# Patient Record
Sex: FEMALE | Race: WHITE | NOT HISPANIC OR LATINO | Employment: OTHER | URBAN - METROPOLITAN AREA
[De-identification: names, ages, dates, MRNs, and addresses within clinical notes are randomized per-mention and may not be internally consistent; named-entity substitution may affect disease eponyms.]

---

## 2017-01-16 ENCOUNTER — HOSPITAL ENCOUNTER (OUTPATIENT)
Dept: PAIN MEDICINE | Facility: HOSPITAL | Age: 82
Discharge: HOME | End: 2017-01-16
Attending: ANESTHESIOLOGY | Admitting: ANESTHESIOLOGY

## 2017-01-16 LAB
AMPHETAMINES UR QL SCN: NEGATIVE
BZE UR QL SCN: NEGATIVE
CREAT 24H UR-MCNC: ABNORMAL MG/DL
METHADONE UR QL SCN: NEGATIVE
OPIATE CONFIRMATION URINE: ABNORMAL
THC SERPLBLD CFM-MCNC: NEGATIVE NG/ML

## 2017-11-17 ENCOUNTER — HOSPITAL ENCOUNTER (OUTPATIENT)
Dept: PAIN MEDICINE | Facility: HOSPITAL | Age: 82
Discharge: HOME | End: 2017-11-17
Attending: ANESTHESIOLOGY | Admitting: ANESTHESIOLOGY

## 2017-11-22 ENCOUNTER — HOSPITAL ENCOUNTER (OUTPATIENT)
Dept: LAB | Facility: HOSPITAL | Age: 82
Discharge: HOME | End: 2017-11-22
Attending: NURSE PRACTITIONER | Admitting: NURSE PRACTITIONER

## 2017-11-22 LAB
ABS VARIANT LYMPHS: 0.18 10*3/UL
ALBUMIN SERPL-MCNC: 3.4 G/DL (ref 3.5–4.8)
ALBUMIN/GLOB SERPL: 0.8 {RATIO} (ref 1–1.7)
ALP SERPL-CCNC: 76 IU/L (ref 32–91)
ALT SERPL-CCNC: 10 IU/L (ref 14–54)
ANION GAP SERPL CALC-SCNC: 14.3 MMOL/L (ref 10–20)
AST SERPL-CCNC: 19 IU/L (ref 15–41)
BILIRUB SERPL-MCNC: 1.3 MG/DL (ref 0.3–1.2)
BUN SERPL-MCNC: 30 MG/DL (ref 8–20)
BUN/CREAT SERPL: 17.6 (ref 5.4–26.2)
CALCIUM SERPL-MCNC: 9 MG/DL (ref 8.9–10.3)
CHLORIDE SERPL-SCNC: 95 MMOL/L (ref 101–111)
CONV ABS BANDS: 1.7 10*3/UL
CONV ANISOCYTES: SLIGHT
CONV CO2: 27 MMOL/L (ref 22–32)
CONV DOHLE BODY IN BLOOD BY LIGHT MICROSCOPY: (no result)
CONV TOTAL PROTEIN: 7.5 G/DL (ref 6.1–7.9)
CONV TOXIC GRANULES IN BLOOD BY LIGHT MICROSCOPY: SLIGHT
CONV VARIANT LYMPHOCYTES RELATIVE PERCENT BY MANUAL COUNT: 1 % (ref 0–1)
CREAT UR-MCNC: 1.7 MG/DL (ref 0.4–1)
DIFFERENTIAL METHOD BLD: (no result)
ERYTHROCYTE [DISTWIDTH] IN BLOOD BY AUTOMATED COUNT: 14.9 % (ref 11.5–14.5)
GLOBULIN UR ELPH-MCNC: 4.1 G/DL (ref 2.5–3.8)
GLUCOSE SERPL-MCNC: 132 MG/DL (ref 65–99)
HCT VFR BLD AUTO: 34.7 % (ref 35–49)
HGB BLD-MCNC: 11.3 G/DL (ref 12–15)
LYMPHOCYTES # BLD AUTO: 0.6 10*3/UL (ref 0.8–4.8)
LYMPHOCYTES NFR BLD AUTO: 3 % (ref 18–42)
MCH RBC QN AUTO: 28.2 PG (ref 26–32)
MCHC RBC AUTO-ENTMCNC: 32.7 G/DL (ref 32–36)
MCV RBC AUTO: 86.4 FL (ref 80–94)
MONOCYTES # BLD AUTO: 0.4 10*3/UL (ref 0.1–1.3)
MONOCYTES NFR BLD AUTO: 2 % (ref 2–11)
NEUTROPHILS # BLD AUTO: 15.5 10*3/UL (ref 2.3–8.6)
NEUTROPHILS NFR BLD AUTO: 85 % (ref 50–75)
NEUTS BAND NFR BLD MANUAL: 9 % (ref 0–5)
PLATELET # BLD AUTO: 296 10*3/UL (ref 150–450)
PMV BLD AUTO: 7.2 FL (ref 7.4–10.4)
POTASSIUM SERPL-SCNC: 4.3 MMOL/L (ref 3.6–5.1)
RBC # BLD AUTO: 4.02 10*6/UL (ref 4–5.4)
SODIUM SERPL-SCNC: 132 MMOL/L (ref 136–144)
WBC # BLD AUTO: 18.4 10*3/UL (ref 4.5–11.5)

## 2017-12-08 ENCOUNTER — HOSPITAL ENCOUNTER (OUTPATIENT)
Dept: HOME HEALTH SERVICES | Facility: HOME HEALTHCARE | Age: 82
Setting detail: SPECIMEN
Discharge: HOME | End: 2017-12-08
Attending: FAMILY MEDICINE | Admitting: FAMILY MEDICINE

## 2017-12-08 LAB
ANION GAP SERPL CALC-SCNC: 11.4 MMOL/L (ref 10–20)
BUN SERPL-MCNC: 29 MG/DL (ref 8–20)
BUN/CREAT SERPL: 29 (ref 5.4–26.2)
CALCIUM SERPL-MCNC: 8.8 MG/DL (ref 8.9–10.3)
CHLORIDE SERPL-SCNC: 94 MMOL/L (ref 101–111)
CONV CO2: 28 MMOL/L (ref 22–32)
CREAT UR-MCNC: 1 MG/DL (ref 0.4–1)
GLUCOSE SERPL-MCNC: 126 MG/DL (ref 65–99)
POTASSIUM SERPL-SCNC: 4.4 MMOL/L (ref 3.6–5.1)
SODIUM SERPL-SCNC: 129 MMOL/L (ref 136–144)

## 2019-01-28 ENCOUNTER — HOSPITAL ENCOUNTER (OUTPATIENT)
Dept: CARDIOLOGY | Facility: HOSPITAL | Age: 84
Discharge: HOME | End: 2019-01-28
Attending: INTERNAL MEDICINE | Admitting: INTERNAL MEDICINE

## 2019-06-27 DIAGNOSIS — I48.20 CHRONIC ATRIAL FIBRILLATION (HCC): Primary | ICD-10-CM

## 2019-06-27 DIAGNOSIS — Z79.01 LONG TERM (CURRENT) USE OF ANTICOAGULANTS: ICD-10-CM

## 2019-06-27 RX ORDER — WARFARIN SODIUM 2.5 MG/1
TABLET ORAL
Qty: 90 TABLET | Refills: 0 | Status: SHIPPED | OUTPATIENT
Start: 2019-06-27 | End: 2019-10-09 | Stop reason: SDUPTHER

## 2019-07-23 ENCOUNTER — ANTICOAGULATION VISIT (OUTPATIENT)
Dept: CARDIOLOGY | Facility: CLINIC | Age: 84
End: 2019-07-23

## 2019-07-23 VITALS — DIASTOLIC BLOOD PRESSURE: 58 MMHG | SYSTOLIC BLOOD PRESSURE: 164 MMHG | WEIGHT: 103 LBS | HEART RATE: 74 BPM

## 2019-07-23 DIAGNOSIS — Z79.01 LONG TERM (CURRENT) USE OF ANTICOAGULANTS: ICD-10-CM

## 2019-07-23 DIAGNOSIS — I48.20 CHRONIC ATRIAL FIBRILLATION (HCC): ICD-10-CM

## 2019-07-23 PROBLEM — I48.91 ATRIAL FIBRILLATION: Status: ACTIVE | Noted: 2019-07-23

## 2019-07-23 LAB — INR PPP: 2.3 (ref 2–3)

## 2019-07-23 PROCEDURE — 85610 PROTHROMBIN TIME: CPT | Performed by: INTERNAL MEDICINE

## 2019-07-23 PROCEDURE — 36416 COLLJ CAPILLARY BLOOD SPEC: CPT | Performed by: INTERNAL MEDICINE

## 2019-08-05 ENCOUNTER — CLINICAL SUPPORT NO REQUIREMENTS (OUTPATIENT)
Dept: CARDIOLOGY | Facility: CLINIC | Age: 84
End: 2019-08-05

## 2019-08-05 DIAGNOSIS — I48.20 CHRONIC ATRIAL FIBRILLATION (HCC): ICD-10-CM

## 2019-08-05 DIAGNOSIS — Z95.0 PACEMAKER: Primary | ICD-10-CM

## 2019-08-05 DIAGNOSIS — I49.5 TACHY-BRADY SYNDROME (HCC): ICD-10-CM

## 2019-09-03 ENCOUNTER — ANTICOAGULATION VISIT (OUTPATIENT)
Dept: CARDIOLOGY | Facility: CLINIC | Age: 84
End: 2019-09-03

## 2019-09-03 VITALS — HEART RATE: 70 BPM | WEIGHT: 101 LBS | DIASTOLIC BLOOD PRESSURE: 71 MMHG | SYSTOLIC BLOOD PRESSURE: 163 MMHG

## 2019-09-03 DIAGNOSIS — Z79.01 LONG TERM (CURRENT) USE OF ANTICOAGULANTS: ICD-10-CM

## 2019-09-03 DIAGNOSIS — I48.20 CHRONIC ATRIAL FIBRILLATION (HCC): ICD-10-CM

## 2019-09-03 LAB — INR PPP: 2 (ref 0.9–1.1)

## 2019-09-03 PROCEDURE — 36416 COLLJ CAPILLARY BLOOD SPEC: CPT | Performed by: INTERNAL MEDICINE

## 2019-09-03 PROCEDURE — 85610 PROTHROMBIN TIME: CPT | Performed by: INTERNAL MEDICINE

## 2019-09-24 ENCOUNTER — CLINICAL SUPPORT NO REQUIREMENTS (OUTPATIENT)
Dept: CARDIOLOGY | Facility: CLINIC | Age: 84
End: 2019-09-24

## 2019-09-24 DIAGNOSIS — I49.5 SICK SINUS SYNDROME (HCC): Primary | ICD-10-CM

## 2019-09-24 DIAGNOSIS — Z95.0 PACEMAKER: ICD-10-CM

## 2019-09-24 PROCEDURE — 93296 REM INTERROG EVL PM/IDS: CPT | Performed by: INTERNAL MEDICINE

## 2019-09-24 PROCEDURE — 93294 REM INTERROG EVL PM/LDLS PM: CPT | Performed by: INTERNAL MEDICINE

## 2019-10-08 ENCOUNTER — ANTICOAGULATION VISIT (OUTPATIENT)
Dept: CARDIOLOGY | Facility: CLINIC | Age: 84
End: 2019-10-08

## 2019-10-08 VITALS — WEIGHT: 104 LBS | HEART RATE: 68 BPM | DIASTOLIC BLOOD PRESSURE: 70 MMHG | SYSTOLIC BLOOD PRESSURE: 166 MMHG

## 2019-10-08 DIAGNOSIS — I48.21 PERMANENT ATRIAL FIBRILLATION (HCC): ICD-10-CM

## 2019-10-08 DIAGNOSIS — Z79.01 LONG TERM (CURRENT) USE OF ANTICOAGULANTS: ICD-10-CM

## 2019-10-08 LAB — INR PPP: 1.7 (ref 0.9–1.1)

## 2019-10-08 PROCEDURE — 85610 PROTHROMBIN TIME: CPT | Performed by: INTERNAL MEDICINE

## 2019-10-08 PROCEDURE — 36416 COLLJ CAPILLARY BLOOD SPEC: CPT | Performed by: INTERNAL MEDICINE

## 2019-10-09 DIAGNOSIS — Z79.01 LONG TERM (CURRENT) USE OF ANTICOAGULANTS: ICD-10-CM

## 2019-10-09 DIAGNOSIS — I48.20 CHRONIC ATRIAL FIBRILLATION (HCC): ICD-10-CM

## 2019-10-09 RX ORDER — WARFARIN SODIUM 2.5 MG/1
TABLET ORAL
Qty: 90 TABLET | Refills: 0 | Status: SHIPPED | OUTPATIENT
Start: 2019-10-09 | End: 2019-12-30

## 2019-11-20 ENCOUNTER — CLINICAL SUPPORT NO REQUIREMENTS (OUTPATIENT)
Dept: CARDIOLOGY | Facility: CLINIC | Age: 84
End: 2019-11-20

## 2019-11-20 DIAGNOSIS — Z95.0 PACEMAKER: ICD-10-CM

## 2019-11-20 DIAGNOSIS — I49.5 TACHY-BRADY SYNDROME (HCC): Primary | ICD-10-CM

## 2019-11-26 ENCOUNTER — ANTICOAGULATION VISIT (OUTPATIENT)
Dept: CARDIOLOGY | Facility: CLINIC | Age: 84
End: 2019-11-26

## 2019-11-26 VITALS — SYSTOLIC BLOOD PRESSURE: 145 MMHG | HEART RATE: 76 BPM | WEIGHT: 103 LBS | DIASTOLIC BLOOD PRESSURE: 59 MMHG

## 2019-11-26 DIAGNOSIS — I48.21 PERMANENT ATRIAL FIBRILLATION (HCC): ICD-10-CM

## 2019-11-26 DIAGNOSIS — Z79.01 LONG TERM (CURRENT) USE OF ANTICOAGULANTS: ICD-10-CM

## 2019-11-26 LAB — INR PPP: 1.7 (ref 0.9–1.1)

## 2019-11-26 PROCEDURE — 85610 PROTHROMBIN TIME: CPT | Performed by: INTERNAL MEDICINE

## 2019-11-26 PROCEDURE — 36416 COLLJ CAPILLARY BLOOD SPEC: CPT | Performed by: INTERNAL MEDICINE

## 2019-12-27 RX ORDER — ACETAMINOPHEN 500 MG
TABLET ORAL
COMMUNITY
Start: 2016-01-26 | End: 2020-04-11

## 2019-12-27 RX ORDER — DIGOXIN 125 MCG
125 TABLET ORAL EVERY 24 HOURS
COMMUNITY
Start: 2014-08-15 | End: 2020-10-07

## 2019-12-27 RX ORDER — ASPIRIN 81 MG/1
81 TABLET ORAL DAILY
COMMUNITY
Start: 2014-08-15 | End: 2020-10-07

## 2019-12-27 RX ORDER — TRAMADOL HYDROCHLORIDE 50 MG/1
50 TABLET ORAL
COMMUNITY
Start: 2014-08-15 | End: 2020-10-07

## 2019-12-27 RX ORDER — LISINOPRIL 5 MG/1
5 TABLET ORAL EVERY 24 HOURS
COMMUNITY
Start: 2016-01-26 | End: 2020-10-07

## 2019-12-27 RX ORDER — AMLODIPINE BESYLATE 5 MG/1
TABLET ORAL EVERY 24 HOURS
COMMUNITY
Start: 2019-01-23 | End: 2020-01-20

## 2019-12-30 DIAGNOSIS — Z79.01 LONG TERM (CURRENT) USE OF ANTICOAGULANTS: ICD-10-CM

## 2019-12-30 DIAGNOSIS — I48.20 CHRONIC ATRIAL FIBRILLATION (HCC): ICD-10-CM

## 2019-12-30 RX ORDER — WARFARIN SODIUM 2.5 MG/1
TABLET ORAL
Qty: 100 TABLET | Refills: 0 | Status: SHIPPED | OUTPATIENT
Start: 2019-12-30 | End: 2020-01-28

## 2020-01-06 DIAGNOSIS — I48.20 CHRONIC ATRIAL FIBRILLATION (HCC): ICD-10-CM

## 2020-01-06 DIAGNOSIS — Z79.01 LONG TERM (CURRENT) USE OF ANTICOAGULANTS: ICD-10-CM

## 2020-01-07 ENCOUNTER — ANTICOAGULATION VISIT (OUTPATIENT)
Dept: CARDIOLOGY | Facility: CLINIC | Age: 85
End: 2020-01-07

## 2020-01-07 ENCOUNTER — OFFICE VISIT (OUTPATIENT)
Dept: CARDIOLOGY | Facility: CLINIC | Age: 85
End: 2020-01-07

## 2020-01-07 ENCOUNTER — CLINICAL SUPPORT NO REQUIREMENTS (OUTPATIENT)
Dept: CARDIOLOGY | Facility: CLINIC | Age: 85
End: 2020-01-07

## 2020-01-07 VITALS — WEIGHT: 105 LBS | HEART RATE: 79 BPM | SYSTOLIC BLOOD PRESSURE: 139 MMHG | DIASTOLIC BLOOD PRESSURE: 61 MMHG

## 2020-01-07 VITALS
BODY MASS INDEX: 20.87 KG/M2 | HEART RATE: 79 BPM | HEIGHT: 59 IN | DIASTOLIC BLOOD PRESSURE: 61 MMHG | SYSTOLIC BLOOD PRESSURE: 139 MMHG | WEIGHT: 103.5 LBS

## 2020-01-07 DIAGNOSIS — Z79.01 LONG TERM (CURRENT) USE OF ANTICOAGULANTS: ICD-10-CM

## 2020-01-07 DIAGNOSIS — Z95.0 PACEMAKER: ICD-10-CM

## 2020-01-07 DIAGNOSIS — I48.0 PAROXYSMAL ATRIAL FIBRILLATION (HCC): Primary | ICD-10-CM

## 2020-01-07 DIAGNOSIS — I48.21 PERMANENT ATRIAL FIBRILLATION (HCC): ICD-10-CM

## 2020-01-07 DIAGNOSIS — I49.5 TACHY-BRADY SYNDROME (HCC): Primary | ICD-10-CM

## 2020-01-07 DIAGNOSIS — I49.5 TACHY-BRADY SYNDROME (HCC): ICD-10-CM

## 2020-01-07 LAB — INR PPP: 2 (ref 0.9–1.1)

## 2020-01-07 PROCEDURE — 93000 ELECTROCARDIOGRAM COMPLETE: CPT | Performed by: INTERNAL MEDICINE

## 2020-01-07 PROCEDURE — 99214 OFFICE O/P EST MOD 30 MIN: CPT | Performed by: INTERNAL MEDICINE

## 2020-01-07 PROCEDURE — 93280 PM DEVICE PROGR EVAL DUAL: CPT | Performed by: INTERNAL MEDICINE

## 2020-01-07 PROCEDURE — 85610 PROTHROMBIN TIME: CPT | Performed by: INTERNAL MEDICINE

## 2020-01-07 PROCEDURE — 36416 COLLJ CAPILLARY BLOOD SPEC: CPT | Performed by: INTERNAL MEDICINE

## 2020-01-07 RX ORDER — WARFARIN SODIUM 2.5 MG/1
TABLET ORAL
Qty: 90 TABLET | Refills: 0 | OUTPATIENT
Start: 2020-01-07

## 2020-01-07 NOTE — TELEPHONE ENCOUNTER
Called spoke with patient she did not remember requesting the med in December advised we sent refill 12/30/19 and rec'd pharmacy confirmation. Per patient okay to cancel new request.

## 2020-01-07 NOTE — PROGRESS NOTES
Encounter Date:01/07/2020  Last seen 6/11/2019      Patient ID: Alexandrea Gaxiola is a 87 y.o. female.    Chief Complaint:  Status post pacemaker  Paroxysmal atrial fibrillation  Anticoagulation management  Hypertension      History of Present Illness  Since I have last seen, the patient has been without any chest discomfort ,shortness of breath, palpitations, dizziness or syncope.  Denies having any headache ,abdominal pain ,nausea, vomiting , diarrhea constipation, loss of weight or loss of appetite.  Denies having any excessive bruising ,hematuria or blood in the stool.    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  Pacemaker was reprogrammed to DDDR due to long AV delays.      -history of intermittent atrial fibrillation.  Patient is in Sinus rhythm.  Patient is on Coumadin.  INR is   1.6     -moderate mitral regurgitation.  Echocardiogram 01/28/2019 revealed moderate mitral regurgitation left atrial enlargement normal left ventricle function.      -  Status post  subendocardial myocardial infarction -improved.     Cardiac catheterization 12/29/2015 revealed mild anterior wall hypokinesis with ejection fraction of 50%, 40% mid LAD 70-80% ostial diagonal branch disease (small caliber vessel) and 80% circumflex distal to a large marginal branch.      -status post left carotid endarterectomy cervical spine surgery left hip replacement and recent scalp surgery for carcinoma and grafting.     -hypertension-not well controlled.      -allergy to codeine, morphine and Demerol.     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     ===============    Plan  ================  Patient is not having any angina pectoris or congestive heart failure.  EKG showed dual-chamber pacemaker rhythm at a rate of  60/min.  Anticoagulation status was reviewed.  INR is 2.0 INR   1.6  Continue Coumadin.    Continue atenolol to 25 mg tablet 2 tablets in the morning and 1 tablet in the evening.  Continue amlodipine to 5 mg a day  Interrogation of the pacemaker revealed excellent pacing parameters.  Battery status is 7 months   Medications were reviewed and updated.  Follow-up in the office in 6 months with pacemaker interrogation.  Have discussed with patient regarding possible need for pulse generator replacement in the near future.  ////////////////////////////             Diagnosis Plan   1. Paroxysmal atrial fibrillation (CMS/HCC)     2. Pacemaker     3. Tachy-ramiro syndrome (CMS/HCC)     4. Long term (current) use of anticoagulants [Z79.01]     LAB RESULTS (LAST 7 DAYS)    CBC        BMP        CMP         BNP        TROPONIN        CoAg  Results from last 7 days   Lab Units 01/07/20  1054   INR  2.00*       Creatinine Clearance  CrCl cannot be calculated (Patient's most recent lab result is older than the maximum 30 days allowed.).    ABG        Radiology  No radiology results for the last day                The following portions of the patient's history were reviewed and updated as appropriate: allergies, current medications, past family history, past medical history, past social history, past surgical history and problem list.    Review of Systems   Constitution: Negative for malaise/fatigue.   Cardiovascular: Positive for leg swelling (some swelling in feet). Negative for chest pain, palpitations and syncope.   Respiratory: Negative for shortness of breath.    Skin: Negative for rash.   Gastrointestinal: Negative for nausea and vomiting.   Neurological: Negative for dizziness, light-headedness and numbness.         Current Outpatient Medications:   •  amLODIPine (NORVASC) 5 MG tablet, Daily., Disp: , Rfl:   •  aspirin (ASPIR-LOW) 81 MG EC tablet, ASPIR-LOW 81 MG TBEC, Disp: , Rfl:   •  Calcium Carbonate-Vit D-Min  "(CALCIUM 1200) 7746-4600 MG-UNIT chewable tablet, CALCIUM 1200 6078-8361 MG-UNIT CHEW, Disp: , Rfl:   •  digoxin (LANOXIN) 125 MCG tablet, Daily., Disp: , Rfl:   •  lisinopril (PRINIVIL,ZESTRIL) 5 MG tablet, Daily., Disp: , Rfl:   •  metoprolol tartrate (LOPRESSOR) 25 MG tablet, Every 12 (Twelve) Hours., Disp: , Rfl:   •  Multiple Vitamins-Minerals (MULTI VITAMIN/MINERALS) tablet, MULTI VITAMIN/MINERALS TABS, Disp: , Rfl:   •  traMADol (ULTRAM) 50 MG tablet, TRAMADOL HCL 50 MG TABS, Disp: , Rfl:   •  warfarin (COUMADIN) 2.5 MG tablet, Take 2 tablets by mouth Wednesday and 1 tablet by mouth all other days or as directed., Disp: 100 tablet, Rfl: 0    Allergies   Allergen Reactions   • Codeine Other (See Comments)   • Hydrocodone-Acetaminophen Other (See Comments)   • Meperidine Other (See Comments)   • Morphine Other (See Comments)       History reviewed. No pertinent family history.    History reviewed. No pertinent surgical history.    History reviewed. No pertinent past medical history.    History reviewed. No pertinent family history.    Social History     Socioeconomic History   • Marital status:      Spouse name: Not on file   • Number of children: Not on file   • Years of education: Not on file   • Highest education level: Not on file   Tobacco Use   • Smoking status: Never Smoker   • Smokeless tobacco: Never Used   Substance and Sexual Activity   • Alcohol use: Never     Frequency: Never           ECG 12 Lead  Date/Time: 1/7/2020 11:32 AM  Performed by: Mignon Luke MD  Authorized by: Mignon Luke MD   Comparison: compared with previous ECG   Similar to previous ECG  Comments: Dual-chamber pacemaker rhythm 62/min nonspecific ST-T wave changes left anterior vascular block no ectopy compared to 6/3/2019 no significant changes seen              Objective:       Physical Exam    /61   Pulse 79   Ht 149.9 cm (59\")   Wt 46.9 kg (103 lb 8 oz)   BMI 20.90 kg/m²   The patient is alert, " oriented and in no distress.    Vital signs as noted above.    Head and neck revealed no carotid bruits or jugular venous distension.  No thyromegaly or lymphadenopathy is present.    Lungs clear.  No wheezing.  Breath sounds are normal bilaterally.    Heart normal first and second heart sounds.  No murmur..  No pericardial rub is present.  No gallop is present.    Abdomen soft and nontender.  No organomegaly is present.    Extremities revealed good peripheral pulses without any pedal edema.    Skin warm and dry.  Pacemaker site looks normal.    Musculoskeletal system is grossly normal.    CNS grossly normal.

## 2020-01-20 RX ORDER — AMLODIPINE BESYLATE 5 MG/1
TABLET ORAL
Qty: 90 TABLET | Refills: 1 | Status: SHIPPED | OUTPATIENT
Start: 2020-01-20 | End: 2020-04-11

## 2020-01-28 DIAGNOSIS — Z79.01 LONG TERM (CURRENT) USE OF ANTICOAGULANTS: ICD-10-CM

## 2020-01-28 DIAGNOSIS — I48.20 CHRONIC ATRIAL FIBRILLATION (HCC): ICD-10-CM

## 2020-01-28 RX ORDER — WARFARIN SODIUM 2.5 MG/1
TABLET ORAL
Qty: 100 TABLET | Refills: 0 | Status: SHIPPED | OUTPATIENT
Start: 2020-01-28 | End: 2020-04-11

## 2020-02-18 ENCOUNTER — ANTICOAGULATION VISIT (OUTPATIENT)
Dept: CARDIOLOGY | Facility: CLINIC | Age: 85
End: 2020-02-18

## 2020-02-18 VITALS
HEART RATE: 74 BPM | BODY MASS INDEX: 21.01 KG/M2 | DIASTOLIC BLOOD PRESSURE: 62 MMHG | SYSTOLIC BLOOD PRESSURE: 145 MMHG | WEIGHT: 104 LBS

## 2020-02-18 DIAGNOSIS — I48.0 PAROXYSMAL ATRIAL FIBRILLATION (HCC): ICD-10-CM

## 2020-02-18 DIAGNOSIS — Z79.01 LONG TERM (CURRENT) USE OF ANTICOAGULANTS: ICD-10-CM

## 2020-02-18 LAB — INR PPP: 1.2 (ref 0.9–1.1)

## 2020-02-18 PROCEDURE — 85610 PROTHROMBIN TIME: CPT | Performed by: INTERNAL MEDICINE

## 2020-02-18 PROCEDURE — 36416 COLLJ CAPILLARY BLOOD SPEC: CPT | Performed by: INTERNAL MEDICINE

## 2020-03-10 PROCEDURE — 88108 CYTOPATH CONCENTRATE TECH: CPT | Performed by: RADIOLOGY

## 2020-03-11 ENCOUNTER — LAB REQUISITION (OUTPATIENT)
Dept: LAB | Facility: HOSPITAL | Age: 85
End: 2020-03-11

## 2020-03-11 DIAGNOSIS — Z00.00 ENCOUNTER FOR GENERAL ADULT MEDICAL EXAMINATION WITHOUT ABNORMAL FINDINGS: ICD-10-CM

## 2020-03-12 LAB
LAB AP CASE REPORT: NORMAL
PATH REPORT.FINAL DX SPEC: NORMAL
PATH REPORT.GROSS SPEC: NORMAL

## 2020-03-17 ENCOUNTER — TELEPHONE (OUTPATIENT)
Dept: CARDIOLOGY | Facility: CLINIC | Age: 85
End: 2020-03-17

## 2020-03-17 NOTE — TELEPHONE ENCOUNTER
Spoke to daughter Qi and advised her that it was not necessary to bring Alexandrea out for INR if she was not having any problems. Did encourage her to speak to PCP about possible liver function studies d/t yellowing of eyes. cj

## 2020-03-17 NOTE — TELEPHONE ENCOUNTER
Qi-daughter [phone], called stating that she does not want to get her mother out right now, she has been weak and she has noticed some yellowing of her eyes. They did contact her PCP and was advised to stay home. Alexandrea was scheduled for an INR check today. Qi was made aware that I would forward a message to the nurses to advise on how long she can go without having this checked.

## 2020-04-10 ENCOUNTER — APPOINTMENT (OUTPATIENT)
Dept: CT IMAGING | Facility: HOSPITAL | Age: 85
End: 2020-04-10

## 2020-04-10 ENCOUNTER — HOSPITAL ENCOUNTER (OUTPATIENT)
Facility: HOSPITAL | Age: 85
Setting detail: OBSERVATION
Discharge: HOME | End: 2020-04-14
Attending: EMERGENCY MEDICINE | Admitting: FAMILY MEDICINE

## 2020-04-10 ENCOUNTER — ANTICOAGULATION VISIT (OUTPATIENT)
Dept: CARDIOLOGY | Facility: CLINIC | Age: 85
End: 2020-04-10

## 2020-04-10 ENCOUNTER — APPOINTMENT (OUTPATIENT)
Dept: GENERAL RADIOLOGY | Facility: HOSPITAL | Age: 85
End: 2020-04-10

## 2020-04-10 VITALS
HEART RATE: 72 BPM | DIASTOLIC BLOOD PRESSURE: 60 MMHG | BODY MASS INDEX: 21.41 KG/M2 | WEIGHT: 106 LBS | SYSTOLIC BLOOD PRESSURE: 148 MMHG

## 2020-04-10 DIAGNOSIS — I50.9 ACUTE CONGESTIVE HEART FAILURE, UNSPECIFIED HEART FAILURE TYPE (HCC): ICD-10-CM

## 2020-04-10 DIAGNOSIS — J18.9 PNEUMONIA DUE TO INFECTIOUS ORGANISM, UNSPECIFIED LATERALITY, UNSPECIFIED PART OF LUNG: Primary | ICD-10-CM

## 2020-04-10 DIAGNOSIS — I48.0 PAROXYSMAL ATRIAL FIBRILLATION (HCC): ICD-10-CM

## 2020-04-10 DIAGNOSIS — Z79.01 LONG TERM (CURRENT) USE OF ANTICOAGULANTS: ICD-10-CM

## 2020-04-10 DIAGNOSIS — J96.01 ACUTE RESPIRATORY FAILURE WITH HYPOXEMIA (HCC): ICD-10-CM

## 2020-04-10 LAB
ANISOCYTOSIS BLD QL: ABNORMAL
D-LACTATE SERPL-SCNC: 1 MMOL/L (ref 0.5–2)
DEPRECATED RDW RBC AUTO: 47.7 FL (ref 37–54)
DIGOXIN SERPL-MCNC: 1 NG/ML (ref 0.6–1.2)
EOSINOPHIL # BLD MANUAL: 0.18 10*3/MM3 (ref 0–0.4)
EOSINOPHIL NFR BLD MANUAL: 1 % (ref 0.3–6.2)
ERYTHROCYTE [DISTWIDTH] IN BLOOD BY AUTOMATED COUNT: 15.5 % (ref 12.3–15.4)
HCT VFR BLD AUTO: 36.6 % (ref 34–46.6)
HGB BLD-MCNC: 12.5 G/DL (ref 12–15.9)
INR PPP: 1.02 (ref 2–3)
INR PPP: 1.3 (ref 0.9–1.1)
LYMPHOCYTES # BLD MANUAL: 0.92 10*3/MM3 (ref 0.7–3.1)
LYMPHOCYTES NFR BLD MANUAL: 3 % (ref 5–12)
LYMPHOCYTES NFR BLD MANUAL: 5 % (ref 19.6–45.3)
MCH RBC QN AUTO: 29.6 PG (ref 26.6–33)
MCHC RBC AUTO-ENTMCNC: 34.2 G/DL (ref 31.5–35.7)
MCV RBC AUTO: 86.5 FL (ref 79–97)
METAMYELOCYTES NFR BLD MANUAL: 1 % (ref 0–0)
MONOCYTES # BLD AUTO: 0.55 10*3/MM3 (ref 0.1–0.9)
MYELOCYTES NFR BLD MANUAL: 2 % (ref 0–0)
NEUTROPHILS # BLD AUTO: 16.19 10*3/MM3 (ref 1.7–7)
NEUTROPHILS NFR BLD MANUAL: 83 % (ref 42.7–76)
NEUTS BAND NFR BLD MANUAL: 5 % (ref 0–5)
NT-PROBNP SERPL-MCNC: 4650 PG/ML (ref 5–1800)
PLAT MORPH BLD: NORMAL
PLATELET # BLD AUTO: 354 10*3/MM3 (ref 140–450)
PMV BLD AUTO: 7 FL (ref 6–12)
POLYCHROMASIA BLD QL SMEAR: ABNORMAL
PROCALCITONIN SERPL-MCNC: 0.11 NG/ML (ref 0.1–0.25)
PROTHROMBIN TIME: 10.7 SECONDS (ref 19.4–28.5)
RBC # BLD AUTO: 4.23 10*6/MM3 (ref 3.77–5.28)
SCAN SLIDE: NORMAL
WBC MORPH BLD: NORMAL
WBC NRBC COR # BLD: 18.4 10*3/MM3 (ref 3.4–10.8)

## 2020-04-10 PROCEDURE — 0099U HC BIOFIRE FILMARRAY RESP PANEL 1: CPT | Performed by: EMERGENCY MEDICINE

## 2020-04-10 PROCEDURE — 71045 X-RAY EXAM CHEST 1 VIEW: CPT

## 2020-04-10 PROCEDURE — 85025 COMPLETE CBC W/AUTO DIFF WBC: CPT | Performed by: EMERGENCY MEDICINE

## 2020-04-10 PROCEDURE — 80162 ASSAY OF DIGOXIN TOTAL: CPT | Performed by: EMERGENCY MEDICINE

## 2020-04-10 PROCEDURE — 93005 ELECTROCARDIOGRAM TRACING: CPT | Performed by: EMERGENCY MEDICINE

## 2020-04-10 PROCEDURE — 84484 ASSAY OF TROPONIN QUANT: CPT | Performed by: EMERGENCY MEDICINE

## 2020-04-10 PROCEDURE — 85610 PROTHROMBIN TIME: CPT | Performed by: INTERNAL MEDICINE

## 2020-04-10 PROCEDURE — 36416 COLLJ CAPILLARY BLOOD SPEC: CPT | Performed by: INTERNAL MEDICINE

## 2020-04-10 PROCEDURE — 71250 CT THORAX DX C-: CPT

## 2020-04-10 PROCEDURE — 83880 ASSAY OF NATRIURETIC PEPTIDE: CPT | Performed by: EMERGENCY MEDICINE

## 2020-04-10 PROCEDURE — 80053 COMPREHEN METABOLIC PANEL: CPT | Performed by: EMERGENCY MEDICINE

## 2020-04-10 PROCEDURE — 99284 EMERGENCY DEPT VISIT MOD MDM: CPT

## 2020-04-10 PROCEDURE — 85007 BL SMEAR W/DIFF WBC COUNT: CPT | Performed by: EMERGENCY MEDICINE

## 2020-04-10 PROCEDURE — 84145 PROCALCITONIN (PCT): CPT | Performed by: EMERGENCY MEDICINE

## 2020-04-10 PROCEDURE — 85610 PROTHROMBIN TIME: CPT | Performed by: EMERGENCY MEDICINE

## 2020-04-10 PROCEDURE — 83605 ASSAY OF LACTIC ACID: CPT

## 2020-04-10 PROCEDURE — 87635 SARS-COV-2 COVID-19 AMP PRB: CPT | Performed by: EMERGENCY MEDICINE

## 2020-04-10 PROCEDURE — 87040 BLOOD CULTURE FOR BACTERIA: CPT | Performed by: EMERGENCY MEDICINE

## 2020-04-10 RX ORDER — ACETAMINOPHEN 325 MG/1
TABLET ORAL
Status: DISPENSED
Start: 2020-04-10 | End: 2020-04-11

## 2020-04-10 RX ORDER — SODIUM CHLORIDE 0.9 % (FLUSH) 0.9 %
10 SYRINGE (ML) INJECTION AS NEEDED
Status: DISCONTINUED | OUTPATIENT
Start: 2020-04-10 | End: 2020-04-14 | Stop reason: HOSPADM

## 2020-04-10 RX ORDER — ACETAMINOPHEN 325 MG/1
650 TABLET ORAL ONCE
Status: COMPLETED | OUTPATIENT
Start: 2020-04-10 | End: 2020-04-10

## 2020-04-10 RX ADMIN — ACETAMINOPHEN 650 MG: 325 TABLET, FILM COATED ORAL at 23:14

## 2020-04-11 PROBLEM — J18.9 PNEUMONIA DUE TO INFECTIOUS ORGANISM: Status: ACTIVE | Noted: 2020-04-11

## 2020-04-11 PROBLEM — I50.31 ACUTE DIASTOLIC CONGESTIVE HEART FAILURE (HCC): Status: ACTIVE | Noted: 2020-04-11

## 2020-04-11 LAB
ALBUMIN SERPL-MCNC: 4 G/DL (ref 3.5–5.2)
ALBUMIN/GLOB SERPL: 1.1 G/DL
ALP SERPL-CCNC: 84 U/L (ref 39–117)
ALT SERPL W P-5'-P-CCNC: 8 U/L (ref 1–33)
ANION GAP SERPL CALCULATED.3IONS-SCNC: 13 MMOL/L (ref 5–15)
AST SERPL-CCNC: 22 U/L (ref 1–32)
B PERT DNA SPEC QL NAA+PROBE: NOT DETECTED
BILIRUB SERPL-MCNC: 0.3 MG/DL (ref 0.2–1.2)
BUN BLD-MCNC: 24 MG/DL (ref 8–23)
BUN/CREAT SERPL: 23.8 (ref 7–25)
C PNEUM DNA NPH QL NAA+NON-PROBE: NOT DETECTED
CALCIUM SPEC-SCNC: 9.3 MG/DL (ref 8.6–10.5)
CHLORIDE SERPL-SCNC: 97 MMOL/L (ref 98–107)
CO2 SERPL-SCNC: 24 MMOL/L (ref 22–29)
CREAT BLD-MCNC: 1.01 MG/DL (ref 0.57–1)
FLUAV H1 2009 PAND RNA NPH QL NAA+PROBE: NOT DETECTED
FLUAV H1 HA GENE NPH QL NAA+PROBE: NOT DETECTED
FLUAV H3 RNA NPH QL NAA+PROBE: NOT DETECTED
FLUAV SUBTYP SPEC NAA+PROBE: NOT DETECTED
FLUBV RNA ISLT QL NAA+PROBE: NOT DETECTED
GFR SERPL CREATININE-BSD FRML MDRD: 52 ML/MIN/1.73
GLOBULIN UR ELPH-MCNC: 3.7 GM/DL
GLUCOSE BLD-MCNC: 144 MG/DL (ref 65–99)
HADV DNA SPEC NAA+PROBE: NOT DETECTED
HCOV 229E RNA SPEC QL NAA+PROBE: NOT DETECTED
HCOV HKU1 RNA SPEC QL NAA+PROBE: NOT DETECTED
HCOV NL63 RNA SPEC QL NAA+PROBE: NOT DETECTED
HCOV OC43 RNA SPEC QL NAA+PROBE: NOT DETECTED
HMPV RNA NPH QL NAA+NON-PROBE: NOT DETECTED
HOLD SPECIMEN: NORMAL
HOLD SPECIMEN: NORMAL
HPIV1 RNA SPEC QL NAA+PROBE: NOT DETECTED
HPIV2 RNA SPEC QL NAA+PROBE: NOT DETECTED
HPIV3 RNA NPH QL NAA+PROBE: NOT DETECTED
HPIV4 P GENE NPH QL NAA+PROBE: NOT DETECTED
M PNEUMO IGG SER IA-ACNC: NOT DETECTED
POTASSIUM BLD-SCNC: 5.1 MMOL/L (ref 3.5–5.2)
PROT SERPL-MCNC: 7.7 G/DL (ref 6–8.5)
RHINOVIRUS RNA SPEC NAA+PROBE: NOT DETECTED
RSV RNA NPH QL NAA+NON-PROBE: NOT DETECTED
SARS-COV-2 RNA RESP QL NAA+PROBE: NOT DETECTED
SODIUM BLD-SCNC: 134 MMOL/L (ref 136–145)
TROPONIN T SERPL-MCNC: <0.01 NG/ML (ref 0–0.03)
WHOLE BLOOD HOLD SPECIMEN: NORMAL
WHOLE BLOOD HOLD SPECIMEN: NORMAL

## 2020-04-11 PROCEDURE — 96375 TX/PRO/DX INJ NEW DRUG ADDON: CPT

## 2020-04-11 PROCEDURE — 25010000002 AZITHROMYCIN PER 500 MG: Performed by: EMERGENCY MEDICINE

## 2020-04-11 PROCEDURE — 96372 THER/PROPH/DIAG INJ SC/IM: CPT

## 2020-04-11 PROCEDURE — 25010000002 FUROSEMIDE PER 20 MG: Performed by: NURSE PRACTITIONER

## 2020-04-11 PROCEDURE — 25010000002 ENOXAPARIN PER 10 MG: Performed by: NURSE PRACTITIONER

## 2020-04-11 PROCEDURE — 96365 THER/PROPH/DIAG IV INF INIT: CPT

## 2020-04-11 PROCEDURE — 25010000002 FUROSEMIDE PER 20 MG: Performed by: EMERGENCY MEDICINE

## 2020-04-11 PROCEDURE — G0378 HOSPITAL OBSERVATION PER HR: HCPCS

## 2020-04-11 PROCEDURE — 99213 OFFICE O/P EST LOW 20 MIN: CPT | Performed by: INTERNAL MEDICINE

## 2020-04-11 PROCEDURE — 25010000002 CEFTRIAXONE IN SWFI 2 GRAMS/20ML IV PUSH SYRINGE (SIMPLE): Performed by: EMERGENCY MEDICINE

## 2020-04-11 PROCEDURE — 25010000002 CEFTRIAXONE PER 250 MG: Performed by: NURSE PRACTITIONER

## 2020-04-11 RX ORDER — FUROSEMIDE 10 MG/ML
40 INJECTION INTRAMUSCULAR; INTRAVENOUS ONCE
Status: COMPLETED | OUTPATIENT
Start: 2020-04-11 | End: 2020-04-11

## 2020-04-11 RX ORDER — WARFARIN SODIUM 2.5 MG/1
2.5 TABLET ORAL
COMMUNITY
End: 2020-08-31

## 2020-04-11 RX ORDER — SODIUM CHLORIDE 0.9 % (FLUSH) 0.9 %
10 SYRINGE (ML) INJECTION EVERY 12 HOURS SCHEDULED
Status: DISCONTINUED | OUTPATIENT
Start: 2020-04-11 | End: 2020-04-11

## 2020-04-11 RX ORDER — SODIUM CHLORIDE 0.9 % (FLUSH) 0.9 %
10 SYRINGE (ML) INJECTION AS NEEDED
Status: DISCONTINUED | OUTPATIENT
Start: 2020-04-11 | End: 2020-04-11

## 2020-04-11 RX ORDER — WARFARIN SODIUM 2.5 MG/1
5 TABLET ORAL
COMMUNITY
End: 2020-08-31 | Stop reason: DRUGHIGH

## 2020-04-11 RX ORDER — SODIUM CHLORIDE 0.9 % (FLUSH) 0.9 %
10 SYRINGE (ML) INJECTION EVERY 12 HOURS SCHEDULED
Status: DISCONTINUED | OUTPATIENT
Start: 2020-04-11 | End: 2020-04-14 | Stop reason: HOSPADM

## 2020-04-11 RX ORDER — AMLODIPINE BESYLATE 5 MG/1
5 TABLET ORAL DAILY
COMMUNITY
End: 2020-07-20

## 2020-04-11 RX ORDER — AZITHROMYCIN 250 MG/1
250 TABLET, FILM COATED ORAL
Status: DISCONTINUED | OUTPATIENT
Start: 2020-04-12 | End: 2020-04-14 | Stop reason: HOSPADM

## 2020-04-11 RX ORDER — DIGOXIN 125 MCG
125 TABLET ORAL DAILY
Status: DISCONTINUED | OUTPATIENT
Start: 2020-04-11 | End: 2020-04-14 | Stop reason: HOSPADM

## 2020-04-11 RX ORDER — WARFARIN SODIUM 2.5 MG/1
2.5 TABLET ORAL
Status: DISCONTINUED | OUTPATIENT
Start: 2020-04-11 | End: 2020-04-12

## 2020-04-11 RX ORDER — WARFARIN SODIUM 5 MG/1
5 TABLET ORAL
Status: DISCONTINUED | OUTPATIENT
Start: 2020-04-15 | End: 2020-04-12

## 2020-04-11 RX ORDER — ASPIRIN 81 MG/1
81 TABLET ORAL DAILY
Status: DISCONTINUED | OUTPATIENT
Start: 2020-04-11 | End: 2020-04-14 | Stop reason: HOSPADM

## 2020-04-11 RX ORDER — ACETAMINOPHEN 325 MG/1
650 TABLET ORAL EVERY 6 HOURS PRN
Status: DISCONTINUED | OUTPATIENT
Start: 2020-04-11 | End: 2020-04-14 | Stop reason: HOSPADM

## 2020-04-11 RX ORDER — NITROGLYCERIN 0.4 MG/1
0.4 TABLET SUBLINGUAL
Status: DISCONTINUED | OUTPATIENT
Start: 2020-04-11 | End: 2020-04-14 | Stop reason: HOSPADM

## 2020-04-11 RX ORDER — ONDANSETRON 2 MG/ML
4 INJECTION INTRAMUSCULAR; INTRAVENOUS EVERY 6 HOURS PRN
Status: DISCONTINUED | OUTPATIENT
Start: 2020-04-11 | End: 2020-04-14 | Stop reason: HOSPADM

## 2020-04-11 RX ADMIN — WARFARIN 2.5 MG: 2.5 TABLET ORAL at 18:39

## 2020-04-11 RX ADMIN — AZITHROMYCIN MONOHYDRATE 500 MG: 500 INJECTION, POWDER, LYOPHILIZED, FOR SOLUTION INTRAVENOUS at 00:23

## 2020-04-11 RX ADMIN — Medication 10 ML: at 21:55

## 2020-04-11 RX ADMIN — CEFTRIAXONE 2 G: 2 INJECTION, POWDER, FOR SOLUTION INTRAMUSCULAR; INTRAVENOUS at 00:24

## 2020-04-11 RX ADMIN — ENOXAPARIN SODIUM 30 MG: 30 INJECTION SUBCUTANEOUS at 15:13

## 2020-04-11 RX ADMIN — ASPIRIN 81 MG: 81 TABLET, COATED ORAL at 13:43

## 2020-04-11 RX ADMIN — METOPROLOL TARTRATE 25 MG: 25 TABLET, FILM COATED ORAL at 13:43

## 2020-04-11 RX ADMIN — FUROSEMIDE 40 MG: 40 INJECTION, SOLUTION INTRAMUSCULAR; INTRAVENOUS at 01:43

## 2020-04-11 RX ADMIN — METOPROLOL TARTRATE 25 MG: 25 TABLET, FILM COATED ORAL at 21:55

## 2020-04-11 RX ADMIN — CEFTRIAXONE SODIUM 1 G: 1 INJECTION, POWDER, FOR SOLUTION INTRAMUSCULAR; INTRAVENOUS at 21:55

## 2020-04-11 RX ADMIN — FUROSEMIDE 40 MG: 40 INJECTION, SOLUTION INTRAMUSCULAR; INTRAVENOUS at 13:43

## 2020-04-11 RX ADMIN — DIGOXIN 125 MCG: 0.12 TABLET ORAL at 13:43

## 2020-04-11 NOTE — CONSULTS
Referring Provider: Dr. Guzman  Reason for Consultation: CHF    Patient Care Team:  Yayo Guzman MD as PCP - General  Flora Guzman MD as PCP - Family Medicine    Chief complaint shortness of breath        History of present illness:  Alexandrea Gaxiola is a 87 y.o. female who presents with shortness of breath and cough.   87-year-old white female patient admitted to hospital with increasing cough for 1 week shortness of breath no significant lower extremity edema PND orthopnea no chest pain or syncopal episodes  Patient was evaluated in the emergency room BNP was elevated so she was admitted to the hospital for CHF and also to rule out acute viral infection  Currently she is in isolation  She denies of any active symptoms at rest  She is does have cough  Previously she underwent permanent pacemaker placement and also on anticoagulation for paroxysmal atrial fibrillation  Symptoms started last 1 week    Review of Systems   Constitution: Positive for malaise/fatigue. Negative for fever.   HENT: Negative for congestion and hearing loss.    Eyes: Negative for double vision and visual disturbance.   Cardiovascular: Positive for dyspnea on exertion. Negative for chest pain, claudication, leg swelling and syncope.   Respiratory: Positive for cough and shortness of breath.    Endocrine: Negative for cold intolerance.   Skin: Negative for color change and rash.   Musculoskeletal: Negative for arthritis and joint pain.   Gastrointestinal: Negative for abdominal pain and heartburn.   Genitourinary: Negative for hematuria.   Neurological: Negative for excessive daytime sleepiness and dizziness.   Psychiatric/Behavioral: Negative for depression. The patient is not nervous/anxious.    All other systems reviewed and are negative.      History   past medical history history of permanent pacemaker placement paroxysmal atrial fibrillation mitral insufficiency    History of coronary artery disease    Family history of  "hypertension    Social History     Tobacco Use   • Smoking status: Never Smoker   • Smokeless tobacco: Never Used   Substance Use Topics   • Alcohol use: Never     Frequency: Never   • Drug use: Not on file        Medications Prior to Admission   Medication Sig Dispense Refill Last Dose   • amLODIPine (NORVASC) 5 MG tablet Take 5 mg by mouth Daily.      • aspirin (ASPIR-LOW) 81 MG EC tablet Take 81 mg by mouth Daily.      • digoxin (LANOXIN) 125 MCG tablet Take 125 mcg by mouth Daily.      • lisinopril (PRINIVIL,ZESTRIL) 5 MG tablet Take 5 mg by mouth Daily.      • metoprolol tartrate (LOPRESSOR) 25 MG tablet Take 25 mg by mouth 2 (Two) Times a Day.      • Multiple Vitamins-Minerals (MULTI VITAMIN/MINERALS) tablet Take 1 tablet by mouth Daily.      • traMADol (ULTRAM) 50 MG tablet Take 50 mg by mouth Every 3 (Three) Hours As Needed for Moderate Pain .      • warfarin (COUMADIN) 2.5 MG tablet Take 2.5 mg by mouth. Sunday, Monday, Tuesday, Thursday, Friday, Saturday      • warfarin (COUMADIN) 2.5 MG tablet Take 5 mg by mouth. Wednesday            Codeine; Hydrocodone-acetaminophen; Meperidine; and Morphine    Scheduled Meds:   Continuous Infusions:   PRN Meds:.•  sodium chloride        VITAL SIGNS  Vitals:    04/11/20 0142 04/11/20 0310 04/11/20 0506 04/11/20 0836   BP:  147/56 146/61 136/73   BP Location:  Right arm Right arm    Patient Position:  Lying Lying    Pulse: 67 69 69 71   Resp:  14 14 15   Temp:  98.2 °F (36.8 °C) 97.8 °F (36.6 °C) 98.1 °F (36.7 °C)   TempSrc:  Oral Oral    SpO2: 93% 93% 94% 91%   Weight:  48.3 kg (106 lb 7.7 oz)     Height:  147.3 cm (58\")         Flowsheet Rows      First Filed Value   Admission Height  149.9 cm (59\") Documented at 04/10/2020 2239   Admission Weight  46.7 kg (103 lb) Documented at 04/10/2020 2239           TELEMETRY: Pacer rhythm    Physical Exam:  Physical Exam   Constitutional: She is oriented to person, place, and time. She appears well-developed and well-nourished. " She is cooperative.   HENT:   Head: Normocephalic and atraumatic.   Mouth/Throat: Uvula is midline and oropharynx is clear and moist. No oral lesions.   Eyes: Conjunctivae are normal. No scleral icterus.   Neck: Trachea normal. Neck supple. No JVD present. Carotid bruit is not present. No thyromegaly present.   Cardiovascular: Normal rate, regular rhythm, S1 normal, S2 normal, intact distal pulses and normal pulses. PMI is not displaced. Exam reveals no gallop and no friction rub.   Murmur heard.  Pulmonary/Chest: Effort normal. She has wheezes. She has rales.   Abdominal: Soft. Bowel sounds are normal.   Genitourinary:   Genitourinary Comments: No Freed   Musculoskeletal: Normal range of motion.   Neurological: She is alert and oriented to person, place, and time. She has normal strength.   No focal deficits   Skin: Skin is warm. No cyanosis.   Psychiatric: She has a normal mood and affect.                LAB RESULTS (LAST 7 DAYS)    CBC  Results from last 7 days   Lab Units 04/10/20  2312   WBC 10*3/mm3 18.40*   RBC 10*6/mm3 4.23   HEMOGLOBIN g/dL 12.5   HEMATOCRIT % 36.6   MCV fL 86.5   PLATELETS 10*3/mm3 354       BMP  Results from last 7 days   Lab Units 04/10/20  2312   SODIUM mmol/L 134*   POTASSIUM mmol/L 5.1   CHLORIDE mmol/L 97*   CO2 mmol/L 24.0   BUN mg/dL 24*   CREATININE mg/dL 1.01*   GLUCOSE mg/dL 144*       CMP   Results from last 7 days   Lab Units 04/10/20  2312   SODIUM mmol/L 134*   POTASSIUM mmol/L 5.1   CHLORIDE mmol/L 97*   CO2 mmol/L 24.0   BUN mg/dL 24*   CREATININE mg/dL 1.01*   GLUCOSE mg/dL 144*   ALBUMIN g/dL 4.00   BILIRUBIN mg/dL 0.3   ALK PHOS U/L 84   AST (SGOT) U/L 22   ALT (SGPT) U/L 8         BNP        TROPONIN  Results from last 7 days   Lab Units 04/10/20  2312   TROPONIN T ng/mL <0.010       CoAg  Results from last 7 days   Lab Units 04/10/20  2334 04/10/20  1203   INR  1.02* 1.30*       Creatinine Clearance  Estimated Creatinine Clearance: 29.9 mL/min (A) (by C-G formula  based on SCr of 1.01 mg/dL (H)).    ABG        Radiology  Ct Chest Without Contrast    Result Date: 4/10/2020  1. Moderate right and small left pleural effusions. Interlobular septal thickening in the lung bases. Findings are compatible with interstitial pulmonary edema. 2. Patchy groundglass airspace disease in the perihilar right upper lobe. This may represent an infectious or inflammatory process, versus an alveolar component of pulmonary edema. 3. Atherosclerosis, including the coronary arteries. 4. Passive atelectasis in the lung bases. 5. Additional chronic findings as above. Electronically signed by:  Sarthak Bowman M.D.  4/10/2020 10:52 PM    Xr Chest 1 View    Result Date: 4/11/2020  New bilateral atelectasis and/or infiltrate is seen. The findings may represent moderate-to-severe pulmonary edema with vascular congestion. Superimposed infectious multifocal pneumonia cannot be excluded, especially in the left lung base.  Small-to-moderate bilateral pleural effusions are present, which are thought to be new, as well.  There is mild to moderate cardiomegaly, as before.  Electronically Signed By-Dr. Gamaliel Danielson MD On:4/11/2020 2:35 AM This report was finalized on [id number] by Dr. Gamaliel Danielson MD.          EKG          I personally viewed and interpreted the patient's EKG/Telemetry data:    ECHOCARDIOGRAM:         STRESS MYOVIEW:    CARDIAC CATHETERIZATION:    OTHER:         Assessment/Plan       Atrial fibrillation (CMS/HCC) [I48.91]    Long term (current) use of anticoagulants [Z79.01]    Pacemaker    Tachy-ramiro syndrome (CMS/HCC)    Pneumonia due to infectious organism      Antibiotics per primary team  Continue anticoagulation monitor INR closely  Pacemaker functioning well  Cardiac wise appears to be stable  Cautious diuresis  When stable repeat the echocardiogram    Shankar Carlson MD  04/11/20  12:02

## 2020-04-11 NOTE — NURSING NOTE
A call was placed to Dr. Guzman and Dr. Schaffer was on call and answered. She was made aware of the pt being on the unit. An order was placed for a regular diet. Pt resting, will continue to monitor.

## 2020-04-11 NOTE — NURSING NOTE
Spoke with pts son and updated him on pt condition and plan of care. Informed him to call nursing station [facility] any concerns or questions

## 2020-04-11 NOTE — H&P
Patient Care Team:  Yayo Guzman MD as PCP - General  Flora Guzman MD as PCP - Family Medicine    Chief complaint NICKI    Subjective     Patient is a 87 y.o. female presents with SOA Onset of symptoms was 1 week ago        Patient lives with her daughter and son in law- Patient reports she has been isolated to the house yet her family works and has not been in isolation. No family members have been ill.    Patient reports she has had a dry cough for 1 week and last night she felt SOA and came to the ED.    According to primary care records, she took Augmentin for a questionable  Illness on 3/16/20    Pt reports to me today that she has felt fatigued for weeks if not months.     Review of Systems   Constitutional: Positive for activity change, fatigue and fever. Negative for appetite change.   HENT: Negative for congestion, sore throat and trouble swallowing.    Respiratory: Positive for cough and shortness of breath.    Cardiovascular: Negative for chest pain, palpitations and leg swelling.   Gastrointestinal: Negative for abdominal pain, constipation, diarrhea, nausea and vomiting.   Genitourinary: Negative for dysuria.   Musculoskeletal: Negative for arthralgias, back pain and joint swelling.   Neurological: Negative for dizziness, speech difficulty and headaches.   Psychiatric/Behavioral: Negative for agitation and confusion.          History   a fib/ Htn/ tachy ramiro syndrome/ paacemaker  No past medical history on file.  No past surgical history on file.  No family history on file.  Social History     Tobacco Use   • Smoking status: Never Smoker   • Smokeless tobacco: Never Used   Substance Use Topics   • Alcohol use: Never     Frequency: Never   • Drug use: Not on file     Medications Prior to Admission   Medication Sig Dispense Refill Last Dose   • amLODIPine (NORVASC) 5 MG tablet Take 5 mg by mouth Daily.      • digoxin (LANOXIN) 125 MCG tablet Take 125 mcg by mouth Daily.      • lisinopril  (PRINIVIL,ZESTRIL) 5 MG tablet Take 5 mg by mouth Daily.      • metoprolol tartrate (LOPRESSOR) 25 MG tablet Take 25 mg by mouth 2 (Two) Times a Day.      • Multiple Vitamins-Minerals (MULTI VITAMIN/MINERALS) tablet Take 1 tablet by mouth Daily.      • traMADol (ULTRAM) 50 MG tablet Take 50 mg by mouth Every 3 (Three) Hours As Needed for Moderate Pain .      • warfarin (COUMADIN) 2.5 MG tablet Take 2.5 mg by mouth. Sunday, Monday, Tuesday, Thursday, Friday, Saturday      • warfarin (COUMADIN) 2.5 MG tablet Take 5 mg by mouth. Wednesday      • aspirin (ASPIR-LOW) 81 MG EC tablet Take 81 mg by mouth Daily.        Allergies:  Codeine; Hydrocodone-acetaminophen; Meperidine; and Morphine    Objective     Vital Signs  Temp:  [97.8 °F (36.6 °C)-101 °F (38.3 °C)] 98.6 °F (37 °C)  Heart Rate:  [64-81] 65  Resp:  [14-36] 14  BP: (119-167)/(56-79) 119/64     Physical Exam:      General Appearance:    Alert, cooperative, in no acute distress   Head:    Normocephalic, without obvious abnormality, atraumatic   Eyes:            Lids and lashes normal, conjunctivae and sclerae normal, no   icterus, no pallor, corneas clear, PERRLA   Ears:    Ears appear intact with no abnormalities noted   Throat:   No oral lesions, no thrush, oral mucosa moist   Neck:   No adenopathy, supple, trachea midline, no thyromegaly, no   carotid bruit, no JVD   Lungs:    patient is with a nonproductive frequent cough,respirations regular, even and unlabored. Bilateral lung bases are with crackles     Heart:    Regular rhythm and normal rate, normal S1 and S2, 2/6 murmur heard at the LSB ,  no gallop, no rub, no click   Chest Wall:    No abnormalities observed   Abdomen:     Normal bowel sounds, no masses, no organomegaly, soft        non-tender, non-distended, no guarding, no rebound                tenderness   Extremities:   Moves all extremities well, no edema, no cyanosis, no             redness   Pulses:   Pulses palpable and equal bilaterally   Skin:    No bleeding, bruising or rash   Lymph nodes:   No palpable adenopathy   Neurologic:   Cranial nerves 2 - 12 grossly intact, sensation intact, DTR       present and equal bilaterally       Results Review:     Imaging Results (Last 24 Hours)     Procedure Component Value Units Date/Time    XR Chest 1 View [[id number]] Collected:  04/11/20 0222     Updated:  04/11/20 0237    Narrative:       DATE OF EXAM:  4/10/2020 11:24 PM     PROCEDURE:  XR CHEST 1 VW-     INDICATIONS:  soa; shortness of breath; cough     COMPARISON:  06/03/2019.     TECHNIQUE:   Single radiographic AP view of the chest was obtained.     FINDINGS:  A single AP upright portable chest radiograph reveals bilateral  atelectasis and/or infiltrate, especially in the mid to basilar lung  zones. The findings may represent pulmonary edema. Infectious multifocal  pneumonia cannot be excluded. Bilateral small to moderate pleural  effusions are suspected. There is mild to moderate cardiomegaly, as  before. Atherosclerotic changes are seen. There is a left-sided cardiac  implantable electronic device (CIED) in place, as before. There are  postoperative changes of the thoracolumbar spine, seen previously. No  pneumothorax is appreciated.  Chronic post-traumatic deformity is  possible involving the proximal right humerus. There may be a chronic  bone infarct versus a chondroid matrix tumor within the proximal right  humerus, seen on prior studies and not significantly changed. Loose  bodies involving the left glenohumeral joint space cannot be excluded.  Scoliosis is suspected.          Impression:       New bilateral atelectasis and/or infiltrate is seen. The findings may  represent moderate-to-severe pulmonary edema with vascular congestion.  Superimposed infectious multifocal pneumonia cannot be excluded,  especially in the left lung base.      Small-to-moderate bilateral pleural effusions are present, which are  thought to be new, as well.      There is mild  to moderate cardiomegaly, as before.     Electronically Signed By-Dr. Gamaliel Danielson MD On:4/11/2020 2:35 AM  This report was finalized on [id number] by Dr. Gamaliel Danielson MD.    CT Chest Without Contrast [[id number]] Collected:  04/10/20 2248     Updated:  04/11/20 0053    Narrative:       EXAM: CT SCAN OF THE CHEST WITHOUT CONTRAST    INDICATION: Shortness breath, fever    TECHNIQUE: CT scan through the chest was performed without the administration of intravenous contrast. CT dose lowering techniques were used, to include: automated exposure control, adjustment for patient size, and / or use of iterative reconstruction.     COMPARISON: None    FINDINGS:  Vasculature: There is no thoracic aortic aneurysm.  There are atherosclerotic calcifications of the thoracic aorta, great vessels and coronary arteries.    Mediastinum / Pleura: A moderate right and small left pleural effusion are present. There is no mediastinal, hilar or axillary lymphadenopathy.     Airways / Lungs: The central airways are patent.  There is no pneumothorax. There is patchy groundglass airspace disease in the perihilar right upper lobe. There is interlobular septal thickening in the lung bases. Passive atelectatic changes are also   present in the lung bases. A coarsely calcified nodule in the left apex is compatible with a granuloma.    Bones: There is scoliotic curvature of the visualized spine. Partially imaged is lower thoracic and lumbar fusion hardware.    Upper Abdomen: Limited images below the diaphragm demonstrate no acute abnormality.      Impression:         1. Moderate right and small left pleural effusions. Interlobular septal thickening in the lung bases. Findings are compatible with interstitial pulmonary edema.  2. Patchy groundglass airspace disease in the perihilar right upper lobe. This may represent an infectious or inflammatory process, versus an alveolar component of pulmonary edema.  3. Atherosclerosis, including the  coronary arteries.  4. Passive atelectasis in the lung bases.  5. Additional chronic findings as above.    Electronically signed by:  Sarthak Bowman M.D.    4/10/2020 10:52 PM           Lab Results (last 24 hours)     Procedure Component Value Units Date/Time    [facility] Draw [[id number]] Collected:  04/10/20 2312    Specimen:  Blood Updated:  04/11/20 0046    Narrative:       The following orders were created for panel order [facility] Draw.  Procedure                               Abnormality         Status                     ---------                               -----------         ------                     Light Blue Top[[id number]]                                   Final result               Green Top (Gel)[[id number]]                                  Final result               Lavender Top[[id number]]                                     Final result               Gold Top - SST[[id number]]                                   Final result                 Please view results for these tests on the individual orders.    Light Blue Top [[id number]] Collected:  04/10/20 2334    Specimen:  Blood Updated:  04/11/20 0046     Extra Tube hold for add-on     Comment: Auto resulted       Respiratory Panel, PCR - Swab, Nasopharynx [[id number]]  (Normal) Collected:  04/10/20 2312    Specimen:  Swab from Nasopharynx Updated:  04/11/20 0041     ADENOVIRUS, PCR Not Detected     Coronavirus 229E Not Detected     Coronavirus HKU1 Not Detected     Coronavirus NL63 Not Detected     Coronavirus OC43 Not Detected     Human Metapneumovirus Not Detected     Human Rhinovirus/Enterovirus Not Detected     Influenza B PCR Not Detected     Parainfluenza Virus 1 Not Detected     Parainfluenza Virus 2 Not Detected     Parainfluenza Virus 3 Not Detected     Parainfluenza Virus 4 Not Detected     Bordetella pertussis pcr Not Detected     Influenza A H1 2009 PCR Not Detected     Chlamydophila pneumoniae PCR Not Detected     Mycoplasma pneumo by PCR Not  Detected     Influenza A PCR Not Detected     Influenza A H3 Not Detected     Influenza A H1 Not Detected     RSV, PCR Not Detected    Narrative:       The coronavirus on the RVP is NOT COVID-19 and is NOT indicative of infection with COVID-19.     SARS-CoV-2, PCR (IN-HOUSE), NP Swab in [employer] - Swab, Nasopharynx [[id number]] Collected:  04/10/20 2312    Specimen:  Swab from Nasopharynx Updated:  04/11/20 0038    Lavender Top [[id number]] Collected:  04/10/20 2312    Specimen:  Blood Updated:  04/11/20 0016     Extra Tube hold for add-on     Comment: Auto resulted       Gold Top - SST [[id number]] Collected:  04/10/20 2312    Specimen:  Blood Updated:  04/11/20 0016     Extra Tube Hold for add-ons.     Comment: Auto resulted.       Green Top (Gel) [[id number]] Collected:  04/10/20 2312    Specimen:  Blood Updated:  04/11/20 0010     Extra Tube --    Troponin [[id number]]  (Normal) Collected:  04/10/20 2312    Specimen:  Blood Updated:  04/11/20 0010     Troponin T <0.010 ng/mL      Comment: Specimen hemolyzed.  Results may be affected.       Narrative:       Troponin T Reference Range:  <= 0.03 ng/mL-   Negative for AMI  >0.03 ng/mL-     Abnormal for myocardial necrosis.  Clinicians would have to utilize clinical acumen, EKG, Troponin and serial changes to determine if it is an Acute Myocardial Infarction or myocardial injury due to an underlying chronic condition.       Results may be falsely decreased if patient taking Biotin.      Comprehensive Metabolic Panel [[id number]]  (Abnormal) Collected:  04/10/20 2312    Specimen:  Blood Updated:  04/11/20 0010     Glucose 144 mg/dL      BUN 24 mg/dL      Creatinine 1.01 mg/dL      Sodium 134 mmol/L      Potassium 5.1 mmol/L      Comment: Specimen hemolyzed.  Results may be affected. ckd with estuardo and said ok to release        Chloride 97 mmol/L      CO2 24.0 mmol/L      Calcium 9.3 mg/dL      Total Protein 7.7 g/dL      Albumin 4.00 g/dL      ALT (SGPT) 8 U/L       Comment: Specimen hemolyzed.  Results may be affected.        AST (SGOT) 22 U/L      Comment: Specimen hemolyzed.  Results may be affected.        Alkaline Phosphatase 84 U/L      Total Bilirubin 0.3 mg/dL      eGFR Non African Amer 52 mL/min/1.73      Globulin 3.7 gm/dL      A/G Ratio 1.1 g/dL      BUN/Creatinine Ratio 23.8     Anion Gap 13.0 mmol/L     Narrative:       GFR Normal >60  Chronic Kidney Disease <60  Kidney Failure <15      CBC & Differential [[id number]] Collected:  04/10/20 2312    Specimen:  Blood Updated:  04/10/20 2359    Narrative:       The following orders were created for panel order CBC & Differential.  Procedure                               Abnormality         Status                     ---------                               -----------         ------                     CBC Auto Differential[[id number]]        Abnormal            Final result                 Please view results for these tests on the individual orders.    CBC Auto Differential [[id number]]  (Abnormal) Collected:  04/10/20 2312    Specimen:  Blood Updated:  04/10/20 2359     WBC 18.40 10*3/mm3      RBC 4.23 10*6/mm3      Hemoglobin 12.5 g/dL      Hematocrit 36.6 %      MCV 86.5 fL      MCH 29.6 pg      MCHC 34.2 g/dL      RDW 15.5 %      RDW-SD 47.7 fl      MPV 7.0 fL      Platelets 354 10*3/mm3     Scan Slide [[id number]] Collected:  04/10/20 2312    Specimen:  Blood Updated:  04/10/20 2359     Scan Slide --     Comment: See Manual Differential Results       Manual Differential [[id number]]  (Abnormal) Collected:  04/10/20 2312    Specimen:  Blood Updated:  04/10/20 2359     Neutrophil % 83.0 %      Lymphocyte % 5.0 %      Monocyte % 3.0 %      Eosinophil % 1.0 %      Bands %  5.0 %      Metamyelocyte % 1.0 %      Myelocyte % 2.0 %      Neutrophils Absolute 16.19 10*3/mm3      Lymphocytes Absolute 0.92 10*3/mm3      Monocytes Absolute 0.55 10*3/mm3      Eosinophils Absolute 0.18 10*3/mm3      Anisocytosis Slight/1+      "Polychromasia Slight/1+     WBC Morphology Normal     Platelet Morphology Normal    Procalcitonin [[id number]]  (Normal) Collected:  04/10/20 2312    Specimen:  Blood Updated:  04/10/20 2355     Procalcitonin 0.11 ng/mL     Narrative:       As a Marker for Sepsis (Non-Neonates):   1. <0.5 ng/mL represents a low risk of severe sepsis and/or septic shock.  1. >2 ng/mL represents a high risk of severe sepsis and/or septic shock.    As a Marker for Lower Respiratory Tract Infections that require antibiotic therapy:  PCT on Admission     Antibiotic Therapy             6-12 Hrs later  > 0.5                Strongly Recommended            >0.25 - <0.5         Recommended  0.1 - 0.25           Discouraged                   Remeasure/reassess PCT  <0.1                 Strongly Discouraged          Remeasure/reassess PCT      As 28 day mortality risk marker: \"Change in Procalcitonin Result\" (> 80 % or <=80 %) if Day 0 (or Day 1) and Day 4 values are available. Refer to [email or web address]   Change in PCT <=80 %   A decrease of PCT levels below or equal to 80 % defines a positive change in PCT test result representing a higher risk for 28-day all-cause mortality of patients diagnosed with severe sepsis or septic shock.  Change in PCT > 80 %   A decrease of PCT levels of more than 80 % defines a negative change in PCT result representing a lower risk for 28-day all-cause mortality of patients diagnosed with severe sepsis or septic shock.                Results may be falsely decreased if patient taking Biotin.     Digoxin Level [[id number]]  (Normal) Collected:  04/10/20 2312    Specimen:  Blood Updated:  04/10/20 2352     Digoxin 1.00 ng/mL     BNP [[id number]]  (Abnormal) Collected:  04/10/20 2312    Specimen:  Blood Updated:  04/10/20 2349     proBNP 4,650.0 pg/mL     Narrative:       Among patients with dyspnea, NT-proBNP is highly sensitive for the detection of acute congestive heart failure. In addition " NT-proBNP of <300 pg/ml effectively rules out acute congestive heart failure with 99% negative predictive value.    Results may be falsely decreased if patient taking Biotin.      Protime-INR [[id number]]  (Abnormal) Collected:  04/10/20 2334    Specimen:  Blood Updated:  04/10/20 2344     Protime 10.7 Seconds      INR 1.02    Blood Culture - Blood, Arm, Right [[id number]] Collected:  04/10/20 2325    Specimen:  Blood from Arm, Right Updated:  04/10/20 2328    POC Lactate [[id number]]  (Normal) Collected:  04/10/20 2325    Specimen:  Blood Updated:  04/10/20 2326     Lactate 1.0 mmol/L      Comment: Serial Number: [id number]:  [id number]       Blood Culture - Blood, Arm, Left [[id number]] Collected:  04/10/20 2320    Specimen:  Blood from Arm, Left Updated:  04/10/20 2325           I reviewed the patient's new clinical results.    Assessment/Plan       Atrial fibrillation (CMS/HCC) [I48.91]    Long term (current) use of anticoagulants [Z79.01]    Pacemaker    Tachy-ramiro syndrome (CMS/HCC)    Pneumonia due to infectious organism    Acute diastolic congestive heart failure (CMS/HCC)    1. Pneumonia- on 1 gm rocephin IV q d- had 500 mg zmax IV will change to po. COVID test pending- respiratory panel negative. Maintain O2- ABG ordered. CT chest reviewed - pleural effusion desiree, groundglass airspace disease  RUL   2. Acute diastolic CHF- probnp elevated- lasix IV today reeval in AM and monitor lytes  3. Afib- on dig and metoprolol- continue- Anticoagulated w coumadin- continue and monitor inr- PT IS NOT THERAPEUTIC AT THIS TIME ADDING IN LOVENOX UNTIL INR IS THERAPEUTIC- continue 81 asa  4. HTN - renal function preserved - last Cr in office in July 2019- 0.7 will monitor holding home norvasc and lisinopril will monitor   5. Long term use of anticoagulants- monitor INR  6. 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 Pacemaker status- monitor         I discussed the patients findings and my recommendations  with patient.     Nela Diaz, APRN  04/11/20  12:57

## 2020-04-11 NOTE — PLAN OF CARE
Problem: Patient Care Overview  Goal: Plan of Care Review  Outcome: Ongoing (interventions implemented as appropriate)  Flowsheets (Taken 4/11/2020 0403)  Progress: no change  Outcome Summary: Pt came to the floor on 2 liters of oxygen via nasal cannula sating at 93%. Pt is stable and is resting comfortably.Will continue to monitor.

## 2020-04-11 NOTE — PLAN OF CARE
Pt doing well this shift with minimal complaints. Covid test has come back negative. Pt states that she feels much better. Does continue to require 4 L of 02 that is not worn at home by patient to maintain sats above 90%. Weakness noted, requiring assist xs 1 to bedside commode

## 2020-04-11 NOTE — ED PROVIDER NOTES
Subjective   87-year-old female complains of shortness of air, moderate, worse with exertion with associated dry cough for 1 week.  Symptoms were worse today with malaise and anorexia.  No chest pain, no abdominal pain vomiting or diarrhea.  No known sick contacts.  No recent illnesses otherwise, no calf pain or swelling or history of DVT or PE.          Review of Systems   Constitutional: Positive for fatigue.   Respiratory: Positive for cough and shortness of breath.    Gastrointestinal:        As per HPI, otherwise negative   All other systems reviewed and are negative.      No past medical history on file.    Allergies   Allergen Reactions   • Codeine Other (See Comments)   • Hydrocodone-Acetaminophen Other (See Comments)   • Meperidine Other (See Comments)   • Morphine Other (See Comments)       No past surgical history on file.    No family history on file.    Social History     Socioeconomic History   • Marital status:      Spouse name: Not on file   • Number of children: Not on file   • Years of education: Not on file   • Highest education level: Not on file   Tobacco Use   • Smoking status: Never Smoker   • Smokeless tobacco: Never Used   Substance and Sexual Activity   • Alcohol use: Never     Frequency: Never           Objective   Physical Exam   Constitutional: She is oriented to person, place, and time. She appears well-developed and well-nourished.   HENT:   Head: Normocephalic and atraumatic.   Mouth/Throat: Oropharynx is clear and moist.   Eyes: Pupils are equal, round, and reactive to light. Conjunctivae are normal.   Neck: Normal range of motion. Neck supple.   Cardiovascular: Normal rate, regular rhythm, normal heart sounds and intact distal pulses.   Pulmonary/Chest:   No wheezes, good air movement, no distress, mild rhonchi bilaterally   Abdominal: Soft. Bowel sounds are normal. She exhibits no distension. There is no tenderness.   Musculoskeletal: Normal range of motion. She exhibits no  edema or tenderness.   Neurological: She is alert and oriented to person, place, and time.   Skin: Capillary refill takes less than 2 seconds.   Psychiatric: She has a normal mood and affect. Her behavior is normal.       Procedures           ED Course  ED Course as of Apr 11 0119   Fri Apr 10, 2020   2311 EKG interpretation: Paced rhythm, rate 83    [JR]      ED Course User Index  [JR] Francis Reyes MD                                           MDM  Number of Diagnoses or Management Options  Acute congestive heart failure, unspecified heart failure type (CMS/HCC):   Acute respiratory failure with hypoxemia (CMS/HCC):   Pneumonia due to infectious organism, unspecified laterality, unspecified part of lung:   Diagnosis management comments: Results for orders placed or performed during the hospital encounter of 04/10/20  -Respiratory Panel, PCR - Swab, Nasopharynx       Result                      Value             Ref Range           ADENOVIRUS, PCR             Not Detected      Not Detected        Coronavirus 229E            Not Detected      Not Detected        Coronavirus HKU1            Not Detected      Not Detected        Coronavirus NL63            Not Detected      Not Detected        Coronavirus OC43            Not Detected      Not Detected        Human Metapneumovirus       Not Detected      Not Detected        Human Rhinovirus/Enter*     Not Detected      Not Detected        Influenza B PCR             Not Detected      Not Detected        Parainfluenza Virus 1       Not Detected      Not Detected        Parainfluenza Virus 2       Not Detected      Not Detected        Parainfluenza Virus 3       Not Detected      Not Detected        Parainfluenza Virus 4       Not Detected      Not Detected        Bordetella pertussis p*     Not Detected      Not Detected        Influenza A H1 2009 PCR     Not Detected      Not Detected        Chlamydophila pneumoni*     Not Detected      Not Detected        Mycoplasma  pneumo by P*     Not Detected      Not Detected        Influenza A PCR             Not Detected      Not Detected        Influenza A H3              Not Detected      Not Detected        Influenza A H1              Not Detected      Not Detected        RSV, PCR                    Not Detected      Not Detected   -Comprehensive Metabolic Panel       Result                      Value             Ref Range           Glucose                     144 (H)           65 - 99 mg/dL       BUN                         24 (H)            8 - 23 mg/dL        Creatinine                  1.01 (H)          0.57 - 1.00 *       Sodium                      134 (L)           136 - 145 mm*       Potassium                   5.1               3.5 - 5.2 mm*       Chloride                    97 (L)            98 - 107 mmo*       CO2                         24.0              22.0 - 29.0 *       Calcium                     9.3               8.6 - 10.5 m*       Total Protein               7.7               6.0 - 8.5 g/*       Albumin                     4.00              3.50 - 5.20 *       ALT (SGPT)                  8                 1 - 33 U/L          AST (SGOT)                  22                1 - 32 U/L          Alkaline Phosphatase        84                39 - 117 U/L        Total Bilirubin             0.3               0.2 - 1.2 mg*       eGFR Non  Amer       52 (L)            >60 mL/min/1*       Globulin                    3.7               gm/dL               A/G Ratio                   1.1               g/dL                BUN/Creatinine Ratio        23.8              7.0 - 25.0          Anion Gap                   13.0              5.0 - 15.0 m*  -Troponin       Result                      Value             Ref Range           Troponin T                  <0.010            0.000 - 0.03*  -BNP       Result                      Value             Ref Range           proBNP                      4,650.0 (H)       5.0-1,800.0  *  -Protime-INR       Result                      Value             Ref Range           Protime                     10.7 (L)          19.4 - 28.5 *       INR                         1.02 (L)          2.00 - 3.00    -Digoxin Level       Result                      Value             Ref Range           Digoxin                     1.00              0.60 - 1.20 *  -Procalcitonin       Result                      Value             Ref Range           Procalcitonin               0.11              0.10 - 0.25 *  -CBC Auto Differential       Result                      Value             Ref Range           WBC                         18.40 (H)         3.40 - 10.80*       RBC                         4.23              3.77 - 5.28 *       Hemoglobin                  12.5              12.0 - 15.9 *       Hematocrit                  36.6              34.0 - 46.6 %       MCV                         86.5              79.0 - 97.0 *       MCH                         29.6              26.6 - 33.0 *       MCHC                        34.2              31.5 - 35.7 *       RDW                         15.5 (H)          12.3 - 15.4 %       RDW-SD                      47.7              37.0 - 54.0 *       MPV                         7.0               6.0 - 12.0 fL       Platelets                   354               140 - 450 10*  -Scan Slide       Result                      Value             Ref Range           Scan Slide                                                   -Manual Differential       Result                      Value             Ref Range           Neutrophil %                83.0 (H)          42.7 - 76.0 %       Lymphocyte %                5.0 (L)           19.6 - 45.3 %       Monocyte %                  3.0 (L)           5.0 - 12.0 %        Eosinophil %                1.0               0.3 - 6.2 %         Bands %                     5.0               0.0 - 5.0 %         Metamyelocyte %             1.0 (H)           0.0 -  0.0 %         Myelocyte %                 2.0 (H)           0.0 - 0.0 %         Neutrophils Absolute        16.19 (H)         1.70 - 7.00 *       Lymphocytes Absolute        0.92              0.70 - 3.10 *       Monocytes Absolute          0.55              0.10 - 0.90 *       Eosinophils Absolute        0.18              0.00 - 0.40 *       Anisocytosis                Slight/1+         None Seen           Polychromasia               Slight/1+         None Seen           WBC Morphology              Normal            Normal              Platelet Morphology         Normal            Normal         -POC Lactate       Result                      Value             Ref Range           Lactate                     1.0               0.5 - 2.0 mm*  -Light Blue Top       Result                      Value             Ref Range           Extra Tube                                                    hold for add-on  -Green Top (Gel)       Result                      Value             Ref Range           Extra Tube                                                   -Lavender Top       Result                      Value             Ref Range           Extra Tube                                                    hold for add-on  -Gold Top - SST       Result                      Value             Ref Range           Extra Tube                                                    Hold for add-ons.  Ct Chest Without Contrast    Result Date: 4/10/2020  1. Moderate right and small left pleural effusions. Interlobular septal thickening in the lung bases. Findings are compatible with interstitial pulmonary edema. 2. Patchy groundglass airspace disease in the perihilar right upper lobe. This may represent an infectious or inflammatory process, versus an alveolar component of pulmonary edema. 3. Atherosclerosis, including the coronary arteries. 4. Passive atelectasis in the lung bases. 5. Additional chronic findings as above. Electronically  signed by:  Sarthak Bowman M.D.  4/10/2020 10:52 PM    Patient well, feels better supplemental oxygen, vital signs stable heart failure with fever and cough and likely underlying pneumonia.  Patient without any chest pain.  Case discussed with Dr. Schaffer on-call for Dr. Guzman accepts for admission.  Patient will be ruled out for coronavirus as it still is in the differential.  Patient elects to be a full code at this time.       Amount and/or Complexity of Data Reviewed  Clinical lab tests: reviewed  Tests in the radiology section of CPT®: reviewed  Decide to obtain previous medical records or to obtain history from someone other than the patient: yes        Final diagnoses:   Pneumonia due to infectious organism, unspecified laterality, unspecified part of lung   Acute respiratory failure with hypoxemia (CMS/HCC)   Acute congestive heart failure, unspecified heart failure type (CMS/HCC)            Francis Reyes MD  04/11/20 0122

## 2020-04-12 LAB
ANION GAP SERPL CALCULATED.3IONS-SCNC: 10 MMOL/L (ref 5–15)
BASOPHILS # BLD AUTO: 0 10*3/MM3 (ref 0–0.2)
BASOPHILS NFR BLD AUTO: 0.3 % (ref 0–1.5)
BUN BLD-MCNC: 25 MG/DL (ref 8–23)
BUN/CREAT SERPL: 25 (ref 7–25)
CALCIUM SPEC-SCNC: 9.1 MG/DL (ref 8.6–10.5)
CHLORIDE SERPL-SCNC: 96 MMOL/L (ref 98–107)
CO2 SERPL-SCNC: 30 MMOL/L (ref 22–29)
CREAT BLD-MCNC: 1 MG/DL (ref 0.57–1)
DEPRECATED RDW RBC AUTO: 47.7 FL (ref 37–54)
EOSINOPHIL # BLD AUTO: 0.3 10*3/MM3 (ref 0–0.4)
EOSINOPHIL NFR BLD AUTO: 2.9 % (ref 0.3–6.2)
ERYTHROCYTE [DISTWIDTH] IN BLOOD BY AUTOMATED COUNT: 15.5 % (ref 12.3–15.4)
GFR SERPL CREATININE-BSD FRML MDRD: 52 ML/MIN/1.73
GLUCOSE BLD-MCNC: 102 MG/DL (ref 65–99)
HCT VFR BLD AUTO: 31.5 % (ref 34–46.6)
HGB BLD-MCNC: 10.5 G/DL (ref 12–15.9)
INR PPP: 1.02 (ref 2–3)
LYMPHOCYTES # BLD AUTO: 0.6 10*3/MM3 (ref 0.7–3.1)
LYMPHOCYTES NFR BLD AUTO: 6.3 % (ref 19.6–45.3)
MCH RBC QN AUTO: 29 PG (ref 26.6–33)
MCHC RBC AUTO-ENTMCNC: 33.3 G/DL (ref 31.5–35.7)
MCV RBC AUTO: 87.1 FL (ref 79–97)
MONOCYTES # BLD AUTO: 0.7 10*3/MM3 (ref 0.1–0.9)
MONOCYTES NFR BLD AUTO: 6.9 % (ref 5–12)
NEUTROPHILS # BLD AUTO: 7.9 10*3/MM3 (ref 1.7–7)
NEUTROPHILS NFR BLD AUTO: 83.6 % (ref 42.7–76)
NRBC BLD AUTO-RTO: 0.1 /100 WBC (ref 0–0.2)
PLATELET # BLD AUTO: 219 10*3/MM3 (ref 140–450)
PMV BLD AUTO: 6.7 FL (ref 6–12)
POTASSIUM BLD-SCNC: 3.9 MMOL/L (ref 3.5–5.2)
PROTHROMBIN TIME: 10.6 SECONDS (ref 19.4–28.5)
RBC # BLD AUTO: 3.61 10*6/MM3 (ref 3.77–5.28)
SODIUM BLD-SCNC: 136 MMOL/L (ref 136–145)
WBC NRBC COR # BLD: 9.4 10*3/MM3 (ref 3.4–10.8)

## 2020-04-12 PROCEDURE — 25010000002 ENOXAPARIN PER 10 MG: Performed by: NURSE PRACTITIONER

## 2020-04-12 PROCEDURE — 99214 OFFICE O/P EST MOD 30 MIN: CPT | Performed by: INTERNAL MEDICINE

## 2020-04-12 PROCEDURE — 85610 PROTHROMBIN TIME: CPT | Performed by: NURSE PRACTITIONER

## 2020-04-12 PROCEDURE — 96372 THER/PROPH/DIAG INJ SC/IM: CPT

## 2020-04-12 PROCEDURE — 80048 BASIC METABOLIC PNL TOTAL CA: CPT | Performed by: NURSE PRACTITIONER

## 2020-04-12 PROCEDURE — 25010000002 CEFTRIAXONE PER 250 MG: Performed by: NURSE PRACTITIONER

## 2020-04-12 PROCEDURE — 96367 TX/PROPH/DG ADDL SEQ IV INF: CPT

## 2020-04-12 PROCEDURE — G0378 HOSPITAL OBSERVATION PER HR: HCPCS

## 2020-04-12 PROCEDURE — 85025 COMPLETE CBC W/AUTO DIFF WBC: CPT | Performed by: NURSE PRACTITIONER

## 2020-04-12 RX ORDER — WARFARIN SODIUM 5 MG/1
5 TABLET ORAL
Status: DISCONTINUED | OUTPATIENT
Start: 2020-04-12 | End: 2020-04-14 | Stop reason: HOSPADM

## 2020-04-12 RX ORDER — LISINOPRIL 5 MG/1
5 TABLET ORAL
Status: DISCONTINUED | OUTPATIENT
Start: 2020-04-12 | End: 2020-04-14 | Stop reason: HOSPADM

## 2020-04-12 RX ADMIN — WARFARIN 5 MG: 5 TABLET ORAL at 17:41

## 2020-04-12 RX ADMIN — ENOXAPARIN SODIUM 30 MG: 30 INJECTION SUBCUTANEOUS at 17:41

## 2020-04-12 RX ADMIN — Medication 10 ML: at 20:52

## 2020-04-12 RX ADMIN — ASPIRIN 81 MG: 81 TABLET, COATED ORAL at 08:59

## 2020-04-12 RX ADMIN — METOPROLOL TARTRATE 25 MG: 25 TABLET, FILM COATED ORAL at 08:59

## 2020-04-12 RX ADMIN — AZITHROMYCIN MONOHYDRATE 250 MG: 250 TABLET ORAL at 08:59

## 2020-04-12 RX ADMIN — Medication 10 ML: at 08:59

## 2020-04-12 RX ADMIN — CEFTRIAXONE SODIUM 1 G: 1 INJECTION, POWDER, FOR SOLUTION INTRAMUSCULAR; INTRAVENOUS at 21:14

## 2020-04-12 RX ADMIN — METOPROLOL TARTRATE 25 MG: 25 TABLET, FILM COATED ORAL at 20:52

## 2020-04-12 RX ADMIN — DIGOXIN 125 MCG: 0.12 TABLET ORAL at 09:00

## 2020-04-12 RX ADMIN — LISINOPRIL 5 MG: 5 TABLET ORAL at 17:41

## 2020-04-12 NOTE — PROGRESS NOTES
"    Reason for follow-up: Atrial fibrillation  Shortness of breath probably pneumonia  Diastolic congestive heart failure  Hypertension  Status post permanent pacemaker placement     Patient Care Team:  Yayo Guzman MD as PCP - General  Flora Guzman MD as PCP - Family Medicine    Subjective .   Feels better     Review of Systems   Constitution: Positive for malaise/fatigue.       Codeine; Hydrocodone-acetaminophen; Meperidine; and Morphine    Scheduled Meds:  aspirin 81 mg Oral Daily   azithromycin 250 mg Oral Q24H   cefTRIAXone 1 g Intravenous Q24H   digoxin 125 mcg Oral Daily   enoxaparin 30 mg Subcutaneous Q24H   lisinopril 5 mg Oral Q24H   metoprolol tartrate 25 mg Oral BID   sodium chloride 10 mL Intravenous Q12H   warfarin 5 mg Oral Daily     Continuous Infusions:   PRN Meds:.•  acetaminophen  •  nitroglycerin  •  ondansetron  •  sodium chloride    Objective   Looks comfortable lying in the bed    VITAL SIGNS  Vitals:    04/11/20 2018 04/12/20 0359 04/12/20 0755 04/12/20 1238   BP: 129/55 131/54 134/53 162/66   BP Location: Right arm Right arm Right arm Right arm   Patient Position: Lying Lying Lying Lying   Pulse: 62 62 75 60   Resp: 16 16  17   Temp: 97.4 °F (36.3 °C) 97.4 °F (36.3 °C)  98.1 °F (36.7 °C)   TempSrc: Oral Oral  Oral   SpO2: 93% 94%  97%   Weight:  46.2 kg (101 lb 13.6 oz)     Height:           Flowsheet Rows      First Filed Value   Admission Height  149.9 cm (59\") Documented at 04/10/2020 2239   Admission Weight  46.7 kg (103 lb) Documented at 04/10/2020 2239           TELEMETRY: Pacer rhythm    Physical Exam:  Physical Exam   Constitutional: She is oriented to person, place, and time. She appears well-developed and well-nourished. She is cooperative.   HENT:   Head: Normocephalic and atraumatic.   Mouth/Throat: Uvula is midline and oropharynx is clear and moist. No oral lesions.   Eyes: Conjunctivae are normal. No scleral icterus.   Neck: Trachea normal. Neck supple. No JVD present. " Carotid bruit is not present. No thyromegaly present.   Cardiovascular: Normal rate, regular rhythm, S1 normal, S2 normal, normal heart sounds, intact distal pulses and normal pulses. PMI is not displaced. Exam reveals no gallop and no friction rub.   No murmur heard.  Pulmonary/Chest: Effort normal and breath sounds normal.   Abdominal: Soft. Bowel sounds are normal.   Musculoskeletal: Normal range of motion.   Neurological: She is alert and oriented to person, place, and time. She has normal strength.   No focal deficits   Skin: Skin is warm. No cyanosis.   Psychiatric: She has a normal mood and affect.                LAB RESULTS (LAST 7 DAYS)    CBC  Results from last 7 days   Lab Units 04/12/20  0234 04/10/20  2312   WBC 10*3/mm3 9.40 18.40*   RBC 10*6/mm3 3.61* 4.23   HEMOGLOBIN g/dL 10.5* 12.5   HEMATOCRIT % 31.5* 36.6   MCV fL 87.1 86.5   PLATELETS 10*3/mm3 219 354       BMP  Results from last 7 days   Lab Units 04/12/20  0234 04/10/20  2312   SODIUM mmol/L 136 134*   POTASSIUM mmol/L 3.9 5.1   CHLORIDE mmol/L 96* 97*   CO2 mmol/L 30.0* 24.0   BUN mg/dL 25* 24*   CREATININE mg/dL 1.00 1.01*   GLUCOSE mg/dL 102* 144*       CMP Results from last 7 days   Lab Units 04/12/20  0234 04/10/20  2312   SODIUM mmol/L 136 134*   POTASSIUM mmol/L 3.9 5.1   CHLORIDE mmol/L 96* 97*   CO2 mmol/L 30.0* 24.0   BUN mg/dL 25* 24*   CREATININE mg/dL 1.00 1.01*   GLUCOSE mg/dL 102* 144*   ALBUMIN g/dL  --  4.00   BILIRUBIN mg/dL  --  0.3   ALK PHOS U/L  --  84   AST (SGOT) U/L  --  22   ALT (SGPT) U/L  --  8         BNP        TROPONIN  Results from last 7 days   Lab Units 04/10/20  2312   TROPONIN T ng/mL <0.010       CoAg  Results from last 7 days   Lab Units 04/12/20  0234 04/10/20  2334 04/10/20  1203   INR  1.02* 1.02* 1.30*       Creatinine Clearance  Estimated Creatinine Clearance: 28.9 mL/min (by C-G formula based on SCr of 1 mg/dL).    ABG        Radiology  Ct Chest Without Contrast    Result Date: 4/10/2020  1. Moderate  right and small left pleural effusions. Interlobular septal thickening in the lung bases. Findings are compatible with interstitial pulmonary edema. 2. Patchy groundglass airspace disease in the perihilar right upper lobe. This may represent an infectious or inflammatory process, versus an alveolar component of pulmonary edema. 3. Atherosclerosis, including the coronary arteries. 4. Passive atelectasis in the lung bases. 5. Additional chronic findings as above. Electronically signed by:  Sarthak Bowman M.D.  4/10/2020 10:52 PM    Xr Chest 1 View    Result Date: 4/11/2020  New bilateral atelectasis and/or infiltrate is seen. The findings may represent moderate-to-severe pulmonary edema with vascular congestion. Superimposed infectious multifocal pneumonia cannot be excluded, especially in the left lung base.  Small-to-moderate bilateral pleural effusions are present, which are thought to be new, as well.  There is mild to moderate cardiomegaly, as before.  Electronically Signed By-Dr. Gamaliel Danielson MD On:4/11/2020 2:35 AM This report was finalized on [id number] by Dr. Gamaliel Danielson MD.            EKG        I personally viewed and interpreted the patient's EKG/Telemetry data:    ECHOCARDIOGRAM:       STRESS MYOVIEW:    CARDIAC CATHETERIZATION:    OTHER:         Assessment/Plan       Atrial fibrillation (CMS/HCC) [I48.91]    Long term (current) use of anticoagulants [Z79.01]    Pacemaker    Tachy-ramiro syndrome (CMS/HCC)    Pneumonia due to infectious organism    Acute diastolic congestive heart failure (CMS/HCC)      Chronic atrial fibrillation on anticoagulation stable  Status post permanent pacemaker placement for tachybradycardia syndrome stable  Respiratory distress probably combination of pneumonia and diastolic congestive heart failure stable  Continue current medical management hopefully home soon    I discussed the patients findings and my recommendations with patient     Shankar Carlson,  MD  04/12/20  15:15

## 2020-04-12 NOTE — PLAN OF CARE
Problem: Patient Care Overview  Goal: Plan of Care Review  Outcome: Ongoing (interventions implemented as appropriate)  Flowsheets (Taken 4/12/2020 1605)  Progress: improving  Plan of Care Reviewed With: patient  Outcome Summary: patient requiring 2 liters o2. anxiety treated per MAR. no further complaints at this time. will continue to monitor.

## 2020-04-12 NOTE — PLAN OF CARE
Problem: Patient Care Overview  Goal: Plan of Care Review  Outcome: Ongoing (interventions implemented as appropriate)  Goal: Individualization and Mutuality  Outcome: Ongoing (interventions implemented as appropriate)  Goal: Discharge Needs Assessment  Outcome: Ongoing (interventions implemented as appropriate)  Goal: Interprofessional Rounds/Family Conf  Outcome: Ongoing (interventions implemented as appropriate)     Problem: Fall Risk (Adult)  Goal: Identify Related Risk Factors and Signs and Symptoms  Description  Related risk factors and signs and symptoms are identified upon initiation of Human Response Clinical Practice Guideline (CPG).  Outcome: Ongoing (interventions implemented as appropriate)  Goal: Absence of Fall  Description  Patient will demonstrate the desired outcomes by discharge/transition of care.  Outcome: Ongoing (interventions implemented as appropriate)     Problem: Pneumonia (Adult)  Goal: Signs and Symptoms of Listed Potential Problems Will be Absent, Minimized or Managed (Pneumonia)  Description  Signs and symptoms of listed potential problems will be absent, minimized or managed by discharge/transition of care (reference Pneumonia (Adult) CPG).  Outcome: Ongoing (interventions implemented as appropriate)

## 2020-04-12 NOTE — PROGRESS NOTES
LOS: 0 days   Patient Care Team:  Yayo Guzman MD as PCP - General  Flora Guzman MD as PCP - Family Medicine    Subjective     Interval History:    Patient Complaints: soa- improved     History taken from: patient    Review of Systems   Constitutional: Positive for fatigue.   HENT: Negative for trouble swallowing.    Respiratory: Positive for cough and shortness of breath (improved). Negative for chest tightness.    Cardiovascular: Negative for chest pain, palpitations and leg swelling.   Gastrointestinal: Negative for abdominal pain.   Genitourinary: Negative for difficulty urinating.   Psychiatric/Behavioral: Negative for confusion.           Objective     Vital Signs  Temp:  [97.4 °F (36.3 °C)-98.1 °F (36.7 °C)] 98.1 °F (36.7 °C)  Heart Rate:  [60-75] 60  Resp:  [16-17] 17  BP: (129-162)/(53-66) 162/66    Physical Exam:     General Appearance:    Alert, cooperative, in no acute distress,   Head:    Normocephalic, without obvious abnormality, atraumatic   Eyes:            Lids and lashes normal, conjunctivae and sclerae normal, no   icterus, no pallor, corneas clear, PERRLA   Ears:    Ears appear intact with no abnormalities noted   Throat:   No oral lesions, no thrush, oral mucosa moist   Neck:   No adenopathy, supple, trachea midline, no thyromegaly, no   carotid bruit, no JVD   Lungs:     Clear to auscultation,respirations regular, even and                  Unlabored- no crackles today .     Heart:    Regular rhythm and normal rate, normal S1 and S2, no            murmur, no gallop, no rub, no click   Chest Wall:    No abnormalities observed   Abdomen:     Normal bowel sounds, no masses, no organomegaly, soft        Non-tender non-distended, no guarding,   Extremities:   Moves all extremities well, no edema, no cyanosis, no             Redness   Pulses:   Pulses palpable and equal bilaterally   Skin:   No bleeding, bruising or rash   Lymph nodes:   No palpable adenopathy   Neurologic:   Cranial nerves 2  - 12 grossly intact, sensation intact, DTR       present and equal bilaterally        Results Review:    Lab Results (last 24 hours)     Procedure Component Value Units Date/Time    Basic Metabolic Panel [[id number]]  (Abnormal) Collected:  04/12/20 0234    Specimen:  Blood Updated:  04/12/20 0322     Glucose 102 mg/dL      BUN 25 mg/dL      Creatinine 1.00 mg/dL      Sodium 136 mmol/L      Potassium 3.9 mmol/L      Chloride 96 mmol/L      CO2 30.0 mmol/L      Calcium 9.1 mg/dL      eGFR Non African Amer 52 mL/min/1.73      BUN/Creatinine Ratio 25.0     Anion Gap 10.0 mmol/L     Narrative:       GFR Normal >60  Chronic Kidney Disease <60  Kidney Failure <15      CBC & Differential [[id number]] Collected:  04/12/20 0234    Specimen:  Blood Updated:  04/12/20 0304    Narrative:       The following orders were created for panel order CBC & Differential.  Procedure                               Abnormality         Status                     ---------                               -----------         ------                     CBC Auto Differential[[id number]]        Abnormal            Final result                 Please view results for these tests on the individual orders.    CBC Auto Differential [[id number]]  (Abnormal) Collected:  04/12/20 0234    Specimen:  Blood Updated:  04/12/20 0304     WBC 9.40 10*3/mm3      RBC 3.61 10*6/mm3      Hemoglobin 10.5 g/dL      Comment: Result checked         Hematocrit 31.5 %      MCV 87.1 fL      MCH 29.0 pg      MCHC 33.3 g/dL      RDW 15.5 %      RDW-SD 47.7 fl      MPV 6.7 fL      Platelets 219 10*3/mm3      Neutrophil % 83.6 %      Lymphocyte % 6.3 %      Monocyte % 6.9 %      Eosinophil % 2.9 %      Basophil % 0.3 %      Neutrophils, Absolute 7.90 10*3/mm3      Lymphocytes, Absolute 0.60 10*3/mm3      Monocytes, Absolute 0.70 10*3/mm3      Eosinophils, Absolute 0.30 10*3/mm3      Basophils, Absolute 0.00 10*3/mm3      nRBC 0.1 /100 WBC     Protime-INR [[id number]]  (Abnormal)  Collected:  04/12/20 0234    Specimen:  Blood Updated:  04/12/20 0256     Protime 10.6 Seconds      INR 1.02    Blood Culture - Blood, Arm, Right [[id number]] Collected:  04/10/20 2325    Specimen:  Blood from Arm, Right Updated:  04/11/20 2331     Blood Culture No growth at 24 hours    Blood Culture - Blood, Arm, Left [[id number]] Collected:  04/10/20 2320    Specimen:  Blood from Arm, Left Updated:  04/11/20 2331     Blood Culture No growth at 24 hours    SARS-CoV-2, PCR (IN-HOUSE), NP Swab in Transport Media - Swab, Nasopharynx [[id number]]  (Normal) Collected:  04/10/20 2312    Specimen:  Swab from Nasopharynx Updated:  04/11/20 1335     COVID19 Not Detected           Imaging Results (Last 24 Hours)     ** No results found for the last 24 hours. **               I reviewed the patient's new clinical results.    Medication Review:   Scheduled Meds:  aspirin 81 mg Oral Daily   azithromycin 250 mg Oral Q24H   cefTRIAXone 1 g Intravenous Q24H   digoxin 125 mcg Oral Daily   enoxaparin 30 mg Subcutaneous Q24H   metoprolol tartrate 25 mg Oral BID   sodium chloride 10 mL Intravenous Q12H   warfarin 5 mg Oral Daily     Continuous Infusions:   PRN Meds:.•  acetaminophen  •  nitroglycerin  •  ondansetron  •  sodium chloride     Assessment/Plan       Atrial fibrillation (CMS/HCC) [I48.91]    Long term (current) use of anticoagulants [Z79.01]    Pacemaker    Tachy-ramiro syndrome (CMS/HCC)    Pneumonia due to infectious organism    Acute diastolic congestive heart failure (CMS/HCC)     1. Pneumonia- on po zmax ans IV rocephin- covid test NEGATIVE- clinical improvement today   2. CHF- improved today after 1 dose of IV lasix- will hold on more diuretics as pt is improved   3. A fib- INR not therapeutic- increase coumadin to 5 mg QF_ monitor INR stop lovenox when INR b/t 2-3   4. HTN- Add back in home lisinopril 5 mg a day   5. Long term anticoagulant use- monitor inr  6. Tachy- ramiro syndrome- has pacer  7. Pacemaker status-          Plan for disposition: home when able     Nela Diaz, FELIZ  04/12/20  12:42

## 2020-04-12 NOTE — PLAN OF CARE
Problem: Patient Care Overview  Goal: Plan of Care Review  4/12/2020 1608 by Lisa Wallace RN  Outcome: Ongoing (interventions implemented as appropriate)  Flowsheets (Taken 4/12/2020 1608)  Outcome Summary: patient requiring 2 liters of oxygen. no complaints during shift. will continue to monitor.

## 2020-04-13 PROBLEM — I10 HYPERTENSION: Status: ACTIVE | Noted: 2020-04-13

## 2020-04-13 LAB
ANION GAP SERPL CALCULATED.3IONS-SCNC: 12 MMOL/L (ref 5–15)
BASOPHILS # BLD AUTO: 0 10*3/MM3 (ref 0–0.2)
BASOPHILS NFR BLD AUTO: 0.5 % (ref 0–1.5)
BUN BLD-MCNC: 22 MG/DL (ref 8–23)
BUN/CREAT SERPL: 27.8 (ref 7–25)
CALCIUM SPEC-SCNC: 9.4 MG/DL (ref 8.6–10.5)
CHLORIDE SERPL-SCNC: 99 MMOL/L (ref 98–107)
CO2 SERPL-SCNC: 28 MMOL/L (ref 22–29)
CREAT BLD-MCNC: 0.79 MG/DL (ref 0.57–1)
DEPRECATED RDW RBC AUTO: 47.3 FL (ref 37–54)
DIGOXIN SERPL-MCNC: 1 NG/ML (ref 0.6–1.2)
EOSINOPHIL # BLD AUTO: 0.4 10*3/MM3 (ref 0–0.4)
EOSINOPHIL NFR BLD AUTO: 5 % (ref 0.3–6.2)
ERYTHROCYTE [DISTWIDTH] IN BLOOD BY AUTOMATED COUNT: 15.4 % (ref 12.3–15.4)
GFR SERPL CREATININE-BSD FRML MDRD: 69 ML/MIN/1.73
GLUCOSE BLD-MCNC: 102 MG/DL (ref 65–99)
HCT VFR BLD AUTO: 35.7 % (ref 34–46.6)
HGB BLD-MCNC: 11.9 G/DL (ref 12–15.9)
INR PPP: 1.06 (ref 2–3)
LYMPHOCYTES # BLD AUTO: 0.6 10*3/MM3 (ref 0.7–3.1)
LYMPHOCYTES NFR BLD AUTO: 7.6 % (ref 19.6–45.3)
MCH RBC QN AUTO: 29 PG (ref 26.6–33)
MCHC RBC AUTO-ENTMCNC: 33.2 G/DL (ref 31.5–35.7)
MCV RBC AUTO: 87.1 FL (ref 79–97)
MONOCYTES # BLD AUTO: 0.6 10*3/MM3 (ref 0.1–0.9)
MONOCYTES NFR BLD AUTO: 7.4 % (ref 5–12)
NEUTROPHILS # BLD AUTO: 6.4 10*3/MM3 (ref 1.7–7)
NEUTROPHILS NFR BLD AUTO: 79.5 % (ref 42.7–76)
NRBC BLD AUTO-RTO: 0.2 /100 WBC (ref 0–0.2)
PLATELET # BLD AUTO: 260 10*3/MM3 (ref 140–450)
PMV BLD AUTO: 6.8 FL (ref 6–12)
POTASSIUM BLD-SCNC: 4.2 MMOL/L (ref 3.5–5.2)
PROTHROMBIN TIME: 11 SECONDS (ref 19.4–28.5)
RBC # BLD AUTO: 4.1 10*6/MM3 (ref 3.77–5.28)
SODIUM BLD-SCNC: 139 MMOL/L (ref 136–145)
WBC NRBC COR # BLD: 8.1 10*3/MM3 (ref 3.4–10.8)

## 2020-04-13 PROCEDURE — 25010000002 ENOXAPARIN PER 10 MG: Performed by: NURSE PRACTITIONER

## 2020-04-13 PROCEDURE — 99214 OFFICE O/P EST MOD 30 MIN: CPT | Performed by: INTERNAL MEDICINE

## 2020-04-13 PROCEDURE — 96372 THER/PROPH/DIAG INJ SC/IM: CPT

## 2020-04-13 PROCEDURE — 85025 COMPLETE CBC W/AUTO DIFF WBC: CPT | Performed by: NURSE PRACTITIONER

## 2020-04-13 PROCEDURE — 80162 ASSAY OF DIGOXIN TOTAL: CPT | Performed by: NURSE PRACTITIONER

## 2020-04-13 PROCEDURE — 85610 PROTHROMBIN TIME: CPT | Performed by: FAMILY MEDICINE

## 2020-04-13 PROCEDURE — G0378 HOSPITAL OBSERVATION PER HR: HCPCS

## 2020-04-13 PROCEDURE — 80048 BASIC METABOLIC PNL TOTAL CA: CPT | Performed by: NURSE PRACTITIONER

## 2020-04-13 PROCEDURE — 97161 PT EVAL LOW COMPLEX 20 MIN: CPT

## 2020-04-13 PROCEDURE — 25010000002 CEFTRIAXONE PER 250 MG: Performed by: NURSE PRACTITIONER

## 2020-04-13 RX ADMIN — AZITHROMYCIN MONOHYDRATE 250 MG: 250 TABLET ORAL at 12:24

## 2020-04-13 RX ADMIN — METOPROLOL TARTRATE 25 MG: 25 TABLET, FILM COATED ORAL at 21:03

## 2020-04-13 RX ADMIN — ASPIRIN 81 MG: 81 TABLET, COATED ORAL at 09:44

## 2020-04-13 RX ADMIN — LISINOPRIL 5 MG: 5 TABLET ORAL at 09:44

## 2020-04-13 RX ADMIN — WARFARIN 5 MG: 5 TABLET ORAL at 16:38

## 2020-04-13 RX ADMIN — ENOXAPARIN SODIUM 30 MG: 30 INJECTION SUBCUTANEOUS at 16:39

## 2020-04-13 RX ADMIN — METOPROLOL TARTRATE 25 MG: 25 TABLET, FILM COATED ORAL at 09:44

## 2020-04-13 RX ADMIN — Medication 10 ML: at 09:44

## 2020-04-13 RX ADMIN — CEFTRIAXONE SODIUM 1 G: 1 INJECTION, POWDER, FOR SOLUTION INTRAMUSCULAR; INTRAVENOUS at 21:03

## 2020-04-13 RX ADMIN — Medication 10 ML: at 21:03

## 2020-04-13 NOTE — DISCHARGE PLACEMENT REQUEST
"Ruddy Gaxiola (87 y.o. Female)     Date of Birth Social Security Number Address Home Phone MRN    11/12/1932  [id number] [facility] [address] [phone] [id number]    [facility] Mar[employer] Status          [place]        Admission Date Admission Type Admitting Provider Attending Provider Department, Room/Bed    4/10/20 Emergency Yayo Guzman MD Heimer, Brian T, MD [facility] 3C MEDICAL INPATIENT, 358/1    Discharge Date Discharge Disposition Discharge Destination                       Attending Provider:  Yayo Guzman MD    Allergies:  Codeine, Hydrocodone-acetaminophen, Meperidine, Morphine    Isolation:  None   Infection:  None   Code Status:  CPR    Ht:  147.3 cm (58\")   Wt:  46.2 kg (101 lb 13.6 oz)    Admission Cmt:  None   Principal Problem:  None                Active Insurance as of 4/10/2020     Primary Coverage     Payor Plan Insurance Group Employer/Plan Group    MEDICARE MEDICARE A & B      Payor Plan Address Payor Plan Phone Number Payor Plan Fax Number Effective Dates    [address] [phone]  9/1/1992 - None Entered    [facility] [id number]       Subscriber Name Subscriber Birth Date Member ID       RUDDY GAXIOLA 11/12/1932 [id number]           Secondary Coverage     Payor Plan Insurance Group Employer/Plan Group    [facility] SUPP [id number]     Payor Plan Address Payor Plan Phone Number Payor Plan Fax Number Effective Dates    [address]   12/1/2016 - None Entered    [facility] [id number]       Subscriber Name Subscriber Birth Date Member ID       RUDDY GAXIOLA 11/12/1932 [id number]                 Emergency Contacts      (Rel.) Home Phone Work Phone Mobile Phone    Celso Rutledge (Relative) -- -- [phone]    Qi Rutledge (Daughter) [phone] -- --            Emergency Contact Information     Name Relation Home Work Mobile    Celso Rutledge Relative   [phone]    Qi Rutledge Daughter [phone] "            Insurance Information                MEDICARE/MEDICARE A & B Phone: [phone]    Subscriber: Alexandrea Gaxiola Subscriber#: [id number]    Group#:  Precert#:         [facility] SUPP Phone:     Subscriber: Alexandrea Gaxiola Subscriber#: [id number]    Group#: [id number] Precert#:              History & Physical      Margarita Schaffer MD at 04/11/20 1257          Patient Care Team:  Yayo Guzman MD as PCP - General  Flora Guzman MD as PCP - Family Medicine    Chief complaint NICKI    Subjective     Patient is a 87 y.o. female presents with SOA Onset of symptoms was 1 week ago        Patient lives with her daughter and son in law- Patient reports she has been isolated to the house yet her family works and has not been in isolation. No family members have been ill.    Patient reports she has had a dry cough for 1 week and last night she felt SOA and came to the ED.    According to primary care records, she took Augmentin for a questionable  Illness on 3/16/20    Pt reports to me today that she has felt fatigued for weeks if not months.     Review of Systems   Constitutional: Positive for activity change, fatigue and fever. Negative for appetite change.   HENT: Negative for congestion, sore throat and trouble swallowing.    Respiratory: Positive for cough and shortness of breath.    Cardiovascular: Negative for chest pain, palpitations and leg swelling.   Gastrointestinal: Negative for abdominal pain, constipation, diarrhea, nausea and vomiting.   Genitourinary: Negative for dysuria.   Musculoskeletal: Negative for arthralgias, back pain and joint swelling.   Neurological: Negative for dizziness, speech difficulty and headaches.   Psychiatric/Behavioral: Negative for agitation and confusion.          History   a fib/ Htn/ tachy ramiro syndrome/ paacemaker  No past medical history on file.  No past surgical history on file.  No family history on file.  Social History     Tobacco Use   •  Smoking status: Never Smoker   • Smokeless tobacco: Never Used   Substance Use Topics   • Alcohol use: Never     Frequency: Never   • Drug use: Not on file     Medications Prior to Admission   Medication Sig Dispense Refill Last Dose   • amLODIPine (NORVASC) 5 MG tablet Take 5 mg by mouth Daily.      • digoxin (LANOXIN) 125 MCG tablet Take 125 mcg by mouth Daily.      • lisinopril (PRINIVIL,ZESTRIL) 5 MG tablet Take 5 mg by mouth Daily.      • metoprolol tartrate (LOPRESSOR) 25 MG tablet Take 25 mg by mouth 2 (Two) Times a Day.      • Multiple Vitamins-Minerals (MULTI VITAMIN/MINERALS) tablet Take 1 tablet by mouth Daily.      • traMADol (ULTRAM) 50 MG tablet Take 50 mg by mouth Every 3 (Three) Hours As Needed for Moderate Pain .      • warfarin (COUMADIN) 2.5 MG tablet Take 2.5 mg by mouth. Sunday, Monday, Tuesday, Thursday, Friday, Saturday      • warfarin (COUMADIN) 2.5 MG tablet Take 5 mg by mouth. Wednesday      • aspirin (ASPIR-LOW) 81 MG EC tablet Take 81 mg by mouth Daily.        Allergies:  Codeine; Hydrocodone-acetaminophen; Meperidine; and Morphine    Objective     Vital Signs  Temp:  [97.8 °F (36.6 °C)-101 °F (38.3 °C)] 98.6 °F (37 °C)  Heart Rate:  [64-81] 65  Resp:  [14-36] 14  BP: (119-167)/(56-79) 119/64     Physical Exam:      General Appearance:    Alert, cooperative, in no acute distress   Head:    Normocephalic, without obvious abnormality, atraumatic   Eyes:            Lids and lashes normal, conjunctivae and sclerae normal, no   icterus, no pallor, corneas clear, PERRLA   Ears:    Ears appear intact with no abnormalities noted   Throat:   No oral lesions, no thrush, oral mucosa moist   Neck:   No adenopathy, supple, trachea midline, no thyromegaly, no   carotid bruit, no JVD   Lungs:    patient is with a nonproductive frequent cough,respirations regular, even and unlabored. Bilateral lung bases are with crackles     Heart:    Regular rhythm and normal rate, normal S1 and S2, 2/6 murmur heard at  the LSB ,  no gallop, no rub, no click   Chest Wall:    No abnormalities observed   Abdomen:     Normal bowel sounds, no masses, no organomegaly, soft        non-tender, non-distended, no guarding, no rebound                tenderness   Extremities:   Moves all extremities well, no edema, no cyanosis, no             redness   Pulses:   Pulses palpable and equal bilaterally   Skin:   No bleeding, bruising or rash   Lymph nodes:   No palpable adenopathy   Neurologic:   Cranial nerves 2 - 12 grossly intact, sensation intact, DTR       present and equal bilaterally       Results Review:     Imaging Results (Last 24 Hours)     Procedure Component Value Units Date/Time    XR Chest 1 View [[id number]] Collected:  04/11/20 0222     Updated:  04/11/20 0237    Narrative:       DATE OF EXAM:  4/10/2020 11:24 PM     PROCEDURE:  XR CHEST 1 VW-     INDICATIONS:  soa; shortness of breath; cough     COMPARISON:  06/03/2019.     TECHNIQUE:   Single radiographic AP view of the chest was obtained.     FINDINGS:  A single AP upright portable chest radiograph reveals bilateral  atelectasis and/or infiltrate, especially in the mid to basilar lung  zones. The findings may represent pulmonary edema. Infectious multifocal  pneumonia cannot be excluded. Bilateral small to moderate pleural  effusions are suspected. There is mild to moderate cardiomegaly, as  before. Atherosclerotic changes are seen. There is a left-sided cardiac  implantable electronic device (CIED) in place, as before. There are  postoperative changes of the thoracolumbar spine, seen previously. No  pneumothorax is appreciated.  Chronic post-traumatic deformity is  possible involving the proximal right humerus. There may be a chronic  bone infarct versus a chondroid matrix tumor within the proximal right  humerus, seen on prior studies and not significantly changed. Loose  bodies involving the left glenohumeral joint space cannot be excluded.  Scoliosis is suspected.           Impression:       New bilateral atelectasis and/or infiltrate is seen. The findings may  represent moderate-to-severe pulmonary edema with vascular congestion.  Superimposed infectious multifocal pneumonia cannot be excluded,  especially in the left lung base.      Small-to-moderate bilateral pleural effusions are present, which are  thought to be new, as well.      There is mild to moderate cardiomegaly, as before.     Electronically Signed By-Dr. Gamaliel Danielson MD On:4/11/2020 2:35 AM  This report was finalized on [id number] by Dr. Gamaliel Danielson MD.    CT Chest Without Contrast [[id number]] Collected:  04/10/20 2248     Updated:  04/11/20 0053    Narrative:       EXAM: CT SCAN OF THE CHEST WITHOUT CONTRAST    INDICATION: Shortness breath, fever    TECHNIQUE: CT scan through the chest was performed without the administration of intravenous contrast. CT dose lowering techniques were used, to include: automated exposure control, adjustment for patient size, and / or use of iterative reconstruction.     COMPARISON: None    FINDINGS:  Vasculature: There is no thoracic aortic aneurysm.  There are atherosclerotic calcifications of the thoracic aorta, great vessels and coronary arteries.    Mediastinum / Pleura: A moderate right and small left pleural effusion are present. There is no mediastinal, hilar or axillary lymphadenopathy.     Airways / Lungs: The central airways are patent.  There is no pneumothorax. There is patchy groundglass airspace disease in the perihilar right upper lobe. There is interlobular septal thickening in the lung bases. Passive atelectatic changes are also   present in the lung bases. A coarsely calcified nodule in the left apex is compatible with a granuloma.    Bones: There is scoliotic curvature of the visualized spine. Partially imaged is lower thoracic and lumbar fusion hardware.    Upper Abdomen: Limited images below the diaphragm demonstrate no acute abnormality.      Impression:            1. Moderate right and small left pleural effusions. Interlobular septal thickening in the lung bases. Findings are compatible with interstitial pulmonary edema.  2. Patchy groundglass airspace disease in the perihilar right upper lobe. This may represent an infectious or inflammatory process, versus an alveolar component of pulmonary edema.  3. Atherosclerosis, including the coronary arteries.  4. Passive atelectasis in the lung bases.  5. Additional chronic findings as above.    Electronically signed by:  Sarthak Bowman M.D.    4/10/2020 10:52 PM           Lab Results (last 24 hours)     Procedure Component Value Units Date/Time    [place] Draw [[id number]] Collected:  04/10/20 2312    Specimen:  Blood Updated:  04/11/20 0046    Narrative:       The following orders were created for panel order [place] Draw.  Procedure                               Abnormality         Status                     ---------                               -----------         ------                     Light Blue Top[[id number]]                                   Final result               Green Top (Gel)[[id number]]                                  Final result               Lavender Top[[id number]]                                     Final result               Gold Top - SST[[id number]]                                   Final result                 Please view results for these tests on the individual orders.    Light Blue Top [[id number]] Collected:  04/10/20 2334    Specimen:  Blood Updated:  04/11/20 0046     Extra Tube hold for add-on     Comment: Auto resulted       Respiratory Panel, PCR - Swab, Nasopharynx [[id number]]  (Normal) Collected:  04/10/20 2312    Specimen:  Swab from Nasopharynx Updated:  04/11/20 0041     ADENOVIRUS, PCR Not Detected     Coronavirus 229E Not Detected     Coronavirus HKU1 Not Detected     Coronavirus NL63 Not Detected     Coronavirus OC43 Not Detected     Human Metapneumovirus Not Detected     Human  Rhinovirus/Enterovirus Not Detected     Influenza B PCR Not Detected     Parainfluenza Virus 1 Not Detected     Parainfluenza Virus 2 Not Detected     Parainfluenza Virus 3 Not Detected     Parainfluenza Virus 4 Not Detected     Bordetella pertussis pcr Not Detected     Influenza A H1 2009 PCR Not Detected     Chlamydophila pneumoniae PCR Not Detected     Mycoplasma pneumo by PCR Not Detected     Influenza A PCR Not Detected     Influenza A H3 Not Detected     Influenza A H1 Not Detected     RSV, PCR Not Detected    Narrative:       The coronavirus on the RVP is NOT COVID-19 and is NOT indicative of infection with COVID-19.     SARS-CoV-2, PCR (IN-HOUSE), NP Swab in [employer] - Swab, Nasopharynx [[id number]] Collected:  04/10/20 2312    Specimen:  Swab from Nasopharynx Updated:  04/11/20 0038    Lavender Top [[id number]] Collected:  04/10/20 2312    Specimen:  Blood Updated:  04/11/20 0016     Extra Tube hold for add-on     Comment: Auto resulted       Gold Top - SST [[id number]] Collected:  04/10/20 2312    Specimen:  Blood Updated:  04/11/20 0016     Extra Tube Hold for add-ons.     Comment: Auto resulted.       Green Top (Gel) [[id number]] Collected:  04/10/20 2312    Specimen:  Blood Updated:  04/11/20 0010     Extra Tube --    Troponin [[id number]]  (Normal) Collected:  04/10/20 2312    Specimen:  Blood Updated:  04/11/20 0010     Troponin T <0.010 ng/mL      Comment: Specimen hemolyzed.  Results may be affected.       Narrative:       Troponin T Reference Range:  <= 0.03 ng/mL-   Negative for AMI  >0.03 ng/mL-     Abnormal for myocardial necrosis.  Clinicians would have to utilize clinical acumen, EKG, Troponin and serial changes to determine if it is an Acute Myocardial Infarction or myocardial injury due to an underlying chronic condition.       Results may be falsely decreased if patient taking Biotin.      Comprehensive Metabolic Panel [[id number]]  (Abnormal) Collected:  04/10/20 2312    Specimen:  Blood  Updated:  04/11/20 0010     Glucose 144 mg/dL      BUN 24 mg/dL      Creatinine 1.01 mg/dL      Sodium 134 mmol/L      Potassium 5.1 mmol/L      Comment: Specimen hemolyzed.  Results may be affected. ckd with estuardo and said ok to release        Chloride 97 mmol/L      CO2 24.0 mmol/L      Calcium 9.3 mg/dL      Total Protein 7.7 g/dL      Albumin 4.00 g/dL      ALT (SGPT) 8 U/L      Comment: Specimen hemolyzed.  Results may be affected.        AST (SGOT) 22 U/L      Comment: Specimen hemolyzed.  Results may be affected.        Alkaline Phosphatase 84 U/L      Total Bilirubin 0.3 mg/dL      eGFR Non African Amer 52 mL/min/1.73      Globulin 3.7 gm/dL      A/G Ratio 1.1 g/dL      BUN/Creatinine Ratio 23.8     Anion Gap 13.0 mmol/L     Narrative:       GFR Normal >60  Chronic Kidney Disease <60  Kidney Failure <15      CBC & Differential [[id number]] Collected:  04/10/20 2312    Specimen:  Blood Updated:  04/10/20 2359    Narrative:       The following orders were created for panel order CBC & Differential.  Procedure                               Abnormality         Status                     ---------                               -----------         ------                     CBC Auto Differential[[id number]]        Abnormal            Final result                 Please view results for these tests on the individual orders.    CBC Auto Differential [[id number]]  (Abnormal) Collected:  04/10/20 2312    Specimen:  Blood Updated:  04/10/20 2359     WBC 18.40 10*3/mm3      RBC 4.23 10*6/mm3      Hemoglobin 12.5 g/dL      Hematocrit 36.6 %      MCV 86.5 fL      MCH 29.6 pg      MCHC 34.2 g/dL      RDW 15.5 %      RDW-SD 47.7 fl      MPV 7.0 fL      Platelets 354 10*3/mm3     Scan Slide [[id number]] Collected:  04/10/20 2312    Specimen:  Blood Updated:  04/10/20 2359     Scan Slide --     Comment: See Manual Differential Results       Manual Differential [[id number]]  (Abnormal) Collected:  04/10/20 2312    Specimen:  Blood  "Updated:  04/10/20 2359     Neutrophil % 83.0 %      Lymphocyte % 5.0 %      Monocyte % 3.0 %      Eosinophil % 1.0 %      Bands %  5.0 %      Metamyelocyte % 1.0 %      Myelocyte % 2.0 %      Neutrophils Absolute 16.19 10*3/mm3      Lymphocytes Absolute 0.92 10*3/mm3      Monocytes Absolute 0.55 10*3/mm3      Eosinophils Absolute 0.18 10*3/mm3      Anisocytosis Slight/1+     Polychromasia Slight/1+     WBC Morphology Normal     Platelet Morphology Normal    Procalcitonin [[id number]]  (Normal) Collected:  04/10/20 2312    Specimen:  Blood Updated:  04/10/20 2355     Procalcitonin 0.11 ng/mL     Narrative:       As a Marker for Sepsis (Non-Neonates):   1. <0.5 ng/mL represents a low risk of severe sepsis and/or septic shock.  1. >2 ng/mL represents a high risk of severe sepsis and/or septic shock.    As a Marker for Lower Respiratory Tract Infections that require antibiotic therapy:  PCT on Admission     Antibiotic Therapy             6-12 Hrs later  > 0.5                Strongly Recommended            >0.25 - <0.5         Recommended  0.1 - 0.25           Discouraged                   Remeasure/reassess PCT  <0.1                 Strongly Discouraged          Remeasure/reassess PCT      As 28 day mortality risk marker: \"Change in Procalcitonin Result\" (> 80 % or <=80 %) if Day 0 (or Day 1) and Day 4 values are available. Refer to http://www.Grafighterss-pct-calculator.com/   Change in PCT <=80 %   A decrease of PCT levels below or equal to 80 % defines a positive change in PCT test result representing a higher risk for 28-day all-cause mortality of patients diagnosed with severe sepsis or septic shock.  Change in PCT > 80 %   A decrease of PCT levels of more than 80 % defines a negative change in PCT result representing a lower risk for 28-day all-cause mortality of patients diagnosed with severe sepsis or septic shock.                Results may be falsely decreased if patient taking Biotin.     Digoxin Level [[id number]]  " (Normal) Collected:  04/10/20 2312    Specimen:  Blood Updated:  04/10/20 2352     Digoxin 1.00 ng/mL     BNP [[id number]]  (Abnormal) Collected:  04/10/20 2312    Specimen:  Blood Updated:  04/10/20 2349     proBNP 4,650.0 pg/mL     Narrative:       Among patients with dyspnea, NT-proBNP is highly sensitive for the detection of acute congestive heart failure. In addition NT-proBNP of <300 pg/ml effectively rules out acute congestive heart failure with 99% negative predictive value.    Results may be falsely decreased if patient taking Biotin.      Protime-INR [[id number]]  (Abnormal) Collected:  04/10/20 2334    Specimen:  Blood Updated:  04/10/20 2344     Protime 10.7 Seconds      INR 1.02    Blood Culture - Blood, Arm, Right [[id number]] Collected:  04/10/20 2325    Specimen:  Blood from Arm, Right Updated:  04/10/20 2328    POC Lactate [[id number]]  (Normal) Collected:  04/10/20 2325    Specimen:  Blood Updated:  04/10/20 2326     Lactate 1.0 mmol/L      Comment: Serial Number: [id number]:  [id number]       Blood Culture - Blood, Arm, Left [[id number]] Collected:  04/10/20 2320    Specimen:  Blood from Arm, Left Updated:  04/10/20 2325           I reviewed the patient's new clinical results.    Assessment/Plan       Atrial fibrillation (CMS/HCC) [I48.91]    Long term (current) use of anticoagulants [Z79.01]    Pacemaker    Tachy-ramiro syndrome (CMS/HCC)    Pneumonia due to infectious organism    Acute diastolic congestive heart failure (CMS/HCC)    1. Pneumonia- on 1 gm rocephin IV q d- had 500 mg zmax IV will change to po. COVID test pending- respiratory panel negative. Maintain O2- ABG ordered. CT chest reviewed - pleural effusion desiree, groundglass airspace disease  RUL   2. Acute diastolic CHF- probnp elevated- lasix IV today reeval in AM and monitor lytes  3. Afib- on dig and metoprolol- continue- Anticoagulated w coumadin- continue and monitor inr- PT IS NOT THERAPEUTIC AT THIS TIME ADDING IN LOVENOX UNTIL  INR IS THERAPEUTIC- continue 81 asa  4. HTN - renal function preserved - last Cr in office in July 2019- 0.7 will monitor holding home norvasc and lisinopril will monitor   5. Long term use of anticoagulants- monitor INR  6. 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 Pacemaker status- monitor         I discussed the patients findings and my recommendations with patient.     FELIZ Brush  04/11/20  12:57        Electronically signed by Margarita Schaffer MD at 04/11/20 2330         Current Facility-Administered Medications   Medication Dose Route Frequency Provider Last Rate Last Dose   • acetaminophen (TYLENOL) tablet 650 mg  650 mg Oral Q6H PRN Nela Diaz APRBENNY       • aspirin EC tablet 81 mg  81 mg Oral Daily Nela Diaz APRN   81 mg at 04/13/20 0944   • azithromycin (ZITHROMAX) tablet 250 mg  250 mg Oral Q24H Nela Diaz APRN   250 mg at 04/13/20 1224   • cefTRIAXone (ROCEPHIN) 1 g in sodium chloride 0.9 % 100 mL IVPB  1 g Intravenous Q24H Nela Diaz APRN   Stopped at 04/12/20 2144   • digoxin (LANOXIN) tablet 125 mcg  125 mcg Oral Daily Nela Diaz APRN   125 mcg at 04/12/20 0900   • enoxaparin (LOVENOX) syringe 30 mg  30 mg Subcutaneous Q24H Nela Diaz APRN   30 mg at 04/12/20 1741   • lisinopril (PRINIVIL,ZESTRIL) tablet 5 mg  5 mg Oral Q24H Nela Diaz APRN   5 mg at 04/13/20 0944   • metoprolol tartrate (LOPRESSOR) tablet 25 mg  25 mg Oral BID Nela Diaz APRN   25 mg at 04/13/20 0944   • nitroglycerin (NITROSTAT) SL tablet 0.4 mg  0.4 mg Sublingual Q5 Min PRN Nela Diaz APRN       • ondansetron (ZOFRAN) injection 4 mg  4 mg Intravenous Q6H PRN Nela Diaz APRN       • Pharmacy to dose warfarin   Does not apply Continuous PRN Yayo Guzman MD       • sodium chloride 0.9 % flush 10 mL  10 mL Intravenous PRN Francis Reyes MD       • sodium chloride 0.9 % flush 10 mL  10 mL Intravenous Q12H Nela Diaz APRN   10 mL at  04/13/20 0944   • warfarin (COUMADIN) tablet 5 mg  5 mg Oral Daily Nela Diaz, APRN   5 mg at 04/12/20 1744

## 2020-04-13 NOTE — PROGRESS NOTES
Referring Provider: Yayo Guzman MD    Reason for follow-up:  Status post pacemaker  Anticoagulation management  Atrial fibrillation     Patient Care Team:  Yayo Guzman MD as PCP - General  Flora Guzman MD as PCP - Family Medicine    Subjective .  Feeling better     ROS    the patient has been without any chest discomfort ,shortness of breath, palpitations, dizziness or syncope.  Denies having any headache ,abdominal pain ,nausea, vomiting , diarrhea constipation, loss of weight or loss of appetite.  Denies having any excessive bruising ,hematuria or blood in the stool.    Review of all systems negative except as indicated    History  No past medical history on file.    No past surgical history on file.    No family history on file.    Social History     Tobacco Use   • Smoking status: Never Smoker   • Smokeless tobacco: Never Used   Substance Use Topics   • Alcohol use: Never     Frequency: Never   • Drug use: Not on file        Medications Prior to Admission   Medication Sig Dispense Refill Last Dose   • amLODIPine (NORVASC) 5 MG tablet Take 5 mg by mouth Daily.      • digoxin (LANOXIN) 125 MCG tablet Take 125 mcg by mouth Daily.      • lisinopril (PRINIVIL,ZESTRIL) 5 MG tablet Take 5 mg by mouth Daily.      • metoprolol tartrate (LOPRESSOR) 25 MG tablet Take 25 mg by mouth 2 (Two) Times a Day.      • Multiple Vitamins-Minerals (MULTI VITAMIN/MINERALS) tablet Take 1 tablet by mouth Daily.      • traMADol (ULTRAM) 50 MG tablet Take 50 mg by mouth Every 3 (Three) Hours As Needed for Moderate Pain .      • warfarin (COUMADIN) 2.5 MG tablet Take 2.5 mg by mouth. Sunday, Monday, Tuesday, Thursday, Friday, Saturday      • warfarin (COUMADIN) 2.5 MG tablet Take 5 mg by mouth. Wednesday      • aspirin (ASPIR-LOW) 81 MG EC tablet Take 81 mg by mouth Daily.          Allergies  Codeine; Hydrocodone-acetaminophen; Meperidine; and Morphine    Scheduled Meds:  aspirin 81 mg Oral Daily   azithromycin 250 mg Oral Q24H   "  cefTRIAXone 1 g Intravenous Q24H   digoxin 125 mcg Oral Daily   enoxaparin 30 mg Subcutaneous Q24H   lisinopril 5 mg Oral Q24H   metoprolol tartrate 25 mg Oral BID   sodium chloride 10 mL Intravenous Q12H   warfarin 5 mg Oral Daily     Continuous Infusions:   PRN Meds:.•  acetaminophen  •  nitroglycerin  •  ondansetron  •  sodium chloride    Objective     VITAL SIGNS  Vitals:    04/12/20 1238 04/12/20 1741 04/12/20 2047 04/13/20 0529   BP: 162/66 148/61 129/42 166/57   BP Location: Right arm Right arm Right arm Right arm   Patient Position: Lying Sitting Lying Lying   Pulse: 60 66 71 65   Resp: 17  16 16   Temp: 98.1 °F (36.7 °C)  98.7 °F (37.1 °C) 97.7 °F (36.5 °C)   TempSrc: Oral  Oral Oral   SpO2: 97%  97% 97%   Weight:       Height:           Flowsheet Rows      First Filed Value   Admission Height  149.9 cm (59\") Documented at 04/10/2020 2239   Admission Weight  46.7 kg (103 lb) Documented at 04/10/2020 2239            Intake/Output Summary (Last 24 hours) at 4/13/2020 0639  Last data filed at 4/13/2020 0529  Gross per 24 hour   Intake 240 ml   Output 750 ml   Net -510 ml        TELEMETRY: Atrial fibrillation    Physical Exam:  The patient is alert, oriented and in no distress.  Vital signs as noted above.  Head and neck revealed no carotid bruits or jugular venous distention.  No thyromegaly or lymphadenopathy is present  Lungs clear.  No wheezing.  Breath sounds are normal bilaterally.  Heart normal first and second heart sounds.  No murmur. No precordial rub is present.  No gallop is present.  Abdomen soft and nontender.  No organomegaly is present.  Extremities with good peripheral pulses without any pedal edema.  Skin warm and dry.  Pacemaker site looks normal.  Musculoskeletal system is grossly normal  CNS grossly normal      Results Review:   I reviewed the patient's new clinical results.  Lab Results (last 24 hours)     Procedure Component Value Units Date/Time    Digoxin Level [[id number]]  (Normal) " Collected:  04/13/20 0343    Specimen:  Blood Updated:  04/13/20 0600     Digoxin 1.00 ng/mL     Basic Metabolic Panel [[id number]]  (Abnormal) Collected:  04/13/20 0343    Specimen:  Blood Updated:  04/13/20 0437     Glucose 102 mg/dL      BUN 22 mg/dL      Creatinine 0.79 mg/dL      Sodium 139 mmol/L      Potassium 4.2 mmol/L      Chloride 99 mmol/L      CO2 28.0 mmol/L      Calcium 9.4 mg/dL      eGFR Non African Amer 69 mL/min/1.73      BUN/Creatinine Ratio 27.8     Anion Gap 12.0 mmol/L     Narrative:       GFR Normal >60  Chronic Kidney Disease <60  Kidney Failure <15      CBC & Differential [[id number]] Collected:  04/13/20 0342    Specimen:  Blood Updated:  04/13/20 0415    Narrative:       The following orders were created for panel order CBC & Differential.  Procedure                               Abnormality         Status                     ---------                               -----------         ------                     CBC Auto Differential[[id number]]        Abnormal            Final result                 Please view results for these tests on the individual orders.    CBC Auto Differential [[id number]]  (Abnormal) Collected:  04/13/20 0342    Specimen:  Blood Updated:  04/13/20 0415     WBC 8.10 10*3/mm3      RBC 4.10 10*6/mm3      Hemoglobin 11.9 g/dL      Hematocrit 35.7 %      MCV 87.1 fL      MCH 29.0 pg      MCHC 33.2 g/dL      RDW 15.4 %      RDW-SD 47.3 fl      MPV 6.8 fL      Platelets 260 10*3/mm3      Neutrophil % 79.5 %      Lymphocyte % 7.6 %      Monocyte % 7.4 %      Eosinophil % 5.0 %      Basophil % 0.5 %      Neutrophils, Absolute 6.40 10*3/mm3      Lymphocytes, Absolute 0.60 10*3/mm3      Monocytes, Absolute 0.60 10*3/mm3      Eosinophils, Absolute 0.40 10*3/mm3      Basophils, Absolute 0.00 10*3/mm3      nRBC 0.2 /100 WBC     Blood Culture - Blood, Arm, Right [[id number]] Collected:  04/10/20 0714    Specimen:  Blood from Arm, Right Updated:  04/12/20 7387     Blood Culture No  growth at 2 days    Blood Culture - Blood, Arm, Left [[id number]] Collected:  04/10/20 2320    Specimen:  Blood from Arm, Left Updated:  04/12/20 2331     Blood Culture No growth at 2 days          Imaging Results (Last 24 Hours)     ** No results found for the last 24 hours. **      LAB RESULTS (LAST 7 DAYS)    CBC  Results from last 7 days   Lab Units 04/13/20  0342 04/12/20  0234 04/10/20  2312   WBC 10*3/mm3 8.10 9.40 18.40*   RBC 10*6/mm3 4.10 3.61* 4.23   HEMOGLOBIN g/dL 11.9* 10.5* 12.5   HEMATOCRIT % 35.7 31.5* 36.6   MCV fL 87.1 87.1 86.5   PLATELETS 10*3/mm3 260 219 354       BMP  Results from last 7 days   Lab Units 04/13/20  0343 04/12/20  0234 04/10/20  2312   SODIUM mmol/L 139 136 134*   POTASSIUM mmol/L 4.2 3.9 5.1   CHLORIDE mmol/L 99 96* 97*   CO2 mmol/L 28.0 30.0* 24.0   BUN mg/dL 22 25* 24*   CREATININE mg/dL 0.79 1.00 1.01*   GLUCOSE mg/dL 102* 102* 144*       CMP Results from last 7 days   Lab Units 04/13/20  0343 04/12/20  0234 04/10/20  2312   SODIUM mmol/L 139 136 134*   POTASSIUM mmol/L 4.2 3.9 5.1   CHLORIDE mmol/L 99 96* 97*   CO2 mmol/L 28.0 30.0* 24.0   BUN mg/dL 22 25* 24*   CREATININE mg/dL 0.79 1.00 1.01*   GLUCOSE mg/dL 102* 102* 144*   ALBUMIN g/dL  --   --  4.00   BILIRUBIN mg/dL  --   --  0.3   ALK PHOS U/L  --   --  84   AST (SGOT) U/L  --   --  22   ALT (SGPT) U/L  --   --  8         BNP        TROPONIN  Results from last 7 days   Lab Units 04/10/20  2312   TROPONIN T ng/mL <0.010       CoAg  Results from last 7 days   Lab Units 04/12/20  0234 04/10/20  2334 04/10/20  1203   INR  1.02* 1.02* 1.30*       Creatinine Clearance  Estimated Creatinine Clearance: 36.1 mL/min (by C-G formula based on SCr of 0.79 mg/dL).    ABG        Radiology  No radiology results for the last day            EKG          I personally viewed and interpreted the patient's EKG/Telemetry data: Ventricular pacemaker rhythm.  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  Pacemaker was reprogrammed to DDDR due to long AV delays.      -history of intermittent atrial fibrillation.  Patient is in Sinus rhythm.  Patient was on Coumadin.    -moderate mitral regurgitation.  Echocardiogram 01/28/2019 revealed moderate mitral regurgitation left atrial enlargement normal left ventricle function.      -  Status post  subendocardial myocardial infarction -improved.     Cardiac catheterization 12/29/2015 revealed mild anterior wall hypokinesis with ejection fraction of 50%, 40% mid LAD 70-80% ostial diagonal branch disease (small caliber vessel) and 80% circumflex distal to a large marginal branch.      -status post left carotid endarterectomy cervical spine surgery left hip replacement and recent scalp surgery for carcinoma and grafting.     -hypertension-not well controlled.      -allergy to codeine, morphine and Demerol.     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     ===============    Plan  ================  Shortness of breath-probably due to pneumonia.  Patient is not having any angina pectoris or congestive heart failure.  Anticoagulation status was reviewed.  INR is 1.6  Continue Coumadin and adjust dosage..    Medications were reviewed and updated.  ////////////////////////////             Mignon Luke MD  04/13/20  06:39

## 2020-04-13 NOTE — THERAPY EVALUATION
Patient Name: Alexandrea Gaxiola  : 1932    MRN: [id number]                              Today's Date: 2020       Admit Date: 4/10/2020    Visit Dx:     ICD-10-CM ICD-9-CM   1. Pneumonia due to infectious organism, unspecified laterality, unspecified part of lung J18.9 136.9     484.8   2. Acute respiratory failure with hypoxemia (CMS/[facility]) J96.01 518.81   3. 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[facility])   • Pneumonia due to infectious organism   • Acute diastolic congestive heart failure (CMS/HCC)   • Bilateral carotid artery stenosis   • Congestive heart failure (CMS/[facility])   • Coronary artery disease   • Degeneration of intervertebral disc of lumbar region   • 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 No pertinent past medical history.  History reviewed. No pertinent surgical history.  General Information     Row Name 20 1356          PT Evaluation Time/Intention    Document Type  evaluation  -CM     Mode of Treatment  physical therapy  -CM     Row Name 20 1356          General Information    Patient Profile Reviewed?  yes 86 yo female adm for acute respiratory failure w/ hypoxemia. Dx w/ pna, ae chf (proBNP 4,650). Tested but (-) for covid19. Hx of fibromyalgia & cva.   -CM     Prior Level of Function  independent:;all household mobility;gait uses rollator wx   -CM     Existing Precautions/Restrictions  fall  -CM     Barriers to Rehab  none identified  -CM     Row  Name 04/13/20 1356          Relationship/Environment    Lives With  child(adrienne), adult dtr, son in law, and grandson; all work but someone is home w/ her every day  -CM     Row Name 04/13/20 1356          Resource/Environmental Concerns    Current Living Arrangements  home/apartment/condo  -CM     Row Name 04/13/20 1356          Cognitive Assessment/Intervention- PT/OT    Orientation Status (Cognition)  oriented x 4  -CM     Cognitive Assessment/Intervention Comment  pleasant, cooperative  -CM     Row Name 04/13/20 1356          Safety Issues, Functional Mobility    Impairments Affecting Function (Mobility)  strength;endurance/activity tolerance;shortness of breath  -CM       User Key  (r) = Recorded By, (t) = Taken By, (c) = Cosigned By    Initials Name Provider Type    CM Lorraine Bell, PT Physical Therapist        Mobility     Row Name 04/13/20 1358          Bed Mobility Assessment/Treatment    Bed Mobility Assessment/Treatment  supine-sit;sit-supine  -CM     Supine-Sit [place] (Bed Mobility)  minimum assist (75% patient effort)  -CM     Sit-Supine [place] (Bed Mobility)  not tested  -CM     Assistive Device (Bed Mobility)  bed rails  -CM     Row Name 04/13/20 1358          Bed-Chair Transfer    Bed-Chair [place] (Transfers)  not tested  -CM     Row Name 04/13/20 1358          Sit-Stand Transfer    Sit-Stand [place] (Transfers)  minimum assist (75% patient effort)  -CM     Assistive Device (Sit-Stand Transfers)  walker, front-wheeled  -CM     Row Name 04/13/20 1358          Gait/Stairs Assessment/Training    Gait/Stairs Assessment/Training  gait/ambulation independence;gait/ambulation assistive device  -CM     [place] Level (Gait)  1 person assist;contact guard  -CM     Assistive Device (Gait)  walker, front-wheeled  -CM     Distance in Feet (Gait)  120 ft   -CM     Pattern (Gait)  step-to  -CM     Deviations/Abnormal Patterns (Gait)  steppage  -CM     Left Sided Gait Deviations  foot  drop/toe drag;heel strike decreased  -CM       User Key  (r) = Recorded By, (t) = Taken By, (c) = Cosigned By    Initials Name Provider Type    Lorraine Denton, PT Physical Therapist        Obj/Interventions     Row Name 04/13/20 1400          General ROM    GENERAL ROM COMMENTS  wfl BUEs and BLEs  -CM     Row Name 04/13/20 1400          MMT (Manual Muscle Testing)    General MMT Comments  UEs wfl; RLE 4-/5, LLE 3+/5, except for L DF 2+/5  -CM     Row Name 04/13/20 1400          Static Sitting Balance    Level of [place] (Unsupported Sitting, Static Balance)  independent  -CM     Sitting Position (Unsupported Sitting, Static Balance)  sitting on edge of bed  -     Time Able to Maintain Position (Unsupported Sitting, Static Balance)  more than 5 minutes  -CM     Row Name 04/13/20 1400          Dynamic Sitting Balance    Level of [place], Reaches Outside Midline (Sitting, Dynamic Balance)  independent  -CM     Sitting Position, Reaches Outside Midline (Sitting, Dynamic Balance)  sitting on edge of bed  -CM     Row Name 04/13/20 1400          Static Standing Balance    Level of [place] (Supported Standing, Static Balance)  contact guard assist  -CM     Assistive Device Utilized (Supported Standing, Static Balance)  walker, rolling  -CM     Row Name 04/13/20 1400          Dynamic Standing Balance    Level of [place], Reaches Outside Midline (Standing, Dynamic Balance)  minimal assist, 75% patient effort  -CM     Assistive Device Utilized (Supported Standing, Dynamic Balance)  walker, rolling  -CM     Row Name 04/13/20 1400          Sensory Assessment/Intervention    Sensory General Assessment  no sensation deficits identified  -CM       User Key  (r) = Recorded By, (t) = Taken By, (c) = Cosigned By    Initials Name Provider Type    Lorraine Denton, PT Physical Therapist        Goals/Plan     Row Name 04/13/20 1405          Bed Mobility Goal 1 (PT)    Activity/Assistive Device (Bed  Mobility Goal 1, PT)  bed mobility activities, all  -CM     [place] Level/Cues Needed (Bed Mobility Goal 1, PT)  conditional independence  -CM     Time Frame (Bed Mobility Goal 1, PT)  2 weeks  -CM     Row Name 04/13/20 1405          Transfer Goal 1 (PT)    Activity/Assistive Device (Transfer Goal 1, PT)  transfers, all;walker, rolling  -CM     [place] Level/Cues Needed (Transfer Goal 1, PT)  conditional independence  -CM     Time Frame (Transfer Goal 1, PT)  2 weeks  -CM     Row Name 04/13/20 1405          Gait Training Goal 1 (PT)    Activity/Assistive Device (Gait Training Goal 1, PT)  gait (walking locomotion);assistive device use;walker, rolling  -CM     [place] Level (Gait Training Goal 1, PT)  conditional independence  -CM     Distance (Gait Goal 1, PT)  300 ft w/ rw, no soa  -CM     Time Frame (Gait Training Goal 1, PT)  2 weeks  -CM       User Key  (r) = Recorded By, (t) = Taken By, (c) = Cosigned By    Initials Name Provider Type    Lorraine Denton, PT Physical Therapist        Clinical Impression     Row Name 04/13/20 1401          Pain Assessment    Additional Documentation  Pain Scale: Numbers Pre/Post-Treatment (Group)  -CM     Row Name 04/13/20 1401          Pain Scale: Numbers Pre/Post-Treatment    Pain Scale: Numbers, Pretreatment  0/10 - no pain  -CM     Pain Scale: Numbers, Post-Treatment  0/10 - no pain  -CM     Pain Intervention(s)  Emotional support;Repositioned  -CM     Row Name 04/13/20 1401          Plan of Care Review    Plan of Care Reviewed With  patient  -CM     Progress  improving  -CM     Outcome Summary  88 yo female adm for hypoxic acute respiratory failure, pna, ae chf. Tested for covid19; results negative. Able to come to sitting at eob w/ min assist, stands w/ min assist, and amb w/ cga using rw x 120 ft. Has steppage gait, w/ foot drop noted on L side. Pt stable w/ activity in regard to respiration. No soa noted. Does present w/ weakness, and she is not quite  at her baseline. Pt will need home health PT, 24 hr care at d/c. Will follow for skilled PT while in hospital.   -CM     Row Name 04/13/20 1401          Physical Therapy Clinical Impression    Criteria for Skilled Interventions Met (PT Clinical Impression)  yes;treatment indicated  -CM     Rehab Potential (PT Clinical Summary)  good, to achieve stated therapy goals  -CM     Predicted Duration of Therapy (PT)  until d/c   -CM     Row Name 04/13/20 1401          Vital Signs    O2 Delivery Pre Treatment  room air  -CM     O2 Delivery Intra Treatment  room air  -CM     Post SpO2 (%)  98  -CM     O2 Delivery Post Treatment  room air  -CM     Row Name 04/13/20 1401          Positioning and Restraints    Pre-Treatment Position  in bed  -CM     Post Treatment Position  chair  -CM     In Chair  sitting;call light within reach;encouraged to call for assist;exit alarm on;notified nsg  -CM       User Key  (r) = Recorded By, (t) = Taken By, (c) = Cosigned By    Initials Name Provider Type    Lorraine Denton, PT Physical Therapist        Outcome Measures     Row Name 04/13/20 1406          How much help from another person do you currently need...    Turning from your back to your side while in flat bed without using bedrails?  4  -CM     Moving from lying on back to sitting on the side of a flat bed without bedrails?  3  -CM     Moving to and from a bed to a chair (including a wheelchair)?  3  -CM     Standing up from a chair using your arms (e.g., wheelchair, bedside chair)?  3  -CM     Climbing 3-5 steps with a railing?  2  -CM     To walk in hospital room?  3  -CM     AM-PAC 6 Clicks Score (PT)  18  -CM       User Key  (r) = Recorded By, (t) = Taken By, (c) = Cosigned By    Initials Name Provider Type    Lorraine Denton, PT Physical Therapist        Physical Therapy Education                 Title: PT OT SLP Therapies (Done)     Topic: Physical Therapy (Done)     Point: Mobility training (Done)     Description:    Instruct learner(s) on safety and technique for assisting patient out of bed, chair or wheelchair.  Instruct in the proper use of assistive devices, such as walker, crutches, cane or brace.              Patient Friendly Description:   It's important to get you on your feet again, but we need to do so in a way that is safe for you. Falling has serious consequences, and your personal safety is the most important thing of all.        When it's time to get out of bed, one of us or a family member will sit next to you on the bed to give you support.     If your doctor or nurse tells you to use a walker, crutches, a cane, or a brace, be sure you use it every time you get out of bed, even if you think you don't need it.    Learning Progress Summary           Patient Acceptance, E,TB, VU by GOGO at 4/13/2020 1408                               User Key     Initials Effective Dates Name Provider Type Discipline    GOGO 03/01/19 -  Lorraine Bell, PT Physical Therapist PT              PT Recommendation and Plan  Planned Therapy Interventions (PT Eval): balance training, bed mobility training, gait training, patient/family education, motor coordination training, postural re-education, strengthening, ROM (range of motion), transfer training, neuromuscular re-education  Outcome Summary/Treatment Plan (PT)  Anticipated Discharge Disposition (PT): home with 24/7 care, home with home health  Plan of Care Reviewed With: patient  Progress: improving  Outcome Summary: 88 yo female adm for hypoxic acute respiratory failure, pna, ae chf. Tested for covid19; results negative. Able to come to sitting at eob w/ min assist, stands w/ min assist, and amb w/ cga using rw x 120 ft. Has steppage gait, w/ foot drop noted on L side. Pt stable w/ activity in regard to respiration. No soa noted. Does present w/ weakness, and she is not quite at her baseline. Pt will need home health PT, 24 hr care at d/c. Will follow for skilled PT while in  [facility].      Time Calculation:   PT Charges     Row Name 04/13/20 1409             Time Calculation    Start Time  1304  -CM      Stop Time  1326  -CM      Time Calculation (min)  22 min  -CM      PT Received On  04/13/20  -CM      PT - Next Appointment  04/15/20  -CM      PT Goal Re-Cert Due Date  04/27/20  -CM         Time Calculation- PT    Total Timed Code Minutes- PT  0 minute(s)  -CM        User Key  (r) = Recorded By, (t) = Taken By, (c) = Cosigned By    Initials Name Provider Type    Lorraine Denton, PT Physical Therapist        Therapy Charges for Today     Code Description Service Date Service Provider Modifiers Qty    81552511502 HC PT EVAL LOW COMPLEXITY 3 4/13/2020 Lorraine Bell, PT GP 1          PT G-Codes  AM-PAC 6 Clicks Score (PT): 18    Lorraine Bell, PT  4/13/2020

## 2020-04-13 NOTE — PLAN OF CARE
Pt unable to sleep tonight. O2 at 2l per NC. Breath sounds diminished. Pt very pleasant and appreciative of care received. Short of air with minimal exertion. Will continue to monitor.  Problem: Patient Care Overview  Goal: Plan of Care Review  Outcome: Ongoing (interventions implemented as appropriate)  Flowsheets  Taken 4/12/2020 1605 by Lisa Wallace RN  Progress: improving  Taken 4/13/2020 4073 by Shakila Urrutia RN  Plan of Care Reviewed With: patient  Goal: Individualization and Mutuality  Outcome: Ongoing (interventions implemented as appropriate)  Goal: Discharge Needs Assessment  Outcome: Ongoing (interventions implemented as appropriate)  Goal: Interprofessional Rounds/Family Conf  Outcome: Ongoing (interventions implemented as appropriate)     Problem: Fall Risk (Adult)  Goal: Identify Related Risk Factors and Signs and Symptoms  Outcome: Ongoing (interventions implemented as appropriate)  Goal: Absence of Fall  Outcome: Ongoing (interventions implemented as appropriate)     Problem: Pneumonia (Adult)  Goal: Signs and Symptoms of Listed Potential Problems Will be Absent, Minimized or Managed (Pneumonia)  Outcome: Ongoing (interventions implemented as appropriate)

## 2020-04-13 NOTE — PROGRESS NOTES
LOS: 0 days   Patient Care Team:  Yayo Guzman MD as PCP - General  Flora Guzman MD as PCP - Family Medicine    Subjective:  Looks and feels better    Objective:   Afebrile      Review of Systems:   Review of Systems   Respiratory: Positive for shortness of breath.    Cardiovascular: Negative.    Musculoskeletal: Positive for back pain.   Neurological: Positive for weakness.           Vital Signs  Temp:  [97.7 °F (36.5 °C)-98.7 °F (37.1 °C)] 97.7 °F (36.5 °C)  Heart Rate:  [60-75] 65  Resp:  [16-17] 16  BP: (129-166)/(42-66) 166/57    Physical Exam:  Physical Exam   Constitutional: She appears well-developed and well-nourished.   HENT:   Head: Normocephalic and atraumatic.   Eyes: Pupils are equal, round, and reactive to light. EOM are normal.   Cardiovascular: Normal heart sounds.   Pulmonary/Chest: Effort normal.   Abdominal: Soft.   Neurological: She is alert.   Skin: Skin is warm.   Nursing note and vitals reviewed.       Radiology:  Ct Chest Without Contrast    Result Date: 4/10/2020  1. Moderate right and small left pleural effusions. Interlobular septal thickening in the lung bases. Findings are compatible with interstitial pulmonary edema. 2. Patchy groundglass airspace disease in the perihilar right upper lobe. This may represent an infectious or inflammatory process, versus an alveolar component of pulmonary edema. 3. Atherosclerosis, including the coronary arteries. 4. Passive atelectasis in the lung bases. 5. Additional chronic findings as above. Electronically signed by:  Sarthak Bowman M.D.  4/10/2020 10:52 PM    Xr Chest 1 View    Result Date: 4/11/2020  New bilateral atelectasis and/or infiltrate is seen. The findings may represent moderate-to-severe pulmonary edema with vascular congestion. Superimposed infectious multifocal pneumonia cannot be excluded, especially in the left lung base.  Small-to-moderate bilateral pleural effusions are present, which are thought to be new, as well.   There is mild to moderate cardiomegaly, as before.  Electronically Signed By-Dr. Gamaliel Danielson MD On:4/11/2020 2:35 AM This report was finalized on [id number] by Dr. Gamaliel Danielson MD.         Results Review:     I reviewed the patient's new clinical results.  I reviewed the patient's new imaging results and agree with the interpretation.    Medication Review:   Scheduled Meds:  aspirin 81 mg Oral Daily   azithromycin 250 mg Oral Q24H   cefTRIAXone 1 g Intravenous Q24H   digoxin 125 mcg Oral Daily   enoxaparin 30 mg Subcutaneous Q24H   lisinopril 5 mg Oral Q24H   metoprolol tartrate 25 mg Oral BID   sodium chloride 10 mL Intravenous Q12H   warfarin 5 mg Oral Daily     Continuous Infusions:   PRN Meds:.•  acetaminophen  •  nitroglycerin  •  ondansetron  •  sodium chloride    Labs:    CBC    Results from last 7 days   Lab Units 04/13/20  0342 04/12/20  0234 04/10/20  2312   WBC 10*3/mm3 8.10 9.40 18.40*   HEMOGLOBIN g/dL 11.9* 10.5* 12.5   PLATELETS 10*3/mm3 260 219 354     BMP Results from last 7 days   Lab Units 04/13/20  0343 04/12/20  0234 04/10/20  2312   SODIUM mmol/L 139 136 134*   POTASSIUM mmol/L 4.2 3.9 5.1   CHLORIDE mmol/L 99 96* 97*   CO2 mmol/L 28.0 30.0* 24.0   BUN mg/dL 22 25* 24*   CREATININE mg/dL 0.79 1.00 1.01*   GLUCOSE mg/dL 102* 102* 144*     Cr Clearance Estimated Creatinine Clearance: 36.1 mL/min (by C-G formula based on SCr of 0.79 mg/dL).  Coag   Results from last 7 days   Lab Units 04/12/20  0234 04/10/20  2334 04/10/20  1203   INR  1.02* 1.02* 1.30*     HbA1C No results found for: HGBA1C  Blood Glucose No results found for: POCGLU  Infection Results from last 7 days   Lab Units 04/10/20  2325 04/10/20  2320 04/10/20  2312   BLOODCX  No growth at 2 days No growth at 2 days  --    PROCALCITONIN ng/mL  --   --  0.11     CMP Results from last 7 days   Lab Units 04/13/20  0343 04/12/20  0234 04/10/20  2312   SODIUM mmol/L 139 136 134*   POTASSIUM mmol/L 4.2 3.9 5.1   CHLORIDE mmol/L 99 96*  97*   CO2 mmol/L 28.0 30.0* 24.0   BUN mg/dL 22 25* 24*   CREATININE mg/dL 0.79 1.00 1.01*   GLUCOSE mg/dL 102* 102* 144*   ALBUMIN g/dL  --   --  4.00   BILIRUBIN mg/dL  --   --  0.3   ALK PHOS U/L  --   --  84   AST (SGOT) U/L  --   --  22   ALT (SGPT) U/L  --   --  8     UA      Radiology(recent) No radiology results for the last day   Assessment:    Community-acquired pneumonia present upon admission left lower lobe  Acute hypoxia secondary to the above  Cardiomegaly  Acute exacerbation of diastolic congestive heart failure  CHFpEF  Atrial fibrillation, paroxysmal  Oral anticoagulation  Chronic kidney disease stage IIIb  Hypertension associated chronic kidney disease stage IIIb  Degenerative joint disease  Anemia of chronic disease  History of tachybradycardia syndrome  History of pacemaker placement  History of carotid occlusive disease  Degenerative disc disease lumbosacral spine  Generative disc disease thoracic spine  History of postlaminectomy syndrome  Cerebrovascular disease status post CVA  Long-term current use of opiate analgesic  Osteoporosis  Peripheral vascular disease      Plan:  Ambulate today//therapy to continue//taper parenteral antimicrobial therapy//taper supplemental oxygen        Yayo Guzman MD  04/13/20  07:35

## 2020-04-13 NOTE — PROGRESS NOTES
Discharge Planning Assessment   [facility]     Patient Name: Alexandrea Gaxiola  MRN: [id number]  Today's Date: 4/13/2020    Admit Date: 4/10/2020    Discharge Needs Assessment     Row Name 04/13/20 1046       Living Environment    Lives With  child(adrienne), adult lives with daughter    Current Living Arrangements  home/apartment/condo    Potentially Unsafe Housing Conditions  unable to assess    Primary Care Provided by  self    Provides Primary Care For  no one    Family Caregiver if Needed  child(adrienne), adult    Quality of Family Relationships  unable to assess    Able to Return to Prior Arrangements  yes       Resource/Environmental Concerns    Resource/Environmental Concerns  none       Transition Planning    Patient/Family Anticipates Transition to  home with family    Patient/Family Anticipated Services at Transition  none    Transportation Anticipated  family or friend will provide       Discharge Needs Assessment    Readmission Within the Last 30 Days  no previous admission in last 30 days    Equipment Currently Used at Home  walker, rolling;cane, straight    Anticipated Changes Related to Illness  none    Provided Post Acute Provider List?  N/A    N/A Provider List Comment  patient denies any needs for discharge        Discharge Plan     Row Name 04/13/20 1048       Plan    Plan  return home    Patient/Family in Agreement with Plan  yes spoke to patient on phone and she has a primary md and able to take her medication;  lives with her daughter but takes care of herself     Functional Status     Row Name 04/13/20 1045       Functional Status    Usual Activity Tolerance  fair    Current Activity Tolerance  fair       Functional Status, IADL    Medications  independent       Carol naegele rn  Case management  Office number [phone]  Cell phone [phone]

## 2020-04-13 NOTE — PROGRESS NOTES
Discharge Planning Assessment  [facility]     Patient Name: Alexandrea Gaxiola  MRN: [id number]  Today's Date: 4/13/2020    Admit Date: 4/10/2020          Plan    Plan  home with referral to [facility]    Patient/Family in Agreement with Plan  other (see comments)    Plan Comments  per physical therappy patient needs home health; reviewed with rolf and she wants me to call daughter; reviewed with zora and she selected [facility];  referral made and order placed       Carol naegele rn  Case management  Office number [phone]  Cell phone [phone]

## 2020-04-13 NOTE — PLAN OF CARE
Pt anxious to go home and keeps asking if she will be dc, heimer called and notified that the patient is ready to go. No orders in

## 2020-04-13 NOTE — PLAN OF CARE
Problem: Patient Care Overview  Goal: Plan of Care Review  Outcome: Ongoing (interventions implemented as appropriate)  Flowsheets (Taken 4/13/2020 1401)  Plan of Care Reviewed With: patient  Outcome Summary: 86 yo female adm for hypoxic acute respiratory failure, pna, ae chf. Tested for covid19; results negative. Able to come to sitting at eob w/ min assist, stands w/ min assist, and amb w/ cga using rw x 120 ft. Has steppage gait, w/ foot drop noted on L side. Pt stable w/ activity in regard to respiration. No soa noted. Does present w/ weakness, and she is not quite at her baseline. Pt will need home health PT, 24 hr care at d/c. Will follow for skilled PT while in hospital.   PPE worn: gloves, mask.

## 2020-04-14 ENCOUNTER — READMISSION MANAGEMENT (OUTPATIENT)
Dept: CALL CENTER | Facility: HOSPITAL | Age: 85
End: 2020-04-14

## 2020-04-14 VITALS
BODY MASS INDEX: 21.38 KG/M2 | TEMPERATURE: 98.1 F | RESPIRATION RATE: 18 BRPM | OXYGEN SATURATION: 97 % | DIASTOLIC BLOOD PRESSURE: 59 MMHG | SYSTOLIC BLOOD PRESSURE: 139 MMHG | HEIGHT: 58 IN | WEIGHT: 101.85 LBS | HEART RATE: 64 BPM

## 2020-04-14 LAB
ANION GAP SERPL CALCULATED.3IONS-SCNC: 10 MMOL/L (ref 5–15)
BUN BLD-MCNC: 27 MG/DL (ref 8–23)
BUN/CREAT SERPL: 37.5 (ref 7–25)
CALCIUM SPEC-SCNC: 9 MG/DL (ref 8.6–10.5)
CHLORIDE SERPL-SCNC: 100 MMOL/L (ref 98–107)
CO2 SERPL-SCNC: 27 MMOL/L (ref 22–29)
CREAT BLD-MCNC: 0.72 MG/DL (ref 0.57–1)
DEPRECATED RDW RBC AUTO: 47.7 FL (ref 37–54)
ERYTHROCYTE [DISTWIDTH] IN BLOOD BY AUTOMATED COUNT: 15.7 % (ref 12.3–15.4)
GFR SERPL CREATININE-BSD FRML MDRD: 77 ML/MIN/1.73
GLUCOSE BLD-MCNC: 94 MG/DL (ref 65–99)
HCT VFR BLD AUTO: 29.5 % (ref 34–46.6)
HGB BLD-MCNC: 10.1 G/DL (ref 12–15.9)
INR PPP: 1
MCH RBC QN AUTO: 29.7 PG (ref 26.6–33)
MCHC RBC AUTO-ENTMCNC: 34.3 G/DL (ref 31.5–35.7)
MCV RBC AUTO: 86.7 FL (ref 79–97)
PLATELET # BLD AUTO: 252 10*3/MM3 (ref 140–450)
PMV BLD AUTO: 6.9 FL (ref 6–12)
POTASSIUM BLD-SCNC: 4.3 MMOL/L (ref 3.5–5.2)
RBC # BLD AUTO: 3.4 10*6/MM3 (ref 3.77–5.28)
SODIUM BLD-SCNC: 137 MMOL/L (ref 136–145)
WBC NRBC COR # BLD: 8.3 10*3/MM3 (ref 3.4–10.8)

## 2020-04-14 PROCEDURE — 94618 PULMONARY STRESS TESTING: CPT

## 2020-04-14 PROCEDURE — 80048 BASIC METABOLIC PNL TOTAL CA: CPT | Performed by: FAMILY MEDICINE

## 2020-04-14 PROCEDURE — 99214 OFFICE O/P EST MOD 30 MIN: CPT | Performed by: INTERNAL MEDICINE

## 2020-04-14 PROCEDURE — 85027 COMPLETE CBC AUTOMATED: CPT | Performed by: FAMILY MEDICINE

## 2020-04-14 PROCEDURE — G0378 HOSPITAL OBSERVATION PER HR: HCPCS

## 2020-04-14 RX ORDER — AZITHROMYCIN 250 MG/1
TABLET, FILM COATED ORAL
Qty: 3 TABLET | Refills: 0 | Status: SHIPPED | OUTPATIENT
Start: 2020-04-14 | End: 2020-10-07

## 2020-04-14 RX ORDER — AZITHROMYCIN 250 MG/1
TABLET, FILM COATED ORAL
Qty: 3 TABLET | Refills: 0 | Status: SHIPPED | OUTPATIENT
Start: 2020-04-14 | End: 2020-04-14

## 2020-04-14 RX ADMIN — LISINOPRIL 5 MG: 5 TABLET ORAL at 09:22

## 2020-04-14 RX ADMIN — METOPROLOL TARTRATE 25 MG: 25 TABLET, FILM COATED ORAL at 09:22

## 2020-04-14 RX ADMIN — ACETAMINOPHEN 650 MG: 325 TABLET, FILM COATED ORAL at 09:57

## 2020-04-14 RX ADMIN — DIGOXIN 125 MCG: 0.12 TABLET ORAL at 09:22

## 2020-04-14 RX ADMIN — ASPIRIN 81 MG: 81 TABLET, COATED ORAL at 09:22

## 2020-04-14 RX ADMIN — AZITHROMYCIN MONOHYDRATE 250 MG: 250 TABLET ORAL at 09:22

## 2020-04-14 RX ADMIN — Medication 10 ML: at 09:22

## 2020-04-14 NOTE — PROGRESS NOTES
Case Management Discharge Note      Final Note: Home with Lucretia Horton , did not qualify for home O2.    Provided Post Acute Provider List?: N/A  N/A Provider List Comment: patient denies any needs for discharge       Home Medical Care - Selection Complete      Service Provider Request Status Selected Services Address Phone Number Fax Number    [facility] [address] [id number] [phone]     Final Discharge Disposition Code: 06 - home with home health care

## 2020-04-14 NOTE — DISCHARGE SUMMARY
Date of Discharge:  4/14/2020    Discharge Diagnosis:     Community-acquired pneumonia present upon admission left lower lobe  Acute hypoxia secondary to the above  Cardiomegaly  Acute exacerbation of diastolic congestive heart failure  CHFpEF  Atrial fibrillation, paroxysmal  Oral anticoagulation  Chronic kidney disease stage IIIb  Hypertension associated chronic kidney disease stage IIIb  Degenerative joint disease  Anemia of chronic disease  History of tachybradycardia syndrome  History of pacemaker placement  History of carotid occlusive disease  Degenerative disc disease lumbosacral spine  Generative disc disease thoracic spine  History of postlaminectomy syndrome  Cerebrovascular disease status post CVA  Long-term current use of opiate analgesic  Osteoporosis  Peripheral vascular disease    Presenting Problem/History of Present Illness  Active Hospital Problems    Diagnosis  POA   • Pneumonia due to infectious organism [J18.9]  Yes   • Acute diastolic congestive heart failure (CMS/HCC) [I50.31]  Unknown   • Pacemaker [Z95.0]  Yes   • Tachy-ramiro syndrome (CMS/HCC) [I49.5]  Yes   • Atrial fibrillation (CMS/HCC) [I48.91] [I48.91]  Yes   • Long term (current) use of anticoagulants [Z79.01] [Z79.01]  Not Applicable      Resolved Hospital Problems   No resolved problems to display.          Hospital Course  Patient is a 87 y.o. female presented with progressive shortness of breath and a cough.  She was admitted and found to have an acute acquired pneumonia left lower lobe.  Was found to have some acute hypoxia as well and improved with antimicrobial therapy and aggressive pulmonary toilet.  She was deemed stable for discharge home today to continue a course of oral azithromycin and to begin over-the-counter Delsym.  She will have a telemedicine visit with us within 1 week's time and will call with any questions or concerns    Procedures Performed         Consults:   Consults     Date and Time Order Name Status  Description    4/11/2020 0118 Inpatient Cardiology Consult Completed     4/11/2020 0112 IP Consult to Family Practice            Pertinent Test Results:Ct Chest Without Contrast    Result Date: 4/10/2020  1. Moderate right and small left pleural effusions. Interlobular septal thickening in the lung bases. Findings are compatible with interstitial pulmonary edema. 2. Patchy groundglass airspace disease in the perihilar right upper lobe. This may represent an infectious or inflammatory process, versus an alveolar component of pulmonary edema. 3. Atherosclerosis, including the coronary arteries. 4. Passive atelectasis in the lung bases. 5. Additional chronic findings as above. Electronically signed by:  Sarthak Bowman M.D.  4/10/2020 10:52 PM    Xr Chest 1 View    Result Date: 4/11/2020  New bilateral atelectasis and/or infiltrate is seen. The findings may represent moderate-to-severe pulmonary edema with vascular congestion. Superimposed infectious multifocal pneumonia cannot be excluded, especially in the left lung base.  Small-to-moderate bilateral pleural effusions are present, which are thought to be new, as well.  There is mild to moderate cardiomegaly, as before.  Electronically Signed By-Dr. Gamaliel Danielson MD On:4/11/2020 2:35 AM This report was finalized on [id number] by Dr. Gamaliel Danielson MD.      Imaging Results (Last 7 Days)     Procedure Component Value Units Date/Time    XR Chest 1 View [[id number]] Collected:  04/11/20 0222     Updated:  04/11/20 0237    Narrative:       DATE OF EXAM:  4/10/2020 11:24 PM     PROCEDURE:  XR CHEST 1 VW-     INDICATIONS:  soa; shortness of breath; cough     COMPARISON:  06/03/2019.     TECHNIQUE:   Single radiographic AP view of the chest was obtained.     FINDINGS:  A single AP upright portable chest radiograph reveals bilateral  atelectasis and/or infiltrate, especially in the mid to basilar lung  zones. The findings may represent pulmonary edema. Infectious  multifocal  pneumonia cannot be excluded. Bilateral small to moderate pleural  effusions are suspected. There is mild to moderate cardiomegaly, as  before. Atherosclerotic changes are seen. There is a left-sided cardiac  implantable electronic device (CIED) in place, as before. There are  postoperative changes of the thoracolumbar spine, seen previously. No  pneumothorax is appreciated.  Chronic post-traumatic deformity is  possible involving the proximal right humerus. There may be a chronic  bone infarct versus a chondroid matrix tumor within the proximal right  humerus, seen on prior studies and not significantly changed. Loose  bodies involving the left glenohumeral joint space cannot be excluded.  Scoliosis is suspected.          Impression:       New bilateral atelectasis and/or infiltrate is seen. The findings may  represent moderate-to-severe pulmonary edema with vascular congestion.  Superimposed infectious multifocal pneumonia cannot be excluded,  especially in the left lung base.      Small-to-moderate bilateral pleural effusions are present, which are  thought to be new, as well.      There is mild to moderate cardiomegaly, as before.     Electronically Signed By-Dr. Gamaliel Danielson MD On:4/11/2020 2:35 AM  This report was finalized on [id number] by Dr. Gamaliel Danielson MD.    CT Chest Without Contrast [[id number]] Collected:  04/10/20 2248     Updated:  04/11/20 0053    Narrative:       EXAM: CT SCAN OF THE CHEST WITHOUT CONTRAST    INDICATION: Shortness breath, fever    TECHNIQUE: CT scan through the chest was performed without the administration of intravenous contrast. CT dose lowering techniques were used, to include: automated exposure control, adjustment for patient size, and / or use of iterative reconstruction.     COMPARISON: None    FINDINGS:  Vasculature: There is no thoracic aortic aneurysm.  There are atherosclerotic calcifications of the thoracic aorta, great vessels and coronary  arteries.    Mediastinum / Pleura: A moderate right and small left pleural effusion are present. There is no mediastinal, hilar or axillary lymphadenopathy.     Airways / Lungs: The central airways are patent.  There is no pneumothorax. There is patchy groundglass airspace disease in the perihilar right upper lobe. There is interlobular septal thickening in the lung bases. Passive atelectatic changes are also   present in the lung bases. A coarsely calcified nodule in the left apex is compatible with a granuloma.    Bones: There is scoliotic curvature of the visualized spine. Partially imaged is lower thoracic and lumbar fusion hardware.    Upper Abdomen: Limited images below the diaphragm demonstrate no acute abnormality.      Impression:         1. Moderate right and small left pleural effusions. Interlobular septal thickening in the lung bases. Findings are compatible with interstitial pulmonary edema.  2. Patchy groundglass airspace disease in the perihilar right upper lobe. This may represent an infectious or inflammatory process, versus an alveolar component of pulmonary edema.  3. Atherosclerosis, including the coronary arteries.  4. Passive atelectasis in the lung bases.  5. Additional chronic findings as above.    Electronically signed by:  Sarthak Bowman M.D.    4/10/2020 10:52 PM              Condition on Discharge:  Fair    Vital Signs  Temp:  [98.1 °F (36.7 °C)-98.4 °F (36.9 °C)] 98.1 °F (36.7 °C)  Heart Rate:  [58-68] 61  Resp:  [16-18] 18  BP: (107-151)/(56-80) 151/61    Physical Exam:     General Appearance:    Alert, cooperative, in no acute distress   Head:    Normocephalic, without obvious abnormality, atraumatic   Eyes:           Conjunctivae and sclerae normal, no   icterus, no pallor, corneas clear, PERRLA   Throat:   No oral lesions, no thrush, oral mucosa moist   Neck:   No adenopathy, supple, trachea midline, no thyromegaly, no   carotid bruit, no JVD   Lungs:     Clear to  auscultation,respirations regular, even and                  unlabored    Heart:    Regular rhythm and normal rate, normal S1 and S2, no            murmur, no gallop, no rub, no click   Chest Wall:    No abnormalities observed   Abdomen:     Normal bowel sounds, no masses, no organomegaly, soft        non-tender, non-distended, no guarding, no rebound                tenderness   Rectal:     Deferred   Extremities:   Moves all extremities well, no edema, no cyanosis, no             redness   Pulses:   Pulses palpable and equal bilaterally   Skin:   No bleeding, bruising or rash   Lymph nodes:   No palpable adenopathy   Neurologic:   Cranial nerves 2 - 12 grossly intact, sensation intact, DTR       present and equal bilaterally         Discharge Disposition  Home or Self Care    Discharge Medications     Discharge Medications      New Medications      Instructions Start Date   azithromycin 250 MG tablet  Commonly known as:  ZITHROMAX   Take 2 tablets the first day, then 1 tablet daily for 4 days.         Continue These Medications      Instructions Start Date   amLODIPine 5 MG tablet  Commonly known as:  NORVASC   5 mg, Oral, Daily      Aspir-Low 81 MG EC tablet  Generic drug:  aspirin   81 mg, Oral, Daily      Lanoxin 125 MCG tablet  Generic drug:  digoxin   125 mcg, Oral, Every 24 Hours      lisinopril 5 MG tablet  Commonly known as:  PRINIVIL,ZESTRIL   5 mg, Oral, Every 24 Hours      metoprolol tartrate 25 MG tablet  Commonly known as:  LOPRESSOR   25 mg, Oral, 2 Times Daily      traMADol 50 MG tablet  Commonly known as:  ULTRAM   50 mg, Oral, Every 3 Hours PRN      warfarin 2.5 MG tablet  Commonly known as:  COUMADIN   2.5 mg, Oral, Sunday, Monday, Tuesday, Thursday, Friday, Saturday       warfarin 2.5 MG tablet  Commonly known as:  COUMADIN   5 mg, Oral, Wednesday          Stop These Medications    Multi Vitamin/Minerals tablet            Discharge Diet:   Low vitamin K cardiac  Activity at Discharge:   As  tolerated  Follow-up Appointments  Future Appointments   Date Time Provider Department Center   5/19/2020 11:30 AM [facility] [place] [facility]   6/23/2020  9:30 AM [facility] [place] [facility]   6/23/2020 10:10 AM Mignon Luke MD [facility]     Additional Instructions for the Follow-ups that You Need to Schedule     Ambulatory Referral to Home Health   As directed      Face to Face Visit Date:  4/13/2020    Follow-up provider for Plan of Care?:  I treated the patient in an acute care facility and will not continue treatment after discharge.    Follow-up provider:  MARBELLA JAY [[id number]]    Reason/Clinical Findings:  recent hospital stay    Describe mobility limitations that make leaving home difficult:  weakness    Nursing/Therapeutic Services Requested:  Skilled Nursing Physical Therapy    Skilled nursing orders:  Medication education    PT orders:  Strengthening    Frequency:  1 Week 1               Test Results Pending at Discharge   Order Current Status    Blood Culture - Blood, Arm, Left Preliminary result    Blood Culture - Blood, Arm, Right Preliminary result           Yayo Jay MD  04/14/20  08:13

## 2020-04-14 NOTE — PROGRESS NOTES
Referring Provider: Yayo Guzman MD    Reason for follow-up:  Status post pacemaker  Anticoagulation management  Atrial fibrillation     Patient Care Team:  Yayo uGzman MD as PCP - General  Flora Guzman MD as PCP - Family Medicine    Subjective .  Feeling better     ROS    the patient has been without any chest discomfort ,shortness of breath, palpitations, dizziness or syncope.  Denies having any headache ,abdominal pain ,nausea, vomiting , diarrhea constipation, loss of weight or loss of appetite.  Denies having any excessive bruising ,hematuria or blood in the stool.    Review of all systems negative except as indicated    History  History reviewed. No pertinent past medical history.    History reviewed. No pertinent surgical history.    History reviewed. No pertinent family history.    Social History     Tobacco Use   • Smoking status: Never Smoker   • Smokeless tobacco: Never Used   Substance Use Topics   • Alcohol use: Never     Frequency: Never   • Drug use: Not on file        Medications Prior to Admission   Medication Sig Dispense Refill Last Dose   • amLODIPine (NORVASC) 5 MG tablet Take 5 mg by mouth Daily.      • digoxin (LANOXIN) 125 MCG tablet Take 125 mcg by mouth Daily.      • lisinopril (PRINIVIL,ZESTRIL) 5 MG tablet Take 5 mg by mouth Daily.      • metoprolol tartrate (LOPRESSOR) 25 MG tablet Take 25 mg by mouth 2 (Two) Times a Day.      • Multiple Vitamins-Minerals (MULTI VITAMIN/MINERALS) tablet Take 1 tablet by mouth Daily.      • traMADol (ULTRAM) 50 MG tablet Take 50 mg by mouth Every 3 (Three) Hours As Needed for Moderate Pain .      • warfarin (COUMADIN) 2.5 MG tablet Take 2.5 mg by mouth. Sunday, Monday, Tuesday, Thursday, Friday, Saturday      • warfarin (COUMADIN) 2.5 MG tablet Take 5 mg by mouth. Wednesday      • aspirin (ASPIR-LOW) 81 MG EC tablet Take 81 mg by mouth Daily.          Allergies  Codeine; Hydrocodone-acetaminophen; Meperidine; and Morphine    Scheduled  "Meds:    aspirin 81 mg Oral Daily   azithromycin 250 mg Oral Q24H   cefTRIAXone 1 g Intravenous Q24H   digoxin 125 mcg Oral Daily   enoxaparin 30 mg Subcutaneous Q24H   lisinopril 5 mg Oral Q24H   metoprolol tartrate 25 mg Oral BID   sodium chloride 10 mL Intravenous Q12H   warfarin 5 mg Oral Daily     Continuous Infusions:    Pharmacy to dose warfarin      PRN Meds:.•  acetaminophen  •  nitroglycerin  •  ondansetron  •  Pharmacy to dose warfarin  •  sodium chloride    Objective     VITAL SIGNS  Vitals:    04/13/20 0944 04/13/20 1351 04/13/20 2005 04/14/20 0410   BP: 107/65 137/56 145/80 151/61   BP Location:   Left arm Left arm   Patient Position:  Lying Lying Lying   Pulse: 58 68 66 61   Resp:  16 16 18   Temp:  98.3 °F (36.8 °C) 98.4 °F (36.9 °C) 98.1 °F (36.7 °C)   TempSrc:  Oral Oral Oral   SpO2:  99% 97%    Weight:       Height:           Flowsheet Rows      First Filed Value   Admission Height  149.9 cm (59\") Documented at 04/10/2020 2239   Admission Weight  46.7 kg (103 lb) Documented at 04/10/2020 2239            Intake/Output Summary (Last 24 hours) at 4/14/2020 0632  Last data filed at 4/14/2020 0410  Gross per 24 hour   Intake 960 ml   Output 1050 ml   Net -90 ml        TELEMETRY: Atrial fibrillation    Physical Exam:  The patient is alert, oriented and in no distress.  Vital signs as noted above.  Head and neck revealed no carotid bruits or jugular venous distention.  No thyromegaly or lymphadenopathy is present  Lungs clear.  No wheezing.  Breath sounds are normal bilaterally.  Heart normal first and second heart sounds.  No murmur. No precordial rub is present.  No gallop is present.  Abdomen soft and nontender.  No organomegaly is present.  Extremities with good peripheral pulses without any pedal edema.  Skin warm and dry.  Pacemaker site looks normal.  Musculoskeletal system is grossly normal  CNS grossly normal      Results Review:   I reviewed the patient's new clinical results.  Lab Results " (last 24 hours)     Procedure Component Value Units Date/Time    Basic Metabolic Panel [[id number]]  (Abnormal) Collected:  04/14/20 0348    Specimen:  Blood Updated:  04/14/20 0506     Glucose 94 mg/dL      BUN 27 mg/dL      Creatinine 0.72 mg/dL      Sodium 137 mmol/L      Potassium 4.3 mmol/L      Chloride 100 mmol/L      CO2 27.0 mmol/L      Calcium 9.0 mg/dL      eGFR Non African Amer 77 mL/min/1.73      BUN/Creatinine Ratio 37.5     Anion Gap 10.0 mmol/L     Narrative:       GFR Normal >60  Chronic Kidney Disease <60  Kidney Failure <15      CBC (No Diff) [[id number]]  (Abnormal) Collected:  04/14/20 0348    Specimen:  Blood Updated:  04/14/20 0445     WBC 8.30 10*3/mm3      RBC 3.40 10*6/mm3      Hemoglobin 10.1 g/dL      Hematocrit 29.5 %      MCV 86.7 fL      MCH 29.7 pg      MCHC 34.3 g/dL      RDW 15.7 %      RDW-SD 47.7 fl      MPV 6.9 fL      Platelets 252 10*3/mm3     Blood Culture - Blood, Arm, Right [[id number]] Collected:  04/10/20 2325    Specimen:  Blood from Arm, Right Updated:  04/13/20 2331     Blood Culture No growth at 3 days    Blood Culture - Blood, Arm, Left [[id number]] Collected:  04/10/20 2320    Specimen:  Blood from Arm, Left Updated:  04/13/20 2331     Blood Culture No growth at 3 days    Protime-INR [[id number]]  (Abnormal) Collected:  04/13/20 0813    Specimen:  Blood from Arm, Left Updated:  04/13/20 0837     Protime 11.0 Seconds      INR 1.06          Imaging Results (Last 24 Hours)     ** No results found for the last 24 hours. **      LAB RESULTS (LAST 7 DAYS)    CBC  Results from last 7 days   Lab Units 04/14/20 0348 04/13/20  0342 04/12/20  0234 04/10/20  2312   WBC 10*3/mm3 8.30 8.10 9.40 18.40*   RBC 10*6/mm3 3.40* 4.10 3.61* 4.23   HEMOGLOBIN g/dL 10.1* 11.9* 10.5* 12.5   HEMATOCRIT % 29.5* 35.7 31.5* 36.6   MCV fL 86.7 87.1 87.1 86.5   PLATELETS 10*3/mm3 252 260 219 354       BMP  Results from last 7 days   Lab Units 04/14/20  0348 04/13/20  0343 04/12/20  0234 04/10/20  0985    SODIUM mmol/L 137 139 136 134*   POTASSIUM mmol/L 4.3 4.2 3.9 5.1   CHLORIDE mmol/L 100 99 96* 97*   CO2 mmol/L 27.0 28.0 30.0* 24.0   BUN mg/dL 27* 22 25* 24*   CREATININE mg/dL 0.72 0.79 1.00 1.01*   GLUCOSE mg/dL 94 102* 102* 144*       CMP   Results from last 7 days   Lab Units 04/14/20  0348 04/13/20  0343 04/12/20  0234 04/10/20  2312   SODIUM mmol/L 137 139 136 134*   POTASSIUM mmol/L 4.3 4.2 3.9 5.1   CHLORIDE mmol/L 100 99 96* 97*   CO2 mmol/L 27.0 28.0 30.0* 24.0   BUN mg/dL 27* 22 25* 24*   CREATININE mg/dL 0.72 0.79 1.00 1.01*   GLUCOSE mg/dL 94 102* 102* 144*   ALBUMIN g/dL  --   --   --  4.00   BILIRUBIN mg/dL  --   --   --  0.3   ALK PHOS U/L  --   --   --  84   AST (SGOT) U/L  --   --   --  22   ALT (SGPT) U/L  --   --   --  8         BNP        TROPONIN  Results from last 7 days   Lab Units 04/10/20  2312   TROPONIN T ng/mL <0.010       CoAg  Results from last 7 days   Lab Units 04/13/20  0813 04/12/20  0234 04/10/20  2334 04/10/20  1203   INR  1.06* 1.02* 1.02* 1.30*       Creatinine Clearance  Estimated Creatinine Clearance: 36.1 mL/min (by C-G formula based on SCr of 0.72 mg/dL).    ABG        Radiology  No radiology results for the last day            EKG          I personally viewed and interpreted the patient's EKG/Telemetry data: Ventricular pacemaker rhythm.  Intrinsic rhythm is atrial fibrillation    ECHOCARDIOGRAM:             STRESS MYOVIEW:    Cardiolite (Tc-99m Sestamibi) stress test    CARDIAC CATHETERIZATION:            OTHER:         Assessment/Plan     Active Problems:    Atrial fibrillation (CMS/HCC) [I48.91]    Long term (current) use of anticoagulants [Z79.01]    Pacemaker    Tachy-ramiro syndrome (CMS/HCC)    Pneumonia due to infectious organism    Acute diastolic congestive heart failure (CMS/[facility])      ////////////////////////////  Impression  ================  -Respiratory distress-probably due to pneumonia      -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  Pacemaker was reprogrammed to DDDR due to long AV delays.      -history of intermittent atrial fibrillation.  Patient is in Sinus rhythm.  Patient was on Coumadin.    -moderate mitral regurgitation.  Echocardiogram 01/28/2019 revealed moderate mitral regurgitation left atrial enlargement normal left ventricle function.      -  Status post  subendocardial myocardial infarction -improved.     Cardiac catheterization 12/29/2015 revealed mild anterior wall hypokinesis with ejection fraction of 50%, 40% mid LAD 70-80% ostial diagonal branch disease (small caliber vessel) and 80% circumflex distal to a large marginal branch.      -status post left carotid endarterectomy cervical spine surgery left hip replacement and recent scalp surgery for carcinoma and grafting.     -hypertension-not well controlled.      -allergy to codeine, morphine and Demerol.     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     ===============    Plan  ================  Shortness of breath-probably due to pneumonia.  Patient is not having any angina pectoris or congestive heart failure.  Anticoagulation status was reviewed.  Coumadin was restarted  INR is 1.06  Continue Coumadin and adjust dosage..    Medications were reviewed and updated.  Okay to discharge plans.  Follow-up in the office at her regularly scheduled appointment.  ////////////////////////////             Mignon Luke MD  04/14/20  06:32

## 2020-04-14 NOTE — NURSING NOTE
Exercise Oximetry    Patient Name:Alexandrea Gaxiola   MRN: [id number]   Date: 04/14/20             ROOM AIR BASELINE   SpO2% 93   Heart Rate 78   Blood Pressure      EXERCISE ON ROOM AIR SpO2% EXERCISE ON O2 @  LPM SpO2%   1 MINUTE 94 1 MINUTE    2 MINUTES 93 2 MINUTES    3 MINUTES 92 3 MINUTES    4 MINUTES 91 4 MINUTES    5 MINUTES 92 5 MINUTES    6 MINUTES 93 6 MINUTES               Distance Walked   Distance Walked   Dyspnea (Randa Scale)   Dyspnea (Randa Scale)   Fatigue (Randa Scale)   Fatigue (Randa Scale)   SpO2% Post Exercise  95 SpO2% Post Exercise   HR Post Exercise  70 HR Post Exercise   Time to Recovery  One Minute Time to Recovery     Comments: PATIENT WALKED ON ROOM AIR WITH WALKER, O2 SATS REMAINED 91% AND ABOVE THRU OUT THE WALK

## 2020-04-14 NOTE — OUTREACH NOTE
Prep Survey      Responses   [facility] patient discharged from?  [facility]   Is LACE score < 7 ?  No   Eligibility  Readm Mgmt   Discharge diagnosis  neg Covid Community-acquired pneumonia present upon admission left lower lobe   COVID-19 Test Status  Negative   Does the patient have one of the following disease processes/diagnoses(primary or secondary)?  COPD/Pneumonia   Does the patient have Home health ordered?  Yes   What is the Home health agency?   [facility]   Is there a DME ordered?  No   Prep survey completed?  Yes          Viviane Patel RN

## 2020-04-14 NOTE — NURSING NOTE
Pt is being d/c.  Left message for case management to confirm home health set up.  Have not received a call back.

## 2020-04-15 ENCOUNTER — READMISSION MANAGEMENT (OUTPATIENT)
Dept: CALL CENTER | Facility: HOSPITAL | Age: 85
End: 2020-04-15

## 2020-04-15 ENCOUNTER — ANTICOAGULATION VISIT (OUTPATIENT)
Dept: CARDIOLOGY | Facility: CLINIC | Age: 85
End: 2020-04-15

## 2020-04-15 DIAGNOSIS — I48.20 CHRONIC ATRIAL FIBRILLATION (HCC): ICD-10-CM

## 2020-04-15 DIAGNOSIS — Z79.01 LONG TERM (CURRENT) USE OF ANTICOAGULANTS: ICD-10-CM

## 2020-04-15 LAB
BACTERIA SPEC AEROBE CULT: NORMAL
BACTERIA SPEC AEROBE CULT: NORMAL

## 2020-04-15 NOTE — OUTREACH NOTE
COPD/PN Week 1 Survey      Responses   [facility]   COVID-19 Test Status  Negative   Does the patient have one of the following disease processes/diagnoses(primary or secondary)?  COPD/Pneumonia   Is there a successful TCM telephone encounter documented?  No   Was the primary reason for admission:  Pneumonia   Week 1 attempt successful?  Yes   Call start time  0851   Call end time  0902   Person spoke with today (if not patient) and relationship  Celso/Son in law   Meds reviewed with patient/caregiver?  Yes   Is the patient having any side effects they believe may be caused by any medication additions or changes?  No   Does the patient have all medications ordered at discharge?  Yes   Is the patient taking all medications as directed (includes completed medication regime)?  Yes   Does the patient have a primary care provider?   Yes   Does the patient have an appointment with their PCP or pulmonologist within 7 days of discharge?  No   Nursing Interventions  Educated patient on importance of making appointment, Advised patient to make appointment   Comments  Explained they are doing telephone visits and evisits.  He will call and get appt set.   What is the [facility] health agency?   [facility]   Has home health visited the patient within 72 hours of discharge?  Call prior to 72 hours   Did the patient receive a copy of their discharge instructions?  Yes   Nursing interventions  Reviewed instructions with patient   What is the patient's perception of their health status since discharge?  Improving   Nursing Interventions  Nurse provided patient education   Is the patient/caregiver able to teach back the hierarchy of who to call/visit for symptoms/problems? PCP, Specialist, Home health nurse, Urgent Care, ED, 911  Yes   Additional teach back comments  States she is still weak and she is sleeping currently. Has medication and taking.   Is the patient/caregiver able to teach back signs and  symptoms of worsening condition:  Fever/chills, Shortness of breath, Chest pain   Is the patient/caregiver able to teach back importance of completing antibiotic course of treatment?  Yes   Week 1 call completed?  Yes   Wrap up additional comments  No questions or needs at this time          Makayla Wiggins LPN

## 2020-04-16 ENCOUNTER — READMISSION MANAGEMENT (OUTPATIENT)
Dept: CALL CENTER | Facility: HOSPITAL | Age: 85
End: 2020-04-16

## 2020-04-16 NOTE — OUTREACH NOTE
COPD/PN Week 1 Survey      Responses   [facility] patient discharged from?  [facility]   COVID-19 Test Status  Negative   Does the patient have one of the following disease processes/diagnoses(primary or secondary)?  COPD/Pneumonia   Is there a successful TCM telephone encounter documented?  No   Was the primary reason for admission:  Pneumonia   Week 1 attempt successful?  Yes   Call start time  1542   Call end time  1547   Is patient permission given to speak with other caregiver?  Yes   List who call center can speak with  JACQUELINE PAGE   Person spoke with today (if not patient) and relationship  JACQUELINE PAGE- SON IN [facility]   Meds reviewed with patient/caregiver?  Yes   Is the patient having any side effects they believe may be caused by any medication additions or changes?  No   Does the patient have all medications ordered at discharge?  Yes   Is the patient taking all medications as directed (includes completed medication regime)?  Yes   Does the patient have a primary care provider?   Yes   Does the patient have an appointment with their PCP or pulmonologist within 7 days of discharge?  No   Comments regarding PCP  SON INFORMED THAT DR. JAY'S OFFICE IS CURRENTLY SCHEDULING VIDEO VISITS AND ENCOURAGED HIM TO CALL AND MAKE AN APPOINTMENT FOR PATIENT.    What is preventing the patient from scheduling follow up appointments within 7 days of discharge?  Haven't had time   Nursing Interventions  Educated patient on importance of making appointment, Advised patient to make appointment   Has the patient kept scheduled appointments due by today?  N/A   What is the Home health agency?   [facility]   Has home health visited the patient within 72 hours of discharge?  Yes   Did the patient receive a copy of their discharge instructions?  Yes   Nursing interventions  Reviewed instructions with patient   What is the patient's perception of their health status since discharge?  Improving   Nursing Interventions  Nurse  provided patient education   Is the patient/caregiver able to teach back the hierarchy of who to call/visit for symptoms/problems? PCP, Specialist, Home health nurse, Urgent Care, ED, 911  Yes   Is the patient/caregiver able to teach back signs and symptoms of worsening condition:  Shortness of breath, Chest pain, Fever/chills   Is the patient/caregiver able to teach back importance of completing antibiotic course of treatment?  Yes   Week 1 call completed?  Yes          Jenniffer Wang LPN

## 2020-04-17 ENCOUNTER — READMISSION MANAGEMENT (OUTPATIENT)
Dept: CALL CENTER | Facility: HOSPITAL | Age: 85
End: 2020-04-17

## 2020-04-17 NOTE — OUTREACH NOTE
COPD/PN Week 1 Survey      Responses   [facility] patient discharged from?  [facility]   COVID-19 Test Status  Negative   Does the patient have one of the following disease processes/diagnoses(primary or secondary)?  COPD/Pneumonia   Is there a successful TCM telephone encounter documented?  No   Was the primary reason for admission:  Pneumonia   Week 1 attempt successful?  Yes   Call start time  0917   Call end time  0924   Discharge diagnosis  neg Covid Community-acquired pneumonia present upon admission left lower lobe   Is patient permission given to speak with other caregiver?  Yes   List who call center can speak with  JACQUELINE PAGE   Person spoke with today (if not patient) and relationship  JACQUELINE PAGE- SON IN LAW   Meds reviewed with patient/caregiver?  Yes   Is the patient having any side effects they believe may be caused by any medication additions or changes?  No   Does the patient have all medications ordered at discharge?  Yes   Is the patient taking all medications as directed (includes completed medication regime)?  Yes   Does the patient have a primary care provider?   Yes   Does the patient have an appointment with their PCP or pulmonologist within 7 days of discharge?  No   Comments regarding PCP  SON INFORMED THAT DR. JAY'S OFFICE IS CURRENTLY SCHEDULING VIDEO VISITS AND ENCOURAGED HIM TO CALL AND MAKE AN APPOINTMENT FOR PATIENT.    What is preventing the patient from scheduling follow up appointments within 7 days of discharge?  Haven't had time   Nursing Interventions  Educated patient on importance of making appointment, Advised patient to make appointment   Has the patient kept scheduled appointments due by today?  N/A   Comments  Explained they are doing telephone visits and evisits.  He will call and get appt set.   What is the Home health agency?   [facility]   Has home health visited the patient within 72 hours of discharge?  Yes   Home health comments  HH is coming out today to  see patient.    Psychosocial issues?  No   Comments  Patient is weak, son-in law is hoping to HH/PT will help her to regain some of her strength. No fever, SOB.   Did the patient receive a copy of their discharge instructions?  Yes   Nursing interventions  Reviewed instructions with patient   What is the patient's perception of their health status since discharge?  Same   Nursing Interventions  Nurse provided patient education   Are the patient's immunizations up to date?   Yes   Is the patient/caregiver able to teach back the hierarchy of who to call/visit for symptoms/problems? PCP, Specialist, Home health nurse, Urgent Care, ED, 911  Yes   Additional teach back comments  States she is still weak and she is sleeping currently. Has medication and taking.   Is the patient/caregiver able to teach back signs and symptoms of worsening condition:  Shortness of breath, Chest pain, Fever/chills   Is the patient/caregiver able to teach back importance of completing antibiotic course of treatment?  Yes   Week 1 call completed?  Yes          Carlos Walters RN

## 2020-04-20 ENCOUNTER — ANTICOAGULATION VISIT (OUTPATIENT)
Dept: CARDIOLOGY | Facility: CLINIC | Age: 85
End: 2020-04-20

## 2020-04-20 ENCOUNTER — READMISSION MANAGEMENT (OUTPATIENT)
Dept: CALL CENTER | Facility: HOSPITAL | Age: 85
End: 2020-04-20

## 2020-04-20 DIAGNOSIS — Z79.01 LONG TERM (CURRENT) USE OF ANTICOAGULANTS: ICD-10-CM

## 2020-04-20 DIAGNOSIS — I48.20 CHRONIC ATRIAL FIBRILLATION (HCC): ICD-10-CM

## 2020-04-20 LAB — INR PPP: 1.9

## 2020-04-20 NOTE — OUTREACH NOTE
COPD/PN Week 1 Survey      Responses   [facility] patient discharged from?  [facility]   COVID-19 Test Status  Negative   Does the patient have one of the following disease processes/diagnoses(primary or secondary)?  COPD/Pneumonia   Is there a successful TCM telephone encounter documented?  No   Was the primary reason for admission:  Pneumonia   Week 1 attempt successful?  Yes   Call start time  0834   Call end time  0848   Discharge diagnosis  neg Covid Community-acquired pneumonia present upon admission left lower lobe   Is patient permission given to speak with other caregiver?  Yes   List who call center can speak with  JACQUELINE PAGE   Person spoke with today (if not patient) and relationship  JACQUELINEMARTA MEJÍAPAGE- SON IN LAW   Medication alerts for this patient  finished    Meds reviewed with patient/caregiver?  Yes   Is the patient having any side effects they believe may be caused by any medication additions or changes?  No   Does the patient have all medications ordered at discharge?  Yes   Does the patient have a primary care provider?   Yes   Does the patient have an appointment with their PCP or pulmonologist within 7 days of discharge?  No   Comments regarding PCP  SON INFORMED THAT DR. JAY'S OFFICE IS CURRENTLY SCHEDULING VIDEO VISITS AND ENCOURAGED HIM TO CALL AND MAKE AN APPOINTMENT FOR PATIENT.    What is preventing the patient from scheduling follow up appointments within 7 days of discharge?  Haven't had time   Nursing Interventions  Educated patient on importance of making appointment, Advised patient to make appointment   Has the patient kept scheduled appointments due by today?  N/A   Comments  Explained they are doing telephone visits and evisits.  He will call and get appt set.   What is the Home health agency?   [facility]   Has home health visited the patient within 72 hours of discharge?  Yes   Home health comments  HH is coming out today to see patient.    Psychosocial issues?  No    Comments  Patient is weak, son-in law is hoping to HH/PT will help her to regain some of her strength. No fever, SOB.   Did the patient receive a copy of their discharge instructions?  Yes   Nursing interventions  Reviewed instructions with patient   What is the patient's perception of their health status since discharge?  Same   Nursing Interventions  Nurse provided patient education   Are the patient's immunizations up to date?   Yes   Is the patient/caregiver able to teach back the hierarchy of who to call/visit for symptoms/problems? PCP, Specialist, Home health nurse, Urgent Care, ED, 911  Yes   Additional teach back comments  Son reports she is weak, but doing alittle better. Has good days and bad days.    Is the patient/caregiver able to teach back signs and symptoms of worsening condition:  Shortness of breath, Chest pain, Fever/chills   Is the patient/caregiver able to teach back importance of completing antibiotic course of treatment?  Yes   Week 1 call completed?  Yes   Wrap up additional comments  No questions or needs at this time          Carlos Walters RN

## 2020-04-23 ENCOUNTER — READMISSION MANAGEMENT (OUTPATIENT)
Dept: CALL CENTER | Facility: HOSPITAL | Age: 85
End: 2020-04-23

## 2020-04-23 NOTE — OUTREACH NOTE
COPD/PN Week 2 Survey      Responses   [facility] patient discharged from?  [facility]   COVID-19 Test Status  Negative   Does the patient have one of the following disease processes/diagnoses(primary or secondary)?  COPD/Pneumonia   Was the primary reason for admission:  Pneumonia   Week 2 attempt successful?  Yes   Call start time  0813   Call end time  0819   Is patient permission given to speak with other caregiver?  Yes   Person spoke with today (if not patient) and relationship  Celso   Meds reviewed with patient/caregiver?  Yes   Is the patient having any side effects they believe may be caused by any medication additions or changes?  No   Does the patient have all medications ordered at discharge?  Yes   Is the patient taking all medications as directed (includes completed medication regime)?  Yes   Does the patient have a primary care provider?   Yes   Does the patient have an appointment with their PCP or pulmonologist within 7 days of discharge?  No   What is preventing the patient from scheduling follow up appointments within 7 days of discharge?  Haven't had time   Nursing Interventions  Educated patient on importance of making appointment   Has the patient kept scheduled appointments due by today?  N/A   Comments  Encouraged son-in-law to make telehealth appt with PCP today-states will call office.   What is the [facility] health agency?   [facility]   Has home health visited the patient within 72 hours of discharge?  Yes   Home health comments  home health PT is still visiting   Psychosocial issues?  No   Did the patient receive a copy of their discharge instructions?  Yes   Nursing interventions  Reviewed instructions with patient   What is the patient's perception of their health status since discharge?  Improving   Nursing Interventions  Nurse provided patient education   Are the patient's immunizations up to date?   Yes   Is the patient/caregiver able to teach back the hierarchy of who to call/visit for  symptoms/problems? PCP, Specialist, Home health nurse, Urgent Care, ED, 911  Yes   Is the patient/caregiver able to teach back signs and symptoms of worsening condition:  Fever/chills, Shortness of breath, Chest pain   Is the patient/caregiver able to teach back importance of completing antibiotic course of treatment?  Yes   Week 2 call completed?  Yes   Wrap up additional comments  Son-in-law states is slowly improving. States no SOA, cough, or chest pain. STates still weak but has improved. Denies any fever. No complaints today.          Liliam Friend, RN

## 2020-04-26 ENCOUNTER — READMISSION MANAGEMENT (OUTPATIENT)
Dept: CALL CENTER | Facility: HOSPITAL | Age: 85
End: 2020-04-26

## 2020-04-26 NOTE — OUTREACH NOTE
COPD/PN Week 2 Survey      Responses   [facility] patient discharged from?  [facility]   COVID-19 Test Status  Negative   Does the patient have one of the following disease processes/diagnoses(primary or secondary)?  COPD/Pneumonia   Was the primary reason for admission:  Pneumonia   Week 2 attempt successful?  Yes   Call start time  1301   Call end time  1302   Discharge diagnosis  neg Covid Community-acquired pneumonia present upon admission left lower lobe   Is patient permission given to speak with other caregiver?  Yes   List who call center can speak with  CELSO PAGE   Person spoke with today (if not patient) and relationship  Celso Magallon reviewed with patient/caregiver?  Yes   Is the patient having any side effects they believe may be caused by any medication additions or changes?  No   Does the patient have all medications ordered at discharge?  Yes   Is the patient taking all medications as directed (includes completed medication regime)?  Yes   Does the patient have a primary care provider?   Yes   Does the patient have an appointment with their PCP or pulmonologist within 7 days of discharge?  No   What is preventing the patient from scheduling follow up appointments within 7 days of discharge?  Haven't had time   Nursing Interventions  Educated patient on importance of making appointment   Has the patient kept scheduled appointments due by today?  N/A   What is the Home health agency?   [facility]   Has home health visited the patient within 72 hours of discharge?  Yes   Home health comments  home health PT is still visiting   Psychosocial issues?  No   Comments  Pt's son reports she is still having some fatigue, but is improving.    Did the patient receive a copy of their discharge instructions?  Yes   Nursing interventions  Reviewed instructions with patient   What is the patient's perception of their health status since discharge?  Improving   Nursing Interventions  Nurse provided patient  education   Are the patient's immunizations up to date?   Yes   Is the patient/caregiver able to teach back the hierarchy of who to call/visit for symptoms/problems? PCP, Specialist, Home health nurse, Urgent Care, ED, 911  Yes   Is the patient/caregiver able to teach back signs and symptoms of worsening condition:  Fever/chills, Shortness of breath, Chest pain   Is the patient/caregiver able to teach back importance of completing antibiotic course of treatment?  Yes   Week 2 call completed?  Yes          Davonte Webster RN

## 2020-05-03 ENCOUNTER — READMISSION MANAGEMENT (OUTPATIENT)
Dept: CALL CENTER | Facility: HOSPITAL | Age: 85
End: 2020-05-03

## 2020-05-03 NOTE — OUTREACH NOTE
COPD/PN Week 3 Survey      Responses   [facility] patient discharged from?  [facility]   COVID-19 Test Status  Negative   Does the patient have one of the following disease processes/diagnoses(primary or secondary)?  COPD/Pneumonia   Was the primary reason for admission:  Pneumonia   Call end time  1521   Is patient permission given to speak with other caregiver?  Yes   List who call center can speak with  CELSO PAGE   Person spoke with today (if not patient) and relationship  Celso Grissoms reviewed with patient/caregiver?  Yes   Is the patient having any side effects they believe may be caused by any medication additions or changes?  No   Does the patient have all medications ordered at discharge?  Yes   Is the patient taking all medications as directed (includes completed medication regime)?  Yes   Does the patient have a primary care provider?   Yes   Does the patient have an appointment with their PCP or pulmonologist within 7 days of discharge?  No   Comments regarding PCP  Pt's family reports she has a visit on Tuesday, 5/5/2020   What is preventing the patient from scheduling follow up appointments within 7 days of discharge?  Haven't had time   Nursing Interventions  Educated patient on importance of making appointment   Has the patient kept scheduled appointments due by today?  N/A   What is the Home health agency?   [facility]   Has home health visited the patient within 72 hours of discharge?  Yes   Home health comments  home health PT is still visiting   Psychosocial issues?  No   Did the patient receive a copy of their discharge instructions?  Yes   Nursing interventions  Reviewed instructions with patient   What is the patient's perception of their health status since discharge?  Improving   Nursing Interventions  Nurse provided patient education   Are the patient's immunizations up to date?   Yes   Is the patient/caregiver able to teach back the hierarchy of who to call/visit for symptoms/problems?  PCP, Specialist, Home health nurse, Urgent Care, ED, 911  Yes   Is the patient/caregiver able to teach back signs and symptoms of worsening condition:  Fever/chills, Shortness of breath, Chest pain   Is the patient/caregiver able to teach back importance of completing antibiotic course of treatment?  Yes   Week 3 call completed?  Yes          Davonte Webster RN

## 2020-05-05 ENCOUNTER — ANTICOAGULATION VISIT (OUTPATIENT)
Dept: CARDIOLOGY | Facility: CLINIC | Age: 85
End: 2020-05-05

## 2020-05-05 DIAGNOSIS — Z79.01 LONG TERM (CURRENT) USE OF ANTICOAGULANTS: ICD-10-CM

## 2020-05-05 DIAGNOSIS — I48.20 CHRONIC ATRIAL FIBRILLATION (HCC): ICD-10-CM

## 2020-05-05 LAB — INR PPP: 3.1

## 2020-05-12 ENCOUNTER — ANTICOAGULATION VISIT (OUTPATIENT)
Dept: CARDIOLOGY | Facility: CLINIC | Age: 85
End: 2020-05-12

## 2020-05-12 ENCOUNTER — READMISSION MANAGEMENT (OUTPATIENT)
Dept: CALL CENTER | Facility: HOSPITAL | Age: 85
End: 2020-05-12

## 2020-05-12 DIAGNOSIS — I48.20 CHRONIC ATRIAL FIBRILLATION (HCC): ICD-10-CM

## 2020-05-12 DIAGNOSIS — Z79.01 LONG TERM (CURRENT) USE OF ANTICOAGULANTS: ICD-10-CM

## 2020-05-12 LAB — INR PPP: 3.4

## 2020-05-12 NOTE — OUTREACH NOTE
COPD/PN Week 4 Survey      Responses   [facility] patient discharged from?  [facility]   COVID-19 Test Status  Negative   Does the patient have one of the following disease processes/diagnoses(primary or secondary)?  COPD/Pneumonia   Was the primary reason for admission:  Pneumonia   Week 4 attempt successful?  Yes   Call start time  1446   Call end time  1450   Discharge diagnosis  neg Covid Community-acquired pneumonia present upon admission left lower lobe   Is patient permission given to speak with other caregiver?  Yes   List who call center can speak with  CELSO PAGE   Person spoke with today (if not patient) and relationship  Celso Magallon reviewed with patient/caregiver?  Yes   Is the patient having any side effects they believe may be caused by any medication additions or changes?  No   Is the patient taking all medications as directed (includes completed medication regime)?  Yes   Has the patient kept scheduled appointments due by today?  Yes   Is the patient still receiving Home Health Services?  Yes   Pulse Ox monitoring  Intermittent   Pulse Ox device source  Other   O2 Sat comments  Home health checks   Psychosocial issues?  No   What is the patient's perception of their health status since discharge?  Improving   Nursing Interventions  Nurse provided patient education   Is the patient/caregiver able to teach back the hierarchy of who to call/visit for symptoms/problems? PCP, Specialist, Home health nurse, Urgent Care, ED, 911  Yes   Additional teach back comments  Son states she is stable.  States home health was in and her INR was 3.9 so they are waiting to hear back with regarding of change in dosage of medication.     Is the patient/caregiver able to teach back signs and symptoms of worsening condition:  Fever/chills, Shortness of breath, Chest pain   Is the patient/caregiver able to teach back importance of completing antibiotic course of treatment?  Yes   Week 4 call completed?  Yes   Wrap up  additional comments  No questions or needs at this time.          Makayla Wiggins, ELIELN

## 2020-05-15 ENCOUNTER — HOSPITAL ENCOUNTER (EMERGENCY)
Facility: HOSPITAL | Age: 85
Discharge: HOME | End: 2020-05-15
Attending: EMERGENCY MEDICINE | Admitting: EMERGENCY MEDICINE

## 2020-05-15 VITALS
WEIGHT: 100 LBS | HEIGHT: 59 IN | HEART RATE: 66 BPM | RESPIRATION RATE: 20 BRPM | TEMPERATURE: 97.9 F | BODY MASS INDEX: 20.16 KG/M2 | DIASTOLIC BLOOD PRESSURE: 70 MMHG | OXYGEN SATURATION: 96 % | SYSTOLIC BLOOD PRESSURE: 158 MMHG

## 2020-05-15 DIAGNOSIS — R04.0 EPISTAXIS: Primary | ICD-10-CM

## 2020-05-15 LAB
BASOPHILS # BLD AUTO: 0.1 10*3/MM3 (ref 0–0.2)
BASOPHILS NFR BLD AUTO: 1.1 % (ref 0–1.5)
DEPRECATED RDW RBC AUTO: 46.4 FL (ref 37–54)
EOSINOPHIL # BLD AUTO: 0.4 10*3/MM3 (ref 0–0.4)
EOSINOPHIL NFR BLD AUTO: 4.9 % (ref 0.3–6.2)
ERYTHROCYTE [DISTWIDTH] IN BLOOD BY AUTOMATED COUNT: 15.4 % (ref 12.3–15.4)
HCT VFR BLD AUTO: 34.3 % (ref 34–46.6)
HGB BLD-MCNC: 11.7 G/DL (ref 12–15.9)
INR PPP: 3.19 (ref 2–3)
LYMPHOCYTES # BLD AUTO: 1.8 10*3/MM3 (ref 0.7–3.1)
LYMPHOCYTES NFR BLD AUTO: 23.2 % (ref 19.6–45.3)
MCH RBC QN AUTO: 28.8 PG (ref 26.6–33)
MCHC RBC AUTO-ENTMCNC: 34.1 G/DL (ref 31.5–35.7)
MCV RBC AUTO: 84.6 FL (ref 79–97)
MONOCYTES # BLD AUTO: 0.6 10*3/MM3 (ref 0.1–0.9)
MONOCYTES NFR BLD AUTO: 8.2 % (ref 5–12)
NEUTROPHILS # BLD AUTO: 4.7 10*3/MM3 (ref 1.7–7)
NEUTROPHILS NFR BLD AUTO: 62.6 % (ref 42.7–76)
NRBC BLD AUTO-RTO: 0.1 /100 WBC (ref 0–0.2)
PLATELET # BLD AUTO: 314 10*3/MM3 (ref 140–450)
PMV BLD AUTO: 6 FL (ref 6–12)
PROTHROMBIN TIME: 30 SECONDS (ref 19.4–28.5)
RBC # BLD AUTO: 4.05 10*6/MM3 (ref 3.77–5.28)
WBC NRBC COR # BLD: 7.6 10*3/MM3 (ref 3.4–10.8)

## 2020-05-15 PROCEDURE — 85025 COMPLETE CBC W/AUTO DIFF WBC: CPT | Performed by: EMERGENCY MEDICINE

## 2020-05-15 PROCEDURE — 99283 EMERGENCY DEPT VISIT LOW MDM: CPT

## 2020-05-15 PROCEDURE — 85610 PROTHROMBIN TIME: CPT | Performed by: EMERGENCY MEDICINE

## 2020-05-15 RX ADMIN — PHENYLEPHRINE HYDROCHLORIDE: 0.5 SPRAY NASAL at 06:45

## 2020-05-15 NOTE — ED NOTES
After applying nose clamp to nose with neosynephrine saturated 2x2, epistaxis has stopped. Dr. Reyes into room to discharge pt home. Son in law contacted to come pick pt up and take her home.     Lukasz Alfaro RN  05/15/20 0199

## 2020-05-15 NOTE — ED PROVIDER NOTES
Subjective   87-year-old female managed on warfarin for atrial fibrillation complains of intermittent epistaxis left nare since last evening, no trauma, no other bleeding, mild to moderate, better with pressure.          Review of Systems   Constitutional: Negative.    HENT: Positive for nosebleeds.    Respiratory: Negative.    Cardiovascular: Negative.    Gastrointestinal: Negative.    Neurological: Negative.        Past Medical History:   Diagnosis Date   • Atrial fibrillation (CMS/HCC)    • Hypertension    • Irregular heart rate        Allergies   Allergen Reactions   • Codeine Other (See Comments)   • Hydrocodone-Acetaminophen Other (See Comments)   • Meperidine Other (See Comments)   • Morphine Other (See Comments)       History reviewed. No pertinent surgical history.    History reviewed. No pertinent family history.    Social History     Socioeconomic History   • Marital status:      Spouse name: Not on file   • Number of children: Not on file   • Years of education: Not on file   • Highest education level: Not on file   Tobacco Use   • Smoking status: Never Smoker   • Smokeless tobacco: Never Used   Substance and Sexual Activity   • Alcohol use: Never     Frequency: Never           Objective   Physical Exam   Constitutional: She is oriented to person, place, and time. She appears well-developed and well-nourished.   HENT:   Head: Normocephalic and atraumatic.   Mouth/Throat: Oropharynx is clear and moist.   Blood in left nare, no active bleeding   Eyes: Pupils are equal, round, and reactive to light. Conjunctivae are normal.   Neck: Normal range of motion. Neck supple.   Cardiovascular: Normal rate, regular rhythm and normal heart sounds.   Pulmonary/Chest: Effort normal and breath sounds normal.   Neurological: She is alert and oriented to person, place, and time.   Skin: Skin is warm and dry. Capillary refill takes less than 2 seconds.   Psychiatric: She has a normal mood and affect. Her behavior is  normal.       Procedures           ED Course                                           MDM  Number of Diagnoses or Management Options  Epistaxis:   Diagnosis management comments: Clots cleared from left nare, reexamined, site of bleeding identified potentially and cauterized at septum with silver nitrate.  However, patient did develop bleeding right at the opening of her left nare, appears to be a scratch or abrasion.  Cauterized with silver nitrate then held with direct pressure and hemostasis achieved.      Final diagnoses:   Epistaxis            Francis Reyes MD  05/15/20 0643

## 2020-05-19 ENCOUNTER — ANTICOAGULATION VISIT (OUTPATIENT)
Dept: CARDIOLOGY | Facility: CLINIC | Age: 85
End: 2020-05-19

## 2020-05-19 DIAGNOSIS — Z79.01 LONG TERM (CURRENT) USE OF ANTICOAGULANTS: ICD-10-CM

## 2020-05-19 DIAGNOSIS — I48.20 CHRONIC ATRIAL FIBRILLATION (HCC): ICD-10-CM

## 2020-05-19 LAB — INR PPP: 2.7

## 2020-05-20 ENCOUNTER — HOSPITAL ENCOUNTER (EMERGENCY)
Facility: HOSPITAL | Age: 85
Discharge: HOME | End: 2020-05-20
Admitting: EMERGENCY MEDICINE

## 2020-05-20 ENCOUNTER — TELEPHONE (OUTPATIENT)
Dept: CARDIOLOGY | Facility: CLINIC | Age: 85
End: 2020-05-20

## 2020-05-20 VITALS
HEIGHT: 60 IN | RESPIRATION RATE: 16 BRPM | HEART RATE: 62 BPM | BODY MASS INDEX: 19.48 KG/M2 | SYSTOLIC BLOOD PRESSURE: 135 MMHG | DIASTOLIC BLOOD PRESSURE: 66 MMHG | TEMPERATURE: 97.9 F | OXYGEN SATURATION: 97 % | WEIGHT: 99.21 LBS

## 2020-05-20 DIAGNOSIS — R04.0 EPISTAXIS: Primary | ICD-10-CM

## 2020-05-20 DIAGNOSIS — R53.1 WEAKNESS: ICD-10-CM

## 2020-05-20 LAB
ABO GROUP BLD: NORMAL
ANION GAP SERPL CALCULATED.3IONS-SCNC: 14 MMOL/L (ref 5–15)
BASOPHILS # BLD AUTO: 0.1 10*3/MM3 (ref 0–0.2)
BASOPHILS NFR BLD AUTO: 0.6 % (ref 0–1.5)
BLD GP AB SCN SERPL QL: NEGATIVE
BUN BLD-MCNC: 27 MG/DL (ref 8–23)
BUN/CREAT SERPL: 24.8 (ref 7–25)
CALCIUM SPEC-SCNC: 9.8 MG/DL (ref 8.6–10.5)
CHLORIDE SERPL-SCNC: 96 MMOL/L (ref 98–107)
CO2 SERPL-SCNC: 27 MMOL/L (ref 22–29)
CREAT BLD-MCNC: 1.09 MG/DL (ref 0.57–1)
DEPRECATED RDW RBC AUTO: 48.1 FL (ref 37–54)
EOSINOPHIL # BLD AUTO: 0.1 10*3/MM3 (ref 0–0.4)
EOSINOPHIL NFR BLD AUTO: 0.8 % (ref 0.3–6.2)
ERYTHROCYTE [DISTWIDTH] IN BLOOD BY AUTOMATED COUNT: 15.8 % (ref 12.3–15.4)
GFR SERPL CREATININE-BSD FRML MDRD: 47 ML/MIN/1.73
GLUCOSE BLD-MCNC: 127 MG/DL (ref 65–99)
HCT VFR BLD AUTO: 31.7 % (ref 34–46.6)
HGB BLD-MCNC: 10.7 G/DL (ref 12–15.9)
HOLD SPECIMEN: NORMAL
INR PPP: 2.71 (ref 2–3)
LYMPHOCYTES # BLD AUTO: 0.8 10*3/MM3 (ref 0.7–3.1)
LYMPHOCYTES NFR BLD AUTO: 7.7 % (ref 19.6–45.3)
MCH RBC QN AUTO: 29 PG (ref 26.6–33)
MCHC RBC AUTO-ENTMCNC: 33.9 G/DL (ref 31.5–35.7)
MCV RBC AUTO: 85.7 FL (ref 79–97)
MONOCYTES # BLD AUTO: 0.6 10*3/MM3 (ref 0.1–0.9)
MONOCYTES NFR BLD AUTO: 5.6 % (ref 5–12)
NEUTROPHILS # BLD AUTO: 9 10*3/MM3 (ref 1.7–7)
NEUTROPHILS NFR BLD AUTO: 85.3 % (ref 42.7–76)
NRBC BLD AUTO-RTO: 0 /100 WBC (ref 0–0.2)
PLATELET # BLD AUTO: 384 10*3/MM3 (ref 140–450)
PMV BLD AUTO: 6.3 FL (ref 6–12)
POTASSIUM BLD-SCNC: 4.3 MMOL/L (ref 3.5–5.2)
PROTHROMBIN TIME: 25.6 SECONDS (ref 19.4–28.5)
RBC # BLD AUTO: 3.69 10*6/MM3 (ref 3.77–5.28)
RH BLD: NEGATIVE
SODIUM BLD-SCNC: 137 MMOL/L (ref 136–145)
T&S EXPIRATION DATE: NORMAL
WBC NRBC COR # BLD: 10.5 10*3/MM3 (ref 3.4–10.8)

## 2020-05-20 PROCEDURE — 85025 COMPLETE CBC W/AUTO DIFF WBC: CPT | Performed by: NURSE PRACTITIONER

## 2020-05-20 PROCEDURE — 86900 BLOOD TYPING SEROLOGIC ABO: CPT

## 2020-05-20 PROCEDURE — 86900 BLOOD TYPING SEROLOGIC ABO: CPT | Performed by: NURSE PRACTITIONER

## 2020-05-20 PROCEDURE — 86901 BLOOD TYPING SEROLOGIC RH(D): CPT

## 2020-05-20 PROCEDURE — 80048 BASIC METABOLIC PNL TOTAL CA: CPT | Performed by: NURSE PRACTITIONER

## 2020-05-20 PROCEDURE — 99284 EMERGENCY DEPT VISIT MOD MDM: CPT

## 2020-05-20 PROCEDURE — 85610 PROTHROMBIN TIME: CPT | Performed by: NURSE PRACTITIONER

## 2020-05-20 PROCEDURE — 86901 BLOOD TYPING SEROLOGIC RH(D): CPT | Performed by: NURSE PRACTITIONER

## 2020-05-20 PROCEDURE — 86850 RBC ANTIBODY SCREEN: CPT | Performed by: NURSE PRACTITIONER

## 2020-05-20 RX ORDER — SODIUM CHLORIDE 0.9 % (FLUSH) 0.9 %
10 SYRINGE (ML) INJECTION AS NEEDED
Status: DISCONTINUED | OUTPATIENT
Start: 2020-05-20 | End: 2020-05-20 | Stop reason: HOSPADM

## 2020-05-20 NOTE — TELEPHONE ENCOUNTER
Patient has had nose bleeds for 1 week, has been to ER and ENT several times. Advised to hold ASA yesterday, continues to bleed. ENT wants her to hold warfarin, advised her to hold x 3 days and restart at prior dose. Bleeding quiet a bit.  Call if continues to bleed. Thanks

## 2020-05-20 NOTE — ED PROVIDER NOTES
Subjective   History:  87-year-old female presents emergency department today for evaluation of weakness.  Patient has had a nosebleed for several days and was seen in her ear nose and throat physician office just prior to arrival.  She was sent here by him for the weakness after being pale.  Her nose is packed in the left nare with gauze and the bleeding has stopped.  Patient is on warfarin daily for atrial fibrillation, this is monitored by Dr. Gibson.  Patient states her last INR was this Tuesday and was normal.              Review of Systems   Constitutional: Positive for fatigue. Negative for appetite change, chills and fever.   HENT: Positive for nosebleeds. Negative for congestion, facial swelling, sinus pain and sore throat.    Eyes: Negative for pain and visual disturbance.   Respiratory: Negative for cough, chest tightness and shortness of breath.    Cardiovascular: Negative for chest pain and palpitations.   Gastrointestinal: Negative for constipation, diarrhea, nausea and vomiting.   Genitourinary: Negative for dysuria, flank pain, frequency and urgency.   Musculoskeletal: Negative for arthralgias, joint swelling and neck pain.   Skin: Negative for color change and rash.   Neurological: Positive for weakness. Negative for dizziness, seizures, syncope, light-headedness and headaches.       Past Medical History:   Diagnosis Date   • Atrial fibrillation (CMS/HCC)    • Hypertension    • Irregular heart rate        Allergies   Allergen Reactions   • Codeine Other (See Comments)   • Hydrocodone-Acetaminophen Other (See Comments)   • Meperidine Other (See Comments)   • Morphine Other (See Comments)       No past surgical history on file.    No family history on file.    Social History     Socioeconomic History   • Marital status:      Spouse name: Not on file   • Number of children: Not on file   • Years of education: Not on file   • Highest education level: Not on file   Tobacco Use   • Smoking status:  Never Smoker   • Smokeless tobacco: Never Used   Substance and Sexual Activity   • Alcohol use: Never     Frequency: Never           Objective   Physical Exam   Constitutional: She is oriented to person, place, and time. She appears well-developed and well-nourished.   HENT:   Head: Normocephalic and atraumatic.   Left nare occluded with nasal packing which is soaked with blood but there is no active bleeding from the nose   Eyes: Pupils are equal, round, and reactive to light. EOM are normal.   Neck: Normal range of motion. Neck supple.   Cardiovascular: Normal rate, regular rhythm, normal heart sounds and intact distal pulses.   Pulmonary/Chest: Effort normal and breath sounds normal.   Lymphadenopathy:     She has no cervical adenopathy.   Neurological: She is alert and oriented to person, place, and time.   Skin: Skin is warm. Capillary refill takes 2 to 3 seconds. There is pallor.   Psychiatric: She has a normal mood and affect. Her behavior is normal. Judgment and thought content normal.       Procedures           ED Course                                           MDM  Number of Diagnoses or Management Options  Epistaxis:   Weakness:   Diagnosis management comments: I examined the patient using the appropriate personal protective equipment.      DISPOSITION:   Chart Review:  Comorbidity:  has a past medical history of Atrial fibrillation (CMS/HCC), Hypertension, and Irregular heart rate.  Labs: PT 25.6, INR 2.71, BUN 27, creatinine 1.09, EGFR 47, RBC 3.69, hemoglobin 10.7    Imaging: Was interpreted by physician and reviewed by myself:  No radiology results for the last day    Disposition/Treatment:  87-year-old female presents emergency department today with complaints of mild weakness following a few day history of epistaxis.  Patient was seen by ENT this morning and had packing placed in the left nare and the bleeding was stopped.  Patient is on chronic anticoagulation with Coumadin.  INR was obtained and  patient is therapeutic.  Hemoglobin 10.7.  Discussed patient's presentation, symptoms, and laboratory findings with Dr. Yayo Guzman who recommended close follow-up after discharge from the emergency department in his office.  Discussed plan with patient who is agreeable.  Will be discharged home in stable condition to follow-up there and with ENT on Friday as already scheduled.  Patient return to the emergency department if the bleeding returns or for any new or worsening symptoms.         Amount and/or Complexity of Data Reviewed  Clinical lab tests: reviewed        Final diagnoses:   Epistaxis   Weakness            Savannah Anne, APRN  05/20/20 1242

## 2020-05-20 NOTE — DISCHARGE INSTRUCTIONS
Do not remove packing that was placed by ENT.  Follow-up with him as scheduled on Friday.  Call today to schedule close follow-up with your primary care provider.  Return to the emergency department if bleeding returns or for any new or worsening symptoms.

## 2020-05-26 ENCOUNTER — ANTICOAGULATION VISIT (OUTPATIENT)
Dept: CARDIOLOGY | Facility: CLINIC | Age: 85
End: 2020-05-26

## 2020-05-26 DIAGNOSIS — I48.20 CHRONIC ATRIAL FIBRILLATION (HCC): ICD-10-CM

## 2020-05-26 DIAGNOSIS — Z79.01 LONG TERM (CURRENT) USE OF ANTICOAGULANTS: ICD-10-CM

## 2020-05-26 LAB — INR PPP: 1.14

## 2020-06-03 ENCOUNTER — ANTICOAGULATION VISIT (OUTPATIENT)
Dept: CARDIOLOGY | Facility: CLINIC | Age: 85
End: 2020-06-03

## 2020-06-03 DIAGNOSIS — Z79.01 LONG TERM (CURRENT) USE OF ANTICOAGULANTS: ICD-10-CM

## 2020-06-03 DIAGNOSIS — I48.20 CHRONIC ATRIAL FIBRILLATION (HCC): ICD-10-CM

## 2020-06-03 LAB — INR PPP: 1.7

## 2020-06-10 ENCOUNTER — ANTICOAGULATION VISIT (OUTPATIENT)
Dept: CARDIOLOGY | Facility: CLINIC | Age: 85
End: 2020-06-10

## 2020-06-10 DIAGNOSIS — I48.20 CHRONIC ATRIAL FIBRILLATION (HCC): ICD-10-CM

## 2020-06-10 DIAGNOSIS — Z79.01 LONG TERM (CURRENT) USE OF ANTICOAGULANTS: ICD-10-CM

## 2020-06-10 LAB — INR PPP: 2.4

## 2020-06-16 ENCOUNTER — ANTICOAGULATION VISIT (OUTPATIENT)
Dept: CARDIOLOGY | Facility: CLINIC | Age: 85
End: 2020-06-16

## 2020-06-16 DIAGNOSIS — Z79.01 LONG TERM (CURRENT) USE OF ANTICOAGULANTS: ICD-10-CM

## 2020-06-16 DIAGNOSIS — I48.20 CHRONIC ATRIAL FIBRILLATION (HCC): ICD-10-CM

## 2020-06-16 LAB — INR PPP: 3.4

## 2020-06-23 ENCOUNTER — ANTICOAGULATION VISIT (OUTPATIENT)
Dept: CARDIOLOGY | Facility: CLINIC | Age: 85
End: 2020-06-23

## 2020-06-23 ENCOUNTER — CLINICAL SUPPORT NO REQUIREMENTS (OUTPATIENT)
Dept: CARDIOLOGY | Facility: CLINIC | Age: 85
End: 2020-06-23

## 2020-06-23 ENCOUNTER — OFFICE VISIT (OUTPATIENT)
Dept: CARDIOLOGY | Facility: CLINIC | Age: 85
End: 2020-06-23

## 2020-06-23 VITALS
OXYGEN SATURATION: 99 % | WEIGHT: 102 LBS | SYSTOLIC BLOOD PRESSURE: 151 MMHG | BODY MASS INDEX: 20.56 KG/M2 | DIASTOLIC BLOOD PRESSURE: 74 MMHG | HEIGHT: 59 IN | HEART RATE: 63 BPM

## 2020-06-23 VITALS
DIASTOLIC BLOOD PRESSURE: 73 MMHG | WEIGHT: 102 LBS | SYSTOLIC BLOOD PRESSURE: 151 MMHG | OXYGEN SATURATION: 99 % | HEART RATE: 63 BPM | BODY MASS INDEX: 19.92 KG/M2

## 2020-06-23 DIAGNOSIS — I49.5 SICK SINUS SYNDROME (HCC): ICD-10-CM

## 2020-06-23 DIAGNOSIS — I48.20 CHRONIC ATRIAL FIBRILLATION (HCC): Primary | ICD-10-CM

## 2020-06-23 DIAGNOSIS — I48.20 CHRONIC ATRIAL FIBRILLATION (HCC): ICD-10-CM

## 2020-06-23 DIAGNOSIS — I49.5 TACHY-BRADY SYNDROME (HCC): ICD-10-CM

## 2020-06-23 DIAGNOSIS — Z79.01 LONG TERM (CURRENT) USE OF ANTICOAGULANTS: ICD-10-CM

## 2020-06-23 DIAGNOSIS — Z95.0 PACEMAKER: ICD-10-CM

## 2020-06-23 DIAGNOSIS — Z95.0 PACEMAKER: Primary | ICD-10-CM

## 2020-06-23 LAB — INR PPP: 2.1 (ref 0.9–1.1)

## 2020-06-23 PROCEDURE — 93280 PM DEVICE PROGR EVAL DUAL: CPT | Performed by: INTERNAL MEDICINE

## 2020-06-23 PROCEDURE — 85610 PROTHROMBIN TIME: CPT | Performed by: INTERNAL MEDICINE

## 2020-06-23 PROCEDURE — 99214 OFFICE O/P EST MOD 30 MIN: CPT | Performed by: INTERNAL MEDICINE

## 2020-06-23 PROCEDURE — 36416 COLLJ CAPILLARY BLOOD SPEC: CPT | Performed by: INTERNAL MEDICINE

## 2020-06-23 NOTE — PROGRESS NOTES
Encounter Date:06/23/2020  Last seen 4/14/2020      Patient ID: Alexandrea Gaxiola is a 87 y.o. female.    Chief Complaint:  Pacemaker  Hypertension  Intermittent atrial fibrillation  Mitral regurgitation    History of Present Illness    Since I have last seen, the patient has been without any chest discomfort ,shortness of breath, palpitations, dizziness or syncope.  Denies having any headache ,abdominal pain ,nausea, vomiting , diarrhea constipation, loss of weight or loss of appetite.  Denies having any excessive bruising ,hematuria or blood in the stool.    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  Pacemaker was reprogrammed to DDDR due to long AV delays.  Pacemaker battery status is 3 months.      -history of intermittent atrial fibrillation.  Patient is in Sinus rhythm.    -Anticoagulation-patient is on Coumadin.     -moderate mitral regurgitation.  Echocardiogram 01/28/2019 revealed moderate mitral regurgitation left atrial enlargement normal left ventricle function.      -  Status post  subendocardial myocardial infarction -improved.     Cardiac catheterization 12/29/2015 revealed mild anterior wall hypokinesis with ejection fraction of 50%, 40% mid LAD 70-80% ostial diagonal branch disease (small caliber vessel) and 80% circumflex distal to a large marginal branch.      -status post left carotid endarterectomy cervical spine surgery left hip replacement and recent scalp surgery for carcinoma and grafting.     -hypertension-not well controlled.      -allergy to codeine, morphine and Demerol.     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     ===============    Plan  ================  Patient is not having any angina pectoris or congestive heart failure.  Anticoagulation status was  reviewed.  Coumadin was restarted  INR is therapeutic at 2.16  Continue Coumadin and adjust dosage..  Interrogation of the pacemaker revealed excellent pacing parameters.  Battery status is 3 months.  Have discussed with patient and patient's family regarding possible need for pacemaker pulse generator replacement sometime soon.  Apparently Tuesdays and Fridays are best for replacement when it is needed.  Medications were reviewed and updated.  Follow-up in the office in 3 months with pacemaker interrogation.  Transtelephonic monitoring in 6 weeks.  Further plan will depend on patient's progress.  ////////////////////////////              Diagnosis Plan   1. Pacemaker     2. Tachy-ramiro syndrome (CMS/HCC)     3. Long term (current) use of anticoagulants     4. Sick sinus syndrome (CMS/HCC)     LAB RESULTS (LAST 7 DAYS)    CBC        BMP        CMP         BNP        TROPONIN        CoAg  Results from last 7 days   Lab Units 06/23/20  1032   INR  2.10*       Creatinine Clearance  CrCl cannot be calculated (Patient's most recent lab result is older than the maximum 30 days allowed.).    ABG        Radiology  No radiology results for the last day                The following portions of the patient's history were reviewed and updated as appropriate: allergies, current medications, past family history, past medical history, past social history, past surgical history and problem list.    Review of Systems   Constitution: Negative for malaise/fatigue.   Cardiovascular: Negative for chest pain, leg swelling, palpitations and syncope.   Respiratory: Negative for shortness of breath.    Skin: Negative for rash.   Gastrointestinal: Negative for nausea and vomiting.   Neurological: Negative for dizziness, light-headedness and numbness.         Current Outpatient Medications:   •  amLODIPine (NORVASC) 5 MG tablet, Take 5 mg by mouth Daily., Disp: , Rfl:   •  aspirin (ASPIR-LOW) 81 MG EC tablet, Take 81 mg by mouth Daily., Disp:  ", Rfl:   •  azithromycin (ZITHROMAX) 250 MG tablet, Take 1 tablet daily for 3 days.  Indications: Pneumonia, Disp: 3 tablet, Rfl: 0  •  digoxin (LANOXIN) 125 MCG tablet, Take 125 mcg by mouth Daily., Disp: , Rfl:   •  lisinopril (PRINIVIL,ZESTRIL) 5 MG tablet, Take 5 mg by mouth Daily., Disp: , Rfl:   •  metoprolol tartrate (LOPRESSOR) 25 MG tablet, Take 25 mg by mouth 2 (Two) Times a Day., Disp: , Rfl:   •  traMADol (ULTRAM) 50 MG tablet, Take 50 mg by mouth Every 3 (Three) Hours As Needed for Moderate Pain ., Disp: , Rfl:   •  warfarin (COUMADIN) 2.5 MG tablet, Take 2.5 mg by mouth. Sunday, Monday, Tuesday, Thursday, Friday, Saturday, Disp: , Rfl:   •  warfarin (COUMADIN) 2.5 MG tablet, Take 5 mg by mouth. Wednesday, Disp: , Rfl:     Allergies   Allergen Reactions   • Codeine Other (See Comments)   • Hydrocodone-Acetaminophen Other (See Comments)   • Meperidine Other (See Comments)   • Morphine Other (See Comments)       History reviewed. No pertinent family history.    History reviewed. No pertinent surgical history.    Past Medical History:   Diagnosis Date   • Atrial fibrillation (CMS/HCC)    • Hypertension    • Irregular heart rate        History reviewed. No pertinent family history.    Social History     Socioeconomic History   • Marital status:      Spouse name: Not on file   • Number of children: Not on file   • Years of education: Not on file   • Highest education level: Not on file   Tobacco Use   • Smoking status: Never Smoker   • Smokeless tobacco: Never Used   Substance and Sexual Activity   • Alcohol use: Never     Frequency: Never         Procedures      Objective:       Physical Exam    /74   Pulse 63   Ht 149.9 cm (59\")   Wt 46.3 kg (102 lb)   SpO2 99%   BMI 20.60 kg/m²   The patient is alert, oriented and in no distress.    Vital signs as noted above.    Head and neck revealed no carotid bruits or jugular venous distension.  No thyromegaly or lymphadenopathy is present.    Lungs " clear.  No wheezing.  Breath sounds are normal bilaterally.    Heart normal first and second heart sounds.  No murmur..  No pericardial rub is present.  No gallop is present.    Abdomen soft and nontender.  No organomegaly is present.    Extremities revealed good peripheral pulses without any pedal edema.    Skin warm and dry.  Pacemaker site looks normal.    Musculoskeletal system is grossly normal.    CNS grossly normal.

## 2020-06-24 ENCOUNTER — TELEPHONE (OUTPATIENT)
Dept: CARDIOLOGY | Facility: CLINIC | Age: 85
End: 2020-06-24

## 2020-06-24 NOTE — TELEPHONE ENCOUNTER
Spoke with Tanya [facility] pt recertified for Home health until August for PT/INR's.

## 2020-06-30 ENCOUNTER — ANTICOAGULATION VISIT (OUTPATIENT)
Dept: CARDIOLOGY | Facility: CLINIC | Age: 85
End: 2020-06-30

## 2020-06-30 DIAGNOSIS — I48.20 CHRONIC ATRIAL FIBRILLATION (HCC): ICD-10-CM

## 2020-06-30 DIAGNOSIS — Z79.01 LONG TERM (CURRENT) USE OF ANTICOAGULANTS: ICD-10-CM

## 2020-06-30 LAB — INR PPP: 2.1

## 2020-07-14 ENCOUNTER — ANTICOAGULATION VISIT (OUTPATIENT)
Dept: CARDIOLOGY | Facility: CLINIC | Age: 85
End: 2020-07-14

## 2020-07-14 DIAGNOSIS — Z79.01 LONG TERM (CURRENT) USE OF ANTICOAGULANTS: ICD-10-CM

## 2020-07-14 DIAGNOSIS — I48.20 CHRONIC ATRIAL FIBRILLATION (HCC): ICD-10-CM

## 2020-07-14 LAB — INR PPP: 2

## 2020-07-17 ENCOUNTER — CLINICAL SUPPORT NO REQUIREMENTS (OUTPATIENT)
Dept: CARDIOLOGY | Facility: CLINIC | Age: 85
End: 2020-07-17

## 2020-07-17 DIAGNOSIS — I49.5 TACHY-BRADY SYNDROME (HCC): ICD-10-CM

## 2020-07-17 DIAGNOSIS — Z95.0 PACEMAKER: ICD-10-CM

## 2020-07-17 DIAGNOSIS — I48.20 CHRONIC ATRIAL FIBRILLATION (HCC): Primary | ICD-10-CM

## 2020-07-20 RX ORDER — AMLODIPINE BESYLATE 5 MG/1
TABLET ORAL
Qty: 90 TABLET | Refills: 3 | Status: SHIPPED | OUTPATIENT
Start: 2020-07-20 | End: 2020-10-07

## 2020-07-28 ENCOUNTER — TELEPHONE (OUTPATIENT)
Dept: CARDIOLOGY | Facility: CLINIC | Age: 85
End: 2020-07-28

## 2020-07-28 ENCOUNTER — ANTICOAGULATION VISIT (OUTPATIENT)
Dept: CARDIOLOGY | Facility: CLINIC | Age: 85
End: 2020-07-28

## 2020-07-28 DIAGNOSIS — I48.20 CHRONIC ATRIAL FIBRILLATION (HCC): ICD-10-CM

## 2020-07-28 DIAGNOSIS — Z79.01 LONG TERM (CURRENT) USE OF ANTICOAGULANTS: ICD-10-CM

## 2020-07-28 LAB — INR PPP: 2.1

## 2020-07-28 NOTE — TELEPHONE ENCOUNTER
Dr. Omid Witt  Phone [phone] Fax [phone]  Right total hip arthroplasty 8/10/2020 under general anesthesia  LOV 6/23/2020

## 2020-07-29 NOTE — TELEPHONE ENCOUNTER
Patient is cleared - can hold blood thinners 4 days prior.   Faxed letter and pacemaker form.   Docs given to [facility] for scanning.

## 2020-08-03 ENCOUNTER — TELEPHONE (OUTPATIENT)
Dept: CARDIOLOGY | Facility: CLINIC | Age: 85
End: 2020-08-03

## 2020-08-03 NOTE — TELEPHONE ENCOUNTER
Pt dtr. Called about upcoming surgery. Advised clearance was faxed to surgeon. Advised okay to hold for 4 days apLPN

## 2020-08-04 ENCOUNTER — LAB REQUISITION (OUTPATIENT)
Dept: LAB | Facility: HOSPITAL | Age: 85
End: 2020-08-04

## 2020-08-04 DIAGNOSIS — I48.0 PAROXYSMAL ATRIAL FIBRILLATION (HCC): ICD-10-CM

## 2020-08-04 LAB
ALBUMIN SERPL-MCNC: 4.1 G/DL (ref 3.5–5.2)
ALBUMIN/GLOB SERPL: 1.3 G/DL
ALP SERPL-CCNC: 79 U/L (ref 39–117)
ALT SERPL W P-5'-P-CCNC: 14 U/L (ref 1–33)
ANION GAP SERPL CALCULATED.3IONS-SCNC: 12 MMOL/L (ref 5–15)
AST SERPL-CCNC: 16 U/L (ref 1–32)
BASOPHILS # BLD AUTO: 0 10*3/MM3 (ref 0–0.2)
BASOPHILS NFR BLD AUTO: 0.8 % (ref 0–1.5)
BILIRUB SERPL-MCNC: 0.2 MG/DL (ref 0–1.2)
BUN SERPL-MCNC: 24 MG/DL (ref 8–23)
BUN SERPL-MCNC: ABNORMAL MG/DL
BUN/CREAT SERPL: ABNORMAL
CALCIUM SPEC-SCNC: 9.6 MG/DL (ref 8.6–10.5)
CHLORIDE SERPL-SCNC: 96 MMOL/L (ref 98–107)
CO2 SERPL-SCNC: 29 MMOL/L (ref 22–29)
CREAT SERPL-MCNC: 1.02 MG/DL (ref 0.57–1)
DEPRECATED RDW RBC AUTO: 45.5 FL (ref 37–54)
DIGOXIN SERPL-MCNC: 2.1 NG/ML (ref 0.6–1.2)
EOSINOPHIL # BLD AUTO: 0.2 10*3/MM3 (ref 0–0.4)
EOSINOPHIL NFR BLD AUTO: 2.7 % (ref 0.3–6.2)
ERYTHROCYTE [DISTWIDTH] IN BLOOD BY AUTOMATED COUNT: 15 % (ref 12.3–15.4)
GFR SERPL CREATININE-BSD FRML MDRD: 51 ML/MIN/1.73
GLOBULIN UR ELPH-MCNC: 3.2 GM/DL
GLUCOSE SERPL-MCNC: 101 MG/DL (ref 65–99)
HCT VFR BLD AUTO: 37.7 % (ref 34–46.6)
HGB BLD-MCNC: 12.3 G/DL (ref 12–15.9)
LYMPHOCYTES # BLD AUTO: 1.2 10*3/MM3 (ref 0.7–3.1)
LYMPHOCYTES NFR BLD AUTO: 19.3 % (ref 19.6–45.3)
MCH RBC QN AUTO: 28.6 PG (ref 26.6–33)
MCHC RBC AUTO-ENTMCNC: 32.7 G/DL (ref 31.5–35.7)
MCV RBC AUTO: 87.4 FL (ref 79–97)
MONOCYTES # BLD AUTO: 0.5 10*3/MM3 (ref 0.1–0.9)
MONOCYTES NFR BLD AUTO: 8.2 % (ref 5–12)
NEUTROPHILS NFR BLD AUTO: 4.2 10*3/MM3 (ref 1.7–7)
NEUTROPHILS NFR BLD AUTO: 69 % (ref 42.7–76)
NRBC BLD AUTO-RTO: 0.1 /100 WBC (ref 0–0.2)
PLATELET # BLD AUTO: 290 10*3/MM3 (ref 140–450)
PMV BLD AUTO: 7 FL (ref 6–12)
POTASSIUM SERPL-SCNC: 4.8 MMOL/L (ref 3.5–5.2)
PROT SERPL-MCNC: 7.3 G/DL (ref 6–8.5)
RBC # BLD AUTO: 4.31 10*6/MM3 (ref 3.77–5.28)
SODIUM SERPL-SCNC: 137 MMOL/L (ref 136–145)
WBC # BLD AUTO: 6.1 10*3/MM3 (ref 3.4–10.8)

## 2020-08-04 PROCEDURE — 80053 COMPREHEN METABOLIC PANEL: CPT | Performed by: ORTHOPAEDIC SURGERY

## 2020-08-04 PROCEDURE — 85025 COMPLETE CBC W/AUTO DIFF WBC: CPT | Performed by: ORTHOPAEDIC SURGERY

## 2020-08-04 PROCEDURE — 80162 ASSAY OF DIGOXIN TOTAL: CPT | Performed by: ORTHOPAEDIC SURGERY

## 2020-08-13 ENCOUNTER — TELEPHONE (OUTPATIENT)
Dept: CARDIOLOGY | Facility: CLINIC | Age: 85
End: 2020-08-13

## 2020-08-13 NOTE — TELEPHONE ENCOUNTER
Tanya/[facility] [phone]    She is going tomorrow to do INR. Patient was recently d/c from [place] for hip replacement. She had elevated digoxin level. Patient's family asking if that need to be rechecked?

## 2020-08-14 ENCOUNTER — ANTICOAGULATION VISIT (OUTPATIENT)
Dept: CARDIOLOGY | Facility: CLINIC | Age: 85
End: 2020-08-14

## 2020-08-14 DIAGNOSIS — I48.20 CHRONIC ATRIAL FIBRILLATION (HCC): ICD-10-CM

## 2020-08-14 DIAGNOSIS — Z79.01 LONG TERM (CURRENT) USE OF ANTICOAGULANTS: ICD-10-CM

## 2020-08-14 LAB — INR PPP: 1.2

## 2020-08-17 ENCOUNTER — TELEPHONE (OUTPATIENT)
Dept: CARDIOLOGY | Facility: CLINIC | Age: 85
End: 2020-08-17

## 2020-08-18 ENCOUNTER — ANTICOAGULATION VISIT (OUTPATIENT)
Dept: CARDIOLOGY | Facility: CLINIC | Age: 85
End: 2020-08-18

## 2020-08-18 DIAGNOSIS — Z79.01 LONG TERM (CURRENT) USE OF ANTICOAGULANTS: ICD-10-CM

## 2020-08-18 DIAGNOSIS — I48.20 CHRONIC ATRIAL FIBRILLATION (HCC): ICD-10-CM

## 2020-08-18 LAB — INR PPP: 1.7

## 2020-08-21 ENCOUNTER — ANTICOAGULATION VISIT (OUTPATIENT)
Dept: CARDIOLOGY | Facility: CLINIC | Age: 85
End: 2020-08-21

## 2020-08-21 DIAGNOSIS — I48.20 CHRONIC ATRIAL FIBRILLATION (HCC): ICD-10-CM

## 2020-08-21 DIAGNOSIS — Z79.01 LONG TERM (CURRENT) USE OF ANTICOAGULANTS: ICD-10-CM

## 2020-08-21 LAB — INR PPP: 3.9

## 2020-08-25 ENCOUNTER — ANTICOAGULATION VISIT (OUTPATIENT)
Dept: CARDIOLOGY | Facility: CLINIC | Age: 85
End: 2020-08-25

## 2020-08-25 DIAGNOSIS — Z79.01 LONG TERM (CURRENT) USE OF ANTICOAGULANTS: ICD-10-CM

## 2020-08-25 DIAGNOSIS — I48.20 CHRONIC ATRIAL FIBRILLATION (HCC): ICD-10-CM

## 2020-08-25 LAB — INR PPP: 2.3

## 2020-08-31 RX ORDER — WARFARIN SODIUM 2.5 MG/1
2.5 TABLET ORAL DAILY
Qty: 100 TABLET | Refills: 0 | Status: SHIPPED | OUTPATIENT
Start: 2020-08-31 | End: 2020-10-07

## 2020-09-08 ENCOUNTER — ANTICOAGULATION VISIT (OUTPATIENT)
Dept: CARDIOLOGY | Facility: CLINIC | Age: 85
End: 2020-09-08

## 2020-09-08 DIAGNOSIS — I48.20 CHRONIC ATRIAL FIBRILLATION (HCC): ICD-10-CM

## 2020-09-08 DIAGNOSIS — Z79.01 LONG TERM (CURRENT) USE OF ANTICOAGULANTS: ICD-10-CM

## 2020-09-08 LAB — INR PPP: 2

## 2020-09-22 ENCOUNTER — ANTICOAGULATION VISIT (OUTPATIENT)
Dept: CARDIOLOGY | Facility: CLINIC | Age: 85
End: 2020-09-22

## 2020-09-22 DIAGNOSIS — Z79.01 LONG TERM (CURRENT) USE OF ANTICOAGULANTS: ICD-10-CM

## 2020-09-22 DIAGNOSIS — I48.20 CHRONIC ATRIAL FIBRILLATION (HCC): ICD-10-CM

## 2020-09-22 LAB — INR PPP: 2.5

## 2020-10-06 ENCOUNTER — ANTICOAGULATION VISIT (OUTPATIENT)
Dept: CARDIOLOGY | Facility: CLINIC | Age: 85
End: 2020-10-06

## 2020-10-06 ENCOUNTER — OFFICE VISIT (OUTPATIENT)
Dept: CARDIOLOGY | Facility: CLINIC | Age: 85
End: 2020-10-06

## 2020-10-06 ENCOUNTER — CLINICAL SUPPORT NO REQUIREMENTS (OUTPATIENT)
Dept: CARDIOLOGY | Facility: CLINIC | Age: 85
End: 2020-10-06

## 2020-10-06 VITALS
DIASTOLIC BLOOD PRESSURE: 69 MMHG | HEART RATE: 69 BPM | WEIGHT: 104.25 LBS | SYSTOLIC BLOOD PRESSURE: 128 MMHG | BODY MASS INDEX: 21.02 KG/M2 | HEIGHT: 59 IN

## 2020-10-06 DIAGNOSIS — Z95.0 PACEMAKER: ICD-10-CM

## 2020-10-06 DIAGNOSIS — I48.20 CHRONIC ATRIAL FIBRILLATION (HCC): ICD-10-CM

## 2020-10-06 DIAGNOSIS — I48.20 CHRONIC ATRIAL FIBRILLATION (HCC): Primary | ICD-10-CM

## 2020-10-06 DIAGNOSIS — Z79.01 LONG TERM (CURRENT) USE OF ANTICOAGULANTS: ICD-10-CM

## 2020-10-06 DIAGNOSIS — I49.5 SICK SINUS SYNDROME (HCC): ICD-10-CM

## 2020-10-06 DIAGNOSIS — I49.5 TACHY-BRADY SYNDROME (HCC): ICD-10-CM

## 2020-10-06 DIAGNOSIS — Z95.0 PRESENCE OF CARDIAC PACEMAKER: Primary | ICD-10-CM

## 2020-10-06 LAB — INR PPP: 2.2 (ref 0.9–1.1)

## 2020-10-06 PROCEDURE — 99215 OFFICE O/P EST HI 40 MIN: CPT | Performed by: INTERNAL MEDICINE

## 2020-10-06 PROCEDURE — 85610 PROTHROMBIN TIME: CPT | Performed by: INTERNAL MEDICINE

## 2020-10-06 PROCEDURE — 93279 PRGRMG DEV EVAL PM/LDLS PM: CPT | Performed by: INTERNAL MEDICINE

## 2020-10-06 PROCEDURE — 36416 COLLJ CAPILLARY BLOOD SPEC: CPT | Performed by: INTERNAL MEDICINE

## 2020-10-06 RX ORDER — DONEPEZIL HYDROCHLORIDE 5 MG/1
5 TABLET, FILM COATED ORAL NIGHTLY
COMMUNITY
End: 2020-10-07

## 2020-10-06 NOTE — H&P (VIEW-ONLY)
Encounter Date:10/06/2020  Last seen 6/23/2020      Patient ID: Alexandrea Gaxiola is a 87 y.o. female.    Chief Complaint:  Pacemaker-reached JUANIS  Hypertension  Intermittent atrial fibrillation  Mitral regurgitation     History of Present Illness     Since I have last seen, the patient has been without any chest discomfort ,shortness of breath, palpitations, dizziness or syncope.  Denies having any headache ,abdominal pain ,nausea, vomiting , diarrhea constipation, loss of weight or loss of appetite.  Denies having any excessive bruising ,hematuria or blood in the stool.     Review of all systems negative except as indicated.    Reviewed ROS.    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  Pacemaker was reprogrammed to DDDR due to long AV delays.  Pulse and data has reached JUANIS.     -history of intermittent atrial fibrillation.  Patient is in Sinus rhythm.     -Anticoagulation-patient is on Coumadin.     -moderate mitral regurgitation.  Echocardiogram 01/28/2019 revealed moderate mitral regurgitation left atrial enlargement normal left ventricle function.      -  Status post  subendocardial myocardial infarction -improved.     Cardiac catheterization 12/29/2015 revealed mild anterior wall hypokinesis with ejection fraction of 50%, 40% mid LAD 70-80% ostial diagonal branch disease (small caliber vessel) and 80% circumflex distal to a large marginal branch.      -status post left carotid endarterectomy cervical spine surgery left hip replacement and recent scalp surgery for carcinoma and grafting.     -hypertension-not well controlled.      -allergy to codeine, morphine and Demerol.     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     ===============    Plan  ================  Patient is not having any angina pectoris or congestive heart  failure.  Interrogation of the pacemaker revealed JUANIS status on 9/27/2020.  Patient is pacemaker dependent.  99% pacing.  Leads are MRI compatible.  Anticoagulation status was reviewed.  INR 2.2  Patient to hold Coumadin 3 days prior to the replacement of the pulse generator.  ([employer])  Risks and benefits pros and cons of the procedure were discussed with patient.  Patient was also advised regarding placement of temporary pacemaker while generator replacement is being performed.  Medications were reviewed and updated.  Further plan will depend on patient's progress.  ////////////////////////////                Diagnosis Plan   1. Presence of cardiac pacemaker  Case Request Cath Lab: Pacemaker DC new  Temporary pacemaker.  Patient is pacemaker dependent.    Basic Metabolic Panel    ECG 12 Lead    MRSA Screen Culture (Outpatient) - Swab, Nares    COVID PRE-OP / PRE-PROCEDURE SCREENING ORDER (NO ISOLATION) - Swab, Nasopharynx   2. Chronic atrial fibrillation (CMS/HCC)     3. Pacemaker     4. Tachy-ramiro syndrome (CMS/HCC)     5. Sick sinus syndrome (CMS/HCC)     6. Long term (current) use of anticoagulants     LAB RESULTS (LAST 7 DAYS)    CBC        BMP        CMP         BNP        TROPONIN        CoAg  Results from last 7 days   Lab Units 10/06/20  1359   INR  2.20*       Creatinine Clearance  CrCl cannot be calculated (Patient's most recent lab result is older than the maximum 30 days allowed.).    ABG        Radiology  No radiology results for the last day                The following portions of the patient's history were reviewed and updated as appropriate: allergies, current medications, past family history, past medical history, past social history, past surgical history and problem list.    Review of Systems   Constitution: Positive for malaise/fatigue.   Cardiovascular: Positive for chest pain (pressure). Negative for leg swelling, palpitations and syncope.   Respiratory: Positive for shortness of breath.     Skin: Negative for rash.   Gastrointestinal: Positive for nausea. Negative for vomiting.   Neurological: Negative for dizziness, light-headedness and numbness.         Current Outpatient Medications:   •  amLODIPine (NORVASC) 5 MG tablet, TAKE ONE TABLET BY MOUTH DAILY, Disp: 90 tablet, Rfl: 3  •  aspirin (ASPIR-LOW) 81 MG EC tablet, Take 81 mg by mouth Daily., Disp: , Rfl:   •  azithromycin (ZITHROMAX) 250 MG tablet, Take 1 tablet daily for 3 days.  Indications: Pneumonia, Disp: 3 tablet, Rfl: 0  •  digoxin (LANOXIN) 125 MCG tablet, Take 125 mcg by mouth Daily., Disp: , Rfl:   •  donepezil (ARICEPT) 5 MG tablet, Take 5 mg by mouth Every Night., Disp: , Rfl:   •  lisinopril (PRINIVIL,ZESTRIL) 5 MG tablet, Take 5 mg by mouth Daily., Disp: , Rfl:   •  metoprolol tartrate (LOPRESSOR) 25 MG tablet, Take 25 mg by mouth 2 (Two) Times a Day., Disp: , Rfl:   •  traMADol (ULTRAM) 50 MG tablet, Take 50 mg by mouth Every 3 (Three) Hours As Needed for Moderate Pain ., Disp: , Rfl:   •  warfarin (COUMADIN) 2.5 MG tablet, Take 1 tablet by mouth Daily., Disp: 100 tablet, Rfl: 0    Allergies   Allergen Reactions   • Codeine Other (See Comments)   • Hydrocodone-Acetaminophen Other (See Comments)   • Meperidine Other (See Comments)   • Morphine Other (See Comments)       History reviewed. No pertinent family history.    History reviewed. No pertinent surgical history.    Past Medical History:   Diagnosis Date   • Atrial fibrillation (CMS/HCC)    • Hypertension    • Irregular heart rate        History reviewed. No pertinent family history.    Social History     Socioeconomic History   • Marital status:      Spouse name: Not on file   • Number of children: Not on file   • Years of education: Not on file   • Highest education level: Not on file   Tobacco Use   • Smoking status: Never Smoker   • Smokeless tobacco: Never Used   Substance and Sexual Activity   • Alcohol use: Never     Frequency: Never  "        Procedures      Objective:       Physical Exam    /69 (BP Location: Left arm, Patient Position: Sitting, Cuff Size: Adult)   Pulse 69   Ht 149.9 cm (59\")   Wt 47.3 kg (104 lb 4 oz)   BMI 21.06 kg/m²   The patient is alert, oriented and in no distress.    Vital signs as noted above.    Head and neck revealed no carotid bruits or jugular venous distension.  No thyromegaly or lymphadenopathy is present.    Lungs clear.  No wheezing.  Breath sounds are normal bilaterally.    Heart normal first and second heart sounds.  No murmur..  No pericardial rub is present.  No gallop is present.    Abdomen soft and nontender.  No organomegaly is present.    Extremities revealed good peripheral pulses without any pedal edema.    Skin warm and dry.  Pacemaker site looks normal.    Musculoskeletal system is grossly normal.    CNS grossly normal.    Reviewed and unchanged from last visit      "

## 2020-10-07 ENCOUNTER — LAB (OUTPATIENT)
Dept: LAB | Facility: HOSPITAL | Age: 85
End: 2020-10-07

## 2020-10-07 ENCOUNTER — HOSPITAL ENCOUNTER (OUTPATIENT)
Dept: CARDIOLOGY | Facility: HOSPITAL | Age: 85
Discharge: HOME | End: 2020-10-07

## 2020-10-07 DIAGNOSIS — Z95.0 PRESENCE OF CARDIAC PACEMAKER: ICD-10-CM

## 2020-10-07 LAB
ANION GAP SERPL CALCULATED.3IONS-SCNC: 7.1 MMOL/L (ref 5–15)
BUN SERPL-MCNC: 23 MG/DL (ref 8–23)
BUN/CREAT SERPL: 26.1 (ref 7–25)
CALCIUM SPEC-SCNC: 9.7 MG/DL (ref 8.6–10.5)
CHLORIDE SERPL-SCNC: 101 MMOL/L (ref 98–107)
CO2 SERPL-SCNC: 30.9 MMOL/L (ref 22–29)
CREAT SERPL-MCNC: 0.88 MG/DL (ref 0.57–1)
GFR SERPL CREATININE-BSD FRML MDRD: 61 ML/MIN/1.73
GLUCOSE SERPL-MCNC: 123 MG/DL (ref 65–99)
MRSA DNA SPEC QL NAA+PROBE: NORMAL
POTASSIUM SERPL-SCNC: 4.8 MMOL/L (ref 3.5–5.2)
SODIUM SERPL-SCNC: 139 MMOL/L (ref 136–145)

## 2020-10-07 PROCEDURE — U0004 COV-19 TEST NON-CDC HGH THRU: HCPCS

## 2020-10-07 PROCEDURE — 93010 ELECTROCARDIOGRAM REPORT: CPT | Performed by: INTERNAL MEDICINE

## 2020-10-07 PROCEDURE — C9803 HOPD COVID-19 SPEC COLLECT: HCPCS

## 2020-10-07 PROCEDURE — 36415 COLL VENOUS BLD VENIPUNCTURE: CPT

## 2020-10-07 PROCEDURE — 93005 ELECTROCARDIOGRAM TRACING: CPT | Performed by: INTERNAL MEDICINE

## 2020-10-07 PROCEDURE — 87641 MR-STAPH DNA AMP PROBE: CPT | Performed by: INTERNAL MEDICINE

## 2020-10-07 PROCEDURE — 80048 BASIC METABOLIC PNL TOTAL CA: CPT

## 2020-10-07 RX ORDER — LISINOPRIL 5 MG/1
10 TABLET ORAL DAILY
COMMUNITY
End: 2022-01-03 | Stop reason: HOSPADM

## 2020-10-07 RX ORDER — ASPIRIN 81 MG/1
81 TABLET ORAL DAILY
Status: ON HOLD | COMMUNITY
End: 2021-12-28

## 2020-10-07 RX ORDER — WARFARIN SODIUM 2.5 MG/1
2.5 TABLET ORAL
COMMUNITY
End: 2020-11-30

## 2020-10-07 RX ORDER — AMLODIPINE BESYLATE 5 MG/1
5 TABLET ORAL DAILY
COMMUNITY
End: 2021-08-04

## 2020-10-07 RX ORDER — DIGOXIN 125 MCG
125 TABLET ORAL
COMMUNITY
End: 2021-08-24 | Stop reason: HOSPADM

## 2020-10-07 RX ORDER — DONEPEZIL HYDROCHLORIDE 5 MG/1
5 TABLET, FILM COATED ORAL NIGHTLY
COMMUNITY

## 2020-10-07 RX ORDER — TRAMADOL HYDROCHLORIDE 50 MG/1
50 TABLET ORAL EVERY 8 HOURS PRN
COMMUNITY

## 2020-10-07 NOTE — PERIOPERATIVE NURSING NOTE
During pre-call, patient and daughter were under the impression that patient was to continue taking coumadin. RN texted Ti and MD wants patient to hold coumadin and INR to be checked on day of procedure.

## 2020-10-08 LAB — SARS-COV-2 RNA RESP QL NAA+PROBE: NOT DETECTED

## 2020-10-09 ENCOUNTER — HOSPITAL ENCOUNTER (OUTPATIENT)
Facility: HOSPITAL | Age: 85
Setting detail: HOSPITAL OUTPATIENT SURGERY
Discharge: HOME | End: 2020-10-09
Attending: INTERNAL MEDICINE | Admitting: INTERNAL MEDICINE

## 2020-10-09 VITALS
BODY MASS INDEX: 20.58 KG/M2 | HEIGHT: 59 IN | HEART RATE: 61 BPM | SYSTOLIC BLOOD PRESSURE: 157 MMHG | DIASTOLIC BLOOD PRESSURE: 42 MMHG | TEMPERATURE: 97 F | OXYGEN SATURATION: 98 % | WEIGHT: 102.07 LBS | RESPIRATION RATE: 23 BRPM

## 2020-10-09 DIAGNOSIS — Z95.0 PRESENCE OF CARDIAC PACEMAKER: ICD-10-CM

## 2020-10-09 LAB
BASOPHILS # BLD AUTO: 0.1 10*3/MM3 (ref 0–0.2)
BASOPHILS NFR BLD AUTO: 1.1 % (ref 0–1.5)
DEPRECATED RDW RBC AUTO: 49 FL (ref 37–54)
EOSINOPHIL # BLD AUTO: 0.2 10*3/MM3 (ref 0–0.4)
EOSINOPHIL NFR BLD AUTO: 3 % (ref 0.3–6.2)
ERYTHROCYTE [DISTWIDTH] IN BLOOD BY AUTOMATED COUNT: 16.3 % (ref 12.3–15.4)
HCT VFR BLD AUTO: 36.4 % (ref 34–46.6)
HGB BLD-MCNC: 12.2 G/DL (ref 12–15.9)
INR PPP: 1.21 (ref 2–3)
LYMPHOCYTES # BLD AUTO: 1.4 10*3/MM3 (ref 0.7–3.1)
LYMPHOCYTES NFR BLD AUTO: 17.2 % (ref 19.6–45.3)
MCH RBC QN AUTO: 29.1 PG (ref 26.6–33)
MCHC RBC AUTO-ENTMCNC: 33.4 G/DL (ref 31.5–35.7)
MCV RBC AUTO: 87.2 FL (ref 79–97)
MONOCYTES # BLD AUTO: 0.6 10*3/MM3 (ref 0.1–0.9)
MONOCYTES NFR BLD AUTO: 6.7 % (ref 5–12)
NEUTROPHILS NFR BLD AUTO: 6 10*3/MM3 (ref 1.7–7)
NEUTROPHILS NFR BLD AUTO: 72 % (ref 42.7–76)
NRBC BLD AUTO-RTO: 0 /100 WBC (ref 0–0.2)
PLATELET # BLD AUTO: 294 10*3/MM3 (ref 140–450)
PMV BLD AUTO: 7 FL (ref 6–12)
PROTHROMBIN TIME: 13.2 SECONDS (ref 19.4–28.5)
RBC # BLD AUTO: 4.17 10*6/MM3 (ref 3.77–5.28)
WBC # BLD AUTO: 8.4 10*3/MM3 (ref 3.4–10.8)

## 2020-10-09 PROCEDURE — 85610 PROTHROMBIN TIME: CPT | Performed by: INTERNAL MEDICINE

## 2020-10-09 PROCEDURE — 99152 MOD SED SAME PHYS/QHP 5/>YRS: CPT | Performed by: INTERNAL MEDICINE

## 2020-10-09 PROCEDURE — 93005 ELECTROCARDIOGRAM TRACING: CPT | Performed by: INTERNAL MEDICINE

## 2020-10-09 PROCEDURE — 85025 COMPLETE CBC W/AUTO DIFF WBC: CPT | Performed by: INTERNAL MEDICINE

## 2020-10-09 PROCEDURE — 33228 REMV&REPLC PM GEN DUAL LEAD: CPT | Performed by: INTERNAL MEDICINE

## 2020-10-09 PROCEDURE — 25010000002 MIDAZOLAM PER 1 MG: Performed by: INTERNAL MEDICINE

## 2020-10-09 PROCEDURE — 93010 ELECTROCARDIOGRAM REPORT: CPT | Performed by: INTERNAL MEDICINE

## 2020-10-09 PROCEDURE — 99153 MOD SED SAME PHYS/QHP EA: CPT | Performed by: INTERNAL MEDICINE

## 2020-10-09 PROCEDURE — C1785 PMKR, DUAL, RATE-RESP: HCPCS | Performed by: INTERNAL MEDICINE

## 2020-10-09 PROCEDURE — 25010000002 VANCOMYCIN 1 G RECONSTITUTED SOLUTION 1 EACH VIAL: Performed by: INTERNAL MEDICINE

## 2020-10-09 DEVICE — GEN PM AZURE XT SURESCAN DR MRI: Type: IMPLANTABLE DEVICE | Status: FUNCTIONAL

## 2020-10-09 RX ORDER — SODIUM CHLORIDE 0.9 % (FLUSH) 0.9 %
3 SYRINGE (ML) INJECTION EVERY 12 HOURS SCHEDULED
Status: DISCONTINUED | OUTPATIENT
Start: 2020-10-09 | End: 2020-10-09 | Stop reason: HOSPADM

## 2020-10-09 RX ORDER — LIDOCAINE HYDROCHLORIDE AND EPINEPHRINE BITARTRATE 20; .01 MG/ML; MG/ML
INJECTION, SOLUTION SUBCUTANEOUS AS NEEDED
Status: DISCONTINUED | OUTPATIENT
Start: 2020-10-09 | End: 2020-10-09 | Stop reason: HOSPADM

## 2020-10-09 RX ORDER — MIDAZOLAM HYDROCHLORIDE 1 MG/ML
INJECTION INTRAMUSCULAR; INTRAVENOUS AS NEEDED
Status: DISCONTINUED | OUTPATIENT
Start: 2020-10-09 | End: 2020-10-09 | Stop reason: HOSPADM

## 2020-10-09 RX ORDER — SODIUM CHLORIDE 9 MG/ML
30 INJECTION, SOLUTION INTRAVENOUS CONTINUOUS
Status: DISCONTINUED | OUTPATIENT
Start: 2020-10-09 | End: 2020-10-09 | Stop reason: HOSPADM

## 2020-10-09 RX ORDER — SODIUM CHLORIDE 0.9 % (FLUSH) 0.9 %
10 SYRINGE (ML) INJECTION AS NEEDED
Status: DISCONTINUED | OUTPATIENT
Start: 2020-10-09 | End: 2020-10-09 | Stop reason: HOSPADM

## 2020-10-09 RX ADMIN — SODIUM CHLORIDE 30 ML/HR: 900 INJECTION, SOLUTION INTRAVENOUS at 12:32

## 2020-10-09 RX ADMIN — SODIUM CHLORIDE 1 G: 900 INJECTION, SOLUTION INTRAVENOUS at 14:07

## 2020-10-13 ENCOUNTER — ANTICOAGULATION VISIT (OUTPATIENT)
Dept: CARDIOLOGY | Facility: CLINIC | Age: 85
End: 2020-10-13

## 2020-10-13 DIAGNOSIS — I48.20 CHRONIC ATRIAL FIBRILLATION (HCC): ICD-10-CM

## 2020-10-13 DIAGNOSIS — Z79.01 LONG TERM (CURRENT) USE OF ANTICOAGULANTS: ICD-10-CM

## 2020-10-13 LAB — INR PPP: 1

## 2020-10-15 ENCOUNTER — ANTICOAGULATION VISIT (OUTPATIENT)
Dept: CARDIOLOGY | Facility: CLINIC | Age: 85
End: 2020-10-15

## 2020-10-15 DIAGNOSIS — I48.20 CHRONIC ATRIAL FIBRILLATION (HCC): ICD-10-CM

## 2020-10-15 DIAGNOSIS — Z79.01 LONG TERM (CURRENT) USE OF ANTICOAGULANTS: ICD-10-CM

## 2020-10-15 LAB — INR PPP: 1.2

## 2020-10-21 ENCOUNTER — ANTICOAGULATION VISIT (OUTPATIENT)
Dept: CARDIOLOGY | Facility: CLINIC | Age: 85
End: 2020-10-21

## 2020-10-21 DIAGNOSIS — Z79.01 LONG TERM (CURRENT) USE OF ANTICOAGULANTS: ICD-10-CM

## 2020-10-21 DIAGNOSIS — I48.20 CHRONIC ATRIAL FIBRILLATION (HCC): ICD-10-CM

## 2020-10-21 LAB — INR PPP: 1.2

## 2020-10-22 ENCOUNTER — CLINICAL SUPPORT NO REQUIREMENTS (OUTPATIENT)
Dept: CARDIOLOGY | Facility: CLINIC | Age: 85
End: 2020-10-22

## 2020-10-22 ENCOUNTER — OFFICE VISIT (OUTPATIENT)
Dept: CARDIOLOGY | Facility: CLINIC | Age: 85
End: 2020-10-22

## 2020-10-22 VITALS
WEIGHT: 102 LBS | SYSTOLIC BLOOD PRESSURE: 136 MMHG | HEIGHT: 59 IN | DIASTOLIC BLOOD PRESSURE: 69 MMHG | OXYGEN SATURATION: 95 % | BODY MASS INDEX: 20.56 KG/M2 | HEART RATE: 63 BPM

## 2020-10-22 DIAGNOSIS — I49.5 TACHY-BRADY SYNDROME (HCC): ICD-10-CM

## 2020-10-22 DIAGNOSIS — I49.5 SICK SINUS SYNDROME (HCC): ICD-10-CM

## 2020-10-22 DIAGNOSIS — I48.20 CHRONIC ATRIAL FIBRILLATION (HCC): Primary | ICD-10-CM

## 2020-10-22 DIAGNOSIS — Z79.01 LONG TERM (CURRENT) USE OF ANTICOAGULANTS: ICD-10-CM

## 2020-10-22 DIAGNOSIS — Z95.0 PACEMAKER: ICD-10-CM

## 2020-10-22 DIAGNOSIS — I48.20 CHRONIC ATRIAL FIBRILLATION (HCC): ICD-10-CM

## 2020-10-22 DIAGNOSIS — Z95.0 PACEMAKER: Primary | ICD-10-CM

## 2020-10-22 PROCEDURE — 99024 POSTOP FOLLOW-UP VISIT: CPT | Performed by: INTERNAL MEDICINE

## 2020-10-22 PROCEDURE — 93280 PM DEVICE PROGR EVAL DUAL: CPT | Performed by: INTERNAL MEDICINE

## 2020-10-22 NOTE — PROGRESS NOTES
Encounter Date:10/22/2020      Patient ID: Alexandrea Gaxiola is a 87 y.o. female.    Chief Complaint:  Recent pacemaker pulse generator replacement  Hypertension  Intermittent atrial fibrillation  Mitral regurgitation     History of Present Illness  Patient had pulse generator replacement and was discharged home.    Since I have last seen, the patient has been without any chest discomfort ,shortness of breath, palpitations, dizziness or syncope.  Denies having any headache ,abdominal pain ,nausea, vomiting , diarrhea constipation, loss of weight or loss of appetite.  Denies having any excessive bruising ,hematuria or blood in the stool.     Review of all systems negative except as indicated.     Reviewed ROS.     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  Pacemaker was reprogrammed to DDDR due to long AV delays.    Status post dual-chamber pacemaker pulse generator replacement ([employer]algomedtronic MRI compatible)     -history of intermittent atrial fibrillation.  Patient is in Sinus rhythm.     -Anticoagulation-patient is on Coumadin.     -moderate mitral regurgitation.  Echocardiogram 01/28/2019 revealed moderate mitral regurgitation left atrial enlargement normal left ventricle function.      -  Status post  subendocardial myocardial infarction -improved.     Cardiac catheterization 12/29/2015 revealed mild anterior wall hypokinesis with ejection fraction of 50%, 40% mid LAD 70-80% ostial diagonal branch disease (small caliber vessel) and 80% circumflex distal to a large marginal branch.      -status post left carotid endarterectomy cervical spine surgery left hip replacement and recent scalp surgery for carcinoma and grafting.     -hypertension-not well controlled.      -allergy to codeine, morphine and Demerol.     -status post local excision and skin grafting of scalp for  melanoma.     ===============    Plan  ================  Patient recently had pacemaker pulse generator replacement  Patient is feeling much better.  Pacemaker site is normal.  Integration of the pacemaker revealed excellent pacing parameters.  Patient is in atrial fibrillation.  Anticoagulation status reviewed.  Continue Coumadin.  Medications were reviewed and updated.  Follow-up in the office in 3 months with pacemaker interrogation.  Further plan will depend on patient's progress.  ////////////////////////////                 Diagnosis Plan   1. Pacemaker     2. Chronic atrial fibrillation (CMS/HCC)     3. Long term (current) use of anticoagulants     4. Sick sinus syndrome (CMS/HCC)     5. Tachy-ramiro syndrome (CMS/HCC)     LAB RESULTS (LAST 7 DAYS)    CBC        BMP        CMP         BNP        TROPONIN        CoAg  Results from last 7 days   Lab Units 10/21/20   INR  1.20       Creatinine Clearance  Estimated Creatinine Clearance: 32.9 mL/min (by C-G formula based on SCr of 0.88 mg/dL).    ABG        Radiology  No radiology results for the last day                The following portions of the patient's history were reviewed and updated as appropriate: allergies, current medications, past family history, past medical history, past social history, past surgical history and problem list.    Review of Systems   Constitution: Negative for malaise/fatigue.   Cardiovascular: Negative for chest pain, leg swelling, palpitations and syncope.   Respiratory: Negative for shortness of breath.    Skin: Negative for rash.   Gastrointestinal: Negative for nausea and vomiting.   Neurological: Negative for dizziness, light-headedness and numbness.         Current Outpatient Medications:   •  amLODIPine (NORVASC) 5 MG tablet, Take 5 mg by mouth Daily., Disp: , Rfl:   •  digoxin (LANOXIN) 125 MCG tablet, Take 125 mcg by mouth Daily., Disp: , Rfl:   •  donepezil (ARICEPT) 5 MG tablet, Take 5 mg by mouth Every Night., Disp: ,  Rfl:   •  lisinopril (PRINIVIL,ZESTRIL) 5 MG tablet, Take 5 mg by mouth Daily., Disp: , Rfl:   •  metoprolol tartrate (LOPRESSOR) 25 MG tablet, Take 25 mg by mouth 2 (Two) Times a Day., Disp: , Rfl:   •  traMADol (ULTRAM) 50 MG tablet, Take 50 mg by mouth Every 8 (Eight) Hours As Needed for Moderate Pain ., Disp: , Rfl:   •  warfarin (COUMADIN) 2.5 MG tablet, Take 2.5 mg by mouth Daily., Disp: , Rfl:   •  aspirin 81 MG EC tablet, Take 81 mg by mouth Daily., Disp: , Rfl:     Allergies   Allergen Reactions   • Codeine Other (See Comments)   • Hydrocodone-Acetaminophen Other (See Comments)   • Meperidine Other (See Comments)   • Morphine Other (See Comments)       History reviewed. No pertinent family history.    Past Surgical History:   Procedure Laterality Date   • CARDIAC CATHETERIZATION     • CARDIAC ELECTROPHYSIOLOGY PROCEDURE N/A 10/9/2020    Procedure: PPM generator change - dual;  Surgeon: Mignon Luke MD;  Location: [facility] INVASIVE LOCATION;  Service: Cardiovascular;  Laterality: N/A;   • CAROTID ENDARTERECTOMY     • CERVICAL SPINE SURGERY     • INSERT / REPLACE / REMOVE PACEMAKER     • SCALP LESION REMOVAL W/ FLAP AND SKIN GRAFT     • TOTAL HIP ARTHROPLASTY Left        Past Medical History:   Diagnosis Date   • Atrial fibrillation (CMS/HCC)    • Chronic kidney disease    • Hypertension    • Melanoma (CMS/HCC)    • MI (myocardial infarction) (CMS/HCC)        History reviewed. No pertinent family history.    Social History     Socioeconomic History   • Marital status:      Spouse name: Not on file   • Number of children: Not on file   • Years of education: Not on file   • Highest education level: Not on file   Tobacco Use   • Smoking status: Never Smoker   • Smokeless tobacco: Never Used   Substance and Sexual Activity   • Alcohol use: Never     Frequency: Never   • Drug use: Never   • Sexual activity: Defer         Procedures      Objective:       Physical Exam    /69   Pulse 63   Ht  "149.9 cm (59\")   Wt 46.3 kg (102 lb)   SpO2 95%   BMI 20.60 kg/m²   The patient is alert, oriented and in no distress.    Vital signs as noted above.    Head and neck revealed no carotid bruits or jugular venous distension.  No thyromegaly or lymphadenopathy is present.    Lungs clear.  No wheezing.  Breath sounds are normal bilaterally.    Heart normal first and second heart sounds.  No murmur..  No pericardial rub is present.  No gallop is present.    Abdomen soft and nontender.  No organomegaly is present.    Extremities revealed good peripheral pulses without any pedal edema.    Skin warm and dry.  Pacemaker site looks normal.    Musculoskeletal system is grossly normal.    CNS grossly normal.    Reviewed and updated.        "

## 2020-11-04 ENCOUNTER — TELEPHONE (OUTPATIENT)
Dept: CARDIOLOGY | Facility: CLINIC | Age: 85
End: 2020-11-04

## 2020-11-04 ENCOUNTER — ANTICOAGULATION VISIT (OUTPATIENT)
Dept: CARDIOLOGY | Facility: CLINIC | Age: 85
End: 2020-11-04

## 2020-11-04 DIAGNOSIS — Z79.01 LONG TERM (CURRENT) USE OF ANTICOAGULANTS: ICD-10-CM

## 2020-11-04 DIAGNOSIS — I48.20 CHRONIC ATRIAL FIBRILLATION (HCC): ICD-10-CM

## 2020-11-04 LAB — INR PPP: 1.9

## 2020-11-04 NOTE — TELEPHONE ENCOUNTER
CALLED PATIENT TO SEE IF INR HAD BEEN DRAWN. SON (JACQUELINE) ANSWERED, PHONE WAS CUTTING IN AND OUT. COULD NOT UNDERSTAND SON. WILL TRY CALLING PATIENT AGAIN AT DIFFERENT TIME.

## 2020-11-11 ENCOUNTER — TELEPHONE (OUTPATIENT)
Dept: CARDIOLOGY | Facility: CLINIC | Age: 85
End: 2020-11-11

## 2020-11-11 ENCOUNTER — ANTICOAGULATION VISIT (OUTPATIENT)
Dept: CARDIOLOGY | Facility: CLINIC | Age: 85
End: 2020-11-11

## 2020-11-11 DIAGNOSIS — I48.20 CHRONIC ATRIAL FIBRILLATION (HCC): ICD-10-CM

## 2020-11-11 DIAGNOSIS — Z79.01 LONG TERM (CURRENT) USE OF ANTICOAGULANTS: ICD-10-CM

## 2020-11-11 LAB — INR PPP: 1.7

## 2020-11-18 ENCOUNTER — TELEPHONE (OUTPATIENT)
Dept: CARDIOLOGY | Facility: CLINIC | Age: 85
End: 2020-11-18

## 2020-11-18 ENCOUNTER — ANTICOAGULATION VISIT (OUTPATIENT)
Dept: CARDIOLOGY | Facility: CLINIC | Age: 85
End: 2020-11-18

## 2020-11-18 DIAGNOSIS — I48.20 CHRONIC ATRIAL FIBRILLATION (HCC): ICD-10-CM

## 2020-11-18 DIAGNOSIS — Z79.01 LONG TERM (CURRENT) USE OF ANTICOAGULANTS: ICD-10-CM

## 2020-11-18 LAB — INR PPP: 2.2

## 2020-11-18 NOTE — TELEPHONE ENCOUNTER
Spoke with Jeanine corrected dose. Pt is actually taking 5 on Sunday and 2.5mg all other days clarified with Son In Law. Advised both to continue current plan recheck next week apLPN

## 2020-11-25 ENCOUNTER — ANTICOAGULATION VISIT (OUTPATIENT)
Dept: CARDIOLOGY | Facility: CLINIC | Age: 85
End: 2020-11-25

## 2020-11-25 ENCOUNTER — TELEPHONE (OUTPATIENT)
Dept: CARDIOLOGY | Facility: CLINIC | Age: 85
End: 2020-11-25

## 2020-11-25 DIAGNOSIS — Z79.01 LONG TERM (CURRENT) USE OF ANTICOAGULANTS: ICD-10-CM

## 2020-11-25 DIAGNOSIS — I48.91 ATRIAL FIBRILLATION, UNSPECIFIED TYPE (HCC): ICD-10-CM

## 2020-11-25 LAB — INR PPP: 2.2

## 2020-11-25 NOTE — TELEPHONE ENCOUNTER
[facility] [phone]  INR 2.2    Pt was just discharged from home health today so you can call the pt if you want to make any changes.

## 2020-11-30 RX ORDER — WARFARIN SODIUM 2.5 MG/1
TABLET ORAL
Qty: 100 TABLET | Refills: 0 | Status: SHIPPED | OUTPATIENT
Start: 2020-11-30 | End: 2021-03-30

## 2020-12-16 ENCOUNTER — ANTICOAGULATION VISIT (OUTPATIENT)
Dept: CARDIOLOGY | Facility: CLINIC | Age: 85
End: 2020-12-16

## 2020-12-16 VITALS
DIASTOLIC BLOOD PRESSURE: 50 MMHG | SYSTOLIC BLOOD PRESSURE: 157 MMHG | BODY MASS INDEX: 21.21 KG/M2 | HEART RATE: 60 BPM | TEMPERATURE: 95.5 F | WEIGHT: 105 LBS

## 2020-12-16 DIAGNOSIS — I48.91 ATRIAL FIBRILLATION, UNSPECIFIED TYPE (HCC): ICD-10-CM

## 2020-12-16 DIAGNOSIS — Z79.01 LONG TERM (CURRENT) USE OF ANTICOAGULANTS: ICD-10-CM

## 2020-12-16 LAB — INR PPP: 1.9 (ref 0.9–1.1)

## 2020-12-16 PROCEDURE — 36416 COLLJ CAPILLARY BLOOD SPEC: CPT | Performed by: INTERNAL MEDICINE

## 2020-12-16 PROCEDURE — 85610 PROTHROMBIN TIME: CPT | Performed by: INTERNAL MEDICINE

## 2021-01-31 PROCEDURE — 93296 REM INTERROG EVL PM/IDS: CPT | Performed by: INTERNAL MEDICINE

## 2021-01-31 PROCEDURE — 93294 REM INTERROG EVL PM/LDLS PM: CPT | Performed by: INTERNAL MEDICINE

## 2021-02-01 ENCOUNTER — OFFICE VISIT (OUTPATIENT)
Dept: CARDIOLOGY | Facility: CLINIC | Age: 86
End: 2021-02-01

## 2021-02-01 ENCOUNTER — CLINICAL SUPPORT NO REQUIREMENTS (OUTPATIENT)
Dept: CARDIOLOGY | Facility: CLINIC | Age: 86
End: 2021-02-01

## 2021-02-01 ENCOUNTER — ANTICOAGULATION VISIT (OUTPATIENT)
Dept: CARDIOLOGY | Facility: CLINIC | Age: 86
End: 2021-02-01

## 2021-02-01 VITALS
TEMPERATURE: 96.9 F | SYSTOLIC BLOOD PRESSURE: 136 MMHG | WEIGHT: 103 LBS | DIASTOLIC BLOOD PRESSURE: 63 MMHG | HEIGHT: 59 IN | BODY MASS INDEX: 20.76 KG/M2 | HEART RATE: 86 BPM

## 2021-02-01 VITALS
WEIGHT: 103 LBS | BODY MASS INDEX: 20.8 KG/M2 | HEART RATE: 86 BPM | DIASTOLIC BLOOD PRESSURE: 63 MMHG | SYSTOLIC BLOOD PRESSURE: 136 MMHG

## 2021-02-01 DIAGNOSIS — Z79.01 LONG TERM (CURRENT) USE OF ANTICOAGULANTS: ICD-10-CM

## 2021-02-01 DIAGNOSIS — I48.20 CHRONIC ATRIAL FIBRILLATION (HCC): ICD-10-CM

## 2021-02-01 DIAGNOSIS — I49.5 TACHY-BRADY SYNDROME (HCC): ICD-10-CM

## 2021-02-01 DIAGNOSIS — I48.91 ATRIAL FIBRILLATION, UNSPECIFIED TYPE (HCC): ICD-10-CM

## 2021-02-01 DIAGNOSIS — Z95.0 PACEMAKER: Primary | ICD-10-CM

## 2021-02-01 DIAGNOSIS — Z95.0 PRESENCE OF CARDIAC PACEMAKER: Primary | ICD-10-CM

## 2021-02-01 LAB — INR PPP: 1.5 (ref 0.9–1.1)

## 2021-02-01 PROCEDURE — 99214 OFFICE O/P EST MOD 30 MIN: CPT | Performed by: INTERNAL MEDICINE

## 2021-02-01 PROCEDURE — 36416 COLLJ CAPILLARY BLOOD SPEC: CPT | Performed by: INTERNAL MEDICINE

## 2021-02-01 PROCEDURE — 93280 PM DEVICE PROGR EVAL DUAL: CPT | Performed by: INTERNAL MEDICINE

## 2021-02-01 PROCEDURE — 85610 PROTHROMBIN TIME: CPT | Performed by: INTERNAL MEDICINE

## 2021-02-01 NOTE — PROGRESS NOTES
Encounter Date:02/01/2021  Last seen 10/22/2020      Patient ID: Alexandrea Gaxiola is a 88 y.o. female.    Chief Complaint:  Status post pacemaker  Hypertension  Intermittent atrial fibrillation  Mitral regurgitation     History of Present Illness  Since I have last seen, the patient has been without any chest discomfort ,shortness of breath, palpitations, dizziness or syncope.  Denies having any headache ,abdominal pain ,nausea, vomiting , diarrhea constipation, loss of weight or loss of appetite.  Denies having any excessive bruising ,hematuria or blood in the stool.    Tiredness    Review of all systems negative except as indicated.    Reviewed ROS.  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  Pacemaker was reprogrammed to DDDR due to long AV delays.  Status post dual-chamber pacemaker pulse generator replacement ([employer] MRI compatible)-10/9/2020      -history of intermittent atrial fibrillation.  Patient is in Sinus rhythm.     -Anticoagulation-patient is on Coumadin.     -moderate mitral regurgitation.  Echocardiogram 01/28/2019 revealed moderate mitral regurgitation left atrial enlargement normal left ventricle function.      -  Status post  subendocardial myocardial infarction -improved.     Cardiac catheterization 12/29/2015 revealed mild anterior wall hypokinesis with ejection fraction of 50%, 40% mid LAD 70-80% ostial diagonal branch disease (small caliber vessel) and 80% circumflex distal to a large marginal branch.      -status post left carotid endarterectomy cervical spine surgery left hip replacement and recent scalp surgery for carcinoma and grafting.     -hypertension-not well controlled.      -allergy to codeine, morphine and Demerol.     -status post local excision and skin grafting of scalp for  melanoma.     ===============    Plan  ================  Status post pacemaker  Pacemaker site is normal.  Integration of the pacemaker revealed excellent pacing parameters.  Patient is in atrial fibrillation.    Anticoagulation status reviewed.  Continue Coumadin.  Recent INR 1.5  Adjust Coumadin dosage.  PT/INR on a monthly basis.    Paroxysmal atrial fibrillation  Appears to have more episodes lately.    Mitral regurgitation-no evidence for congestive heart failure.    Tiredness may be partially related to atrial fibrillation.  Increase metoprolol tartrate to (25 mg tablet) 2 tablets in the morning and 1 tablet in the evening.    Medications were reviewed and updated.    Follow-up in the office in 6 months with pacemaker interrogation.    Further plan will depend on patient's progress.  ////////////////////////////                       Diagnosis Plan   1. Presence of cardiac pacemaker     2. Long term (current) use of anticoagulants     3. Chronic atrial fibrillation (CMS/HCC)     4. Tachy-ramiro syndrome (CMS/HCC)     LAB RESULTS (LAST 7 DAYS)    CBC        BMP        CMP         BNP        TROPONIN        CoAg  Results from last 7 days   Lab Units 02/01/21  1038   INR  1.50*       Creatinine Clearance  CrCl cannot be calculated (Patient's most recent lab result is older than the maximum 30 days allowed.).    ABG        Radiology  No radiology results for the last day                The following portions of the patient's history were reviewed and updated as appropriate: allergies, current medications, past family history, past medical history, past social history, past surgical history and problem list.    Review of Systems   Constitution: Positive for malaise/fatigue.   Cardiovascular: Negative for chest pain, leg swelling, palpitations and syncope.   Respiratory: Negative for shortness of breath.    Skin: Negative for rash.   Gastrointestinal: Negative for nausea and vomiting.   Neurological: Positive for  dizziness. Negative for light-headedness and numbness.         Current Outpatient Medications:   •  amLODIPine (NORVASC) 5 MG tablet, Take 5 mg by mouth Daily., Disp: , Rfl:   •  aspirin 81 MG EC tablet, Take 81 mg by mouth Daily., Disp: , Rfl:   •  digoxin (LANOXIN) 125 MCG tablet, Take 125 mcg by mouth Daily., Disp: , Rfl:   •  donepezil (ARICEPT) 5 MG tablet, Take 5 mg by mouth Every Night., Disp: , Rfl:   •  lisinopril (PRINIVIL,ZESTRIL) 5 MG tablet, Take 5 mg by mouth Daily., Disp: , Rfl:   •  metoprolol tartrate (LOPRESSOR) 25 MG tablet, Take 1 tablet by mouth 2 (Two) Times a Day., Disp: 180 tablet, Rfl: 1  •  traMADol (ULTRAM) 50 MG tablet, Take 50 mg by mouth Every 8 (Eight) Hours As Needed for Moderate Pain ., Disp: , Rfl:   •  warfarin (COUMADIN) 2.5 MG tablet, Take 2 tablets by mouth on Sunday and 1 tablet by mouth the other 6 days a week., Disp: 100 tablet, Rfl: 0    Allergies   Allergen Reactions   • Codeine Other (See Comments)   • Hydrocodone-Acetaminophen Other (See Comments)   • Meperidine Other (See Comments)   • Morphine Other (See Comments)       History reviewed. No pertinent family history.    Past Surgical History:   Procedure Laterality Date   • CARDIAC CATHETERIZATION     • CARDIAC ELECTROPHYSIOLOGY PROCEDURE N/A 10/9/2020    Procedure: PPM generator change - dual;  Surgeon: Mignon Luke MD;  Location: [facility] INVASIVE LOCATION;  Service: Cardiovascular;  Laterality: N/A;   • CAROTID ENDARTERECTOMY     • CERVICAL SPINE SURGERY     • INSERT / REPLACE / REMOVE PACEMAKER     • SCALP LESION REMOVAL W/ FLAP AND SKIN GRAFT     • TOTAL HIP ARTHROPLASTY Left        Past Medical History:   Diagnosis Date   • Atrial fibrillation (CMS/HCC)    • Chronic kidney disease    • Hypertension    • Melanoma (CMS/HCC)    • MI (myocardial infarction) (CMS/HCC)        History reviewed. No pertinent family history.    Social History     Socioeconomic History   • Marital status:      Spouse name: Not  "on file   • Number of children: Not on file   • Years of education: Not on file   • Highest education level: Not on file   Tobacco Use   • Smoking status: Never Smoker   • Smokeless tobacco: Never Used   Substance and Sexual Activity   • Alcohol use: Never     Frequency: Never   • Drug use: Never   • Sexual activity: Defer         Procedures      Objective:       Physical Exam    /63   Pulse 86   Temp 96.9 °F (36.1 °C)   Ht 149.9 cm (59\")   Wt 46.7 kg (103 lb)   BMI 20.80 kg/m²   The patient is alert, oriented and in no distress.    Vital signs as noted above.    Head and neck revealed no carotid bruits or jugular venous distension.  No thyromegaly or lymphadenopathy is present.    Lungs clear.  No wheezing.  Breath sounds are normal bilaterally.    Heart normal first and second heart sounds.  No murmur..  No pericardial rub is present.  No gallop is present.    Abdomen soft and nontender.  No organomegaly is present.    Extremities revealed good peripheral pulses without any pedal edema.    Skin warm and dry.  Pacemaker site looks normal.    Musculoskeletal system is grossly normal.    CNS grossly normal.    Reviewed and updated.        "

## 2021-02-14 ENCOUNTER — HOSPITAL ENCOUNTER (INPATIENT)
Facility: HOSPITAL | Age: 86
LOS: 1 days | Discharge: HOME HEALTH | End: 2021-02-17
Attending: EMERGENCY MEDICINE | Admitting: FAMILY MEDICINE

## 2021-02-14 ENCOUNTER — APPOINTMENT (OUTPATIENT)
Dept: CARDIOLOGY | Facility: HOSPITAL | Age: 86
End: 2021-02-14

## 2021-02-14 ENCOUNTER — APPOINTMENT (OUTPATIENT)
Dept: GENERAL RADIOLOGY | Facility: HOSPITAL | Age: 86
End: 2021-02-14

## 2021-02-14 DIAGNOSIS — I50.9 ACUTE CONGESTIVE HEART FAILURE, UNSPECIFIED HEART FAILURE TYPE (HCC): ICD-10-CM

## 2021-02-14 DIAGNOSIS — J96.01 ACUTE HYPOXEMIC RESPIRATORY FAILURE (HCC): Primary | ICD-10-CM

## 2021-02-14 LAB
ALBUMIN SERPL-MCNC: 4.3 G/DL (ref 3.5–5.2)
ALBUMIN/GLOB SERPL: 1.1 G/DL
ALP SERPL-CCNC: 91 U/L (ref 39–117)
ALT SERPL W P-5'-P-CCNC: 16 U/L (ref 1–33)
ANION GAP SERPL CALCULATED.3IONS-SCNC: 10 MMOL/L (ref 5–15)
APTT PPP: 35.4 SECONDS (ref 24–31)
AST SERPL-CCNC: 17 U/L (ref 1–32)
B PARAPERT DNA SPEC QL NAA+PROBE: NOT DETECTED
B PERT DNA SPEC QL NAA+PROBE: NOT DETECTED
BASOPHILS # BLD AUTO: 0.1 10*3/MM3 (ref 0–0.2)
BASOPHILS NFR BLD AUTO: 0.6 % (ref 0–1.5)
BH CV ECHO MEAS - ACS: 1.6 CM
BH CV ECHO MEAS - AO MAX PG (FULL): 6.1 MMHG
BH CV ECHO MEAS - AO MAX PG: 14.4 MMHG
BH CV ECHO MEAS - AO MEAN PG (FULL): 2.9 MMHG
BH CV ECHO MEAS - AO MEAN PG: 6.9 MMHG
BH CV ECHO MEAS - AO ROOT AREA (BSA CORRECTED): 2.1
BH CV ECHO MEAS - AO ROOT AREA: 6.7 CM^2
BH CV ECHO MEAS - AO ROOT DIAM: 2.9 CM
BH CV ECHO MEAS - AO V2 MAX: 189.5 CM/SEC
BH CV ECHO MEAS - AO V2 MEAN: 116 CM/SEC
BH CV ECHO MEAS - AO V2 VTI: 27.4 CM
BH CV ECHO MEAS - AORTIC HR: 70.1 BPM
BH CV ECHO MEAS - AORTIC R-R: 0.86 SEC
BH CV ECHO MEAS - ASC AORTA: 2.6 CM
BH CV ECHO MEAS - AVA(I,A): 3.3 CM^2
BH CV ECHO MEAS - AVA(I,D): 3.3 CM^2
BH CV ECHO MEAS - AVA(V,A): 2.4 CM^2
BH CV ECHO MEAS - AVA(V,D): 2.4 CM^2
BH CV ECHO MEAS - BSA(HAYCOCK): 1.4 M^2
BH CV ECHO MEAS - BSA: 1.4 M^2
BH CV ECHO MEAS - BZI_BMI: 20.8 KILOGRAMS/M^2
BH CV ECHO MEAS - BZI_METRIC_HEIGHT: 149.9 CM
BH CV ECHO MEAS - BZI_METRIC_WEIGHT: 46.7 KG
BH CV ECHO MEAS - CI(AO): 9.2 L/MIN/M^2
BH CV ECHO MEAS - CI(LVOT): 4.6 L/MIN/M^2
BH CV ECHO MEAS - CO(AO): 12.8 L/MIN
BH CV ECHO MEAS - CO(LVOT): 6.4 L/MIN
BH CV ECHO MEAS - EDV(CUBED): 82.2 ML
BH CV ECHO MEAS - EDV(MOD-SP4): 34.1 ML
BH CV ECHO MEAS - EDV(TEICH): 85.3 ML
BH CV ECHO MEAS - EF(CUBED): 87.6 %
BH CV ECHO MEAS - EF(MOD-BP): 53 %
BH CV ECHO MEAS - EF(MOD-SP4): 53.1 %
BH CV ECHO MEAS - EF(TEICH): 81.6 %
BH CV ECHO MEAS - ESV(CUBED): 10.2 ML
BH CV ECHO MEAS - ESV(MOD-SP4): 16 ML
BH CV ECHO MEAS - ESV(TEICH): 15.7 ML
BH CV ECHO MEAS - FS: 50.1 %
BH CV ECHO MEAS - IVS/LVPW: 1.1
BH CV ECHO MEAS - IVSD: 1.2 CM
BH CV ECHO MEAS - LA DIMENSION(2D): 4 CM
BH CV ECHO MEAS - LV DIASTOLIC VOL/BSA (35-75): 24.5 ML/M^2
BH CV ECHO MEAS - LV MASS(C)D: 179.6 GRAMS
BH CV ECHO MEAS - LV MASS(C)DI: 129.1 GRAMS/M^2
BH CV ECHO MEAS - LV MAX PG: 8.2 MMHG
BH CV ECHO MEAS - LV MEAN PG: 4 MMHG
BH CV ECHO MEAS - LV SYSTOLIC VOL/BSA (12-30): 11.5 ML/M^2
BH CV ECHO MEAS - LV V1 MAX: 143.4 CM/SEC
BH CV ECHO MEAS - LV V1 MEAN: 91.4 CM/SEC
BH CV ECHO MEAS - LV V1 VTI: 28.5 CM
BH CV ECHO MEAS - LVIDD: 4.3 CM
BH CV ECHO MEAS - LVIDS: 2.2 CM
BH CV ECHO MEAS - LVOT AREA: 3.2 CM^2
BH CV ECHO MEAS - LVOT DIAM: 2 CM
BH CV ECHO MEAS - LVPWD: 1.1 CM
BH CV ECHO MEAS - MR MAX PG: 99.3 MMHG
BH CV ECHO MEAS - MR MAX VEL: 498.2 CM/SEC
BH CV ECHO MEAS - MV MAX PG: 13.2 MMHG
BH CV ECHO MEAS - MV MEAN PG: 4.5 MMHG
BH CV ECHO MEAS - MV V2 MAX: 181.6 CM/SEC
BH CV ECHO MEAS - MV V2 MEAN: 96.4 CM/SEC
BH CV ECHO MEAS - MV V2 VTI: 49.5 CM
BH CV ECHO MEAS - MVA(VTI): 1.9 CM^2
BH CV ECHO MEAS - PA MAX PG: 6.4 MMHG
BH CV ECHO MEAS - PA MEAN PG: 3.3 MMHG
BH CV ECHO MEAS - PA V2 MAX: 126.3 CM/SEC
BH CV ECHO MEAS - PA V2 MEAN: 85.4 CM/SEC
BH CV ECHO MEAS - PA V2 VTI: 26.3 CM
BH CV ECHO MEAS - PI END-D VEL: 134.4 CM/SEC
BH CV ECHO MEAS - PI MAX PG: 19.5 MMHG
BH CV ECHO MEAS - PI MAX VEL: 220.9 CM/SEC
BH CV ECHO MEAS - RAP SYSTOLE: 3 MMHG
BH CV ECHO MEAS - RVDD: 1.8 CM
BH CV ECHO MEAS - RVOT AREA: 4.4 CM^2
BH CV ECHO MEAS - RVOT DIAM: 2.4 CM
BH CV ECHO MEAS - RVSP: 34.5 MMHG
BH CV ECHO MEAS - SI(AO): 131.1 ML/M^2
BH CV ECHO MEAS - SI(CUBED): 51.8 ML/M^2
BH CV ECHO MEAS - SI(LVOT): 65.9 ML/M^2
BH CV ECHO MEAS - SI(MOD-SP4): 13 ML/M^2
BH CV ECHO MEAS - SI(TEICH): 50.1 ML/M^2
BH CV ECHO MEAS - SV(AO): 182.4 ML
BH CV ECHO MEAS - SV(CUBED): 72 ML
BH CV ECHO MEAS - SV(LVOT): 91.6 ML
BH CV ECHO MEAS - SV(MOD-SP4): 18.1 ML
BH CV ECHO MEAS - SV(TEICH): 69.6 ML
BH CV ECHO MEAS - TR MAX VEL: 280.6 CM/SEC
BILIRUB SERPL-MCNC: 0.3 MG/DL (ref 0–1.2)
BUN SERPL-MCNC: 26 MG/DL (ref 8–23)
BUN/CREAT SERPL: 28 (ref 7–25)
C PNEUM DNA NPH QL NAA+NON-PROBE: NOT DETECTED
CALCIUM SPEC-SCNC: 9.7 MG/DL (ref 8.6–10.5)
CHLORIDE SERPL-SCNC: 102 MMOL/L (ref 98–107)
CO2 SERPL-SCNC: 26 MMOL/L (ref 22–29)
CREAT SERPL-MCNC: 0.93 MG/DL (ref 0.57–1)
DEPRECATED RDW RBC AUTO: 51.2 FL (ref 37–54)
DIGOXIN SERPL-MCNC: 1.2 NG/ML (ref 0.6–1.2)
EOSINOPHIL # BLD AUTO: 0.2 10*3/MM3 (ref 0–0.4)
EOSINOPHIL NFR BLD AUTO: 1.7 % (ref 0.3–6.2)
ERYTHROCYTE [DISTWIDTH] IN BLOOD BY AUTOMATED COUNT: 16.7 % (ref 12.3–15.4)
FLUAV SUBTYP SPEC NAA+PROBE: NOT DETECTED
FLUBV RNA ISLT QL NAA+PROBE: NOT DETECTED
GFR SERPL CREATININE-BSD FRML MDRD: 57 ML/MIN/1.73
GLOBULIN UR ELPH-MCNC: 3.9 GM/DL
GLUCOSE SERPL-MCNC: 103 MG/DL (ref 65–99)
HADV DNA SPEC NAA+PROBE: NOT DETECTED
HCOV 229E RNA SPEC QL NAA+PROBE: NOT DETECTED
HCOV HKU1 RNA SPEC QL NAA+PROBE: NOT DETECTED
HCOV NL63 RNA SPEC QL NAA+PROBE: NOT DETECTED
HCOV OC43 RNA SPEC QL NAA+PROBE: NOT DETECTED
HCT VFR BLD AUTO: 35.1 % (ref 34–46.6)
HGB BLD-MCNC: 11.7 G/DL (ref 12–15.9)
HMPV RNA NPH QL NAA+NON-PROBE: NOT DETECTED
HOLD SPECIMEN: NORMAL
HPIV1 RNA SPEC QL NAA+PROBE: NOT DETECTED
HPIV2 RNA SPEC QL NAA+PROBE: NOT DETECTED
HPIV3 RNA NPH QL NAA+PROBE: NOT DETECTED
HPIV4 P GENE NPH QL NAA+PROBE: NOT DETECTED
INR PPP: 2.78 (ref 2–3)
LYMPHOCYTES # BLD AUTO: 1.4 10*3/MM3 (ref 0.7–3.1)
LYMPHOCYTES NFR BLD AUTO: 11.4 % (ref 19.6–45.3)
M PNEUMO IGG SER IA-ACNC: NOT DETECTED
MCH RBC QN AUTO: 29.1 PG (ref 26.6–33)
MCHC RBC AUTO-ENTMCNC: 33.4 G/DL (ref 31.5–35.7)
MCV RBC AUTO: 87.1 FL (ref 79–97)
MONOCYTES # BLD AUTO: 0.8 10*3/MM3 (ref 0.1–0.9)
MONOCYTES NFR BLD AUTO: 6.6 % (ref 5–12)
NEUTROPHILS NFR BLD AUTO: 79.7 % (ref 42.7–76)
NEUTROPHILS NFR BLD AUTO: 9.5 10*3/MM3 (ref 1.7–7)
NRBC BLD AUTO-RTO: 0 /100 WBC (ref 0–0.2)
NT-PROBNP SERPL-MCNC: 2901 PG/ML (ref 0–1800)
PLATELET # BLD AUTO: 335 10*3/MM3 (ref 140–450)
PMV BLD AUTO: 6.4 FL (ref 6–12)
POTASSIUM SERPL-SCNC: 4 MMOL/L (ref 3.5–5.2)
PROCALCITONIN SERPL-MCNC: 0.05 NG/ML (ref 0–0.25)
PROT SERPL-MCNC: 8.2 G/DL (ref 6–8.5)
PROTHROMBIN TIME: 29.1 SECONDS (ref 19.4–28.5)
QT INTERVAL: 384 MS
RBC # BLD AUTO: 4.03 10*6/MM3 (ref 3.77–5.28)
RHINOVIRUS RNA SPEC NAA+PROBE: NOT DETECTED
RSV RNA NPH QL NAA+NON-PROBE: NOT DETECTED
SARS-COV-2 RNA NPH QL NAA+NON-PROBE: NOT DETECTED
SODIUM SERPL-SCNC: 138 MMOL/L (ref 136–145)
TROPONIN T SERPL-MCNC: <0.01 NG/ML (ref 0–0.03)
TROPONIN T SERPL-MCNC: <0.01 NG/ML (ref 0–0.03)
WBC # BLD AUTO: 11.9 10*3/MM3 (ref 3.4–10.8)

## 2021-02-14 PROCEDURE — 80162 ASSAY OF DIGOXIN TOTAL: CPT | Performed by: EMERGENCY MEDICINE

## 2021-02-14 PROCEDURE — 93306 TTE W/DOPPLER COMPLETE: CPT

## 2021-02-14 PROCEDURE — 71045 X-RAY EXAM CHEST 1 VIEW: CPT

## 2021-02-14 PROCEDURE — 0202U NFCT DS 22 TRGT SARS-COV-2: CPT | Performed by: EMERGENCY MEDICINE

## 2021-02-14 PROCEDURE — 84145 PROCALCITONIN (PCT): CPT | Performed by: EMERGENCY MEDICINE

## 2021-02-14 PROCEDURE — 84484 ASSAY OF TROPONIN QUANT: CPT | Performed by: INTERNAL MEDICINE

## 2021-02-14 PROCEDURE — 99284 EMERGENCY DEPT VISIT MOD MDM: CPT

## 2021-02-14 PROCEDURE — G0378 HOSPITAL OBSERVATION PER HR: HCPCS

## 2021-02-14 PROCEDURE — 85730 THROMBOPLASTIN TIME PARTIAL: CPT | Performed by: EMERGENCY MEDICINE

## 2021-02-14 PROCEDURE — 93306 TTE W/DOPPLER COMPLETE: CPT | Performed by: INTERNAL MEDICINE

## 2021-02-14 PROCEDURE — 25010000002 FUROSEMIDE PER 20 MG: Performed by: EMERGENCY MEDICINE

## 2021-02-14 PROCEDURE — 85025 COMPLETE CBC W/AUTO DIFF WBC: CPT | Performed by: EMERGENCY MEDICINE

## 2021-02-14 PROCEDURE — 99214 OFFICE O/P EST MOD 30 MIN: CPT | Performed by: INTERNAL MEDICINE

## 2021-02-14 PROCEDURE — 93005 ELECTROCARDIOGRAM TRACING: CPT | Performed by: EMERGENCY MEDICINE

## 2021-02-14 PROCEDURE — 85610 PROTHROMBIN TIME: CPT | Performed by: EMERGENCY MEDICINE

## 2021-02-14 PROCEDURE — 84484 ASSAY OF TROPONIN QUANT: CPT | Performed by: EMERGENCY MEDICINE

## 2021-02-14 PROCEDURE — 80053 COMPREHEN METABOLIC PANEL: CPT | Performed by: EMERGENCY MEDICINE

## 2021-02-14 PROCEDURE — 83880 ASSAY OF NATRIURETIC PEPTIDE: CPT | Performed by: EMERGENCY MEDICINE

## 2021-02-14 PROCEDURE — 87040 BLOOD CULTURE FOR BACTERIA: CPT | Performed by: EMERGENCY MEDICINE

## 2021-02-14 RX ORDER — DONEPEZIL HYDROCHLORIDE 5 MG/1
5 TABLET, FILM COATED ORAL NIGHTLY
Status: DISCONTINUED | OUTPATIENT
Start: 2021-02-14 | End: 2021-02-17 | Stop reason: HOSPADM

## 2021-02-14 RX ORDER — WARFARIN SODIUM 2.5 MG/1
2.5 TABLET ORAL
Status: DISCONTINUED | OUTPATIENT
Start: 2021-02-14 | End: 2021-02-15

## 2021-02-14 RX ORDER — ACETAMINOPHEN 325 MG/1
650 TABLET ORAL EVERY 4 HOURS PRN
Status: DISCONTINUED | OUTPATIENT
Start: 2021-02-14 | End: 2021-02-17 | Stop reason: HOSPADM

## 2021-02-14 RX ORDER — ASPIRIN 81 MG/1
81 TABLET ORAL DAILY
Status: DISCONTINUED | OUTPATIENT
Start: 2021-02-14 | End: 2021-02-17 | Stop reason: HOSPADM

## 2021-02-14 RX ORDER — AMLODIPINE BESYLATE 5 MG/1
5 TABLET ORAL DAILY
Status: DISCONTINUED | OUTPATIENT
Start: 2021-02-14 | End: 2021-02-17 | Stop reason: HOSPADM

## 2021-02-14 RX ORDER — NITROGLYCERIN 0.4 MG/1
0.4 TABLET SUBLINGUAL
Status: DISCONTINUED | OUTPATIENT
Start: 2021-02-14 | End: 2021-02-17 | Stop reason: HOSPADM

## 2021-02-14 RX ORDER — SODIUM CHLORIDE 0.9 % (FLUSH) 0.9 %
3 SYRINGE (ML) INJECTION EVERY 12 HOURS SCHEDULED
Status: DISCONTINUED | OUTPATIENT
Start: 2021-02-14 | End: 2021-02-17 | Stop reason: HOSPADM

## 2021-02-14 RX ORDER — ONDANSETRON 2 MG/ML
4 INJECTION INTRAMUSCULAR; INTRAVENOUS EVERY 6 HOURS PRN
Status: DISCONTINUED | OUTPATIENT
Start: 2021-02-14 | End: 2021-02-17 | Stop reason: HOSPADM

## 2021-02-14 RX ORDER — DIGOXIN 125 MCG
125 TABLET ORAL
Status: DISCONTINUED | OUTPATIENT
Start: 2021-02-14 | End: 2021-02-17 | Stop reason: HOSPADM

## 2021-02-14 RX ORDER — LISINOPRIL 5 MG/1
5 TABLET ORAL DAILY
Status: DISCONTINUED | OUTPATIENT
Start: 2021-02-14 | End: 2021-02-17 | Stop reason: HOSPADM

## 2021-02-14 RX ORDER — FUROSEMIDE 10 MG/ML
40 INJECTION INTRAMUSCULAR; INTRAVENOUS ONCE
Status: COMPLETED | OUTPATIENT
Start: 2021-02-14 | End: 2021-02-14

## 2021-02-14 RX ORDER — TRAMADOL HYDROCHLORIDE 50 MG/1
50 TABLET ORAL EVERY 8 HOURS PRN
Status: DISCONTINUED | OUTPATIENT
Start: 2021-02-14 | End: 2021-02-17 | Stop reason: HOSPADM

## 2021-02-14 RX ORDER — SODIUM CHLORIDE 0.9 % (FLUSH) 0.9 %
3-10 SYRINGE (ML) INJECTION AS NEEDED
Status: DISCONTINUED | OUTPATIENT
Start: 2021-02-14 | End: 2021-02-17 | Stop reason: HOSPADM

## 2021-02-14 RX ADMIN — Medication 3 ML: at 14:37

## 2021-02-14 RX ADMIN — WARFARIN 2.5 MG: 2.5 TABLET ORAL at 17:49

## 2021-02-14 RX ADMIN — DIGOXIN 125 MCG: 125 TABLET ORAL at 14:34

## 2021-02-14 RX ADMIN — ASPIRIN 81 MG: 81 TABLET, COATED ORAL at 14:34

## 2021-02-14 RX ADMIN — FUROSEMIDE 40 MG: 10 INJECTION, SOLUTION INTRAMUSCULAR; INTRAVENOUS at 05:26

## 2021-02-14 RX ADMIN — METOPROLOL TARTRATE 25 MG: 25 TABLET, FILM COATED ORAL at 20:27

## 2021-02-14 RX ADMIN — AMLODIPINE BESYLATE 5 MG: 5 TABLET ORAL at 14:35

## 2021-02-14 RX ADMIN — METOPROLOL TARTRATE 25 MG: 25 TABLET, FILM COATED ORAL at 14:34

## 2021-02-14 RX ADMIN — LISINOPRIL 5 MG: 5 TABLET ORAL at 14:34

## 2021-02-14 RX ADMIN — DONEPEZIL HYDROCHLORIDE 5 MG: 5 TABLET, FILM COATED ORAL at 20:27

## 2021-02-14 RX ADMIN — Medication 3 ML: at 20:27

## 2021-02-14 NOTE — PROGRESS NOTES
"Pharmacy dosing service  Anticoagulant  Warfarin     Subjective:    Alexandrea Gaxiola is a 88 y.o.female being continued on warfarin for atrial fibrillation (CHADS-VASc = 5).  PMH: CHF, HTN, pacemaker    INR Goal: 2 - 3  Home medication?: Yes, 2.5 mg daily (confirmed w/pt and outpt charting)  Bridge Therapy Present?:  No  Interacting Medications Evaluation (New/Present/Discontinued): n/a  Additional Contributing Factors: CHF      Assessment/Plan:    INR therapeutic on admission. Will continue with home warfarin regimen of 2.5 mg daily for now.    Continue to monitor and adjust based on INR.         Date 2/14           INR 2.78           Dose 2.5               Objective:  [Ht: 149.9 cm (59\"); Wt: 46.7 kg (103 lb); BMI: Body mass index is 20.8 kg/m².]    Lab Results   Component Value Date    ALBUMIN 4.30 02/14/2021     Lab Results   Component Value Date    INR 2.78 02/14/2021    INR 1.50 (A) 02/01/2021    INR 1.90 (A) 12/16/2020    PROTIME 29.1 (H) 02/14/2021    PROTIME 13.2 (L) 10/09/2020    PROTIME 11.9 08/10/2020     Lab Results   Component Value Date    HGB 11.7 (L) 02/14/2021    HGB 12.2 10/09/2020    HGB 10.5 (L) 08/11/2020     Lab Results   Component Value Date    HCT 35.1 02/14/2021    HCT 36.4 10/09/2020    HCT 32.3 (L) 08/11/2020       Nimisha Nicole PharmD, BCPS  02/14/21 11:00 EST     "

## 2021-02-14 NOTE — PLAN OF CARE
Continue to monitor and assess pain.    Problem: Adult Inpatient Plan of Care  Goal: Plan of Care Review  Outcome: Ongoing, Progressing  Flowsheets (Taken 2/14/2021 0803)  Progress: improving  Plan of Care Reviewed With: patient  Goal: Patient-Specific Goal (Individualized)  Outcome: Ongoing, Progressing  Goal: Absence of Hospital-Acquired Illness or Injury  Outcome: Ongoing, Progressing  Intervention: Prevent Skin Injury  Flowsheets (Taken 2/14/2021 0803)  Body Position: position changed independently  Intervention: Prevent Infection  Flowsheets (Taken 2/14/2021 0803)  Infection Prevention:   cohorting utilized   personal protective equipment utilized   hand hygiene promoted   single patient room provided   rest/sleep promoted  Goal: Optimal Comfort and Wellbeing  Outcome: Ongoing, Progressing  Intervention: Provide Person-Centered Care  Flowsheets (Taken 2/14/2021 0803)  Trust Relationship/Rapport:   care explained   questions encouraged   questions answered  Goal: Readiness for Transition of Care  Outcome: Ongoing, Progressing  Intervention: Mutually Develop Transition Plan  Flowsheets (Taken 2/14/2021 0803)  Equipment Needed After Discharge: none  Equipment Currently Used at Home: walker, rolling  Anticipated Changes Related to Illness: none  Transportation Anticipated: family or friend will provide  Transportation Concerns: car, none  Concerns to be Addressed: no discharge needs identified  Readmission Within the Last 30 Days: no previous admission in last 30 days  Patient/Family Anticipated Services at Transition: none  Patient/Family Anticipates Transition to: home with family     Problem: Respiratory Compromise (Heart Failure)  Goal: Effective Oxygenation and Ventilation  Outcome: Ongoing, Progressing  Intervention: Promote Airway Secretion Clearance  Flowsheets (Taken 2/14/2021 0803)  Breathing Techniques/Airway Clearance: (pt is on 2.5L oxygen breathing fine) other (see comments)  Cough And Deep Breathing:  done independently per patient  Intervention: Optimize Oxygenation and Ventilation  Flowsheets (Taken 2/14/2021 0803)  Airway/Ventilation Management:   airway patency maintained   calming measures promoted   Goal Outcome Evaluation:  Plan of Care Reviewed With: patient  Progress: improving

## 2021-02-14 NOTE — ED NOTES
Talked to patient's son-in-law on the phone at patient's request, updated him on her admission status.     Bev Martinez RN  02/14/21 0628

## 2021-02-14 NOTE — CONSULTS
[facility]    Yudi Daniels MD    CHIEF COMPLAINT: Chest pain    HISTORY OF PRESENT ILLNESS:    This is a 88-year-old female patient of Dr. Luke.  She has a history of atrial fibrillation chronic kidney disease, hypertension and has had a history of an acute myocardial infarction.  She comes to the emergency room with sudden onset chest pressure associated with shortness of breath.  After nitroglycerin she felt better.  However she was admitted for an evaluation.  I personally reviewed her last echocardiogram from 2019 which showed moderate mitral vegetation with no significant valvular heart disease otherwise and preserved LV systolic function.    I also personally reviewed her ECG from today which atrial fibrillation with controlled ventricular response and lateral T wave inversions that could be ischemic in origin.  Her most recent cardiac troponin is negative currently.      Past Medical History:   Diagnosis Date   • Atrial fibrillation (CMS/HCC)    • Chronic kidney disease    • Hypertension    • Melanoma (CMS/HCC)    • MI (myocardial infarction) (CMS/HCC)      Past Surgical History:   Procedure Laterality Date   • CARDIAC CATHETERIZATION     • CARDIAC ELECTROPHYSIOLOGY PROCEDURE N/A 10/9/2020    Procedure: PPM generator change - dual;  Surgeon: Mignon Luke MD;  Location: [facility] INVASIVE LOCATION;  Service: Cardiovascular;  Laterality: N/A;   • CAROTID ENDARTERECTOMY     • CERVICAL SPINE SURGERY     • INSERT / REPLACE / REMOVE PACEMAKER     • SCALP LESION REMOVAL W/ FLAP AND SKIN GRAFT     • TOTAL HIP ARTHROPLASTY Left      History reviewed. No pertinent family history.  Social History     Tobacco Use   • Smoking status: Never Smoker   • Smokeless tobacco: Never Used   Substance Use Topics   • Alcohol use: Never     Frequency: Never   • Drug use: Never     Medications Prior to Admission   Medication Sig Dispense Refill Last Dose   • amLODIPine (NORVASC) 5 MG  "tablet Take 5 mg by mouth Daily.   2/13/2021 at Unknown time   • aspirin 81 MG EC tablet Take 81 mg by mouth Daily.   2/13/2021 at Unknown time   • digoxin (LANOXIN) 125 MCG tablet Take 125 mcg by mouth Daily.   2/13/2021 at Unknown time   • donepezil (ARICEPT) 5 MG tablet Take 5 mg by mouth Every Night.   2/13/2021 at Unknown time   • lisinopril (PRINIVIL,ZESTRIL) 5 MG tablet Take 5 mg by mouth Daily.   2/13/2021 at Unknown time   • metoprolol tartrate (LOPRESSOR) 25 MG tablet Take 1 tablet by mouth 2 (Two) Times a Day. 180 tablet 1 2/13/2021 at Unknown time   • traMADol (ULTRAM) 50 MG tablet Take 50 mg by mouth Every 8 (Eight) Hours As Needed for Moderate Pain .      • warfarin (COUMADIN) 2.5 MG tablet Take 2 tablets by mouth on Sunday and 1 tablet by mouth the other 6 days a week. (Patient taking differently: 2.5 mg Every Night.) 100 tablet 0 2/13/2021 at Unknown time     Allergies:  Codeine, Hydrocodone-acetaminophen, Meperidine, and Morphine      Review of Symptoms:  Constitutional: Patient afebrile no chills or unexpected weight changes  Respiratory: No cough, no wheezing or dyspnea  Cardiovascular: Today the patient complains of no chest pain, palpitations, dyspnea, orthopnea and no edema  Gastrointestinal: No nausea, vomiting, constipation or diarrhea.  No melena or dark stools    All other systems reviewed and are negative          Vital Signs  Temp:  [97.4 °F (36.3 °C)-98.1 °F (36.7 °C)] 98.1 °F (36.7 °C)  Heart Rate:  [62-76] 63  Resp:  [18-22] 18  BP: (102-176)/(50-85) 176/56  Oxygen Therapy  SpO2: 93 %  Pulse Oximetry Type: Continuous  Device (Oxygen Therapy): nasal cannula  Flow (L/min): 2.5}  Body mass index is 20.8 kg/m².  Flowsheet Rows      First Filed Value   Admission Height  149.9 cm (59\") Documented at 02/14/2021 0304   Admission Weight  46.7 kg (103 lb) Documented at 02/14/2021 0304             Physical exam  Constitutional: well-nourished, and appears stated age in no acute distress  PERRL: " Conjunctiva clear, no pallor, anicteric  HENMT: normocephalic, normal dentition, no cyanosis or pallor  Neck:no bruits, or thrills and bilateral normal carotid upstroke. Normal jugular venous pressure  Cardiovascular: No parasternal heaves an non-displaced focal PMI.  Irregular rhythm: no rub, gallop, murmur or click and normal S1 and S2; no lower or upper extremity edema.   Lungs: unlabored, no wheezing with no rales or rhonchi on auscultation.  Extremities: Warm, no clubbing, cyanosis. Full and equal peripheral pulses in extremities with no bruits appreciated.   Abdomen: soft, non-tender, non-distended  Musculoskeletal: no joint tenderness or swelling and no erythema  Skin: Warm and dry, non-erythematous   Neuro:alert and normal affect. Oriented to time, place and person.          Results Review:    I reviewed the patient's new clinical results.  Lab Results (most recent)     Procedure Component Value Units Date/Time    Troponin [[id number]]  (Normal) Collected: 02/14/21 1112    Specimen: Blood Updated: 02/14/21 1140     Troponin T <0.010 ng/mL     Narrative:      Troponin T Reference Range:  <= 0.03 ng/mL-   Negative for AMI  >0.03 ng/mL-     Abnormal for myocardial necrosis.  Clinicians would have to utilize clinical acumen, EKG, Troponin and serial changes to determine if it is an Acute Myocardial Infarction or myocardial injury due to an underlying chronic condition.       Results may be falsely decreased if patient taking Biotin.      Respiratory Panel PCR w/COVID-19(SARS-CoV-2) ANDREI/DARLENE/TAO/PAD/COR/MAD/ANAMIKA In-House, NP Swab in [facility], 3-4 HR TAT - Swab, Nasopharynx [[id number]]  (Normal) Collected: 02/14/21 0357    Specimen: Swab from Nasopharynx Updated: 02/14/21 0503     ADENOVIRUS, PCR Not Detected     Coronavirus 229E Not Detected     Coronavirus HKU1 Not Detected     Coronavirus NL63 Not Detected     Coronavirus OC43 Not Detected     COVID19 Not Detected     Human Metapneumovirus Not Detected     Human  Rhinovirus/Enterovirus Not Detected     Influenza A PCR Not Detected     Influenza B PCR Not Detected     Parainfluenza Virus 1 Not Detected     Parainfluenza Virus 2 Not Detected     Parainfluenza Virus 3 Not Detected     Parainfluenza Virus 4 Not Detected     RSV, PCR Not Detected     Bordetella pertussis pcr Not Detected     Bordetella parapertussis PCR Not Detected     Chlamydophila pneumoniae PCR Not Detected     Mycoplasma pneumo by PCR Not Detected    Narrative:      Fact sheet for providers: https://docs.Stevie/wp-content/uploads/OSR0738-8457-JO7.1-EUA-Provider-Fact-Sheet-3.pdf    Fact sheet for patients: https://docs.Stevie/wp-content/uploads/XHF1080-6088-NO5.1-EUA-Patient-Fact-Sheet-1.pdf    Test performed by PCR.    Extra Tubes [[id number]] Collected: 02/14/21 0338    Specimen: Blood from Arm, Left Updated: 02/14/21 0446    Narrative:      The following orders were created for panel order Extra Tubes.  Procedure                               Abnormality         Status                     ---------                               -----------         ------                     Gold Top - SST[[id number]]                                   Final result                 Please view results for these tests on the individual orders.    Gold Top - SST [[id number]] Collected: 02/14/21 0338    Specimen: Blood from Arm, Left Updated: 02/14/21 0446     Extra Tube Hold for add-ons.     Comment: Auto resulted.       Protime-INR [[id number]]  (Abnormal) Collected: 02/14/21 0338    Specimen: Blood from Arm, Left Updated: 02/14/21 0439     Protime 29.1 Seconds      INR 2.78    aPTT [[id number]]  (Abnormal) Collected: 02/14/21 0338    Specimen: Blood from Arm, Left Updated: 02/14/21 0439     PTT 35.4 seconds     Digoxin Level [[id number]]  (Normal) Collected: 02/14/21 0338    Specimen: Blood from Arm, Left Updated: 02/14/21 0431     Digoxin 1.20 ng/mL     Procalcitonin [[id number]]  (Normal) Collected: 02/14/21 0338     "Specimen: Blood from Arm, Left Updated: 02/14/21 0429     Procalcitonin 0.05 ng/mL     Narrative:      As a Marker for Sepsis (Non-Neonates):   1. <0.5 ng/mL represents a low risk of severe sepsis and/or septic shock.  1. >2 ng/mL represents a high risk of severe sepsis and/or septic shock.    As a Marker for Lower Respiratory Tract Infections that require antibiotic therapy:  PCT on Admission     Antibiotic Therapy             6-12 Hrs later  > 0.5                Strongly Recommended            >0.25 - <0.5         Recommended  0.1 - 0.25           Discouraged                   Remeasure/reassess PCT  <0.1                 Strongly Discouraged          Remeasure/reassess PCT      As 28 day mortality risk marker: \"Change in Procalcitonin Result\" (> 80 % or <=80 %) if Day 0 (or Day 1) and Day 4 values are available. Refer to http://www.Blokifypct-calculator.com/   Change in PCT <=80 %   A decrease of PCT levels below or equal to 80 % defines a positive change in PCT test result representing a higher risk for 28-day all-cause mortality of patients diagnosed with severe sepsis or septic shock.  Change in PCT > 80 %   A decrease of PCT levels of more than 80 % defines a negative change in PCT result representing a lower risk for 28-day all-cause mortality of patients diagnosed with severe sepsis or septic shock.                Results may be falsely decreased if patient taking Biotin.     Comprehensive Metabolic Panel [[id number]]  (Abnormal) Collected: 02/14/21 0338    Specimen: Blood from Arm, Left Updated: 02/14/21 0429     Glucose 103 mg/dL      BUN 26 mg/dL      Creatinine 0.93 mg/dL      Sodium 138 mmol/L      Potassium 4.0 mmol/L      Chloride 102 mmol/L      CO2 26.0 mmol/L      Calcium 9.7 mg/dL      Total Protein 8.2 g/dL      Albumin 4.30 g/dL      ALT (SGPT) 16 U/L      AST (SGOT) 17 U/L      Alkaline Phosphatase 91 U/L      Total Bilirubin 0.3 mg/dL      eGFR Non African Amer 57 mL/min/1.73      Globulin 3.9 " gm/dL      A/G Ratio 1.1 g/dL      BUN/Creatinine Ratio 28.0     Anion Gap 10.0 mmol/L     Narrative:      GFR Normal >60  Chronic Kidney Disease <60  Kidney Failure <15      Troponin [[id number]]  (Normal) Collected: 02/14/21 0338    Specimen: Blood from Arm, Left Updated: 02/14/21 0429     Troponin T <0.010 ng/mL     Narrative:      Troponin T Reference Range:  <= 0.03 ng/mL-   Negative for AMI  >0.03 ng/mL-     Abnormal for myocardial necrosis.  Clinicians would have to utilize clinical acumen, EKG, Troponin and serial changes to determine if it is an Acute Myocardial Infarction or myocardial injury due to an underlying chronic condition.       Results may be falsely decreased if patient taking Biotin.      BNP [[id number]]  (Abnormal) Collected: 02/14/21 0338    Specimen: Blood from Arm, Left Updated: 02/14/21 0422     proBNP 2,901.0 pg/mL     Narrative:      Among patients with dyspnea, NT-proBNP is highly sensitive for the detection of acute congestive heart failure. In addition NT-proBNP of <300 pg/ml effectively rules out acute congestive heart failure with 99% negative predictive value.    Results may be falsely decreased if patient taking Biotin.      CBC & Differential [[id number]]  (Abnormal) Collected: 02/14/21 0338    Specimen: Blood from Arm, Left Updated: 02/14/21 0359    Narrative:      The following orders were created for panel order CBC & Differential.  Procedure                               Abnormality         Status                     ---------                               -----------         ------                     CBC Auto Differential[[id number]]        Abnormal            Final result                 Please view results for these tests on the individual orders.    CBC Auto Differential [[id number]]  (Abnormal) Collected: 02/14/21 0338    Specimen: Blood from Arm, Left Updated: 02/14/21 0359     WBC 11.90 10*3/mm3      RBC 4.03 10*6/mm3      Hemoglobin 11.7 g/dL      Hematocrit 35.1 %       MCV 87.1 fL      MCH 29.1 pg      MCHC 33.4 g/dL      RDW 16.7 %      RDW-SD 51.2 fl      MPV 6.4 fL      Platelets 335 10*3/mm3      Neutrophil % 79.7 %      Lymphocyte % 11.4 %      Monocyte % 6.6 %      Eosinophil % 1.7 %      Basophil % 0.6 %      Neutrophils, Absolute 9.50 10*3/mm3      Lymphocytes, Absolute 1.40 10*3/mm3      Monocytes, Absolute 0.80 10*3/mm3      Eosinophils, Absolute 0.20 10*3/mm3      Basophils, Absolute 0.10 10*3/mm3      nRBC 0.0 /100 WBC     Blood Culture - Blood, Arm, Right [[id number]] Collected: 02/14/21 0350    Specimen: Blood from Arm, Right Updated: 02/14/21 0353    Blood Culture - Blood, Arm, Left [[id number]] Collected: 02/14/21 0338    Specimen: Blood from Arm, Left Updated: 02/14/21 0353          Imaging Results (Most Recent)     Procedure Component Value Units Date/Time    XR Chest 1 View [[id number]] Collected: 02/14/21 0830     Updated: 02/14/21 0834    Narrative:      DATE OF EXAM:  2/14/2021 3:55 AM     PROCEDURE:  XR CHEST 1 VW-     INDICATIONS:  Shortness of breath, diabetes mellitus.      COMPARISON:  04/10/2020.     TECHNIQUE:   Single radiographic view of the chest was obtained.     FINDINGS:  The heart is enlarged. There is a left-sided transvenous pacemaker in  place. There is no interval change in the mixed interstitial/airspace  disease in the right midlung zone and right lung base. The patient  probably has a trace right pleural effusion. There is continued dense  consolidation in the left mid and lower lung zones. There is bilateral  apical pleural thickening, left greater than right side.  There is  partially imaged fusion hardware in the upper lumbar spine. There are  chronic age-related changes involving the bony thorax and thoracic  aorta.       Impression:      Chronic abnormalities in the chest. This could be related to either  chronic pulmonary edema, chronic pneumonia, or an atypical inflammatory  process. Chronic pleural effusions are also suspected.        Electronically Signed By-Yayo Wang MD On:2/14/2021 8:32 AM  This report was finalized on [id number] by  Yayo Wang MD.        reviewed    ECG/EMG Results (most recent)     Procedure Component Value Units Date/Time    ECG 12 Lead [[id number]] Collected: 02/14/21 0316     Updated: 02/14/21 0831     QT Interval 384 ms     Narrative:      HEART RATE= 67  bpm  RR Interval= 898  ms  VA Interval=   ms  P Horizontal Axis=   deg  P Front Axis=   deg  QRSD Interval= 89  ms  QT Interval= 384  ms  QRS Axis= 8  deg  T Wave Axis= 269  deg  - ABNORMAL ECG -  Atrial fibrillation  Probable LVH with secondary repol abnrm  When compared with ECG of 09-Oct-2020 16:46:34,  No significant change  Electronically Signed By: Francis Reyes (Ryan) 14-Feb-2021 08:30:46  Date and Time of Study: 2021-02-14 03:16:40    Adult Transthoracic Echo Complete w/ Color, Spectral and Contrast if Necessary Per Protocol [[id number]] Collected: 02/14/21 1359     Updated: 02/14/21 1500     BSA 1.4 m^2      RVIDd 1.8 cm      IVSd 1.2 cm      LVIDd 4.3 cm      LVIDs 2.2 cm      LVPWd 1.1 cm      IVS/LVPW 1.1     FS 50.1 %      EDV(Teich) 85.3 ml      ESV(Teich) 15.7 ml      EF(Teich) 81.6 %      EDV(cubed) 82.2 ml      ESV(cubed) 10.2 ml      EF(cubed) 87.6 %      LV mass(C)d 179.6 grams      LV mass(C)dI 129.1 grams/m^2      SV(Teich) 69.6 ml      SI(Teich) 50.1 ml/m^2      SV(cubed) 72.0 ml      SI(cubed) 51.8 ml/m^2      Ao root diam 2.9 cm      Ao root area 6.7 cm^2      ACS 1.6 cm      asc Aorta Diam 2.6 cm      LVOT diam 2.0 cm      LVOT area 3.2 cm^2      RVOT diam 2.4 cm      RVOT area 4.4 cm^2      EDV(MOD-sp4) 34.1 ml      ESV(MOD-sp4) 16.0 ml      EF(MOD-sp4) 53.1 %      SV(MOD-sp4) 18.1 ml      SI(MOD-sp4) 13.0 ml/m^2      Ao root area (BSA corrected) 2.1     LV Parisi Vol (BSA corrected) 24.5 ml/m^2      LV Sys Vol (BSA corrected) 11.5 ml/m^2      Aortic R-R 0.86 sec      Aortic HR 70.1 BPM      MV V2 max 181.6 cm/sec      MV max PG 13.2 mmHg       MV V2 mean 96.4 cm/sec      MV mean PG 4.5 mmHg      MV V2 VTI 49.5 cm      MVA(VTI) 1.9 cm^2      Ao pk griffin 189.5 cm/sec      Ao max PG 14.4 mmHg      Ao max PG (full) 6.1 mmHg      Ao V2 mean 116.0 cm/sec      Ao mean PG 6.9 mmHg      Ao mean PG (full) 2.9 mmHg      Ao V2 VTI 27.4 cm      DAYAN(I,A) 3.3 cm^2      DAYAN(I,D) 3.3 cm^2      DAYAN(V,A) 2.4 cm^2      DAYAN(V,D) 2.4 cm^2      LV V1 max PG 8.2 mmHg      LV V1 mean PG 4.0 mmHg      LV V1 max 143.4 cm/sec      LV V1 mean 91.4 cm/sec      LV V1 VTI 28.5 cm      MR max griffin 498.2 cm/sec      MR max PG 99.3 mmHg      CO(Ao) 12.8 l/min      CI(Ao) 9.2 l/min/m^2      SV(Ao) 182.4 ml      SI(Ao) 131.1 ml/m^2      CO(LVOT) 6.4 l/min      CI(LVOT) 4.6 l/min/m^2      SV(LVOT) 91.6 ml      SI(LVOT) 65.9 ml/m^2      PA V2 max 126.3 cm/sec      PA max PG 6.4 mmHg      PA V2 mean 85.4 cm/sec      PA mean PG 3.3 mmHg      PA V2 VTI 26.3 cm      PI end-d griffin 134.4 cm/sec      PI max griffin 220.9 cm/sec      PI max PG 19.5 mmHg      TR max griffin 280.6 cm/sec      RVSP(TR) 34.5 mmHg      RAP systole 3.0 mmHg       CV ECHO ORA - BZI_BMI 20.8 kilograms/m^2       CV ECHO ORA - BSA(HAYCOCK) 1.4 m^2       CV ECHO ORA - BZI_METRIC_WEIGHT 46.7 kg       CV ECHO ORA - BZI_METRIC_HEIGHT 149.9 cm      EF(MOD-bp) 53.0 %      LA dimension(2D) 4.0 cm         reviewed    Assessment/Plan     Chest pain  Atrial fibrillation  Hypertension  Dyslipidemia    88-year-old female patient who does have history of acute MI with ST-T wave changes that could be consistent with ischemia versus fixed subendocardial ischemia due to left ventricular hypertrophy.  Her first cardiac troponin is negative.  Should her second troponin come back positive we will start heparin and consider left heart catheterization in the morning.  However we will nevertheless make her n.p.o. likely for a stress test in the morning to be read by Dr. Luke.    Continue the antihypertensives as written.    Greater than 30  minutes total of face-to-face/floor  time was spent with the patient and family and nursing coordinating plan and patient management.  Of which counseling of patient with regard specifically to her presentation comprised 50% of this total time.        Brian Noland MD  02/14/21  15:14 EST

## 2021-02-14 NOTE — ED PROVIDER NOTES
Subjective   88-year-old female complains of moderate shortness of air onset this evening associate with nonproductive cough.  Patient denies any fever or known sick contacts.  Patient got her first Covid vaccination earlier in the month.  Patient denies any calf pain or swelling, history of DVT or PE, recent trips or travel admissions or surgeries.  For severe worse with exertion.  Patient denies any chest pain.          Review of Systems   Respiratory: Positive for cough and shortness of breath.    All other systems reviewed and are negative.      Past Medical History:   Diagnosis Date   • Atrial fibrillation (CMS/HCC)    • Chronic kidney disease    • Hypertension    • Melanoma (CMS/HCC)    • MI (myocardial infarction) (CMS/HCC)        Allergies   Allergen Reactions   • Codeine Other (See Comments)   • Hydrocodone-Acetaminophen Other (See Comments)   • Meperidine Other (See Comments)   • Morphine Other (See Comments)       Past Surgical History:   Procedure Laterality Date   • CARDIAC CATHETERIZATION     • CARDIAC ELECTROPHYSIOLOGY PROCEDURE N/A 10/9/2020    Procedure: PPM generator change - dual;  Surgeon: Mignon Luke MD;  Location: [facility] INVASIVE LOCATION;  Service: Cardiovascular;  Laterality: N/A;   • CAROTID ENDARTERECTOMY     • CERVICAL SPINE SURGERY     • INSERT / REPLACE / REMOVE PACEMAKER     • SCALP LESION REMOVAL W/ FLAP AND SKIN GRAFT     • TOTAL HIP ARTHROPLASTY Left        No family history on file.    Social History     Socioeconomic History   • Marital status:      Spouse name: Not on file   • Number of children: Not on file   • Years of education: Not on file   • Highest education level: Not on file   Tobacco Use   • Smoking status: Never Smoker   • Smokeless tobacco: Never Used   Substance and Sexual Activity   • Alcohol use: Never     Frequency: Never   • Drug use: Never   • Sexual activity: Defer           Objective   Physical Exam  Constitutional:       Comments: Thin  pleasant elderly female in no acute distress   HENT:      Head: Normocephalic and atraumatic.      Mouth/Throat:      Mouth: Mucous membranes are moist.      Pharynx: Oropharynx is clear.   Eyes:      Conjunctiva/sclera: Conjunctivae normal.      Pupils: Pupils are equal, round, and reactive to light.   Neck:      Musculoskeletal: Normal range of motion and neck supple.   Cardiovascular:      Rate and Rhythm: Rhythm irregular.      Heart sounds: Normal heart sounds.   Pulmonary:      Effort: Pulmonary effort is normal.      Breath sounds: Normal breath sounds.   Abdominal:      General: Bowel sounds are normal. There is no distension.      Palpations: Abdomen is soft.      Tenderness: There is no abdominal tenderness.   Musculoskeletal: Normal range of motion.         General: No swelling or tenderness.   Skin:     General: Skin is warm and dry.      Capillary Refill: Capillary refill takes less than 2 seconds.   Neurological:      General: No focal deficit present.      Mental Status: She is oriented to person, place, and time.   Psychiatric:         Mood and Affect: Mood normal.         Behavior: Behavior normal.         Procedures           ED Course  ED Course as of Feb 14 0511   Sun Feb 14, 2021   0325 EKG interpretation: Atrial fibrillation, nonspecific ST depression T wave inversion, seen similarly on tracing done on 2/9/2020, rate 67    [JR]      ED Course User Index  [JR] Francis Reyes MD                                           MDM  Number of Diagnoses or Management Options  Acute congestive heart failure, unspecified heart failure type (CMS/HCC):   Acute hypoxemic respiratory failure (CMS/HCC):   Diagnosis management comments: Results for orders placed or performed during the hospital encounter of 02/14/21  -Respiratory Panel PCR w/COVID-19(SARS-CoV-2) ANDREI/DARLENE/TAO/PAD/COR/MAD/ANAMIKA In-House, NP Swab in UTM/VTM, 3-4 HR TAT - Swab, Nasopharynx  Specimen: Nasopharynx; Swab       Result                       Value             Ref Range           ADENOVIRUS, PCR             Not Detected      Not Detected        Coronavirus 229E            Not Detected      Not Detected        Coronavirus HKU1            Not Detected      Not Detected        Coronavirus NL63            Not Detected      Not Detected        Coronavirus OC43            Not Detected      Not Detected        COVID19                     Not Detected      Not Detected*       Human Metapneumovirus       Not Detected      Not Detected        Human Rhinovirus/Enter*     Not Detected      Not Detected        Influenza A PCR             Not Detected      Not Detected        Influenza B PCR             Not Detected      Not Detected        Parainfluenza Virus 1       Not Detected      Not Detected        Parainfluenza Virus 2       Not Detected      Not Detected        Parainfluenza Virus 3       Not Detected      Not Detected        Parainfluenza Virus 4       Not Detected      Not Detected        RSV, PCR                    Not Detected      Not Detected        Bordetella pertussis p*     Not Detected      Not Detected        Bordetella parapertuss*     Not Detected      Not Detected        Chlamydophila pneumoni*     Not Detected      Not Detected        Mycoplasma pneumo by P*     Not Detected      Not Detected   -Protime-INR  Specimen: Arm, Left; Blood       Result                      Value             Ref Range           Protime                     29.1 (H)          19.4 - 28.5 *       INR                         2.78              2.00 - 3.00    -Comprehensive Metabolic Panel  Specimen: Arm, Left; Blood       Result                      Value             Ref Range           Glucose                     103 (H)           65 - 99 mg/dL       BUN                         26 (H)            8 - 23 mg/dL        Creatinine                  0.93              0.57 - 1.00 *       Sodium                      138               136 - 145 mm*       Potassium                    4.0               3.5 - 5.2 mm*       Chloride                    102               98 - 107 mmo*       CO2                         26.0              22.0 - 29.0 *       Calcium                     9.7               8.6 - 10.5 m*       Total Protein               8.2               6.0 - 8.5 g/*       Albumin                     4.30              3.50 - 5.20 *       ALT (SGPT)                  16                1 - 33 U/L          AST (SGOT)                  17                1 - 32 U/L          Alkaline Phosphatase        91                39 - 117 U/L        Total Bilirubin             0.3               0.0 - 1.2 mg*       eGFR Non  Amer       57 (L)            >60 mL/min/1*       Globulin                    3.9               gm/dL               A/G Ratio                   1.1               g/dL                BUN/Creatinine Ratio        28.0 (H)          7.0 - 25.0          Anion Gap                   10.0              5.0 - 15.0 m*  -aPTT  Specimen: Arm, Left; Blood       Result                      Value             Ref Range           PTT                         35.4 (H)          24.0 - 31.0 *  -Troponin  Specimen: Arm, Left; Blood       Result                      Value             Ref Range           Troponin T                  <0.010            0.000 - 0.03*  -BNP  Specimen: Arm, Left; Blood       Result                      Value             Ref Range           proBNP                      2,901.0 (H)       0.0-1,800.0 *  -Procalcitonin  Specimen: Arm, Left; Blood       Result                      Value             Ref Range           Procalcitonin               0.05              0.00 - 0.25 *  -Digoxin Level  Specimen: Arm, Left; Blood       Result                      Value             Ref Range           Digoxin                     1.20              0.60 - 1.20 *  -CBC Auto Differential  Specimen: Arm, Left; Blood       Result                      Value             Ref Range           WBC                          11.90 (H)         3.40 - 10.80*       RBC                         4.03              3.77 - 5.28 *       Hemoglobin                  11.7 (L)          12.0 - 15.9 *       Hematocrit                  35.1              34.0 - 46.6 %       MCV                         87.1              79.0 - 97.0 *       MCH                         29.1              26.6 - 33.0 *       MCHC                        33.4              31.5 - 35.7 *       RDW                         16.7 (H)          12.3 - 15.4 %       RDW-SD                      51.2              37.0 - 54.0 *       MPV                         6.4               6.0 - 12.0 fL       Platelets                   335               140 - 450 10*       Neutrophil %                79.7 (H)          42.7 - 76.0 %       Lymphocyte %                11.4 (L)          19.6 - 45.3 %       Monocyte %                  6.6               5.0 - 12.0 %        Eosinophil %                1.7               0.3 - 6.2 %         Basophil %                  0.6               0.0 - 1.5 %         Neutrophils, Absolute       9.50 (H)          1.70 - 7.00 *       Lymphocytes, Absolute       1.40              0.70 - 3.10 *       Monocytes, Absolute         0.80              0.10 - 0.90 *       Eosinophils, Absolute       0.20              0.00 - 0.40 *       Basophils, Absolute         0.10              0.00 - 0.20 *       nRBC                        0.0               0.0 - 0.2 /1*  -ECG 12 Lead       Result                      Value             Ref Range           QT Interval                 384               ms             -Gold Top - SST       Result                      Value             Ref Range           Extra Tube                                                    Hold for add-ons.    cxr looks like chf in my opinion, will give lasix, doing well with supplemental oxygen.  Discussed with Dr Schaffer, accepts for Dr Daniels.       Amount and/or Complexity of Data Reviewed  Clinical lab  tests: ordered and reviewed  Tests in the radiology section of CPT®: ordered and reviewed  Decide to obtain previous medical records or to obtain history from someone other than the patient: yes  Independent visualization of images, tracings, or specimens: yes        Final diagnoses:   Acute hypoxemic respiratory failure (CMS/HCC)   Acute congestive heart failure, unspecified heart failure type (CMS/HCC)            Francis Reyes MD  02/14/21 0511

## 2021-02-14 NOTE — H&P
Patient Care Team:  Yudi Daniels MD as PCP - General (Family Medicine)  Mignon Luke MD as Consulting Physician (Cardiology)    Chief complaint shortness of breath    Subjective     Patient is a 88 y.o. female with a history of atrial fibrillation who presented ER with sudden onset of substernal chest pressure and shortness of breath.  Shortness of breath was relieved with oxygen and diuresis with IV Lasix.  She feels quite a bit better this morning and denies any current chest discomfort.  She has no cough or other upper respiratory symptoms.  She denies any leg edema, PND or orthopnea.  She is followed regularly as an outpatient by Dr. Luke and has a pacemaker.     Last echocardiogram in January 2019 showed moderate mitral regurgitation preserved ejection fraction of 67% and left atrial enlargement.    Review of Systems       History  Past Medical History:   Diagnosis Date   • Atrial fibrillation (CMS/HCC)    • Chronic kidney disease    • Hypertension    • Melanoma (CMS/HCC)    • MI (myocardial infarction) (CMS/HCC)      Past Surgical History:   Procedure Laterality Date   • CARDIAC CATHETERIZATION     • CARDIAC ELECTROPHYSIOLOGY PROCEDURE N/A 10/9/2020    Procedure: PPM generator change - dual;  Surgeon: Mignon Luke MD;  Location: [facility] INVASIVE LOCATION;  Service: Cardiovascular;  Laterality: N/A;   • CAROTID ENDARTERECTOMY     • CERVICAL SPINE SURGERY     • INSERT / REPLACE / REMOVE PACEMAKER     • SCALP LESION REMOVAL W/ FLAP AND SKIN GRAFT     • TOTAL HIP ARTHROPLASTY Left      History reviewed. No pertinent family history.  Social History     Tobacco Use   • Smoking status: Never Smoker   • Smokeless tobacco: Never Used   Substance Use Topics   • Alcohol use: Never     Frequency: Never   • Drug use: Never     Medications Prior to Admission   Medication Sig Dispense Refill Last Dose   • amLODIPine (NORVASC) 5 MG tablet Take 5 mg by mouth Daily.   2/13/2021 at Unknown time   • aspirin  81 MG EC tablet Take 81 mg by mouth Daily.   2/13/2021 at Unknown time   • digoxin (LANOXIN) 125 MCG tablet Take 125 mcg by mouth Daily.   2/13/2021 at Unknown time   • donepezil (ARICEPT) 5 MG tablet Take 5 mg by mouth Every Night.   2/13/2021 at Unknown time   • lisinopril (PRINIVIL,ZESTRIL) 5 MG tablet Take 5 mg by mouth Daily.   2/13/2021 at Unknown time   • metoprolol tartrate (LOPRESSOR) 25 MG tablet Take 1 tablet by mouth 2 (Two) Times a Day. 180 tablet 1 2/13/2021 at Unknown time   • traMADol (ULTRAM) 50 MG tablet Take 50 mg by mouth Every 8 (Eight) Hours As Needed for Moderate Pain .      • warfarin (COUMADIN) 2.5 MG tablet Take 2 tablets by mouth on Sunday and 1 tablet by mouth the other 6 days a week. (Patient taking differently: 2.5 mg Every Night.) 100 tablet 0 2/13/2021 at Unknown time     Allergies:  Codeine, Hydrocodone-acetaminophen, Meperidine, and Morphine    Objective     Vital Signs  Temp:  [97.4 °F (36.3 °C)] 97.4 °F (36.3 °C)  Heart Rate:  [62-76] 65  Resp:  [18-22] 18  BP: (102-171)/(50-85) 131/60     Physical Exam:      General Appearance:    Alert, cooperative, in no acute distress, alopecia   Head:    Normocephalic, without obvious abnormality, atraumatic   Eyes:            Lids and lashes normal, conjunctivae and sclerae normal, no   icterus, no pallor, corneas clear, PERRLA   Ears:    Ears appear intact with no abnormalities noted   Throat:   No oral lesions, no thrush, oral mucosa moist   Neck:   No adenopathy, supple, trachea midline, no thyromegaly, no   carotid bruit, no JVD   Lungs:     Clear to auscultation,respirations regular, even and                  unlabored    Heart:   Irregularly irregular   Chest Wall:    No abnormalities observed   Abdomen:     Normal bowel sounds, no masses, no organomegaly, soft        non-tender, non-distended, no guarding, no rebound                tenderness   Extremities:  Trace bilateral lower extremity edema   Pulses:   Pulses palpable and equal  bilaterally   Skin:   No bleeding, bruising or rash   Lymph nodes:   No palpable adenopathy   Neurologic:   Cranial nerves 2 - 12 grossly intact, sensation intact, DTR       present and equal bilaterally       Results Review:     Imaging Results (Last 24 Hours)     Procedure Component Value Units Date/Time    XR Chest 1 View [[id number]] Collected: 02/14/21 0830     Updated: 02/14/21 0834    Narrative:      DATE OF EXAM:  2/14/2021 3:55 AM     PROCEDURE:  XR CHEST 1 VW-     INDICATIONS:  Shortness of breath, diabetes mellitus.      COMPARISON:  04/10/2020.     TECHNIQUE:   Single radiographic view of the chest was obtained.     FINDINGS:  The heart is enlarged. There is a left-sided transvenous pacemaker in  place. There is no interval change in the mixed interstitial/airspace  disease in the right midlung zone and right lung base. The patient  probably has a trace right pleural effusion. There is continued dense  consolidation in the left mid and lower lung zones. There is bilateral  apical pleural thickening, left greater than right side.  There is  partially imaged fusion hardware in the upper lumbar spine. There are  chronic age-related changes involving the bony thorax and thoracic  aorta.       Impression:      Chronic abnormalities in the chest. This could be related to either  chronic pulmonary edema, chronic pneumonia, or an atypical inflammatory  process. Chronic pleural effusions are also suspected.     Electronically Signed By-Yayo Wang MD On:2/14/2021 8:32 AM  This report was finalized on [id number] by  Yayo Wang MD.           Lab Results (last 24 hours)     Procedure Component Value Units Date/Time    Troponin [[id number]]  (Normal) Collected: 02/14/21 1112    Specimen: Blood Updated: 02/14/21 1140     Troponin T <0.010 ng/mL     Narrative:      Troponin T Reference Range:  <= 0.03 ng/mL-   Negative for AMI  >0.03 ng/mL-     Abnormal for myocardial necrosis.  Clinicians would have to utilize  clinical acumen, EKG, Troponin and serial changes to determine if it is an Acute Myocardial Infarction or myocardial injury due to an underlying chronic condition.       Results may be falsely decreased if patient taking Biotin.      Respiratory Panel PCR w/COVID-19(SARS-CoV-2) ANDREI/DARLENE/TAO/PAD/COR/MAD/ANAMIKA In-House, NP Swab in UTM/VTM, 3-4 HR TAT - Swab, Nasopharynx [[id number]]  (Normal) Collected: 02/14/21 0357    Specimen: Swab from Nasopharynx Updated: 02/14/21 0503     ADENOVIRUS, PCR Not Detected     Coronavirus 229E Not Detected     Coronavirus HKU1 Not Detected     Coronavirus NL63 Not Detected     Coronavirus OC43 Not Detected     COVID19 Not Detected     Human Metapneumovirus Not Detected     Human Rhinovirus/Enterovirus Not Detected     Influenza A PCR Not Detected     Influenza B PCR Not Detected     Parainfluenza Virus 1 Not Detected     Parainfluenza Virus 2 Not Detected     Parainfluenza Virus 3 Not Detected     Parainfluenza Virus 4 Not Detected     RSV, PCR Not Detected     Bordetella pertussis pcr Not Detected     Bordetella parapertussis PCR Not Detected     Chlamydophila pneumoniae PCR Not Detected     Mycoplasma pneumo by PCR Not Detected    Narrative:      Fact sheet for providers: https://docs.[employer]/wp-content/uploads/LHE3832-8060-PA5.1-EUA-Provider-Fact-Sheet-3.pdf    Fact sheet for patients: https://docs.[employer]/wp-content/uploads/PJM2830-2151-CP4.1-EUA-Patient-Fact-Sheet-1.pdf    Test performed by PCR.    Extra Tubes [[id number]] Collected: 02/14/21 0338    Specimen: Blood from Arm, Left Updated: 02/14/21 0446    Narrative:      The following orders were created for panel order Extra Tubes.  Procedure                               Abnormality         Status                     ---------                               -----------         ------                     Gold Top - [facility][[id number]]                                   Final result                 Please view results for these  "tests on the individual orders.    Gold Top - SST [[id number]] Collected: 02/14/21 0338    Specimen: Blood from Arm, Left Updated: 02/14/21 0446     Extra Tube Hold for add-ons.     Comment: Auto resulted.       Protime-INR [[id number]]  (Abnormal) Collected: 02/14/21 0338    Specimen: Blood from Arm, Left Updated: 02/14/21 0439     Protime 29.1 Seconds      INR 2.78    aPTT [[id number]]  (Abnormal) Collected: 02/14/21 0338    Specimen: Blood from Arm, Left Updated: 02/14/21 0439     PTT 35.4 seconds     Digoxin Level [[id number]]  (Normal) Collected: 02/14/21 0338    Specimen: Blood from Arm, Left Updated: 02/14/21 0431     Digoxin 1.20 ng/mL     Procalcitonin [[id number]]  (Normal) Collected: 02/14/21 0338    Specimen: Blood from Arm, Left Updated: 02/14/21 0429     Procalcitonin 0.05 ng/mL     Narrative:      As a Marker for Sepsis (Non-Neonates):   1. <0.5 ng/mL represents a low risk of severe sepsis and/or septic shock.  1. >2 ng/mL represents a high risk of severe sepsis and/or septic shock.    As a Marker for Lower Respiratory Tract Infections that require antibiotic therapy:  PCT on Admission     Antibiotic Therapy             6-12 Hrs later  > 0.5                Strongly Recommended            >0.25 - <0.5         Recommended  0.1 - 0.25           Discouraged                   Remeasure/reassess PCT  <0.1                 Strongly Discouraged          Remeasure/reassess PCT      As 28 day mortality risk marker: \"Change in Procalcitonin Result\" (> 80 % or <=80 %) if Day 0 (or Day 1) and Day 4 values are available. Refer to http://www.[employer]-pct-calculator.com/   Change in PCT <=80 %   A decrease of PCT levels below or equal to 80 % defines a positive change in PCT test result representing a higher risk for 28-day all-cause mortality of patients diagnosed with severe sepsis or septic shock.  Change in PCT > 80 %   A decrease of PCT levels of more than 80 % defines a negative change in PCT result representing a " lower risk for 28-day all-cause mortality of patients diagnosed with severe sepsis or septic shock.                Results may be falsely decreased if patient taking Biotin.     Comprehensive Metabolic Panel [[id number]]  (Abnormal) Collected: 02/14/21 0338    Specimen: Blood from Arm, Left Updated: 02/14/21 0429     Glucose 103 mg/dL      BUN 26 mg/dL      Creatinine 0.93 mg/dL      Sodium 138 mmol/L      Potassium 4.0 mmol/L      Chloride 102 mmol/L      CO2 26.0 mmol/L      Calcium 9.7 mg/dL      Total Protein 8.2 g/dL      Albumin 4.30 g/dL      ALT (SGPT) 16 U/L      AST (SGOT) 17 U/L      Alkaline Phosphatase 91 U/L      Total Bilirubin 0.3 mg/dL      eGFR Non African Amer 57 mL/min/1.73      Globulin 3.9 gm/dL      A/G Ratio 1.1 g/dL      BUN/Creatinine Ratio 28.0     Anion Gap 10.0 mmol/L     Narrative:      GFR Normal >60  Chronic Kidney Disease <60  Kidney Failure <15      Troponin [[id number]]  (Normal) Collected: 02/14/21 0338    Specimen: Blood from Arm, Left Updated: 02/14/21 0429     Troponin T <0.010 ng/mL     Narrative:      Troponin T Reference Range:  <= 0.03 ng/mL-   Negative for AMI  >0.03 ng/mL-     Abnormal for myocardial necrosis.  Clinicians would have to utilize clinical acumen, EKG, Troponin and serial changes to determine if it is an Acute Myocardial Infarction or myocardial injury due to an underlying chronic condition.       Results may be falsely decreased if patient taking Biotin.      BNP [[id number]]  (Abnormal) Collected: 02/14/21 0338    Specimen: Blood from Arm, Left Updated: 02/14/21 0422     proBNP 2,901.0 pg/mL     Narrative:      Among patients with dyspnea, NT-proBNP is highly sensitive for the detection of acute congestive heart failure. In addition NT-proBNP of <300 pg/ml effectively rules out acute congestive heart failure with 99% negative predictive value.    Results may be falsely decreased if patient taking Biotin.      CBC & Differential [[id number]]  (Abnormal)  Collected: 02/14/21 0338    Specimen: Blood from Arm, Left Updated: 02/14/21 0359    Narrative:      The following orders were created for panel order CBC & Differential.  Procedure                               Abnormality         Status                     ---------                               -----------         ------                     CBC Auto Differential[[id number]]        Abnormal            Final result                 Please view results for these tests on the individual orders.    CBC Auto Differential [[id number]]  (Abnormal) Collected: 02/14/21 0338    Specimen: Blood from Arm, Left Updated: 02/14/21 0359     WBC 11.90 10*3/mm3      RBC 4.03 10*6/mm3      Hemoglobin 11.7 g/dL      Hematocrit 35.1 %      MCV 87.1 fL      MCH 29.1 pg      MCHC 33.4 g/dL      RDW 16.7 %      RDW-SD 51.2 fl      MPV 6.4 fL      Platelets 335 10*3/mm3      Neutrophil % 79.7 %      Lymphocyte % 11.4 %      Monocyte % 6.6 %      Eosinophil % 1.7 %      Basophil % 0.6 %      Neutrophils, Absolute 9.50 10*3/mm3      Lymphocytes, Absolute 1.40 10*3/mm3      Monocytes, Absolute 0.80 10*3/mm3      Eosinophils, Absolute 0.20 10*3/mm3      Basophils, Absolute 0.10 10*3/mm3      nRBC 0.0 /100 WBC     Blood Culture - Blood, Arm, Right [[id number]] Collected: 02/14/21 0350    Specimen: Blood from Arm, Right Updated: 02/14/21 0353    Blood Culture - Blood, Arm, Left [[id number]] Collected: 02/14/21 0338    Specimen: Blood from Arm, Left Updated: 02/14/21 0353           I reviewed the patient's new clinical results.    Assessment/Plan       Acute hypoxemic respiratory failure (CMS/HCC)  Acute exacerbation of congestive heart failure -improved with single dose Lasix   chronic atrial fibrillation -rate is controlled with digoxin, metoprolol, warfarin  Moderate mitral regurgitation -repeat echo is pending  Chest pain -concerning for angina; serial troponins have been ordered ; Dr. Luke has been consulted to consider further ischemic work-up  if appropriate  Essential hypertension -continue home medications        I discussed the patients findings and my recommendations with patient.     Margarita Schaffer MD  02/14/21  12:29 EST

## 2021-02-15 ENCOUNTER — APPOINTMENT (OUTPATIENT)
Dept: NUCLEAR MEDICINE | Facility: HOSPITAL | Age: 86
End: 2021-02-15

## 2021-02-15 LAB
ANION GAP SERPL CALCULATED.3IONS-SCNC: 9 MMOL/L (ref 5–15)
BASOPHILS # BLD AUTO: 0.1 10*3/MM3 (ref 0–0.2)
BASOPHILS NFR BLD AUTO: 0.7 % (ref 0–1.5)
BH CV STRESS BP STAGE 1: NORMAL
BH CV STRESS BP STAGE 2: NORMAL
BH CV STRESS BP STAGE 3: NORMAL
BH CV STRESS COMMENTS STAGE 1: NORMAL
BH CV STRESS COMMENTS STAGE 2: NORMAL
BH CV STRESS DOSE REGADENOSON STAGE 1: 0.4
BH CV STRESS DURATION MIN STAGE 1: 0
BH CV STRESS DURATION MIN STAGE 2: 4
BH CV STRESS DURATION SEC STAGE 1: 10
BH CV STRESS DURATION SEC STAGE 2: 0
BH CV STRESS HR STAGE 1: 60
BH CV STRESS HR STAGE 2: 71
BH CV STRESS HR STAGE 3: 72
BH CV STRESS PROTOCOL 1: NORMAL
BH CV STRESS RECOVERY BP: NORMAL MMHG
BH CV STRESS RECOVERY HR: 69 BPM
BH CV STRESS STAGE 1: 1
BH CV STRESS STAGE 2: 2
BH CV STRESS STAGE 3: 3
BUN SERPL-MCNC: 27 MG/DL (ref 8–23)
BUN/CREAT SERPL: 28.1 (ref 7–25)
CALCIUM SPEC-SCNC: 9.1 MG/DL (ref 8.6–10.5)
CHLORIDE SERPL-SCNC: 103 MMOL/L (ref 98–107)
CO2 SERPL-SCNC: 27 MMOL/L (ref 22–29)
CREAT SERPL-MCNC: 0.96 MG/DL (ref 0.57–1)
DEPRECATED RDW RBC AUTO: 49.9 FL (ref 37–54)
EOSINOPHIL # BLD AUTO: 0.2 10*3/MM3 (ref 0–0.4)
EOSINOPHIL NFR BLD AUTO: 2.5 % (ref 0.3–6.2)
ERYTHROCYTE [DISTWIDTH] IN BLOOD BY AUTOMATED COUNT: 16.5 % (ref 12.3–15.4)
GFR SERPL CREATININE-BSD FRML MDRD: 55 ML/MIN/1.73
GLUCOSE SERPL-MCNC: 92 MG/DL (ref 65–99)
HCT VFR BLD AUTO: 31.1 % (ref 34–46.6)
HGB BLD-MCNC: 10.5 G/DL (ref 12–15.9)
INR PPP: 2.36 (ref 2–3)
LYMPHOCYTES # BLD AUTO: 1.2 10*3/MM3 (ref 0.7–3.1)
LYMPHOCYTES NFR BLD AUTO: 16 % (ref 19.6–45.3)
MAGNESIUM SERPL-MCNC: 2 MG/DL (ref 1.6–2.4)
MAXIMAL PREDICTED HEART RATE: 132 BPM
MCH RBC QN AUTO: 29.3 PG (ref 26.6–33)
MCHC RBC AUTO-ENTMCNC: 33.7 G/DL (ref 31.5–35.7)
MCV RBC AUTO: 87 FL (ref 79–97)
MONOCYTES # BLD AUTO: 0.7 10*3/MM3 (ref 0.1–0.9)
MONOCYTES NFR BLD AUTO: 8.8 % (ref 5–12)
NEUTROPHILS NFR BLD AUTO: 5.5 10*3/MM3 (ref 1.7–7)
NEUTROPHILS NFR BLD AUTO: 72 % (ref 42.7–76)
NRBC BLD AUTO-RTO: 0 /100 WBC (ref 0–0.2)
NT-PROBNP SERPL-MCNC: 2709 PG/ML (ref 0–1800)
PERCENT MAX PREDICTED HR: 60.61 %
PLATELET # BLD AUTO: 259 10*3/MM3 (ref 140–450)
PMV BLD AUTO: 6.3 FL (ref 6–12)
POTASSIUM SERPL-SCNC: 4.2 MMOL/L (ref 3.5–5.2)
PROTHROMBIN TIME: 24.9 SECONDS (ref 19.4–28.5)
RBC # BLD AUTO: 3.57 10*6/MM3 (ref 3.77–5.28)
SODIUM SERPL-SCNC: 139 MMOL/L (ref 136–145)
STRESS BASELINE BP: NORMAL MMHG
STRESS BASELINE HR: 60 BPM
STRESS PERCENT HR: 71 %
STRESS POST PEAK BP: NORMAL MMHG
STRESS POST PEAK HR: 80 BPM
STRESS TARGET HR: 112 BPM
TSH SERPL DL<=0.05 MIU/L-ACNC: 1.72 UIU/ML (ref 0.27–4.2)
WBC # BLD AUTO: 7.7 10*3/MM3 (ref 3.4–10.8)

## 2021-02-15 PROCEDURE — G0378 HOSPITAL OBSERVATION PER HR: HCPCS

## 2021-02-15 PROCEDURE — 85610 PROTHROMBIN TIME: CPT | Performed by: INTERNAL MEDICINE

## 2021-02-15 PROCEDURE — 78452 HT MUSCLE IMAGE SPECT MULT: CPT

## 2021-02-15 PROCEDURE — 0 TECHNETIUM SESTAMIBI: Performed by: FAMILY MEDICINE

## 2021-02-15 PROCEDURE — 93018 CV STRESS TEST I&R ONLY: CPT | Performed by: INTERNAL MEDICINE

## 2021-02-15 PROCEDURE — 83880 ASSAY OF NATRIURETIC PEPTIDE: CPT | Performed by: INTERNAL MEDICINE

## 2021-02-15 PROCEDURE — 25010000002 REGADENOSON 0.4 MG/5ML SOLUTION: Performed by: FAMILY MEDICINE

## 2021-02-15 PROCEDURE — 80048 BASIC METABOLIC PNL TOTAL CA: CPT | Performed by: INTERNAL MEDICINE

## 2021-02-15 PROCEDURE — 84443 ASSAY THYROID STIM HORMONE: CPT | Performed by: INTERNAL MEDICINE

## 2021-02-15 PROCEDURE — A9500 TC99M SESTAMIBI: HCPCS | Performed by: FAMILY MEDICINE

## 2021-02-15 PROCEDURE — 85025 COMPLETE CBC W/AUTO DIFF WBC: CPT | Performed by: INTERNAL MEDICINE

## 2021-02-15 PROCEDURE — 93017 CV STRESS TEST TRACING ONLY: CPT

## 2021-02-15 PROCEDURE — 83735 ASSAY OF MAGNESIUM: CPT | Performed by: INTERNAL MEDICINE

## 2021-02-15 PROCEDURE — 78452 HT MUSCLE IMAGE SPECT MULT: CPT | Performed by: INTERNAL MEDICINE

## 2021-02-15 PROCEDURE — 94762 N-INVAS EAR/PLS OXIMTRY CONT: CPT

## 2021-02-15 PROCEDURE — 93016 CV STRESS TEST SUPVJ ONLY: CPT | Performed by: INTERNAL MEDICINE

## 2021-02-15 PROCEDURE — 99214 OFFICE O/P EST MOD 30 MIN: CPT | Performed by: INTERNAL MEDICINE

## 2021-02-15 RX ORDER — HYDROCHLOROTHIAZIDE 12.5 MG/1
12.5 TABLET ORAL DAILY
Status: DISCONTINUED | OUTPATIENT
Start: 2021-02-15 | End: 2021-02-16

## 2021-02-15 RX ADMIN — ACETAMINOPHEN 650 MG: 325 TABLET, FILM COATED ORAL at 11:00

## 2021-02-15 RX ADMIN — LISINOPRIL 5 MG: 5 TABLET ORAL at 08:59

## 2021-02-15 RX ADMIN — HYDROCHLOROTHIAZIDE 12.5 MG: 12.5 TABLET ORAL at 21:05

## 2021-02-15 RX ADMIN — DIGOXIN 125 MCG: 125 TABLET ORAL at 11:00

## 2021-02-15 RX ADMIN — TECHNETIUM TC 99M SESTAMIBI 1 DOSE: 1 INJECTION INTRAVENOUS at 08:45

## 2021-02-15 RX ADMIN — DONEPEZIL HYDROCHLORIDE 5 MG: 5 TABLET, FILM COATED ORAL at 21:05

## 2021-02-15 RX ADMIN — AMLODIPINE BESYLATE 5 MG: 5 TABLET ORAL at 08:59

## 2021-02-15 RX ADMIN — REGADENOSON 0.4 MG: 0.08 INJECTION, SOLUTION INTRAVENOUS at 10:10

## 2021-02-15 RX ADMIN — METOPROLOL TARTRATE 25 MG: 25 TABLET, FILM COATED ORAL at 21:05

## 2021-02-15 RX ADMIN — ASPIRIN 81 MG: 81 TABLET, COATED ORAL at 08:59

## 2021-02-15 RX ADMIN — METOPROLOL TARTRATE 25 MG: 25 TABLET, FILM COATED ORAL at 09:00

## 2021-02-15 RX ADMIN — TECHNETIUM TC 99M SESTAMIBI 1 DOSE: 1 INJECTION INTRAVENOUS at 10:10

## 2021-02-15 RX ADMIN — Medication 3 ML: at 21:06

## 2021-02-15 NOTE — PROGRESS NOTES
Discharge Planning Assessment  DALJIT Horton     Patient Name: Alexandrea Gaxiola  MRN: [id number]  Today's Date: 2/15/2021    Admit Date: 2/14/2021    Discharge Needs Assessment     Row Name 02/15/21 1219       Living Environment    Lives With  child(adrienne), adult    Current Living Arrangements  home/apartment/condo    Primary Care Provided by  self    Provides Primary Care For  no one, unable/limited ability to care for self    Family Caregiver if Needed  child(adrienne), adult;other relative(s)    Quality of Family Relationships  helpful    Able to Return to Prior Arrangements  yes       Resource/Environmental Concerns    Resource/Environmental Concerns  none    Transportation Concerns  car, none       Transition Planning    Patient/Family Anticipates Transition to  home with help/services;home with family    Patient/Family Anticipated Services at Transition  home health care    Transportation Anticipated  family or friend will provide       Discharge Needs Assessment    Readmission Within the Last 30 Days  no previous admission in last 30 days    Current Outpatient/Agency/Support Group  other (see comments) caregiver 2x/week    Equipment Currently Used at Home  walker, rolling;rollator;commode;shower chair    Concerns to be Addressed  discharge planning    Anticipated Changes Related to Illness  none    Equipment Needed After Discharge  other (see comments) may need oxygen    Outpatient/Agency/Support Group Needs  homecare agency    Discharge Facility/Level of Care Needs  home with home health    Provided Post Acute Provider List?  Yes    Post Acute Provider List  Home Health    Delivered To  Patient;Support Person    Method of Delivery  In person    Current Discharge Risk  physical impairment        Discharge Plan     Row Name 02/15/21 1220       Plan    Plan  Anticipate routine home with family, referral to [facility], ordered and pending acceptance. May require walking oximetry prior to d/c.    Patient/Family in  Agreement with Plan  yes    Plan Comments  Met with patient and son in law at bedside, reports she lives at home with family and has someone available 24/7. Has a caregiver 2x/week but unsure of company name. Discussed home health and patient is agreeable, list discussed and choose [facility]. Referral to [facility] and pending acceptance. PCP and pharmacy confirmed. Son in law provides transportation. No issues affording food or medications. DC barriers: 2.5 L NC (new), myoview        Continued Care and Services - Admitted Since 2/14/2021     Home Medical Care     Service Provider Request Status Selected Services Address Phone Fax Patient Preferred    [facility]  Pending - Request Sent N/A [address] [place] [address] [phone] --              Demographic Summary     Row Name 02/15/21 1217       General Information    Admission Type  observation    Arrived From  emergency department    Required Notices Provided  Observation Status Notice    Referral Source  admission list    Reason for Consult  discharge planning    Preferred Language  English        Functional Status     Row Name 02/15/21 1219       Functional Status    Usual Activity Tolerance  moderate    Current Activity Tolerance  fair       Functional Status, IADL    Medications  assistive person    Meal Preparation  assistive person    Housekeeping  assistive person    Laundry  assistive person    Shopping  assistive person          Patient Forms     Row Name 02/15/21 1222       Patient Forms    Important Message from Medicare ([facility])  -- Foreman 2/14    Patient Observation Letter  Delivered Foreman 2/14        Met with patient in room wearing PPE: mask, goggles.     Maintained distance greater than six feet and spent less than 15 minutes in the room.            Karin Soto RN

## 2021-02-15 NOTE — DISCHARGE PLACEMENT REQUEST
"Ruddy Gaxiola (88 y.o. Female)     Date of Birth Social Security Number Address Home Phone MRN    11/12/1932  [address] [address] [phone] [id number]    Yarsani Marital Status          Confucianism        Admission Date Admission Type Admitting Provider Attending Provider Department, Room/Bed    2/14/21 Emergency Yudi Daniels MD Crowe, Tamera D, MD [facility] 2C MEDICAL INPATIENT, 248/1    Discharge Date Discharge Disposition Discharge Destination                       Attending Provider: Yuid Daniels MD    Allergies: Codeine, Hydrocodone-acetaminophen, Meperidine, Morphine    Isolation: None   Infection: None   Code Status: CPR    Ht: 149.9 cm (59\")   Wt: 46.7 kg (103 lb)    Admission Cmt: None   Principal Problem: None                Active Insurance as of 2/14/2021     Primary Coverage     Payor Plan Insurance Group Employer/Plan Group    MEDICARE MEDICARE A & B      Payor Plan Address Payor Plan Phone Number Payor Plan Fax Number Effective Dates    [address] [phone]  9/1/1992 - None Entered    [facility] [id number]       Subscriber Name Subscriber Birth Date Member ID       RUDDY GAXIOLA 11/12/1932 [id number]           Secondary Coverage     Payor Plan Insurance Group Employer/Plan Group    [facility] SUPP [id number]     Payor Plan Address Payor Plan Phone Number Payor Plan Fax Number Effective Dates    [address]   12/1/2016 - None Entered    [facility] [id number]       Subscriber Name Subscriber Birth Date Member ID       RUDDY GAXIOLA 11/12/1932 [id number]                 Emergency Contacts      (Rel.) Home Phone Work Phone Mobile Phone    Celso Rutledge (Relative) [phone] -- [phone]    AamirQi (Daughter) [phone] -- [phone]              "

## 2021-02-15 NOTE — PLAN OF CARE
Goal Outcome Evaluation:         Patient resting abed, no distress noted. Patient is currently NPO due to cardio work up possibility. Will continue to monitor.

## 2021-02-15 NOTE — PROGRESS NOTES
"Pharmacy dosing service  Anticoagulant  Warfarin     Subjective:    Alexandrea Gaxiola is a 88 y.o.female being continued on warfarin for atrial fibrillation (CHADS-VASc = 5).  PMH: CHF, HTN, pacemaker    INR Goal: 2 - 3  Home medication?: Yes, 2.5 mg daily (confirmed w/pt and outpt charting)  Bridge Therapy Present?:  No  Interacting Medications Evaluation (New/Present/Discontinued): n/a  Additional Contributing Factors: CHF      Assessment/Plan:    INR therapeutic. However, warfarin was DC'd by cardiologist. His progress note states holding in anticipation of possible cardiac catheterization if needed.    Pharmacy to dose consult has been DC'd, cardiologist agrees to re-consult pharmacy to dose if desired once appropriate to resume warfarin.  We will continue to follow peripherally.         Date 2/14 2/15          INR 2.78 2.36          Dose 2.5 ---              Objective:  [Ht: 149.9 cm (59\"); Wt: 46.7 kg (103 lb); BMI: Body mass index is 20.8 kg/m².]    Lab Results   Component Value Date    ALBUMIN 4.30 02/14/2021     Lab Results   Component Value Date    INR 2.36 02/15/2021    INR 2.78 02/14/2021    INR 1.50 (A) 02/01/2021    PROTIME 24.9 02/15/2021    PROTIME 29.1 (H) 02/14/2021    PROTIME 13.2 (L) 10/09/2020     Lab Results   Component Value Date    HGB 10.5 (L) 02/15/2021    HGB 11.7 (L) 02/14/2021    HGB 12.2 10/09/2020     Lab Results   Component Value Date    HCT 31.1 (L) 02/15/2021    HCT 35.1 02/14/2021    HCT 36.4 10/09/2020       Wagner SoniD, BCPS  02/15/21 09:22 EST   " see above

## 2021-02-15 NOTE — PROGRESS NOTES
LOS: 0 days   Patient Care Team:  Yudi Daniels MD as PCP - General (Family Medicine)  Mignon Luke MD as Consulting Physician (Cardiology)    Subjective     Interval History:    Patient Complaints: No further complaints of chest pain or SOA.     History taken from: patient    Review of Systems   Constitutional: Positive for activity change. Negative for appetite change, diaphoresis and fatigue.   HENT: Negative for facial swelling.    Eyes: Negative for visual disturbance.   Respiratory: Negative for cough, shortness of breath, wheezing and stridor.    Cardiovascular: Negative for chest pain and palpitations.   Gastrointestinal: Negative for abdominal pain.   Endocrine: Negative for polyuria.   Genitourinary: Negative for dysuria and enuresis.   Musculoskeletal: Negative for gait problem.   Skin: Negative for rash.   Neurological: Negative for weakness.   Psychiatric/Behavioral: Negative for confusion.           Objective     Vital Signs  Temp:  [97.7 °F (36.5 °C)-98.1 °F (36.7 °C)] 97.7 °F (36.5 °C)  Heart Rate:  [60-65] 60  Resp:  [16-18] 16  BP: (131-176)/(56-61) 151/61    Physical Exam:     General Appearance:    Alert, cooperative, in no acute distress,   Head:    Normocephalic, without obvious abnormality, atraumatic   Eyes:            Lids and lashes normal, conjunctivae and sclerae normal, no   icterus, no pallor, corneas clear, PERRLA   Ears:    Ears appear intact with no abnormalities noted   Throat:   No oral lesions, no thrush, oral mucosa moist   Neck:   No adenopathy, supple, trachea midline, no thyromegaly, no   carotid bruit, no JVD   Lungs:     Clear to auscultation,respirations regular, even and                  unlabored    Heart:    Regular rhythm and normal rate, normal S1 and S2, no            murmur, no gallop, no rub, no click   Chest Wall:    No abnormalities observed   Abdomen:     Normal bowel sounds, no masses, no organomegaly, soft        Non-tender non-distended, no  guarding,   Extremities:   Moves all extremities well, no edema, no cyanosis, no             Redness   Pulses:   Pulses palpable and equal bilaterally   Skin:   No bleeding, bruising or rash   Lymph nodes:   No palpable adenopathy   Neurologic:   Cranial nerves 2 - 12 grossly intact, sensation intact, DTR       present and equal bilaterally        Results Review:    Lab Results (last 24 hours)     Procedure Component Value Units Date/Time    BNP [[id number]]  (Abnormal) Collected: 02/15/21 0324    Specimen: Blood Updated: 02/15/21 0427     proBNP 2,709.0 pg/mL     Narrative:      Among patients with dyspnea, NT-proBNP is highly sensitive for the detection of acute congestive heart failure. In addition NT-proBNP of <300 pg/ml effectively rules out acute congestive heart failure with 99% negative predictive value.    Results may be falsely decreased if patient taking Biotin.      TSH [[id number]]  (Normal) Collected: 02/15/21 0324    Specimen: Blood Updated: 02/15/21 0427     TSH 1.720 uIU/mL     Basic Metabolic Panel [[id number]]  (Abnormal) Collected: 02/15/21 0324    Specimen: Blood Updated: 02/15/21 0421     Glucose 92 mg/dL      BUN 27 mg/dL      Creatinine 0.96 mg/dL      Sodium 139 mmol/L      Potassium 4.2 mmol/L      Chloride 103 mmol/L      CO2 27.0 mmol/L      Calcium 9.1 mg/dL      eGFR Non African Amer 55 mL/min/1.73      BUN/Creatinine Ratio 28.1     Anion Gap 9.0 mmol/L     Narrative:      GFR Normal >60  Chronic Kidney Disease <60  Kidney Failure <15      Magnesium [[id number]]  (Normal) Collected: 02/15/21 0324    Specimen: Blood Updated: 02/15/21 0421     Magnesium 2.0 mg/dL     Blood Culture - Blood, Arm, Right [[id number]] Collected: 02/14/21 0350    Specimen: Blood from Arm, Right Updated: 02/15/21 0401     Blood Culture No growth at 24 hours    Blood Culture - Blood, Arm, Left [[id number]] Collected: 02/14/21 0338    Specimen: Blood from Arm, Left Updated: 02/15/21 0401     Blood Culture No growth at  24 hours    Protime-INR [[id number]]  (Normal) Collected: 02/15/21 0324    Specimen: Blood Updated: 02/15/21 0352     Protime 24.9 Seconds      INR 2.36    CBC Auto Differential [[id number]]  (Abnormal) Collected: 02/15/21 0324    Specimen: Blood Updated: 02/15/21 0346     WBC 7.70 10*3/mm3      RBC 3.57 10*6/mm3      Hemoglobin 10.5 g/dL      Hematocrit 31.1 %      MCV 87.0 fL      MCH 29.3 pg      MCHC 33.7 g/dL      RDW 16.5 %      RDW-SD 49.9 fl      MPV 6.3 fL      Platelets 259 10*3/mm3      Neutrophil % 72.0 %      Lymphocyte % 16.0 %      Monocyte % 8.8 %      Eosinophil % 2.5 %      Basophil % 0.7 %      Neutrophils, Absolute 5.50 10*3/mm3      Lymphocytes, Absolute 1.20 10*3/mm3      Monocytes, Absolute 0.70 10*3/mm3      Eosinophils, Absolute 0.20 10*3/mm3      Basophils, Absolute 0.10 10*3/mm3      nRBC 0.0 /100 WBC     Troponin [[id number]]  (Normal) Collected: 02/14/21 1112    Specimen: Blood Updated: 02/14/21 1140     Troponin T <0.010 ng/mL     Narrative:      Troponin T Reference Range:  <= 0.03 ng/mL-   Negative for AMI  >0.03 ng/mL-     Abnormal for myocardial necrosis.  Clinicians would have to utilize clinical acumen, EKG, Troponin and serial changes to determine if it is an Acute Myocardial Infarction or myocardial injury due to an underlying chronic condition.       Results may be falsely decreased if patient taking Biotin.             Imaging Results (Last 24 Hours)     Procedure Component Value Units Date/Time    XR Chest 1 View [[id number]] Collected: 02/14/21 0830     Updated: 02/14/21 0834    Narrative:      DATE OF EXAM:  2/14/2021 3:55 AM     PROCEDURE:  XR CHEST 1 VW-     INDICATIONS:  Shortness of breath, diabetes mellitus.      COMPARISON:  04/10/2020.     TECHNIQUE:   Single radiographic view of the chest was obtained.     FINDINGS:  The heart is enlarged. There is a left-sided transvenous pacemaker in  place. There is no interval change in the mixed interstitial/airspace  disease in  the right midlung zone and right lung base. The patient  probably has a trace right pleural effusion. There is continued dense  consolidation in the left mid and lower lung zones. There is bilateral  apical pleural thickening, left greater than right side.  There is  partially imaged fusion hardware in the upper lumbar spine. There are  chronic age-related changes involving the bony thorax and thoracic  aorta.       Impression:      Chronic abnormalities in the chest. This could be related to either  chronic pulmonary edema, chronic pneumonia, or an atypical inflammatory  process. Chronic pleural effusions are also suspected.     Electronically Signed By-Yayo Wang MD On:2/14/2021 8:32 AM  This report was finalized on [id number] by  Yayo Wang MD.               I reviewed the patient's new clinical results.    Medication Review:   Scheduled Meds:amLODIPine, 5 mg, Oral, Daily  aspirin, 81 mg, Oral, Daily  digoxin, 125 mcg, Oral, Daily  donepezil, 5 mg, Oral, Nightly  lisinopril, 5 mg, Oral, Daily  metoprolol tartrate, 25 mg, Oral, BID  sodium chloride, 3 mL, Intravenous, Q12H  warfarin, 2.5 mg, Oral, Daily      Continuous Infusions:Pharmacy to dose warfarin,       PRN Meds:.•  acetaminophen  •  nitroglycerin  •  ondansetron  •  Pharmacy to dose warfarin  •  sodium chloride  •  traMADol     Assessment/Plan       Acute hypoxemic respiratory failure (CMS/HCC)  Acute pulmonary edema -resolved  Chest pain - concerning for angina - stress test today.  Further plans pending those results.  Chronic atrial fib - rate conrolled; pacemaker; anti-coagulated.         Plan for disposition:VAHE Schaffer MD  02/15/21  07:20 EST

## 2021-02-15 NOTE — PROGRESS NOTES
Referring Provider: Yudi Daniels MD    Reason for follow-up:  Chest pain  Status post pacemaker  History of coronary artery disease     Patient Care Team:  Yudi Daniels MD as PCP - General (Family Medicine)  Mignon Luke MD as Consulting Physician (Cardiology)    Subjective .      ROS    Patient is not having any shortness of breath, palpitations, dizziness or syncope.  Denies having any headache ,abdominal pain ,nausea, vomiting , diarrhea constipation, loss of weight or loss of appetite.  Denies having any excessive bruising ,hematuria or blood in the stool.    Review of all systems negative except as indicated    History  Past Medical History:   Diagnosis Date   • Atrial fibrillation (CMS/HCC)    • Chronic kidney disease    • Hypertension    • Melanoma (CMS/HCC)    • MI (myocardial infarction) (CMS/HCC)        Past Surgical History:   Procedure Laterality Date   • CARDIAC CATHETERIZATION     • CARDIAC ELECTROPHYSIOLOGY PROCEDURE N/A 10/9/2020    Procedure: PPM generator change - dual;  Surgeon: Mignon Luke MD;  Location: [facility] INVASIVE LOC[facility];  Service: Cardiovascular;  Laterality: N/A;   • CAROTID ENDARTERECTOMY     • CERVICAL SPINE SURGERY     • INSERT / REPLACE / REMOVE PACEMAKER     • SCALP LESION REMOVAL W/ FLAP AND SKIN GRAFT     • TOTAL HIP ARTHROPLASTY Left        History reviewed. No pertinent family history.    Social History     Tobacco Use   • Smoking status: Never Smoker   • Smokeless tobacco: Never Used   Substance Use Topics   • Alcohol use: Never     Frequency: Never   • Drug use: Never        Medications Prior to Admission   Medication Sig Dispense Refill Last Dose   • amLODIPine (NORVASC) 5 MG tablet Take 5 mg by mouth Daily.   2/13/2021 at Unknown time   • aspirin 81 MG EC tablet Take 81 mg by mouth Daily.   2/13/2021 at Unknown time   • digoxin (LANOXIN) 125 MCG tablet Take 125 mcg by mouth Daily.   2/13/2021 at Unknown time   • donepezil (ARICEPT) 5 MG tablet Take 5  "mg by mouth Every Night.   2/13/2021 at Unknown time   • lisinopril (PRINIVIL,ZESTRIL) 5 MG tablet Take 5 mg by mouth Daily.   2/13/2021 at Unknown time   • metoprolol tartrate (LOPRESSOR) 25 MG tablet Take 1 tablet by mouth 2 (Two) Times a Day. 180 tablet 1 2/13/2021 at Unknown time   • traMADol (ULTRAM) 50 MG tablet Take 50 mg by mouth Every 8 (Eight) Hours As Needed for Moderate Pain .      • warfarin (COUMADIN) 2.5 MG tablet Take 2 tablets by mouth on Sunday and 1 tablet by mouth the other 6 days a week. (Patient taking differently: 2.5 mg Every Night.) 100 tablet 0 2/13/2021 at Unknown time       Allergies  Codeine, Hydrocodone-acetaminophen, Meperidine, and Morphine    Scheduled Meds:amLODIPine, 5 mg, Oral, Daily  aspirin, 81 mg, Oral, Daily  digoxin, 125 mcg, Oral, Daily  donepezil, 5 mg, Oral, Nightly  lisinopril, 5 mg, Oral, Daily  metoprolol tartrate, 25 mg, Oral, BID  sodium chloride, 3 mL, Intravenous, Q12H  warfarin, 2.5 mg, Oral, Daily      Continuous Infusions:Pharmacy to dose warfarin,       PRN Meds:.•  acetaminophen  •  nitroglycerin  •  ondansetron  •  Pharmacy to dose warfarin  •  sodium chloride  •  traMADol    Objective     VITAL SIGNS  Vitals:    02/14/21 1338 02/14/21 1359 02/14/21 1954 02/15/21 0336   BP: 176/56 176/56 148/57 151/61   BP Location: Right arm  Right arm Right arm   Patient Position: Lying  Lying Lying   Pulse: 63  61 60   Resp: 18  18 16   Temp: 98.1 °F (36.7 °C)  97.9 °F (36.6 °C) 97.7 °F (36.5 °C)   TempSrc: Oral  Oral Oral   SpO2: 93%  92% 93%   Weight:  46.7 kg (103 lb)     Height:  149.9 cm (59\")         Flowsheet Rows      First Filed Value   Admission Height  149.9 cm (59\") Documented at 02/14/2021 0304   Admission Weight  46.7 kg (103 lb) Documented at 02/14/2021 0304            Intake/Output Summary (Last 24 hours) at 2/15/2021 0700  Last data filed at 2/15/2021 0610  Gross per 24 hour   Intake 120 ml   Output 350 ml   Net -230 ml        TELEMETRY: Atrial paced " ventricular sensed rhythm    Physical Exam:  The patient is alert, oriented and in no distress.  Vital signs as noted above.  Head and neck revealed no carotid bruits or jugular venous distention.  No thyromegaly or lymphadenopathy is present  Lungs clear.  No wheezing.  Breath sounds are normal bilaterally.  Heart normal first and second heart sounds.  No murmur. No precordial rub is present.  No gallop is present.  Abdomen soft and nontender.  No organomegaly is present.  Extremities with good peripheral pulses without any pedal edema.  Skin warm and dry.  His maker site looks normal.  Musculoskeletal system is grossly normal  CNS grossly normal      Results Review:   I reviewed the patient's new clinical results.  Lab Results (last 24 hours)     Procedure Component Value Units Date/Time    BNP [[id number]]  (Abnormal) Collected: 02/15/21 0324    Specimen: Blood Updated: 02/15/21 0427     proBNP 2,709.0 pg/mL     Narrative:      Among patients with dyspnea, NT-proBNP is highly sensitive for the detection of acute congestive heart failure. In addition NT-proBNP of <300 pg/ml effectively rules out acute congestive heart failure with 99% negative predictive value.    Results may be falsely decreased if patient taking Biotin.      TSH [[id number]]  (Normal) Collected: 02/15/21 0324    Specimen: Blood Updated: 02/15/21 0427     TSH 1.720 uIU/mL     Basic Metabolic Panel [[id number]]  (Abnormal) Collected: 02/15/21 0324    Specimen: Blood Updated: 02/15/21 0421     Glucose 92 mg/dL      BUN 27 mg/dL      Creatinine 0.96 mg/dL      Sodium 139 mmol/L      Potassium 4.2 mmol/L      Chloride 103 mmol/L      CO2 27.0 mmol/L      Calcium 9.1 mg/dL      eGFR Non African Amer 55 mL/min/1.73      BUN/Creatinine Ratio 28.1     Anion Gap 9.0 mmol/L     Narrative:      GFR Normal >60  Chronic Kidney Disease <60  Kidney Failure <15      Magnesium [[id number]]  (Normal) Collected: 02/15/21 0324    Specimen: Blood Updated: 02/15/21 0421        Magnesium 2.0 mg/dL     Blood Culture - Blood, Arm, Right [[id number]] Collected: 02/14/21 0350    Specimen: Blood from Arm, Right Updated: 02/15/21 0401     Blood Culture No growth at 24 hours    Blood Culture - Blood, Arm, Left [[id number]] Collected: 02/14/21 0338    Specimen: Blood from Arm, Left Updated: 02/15/21 0401     Blood Culture No growth at 24 hours    Protime-INR [[id number]]  (Normal) Collected: 02/15/21 0324    Specimen: Blood Updated: 02/15/21 0352     Protime 24.9 Seconds      INR 2.36    CBC Auto Differential [[id number]]  (Abnormal) Collected: 02/15/21 0324    Specimen: Blood Updated: 02/15/21 0346     WBC 7.70 10*3/mm3      RBC 3.57 10*6/mm3      Hemoglobin 10.5 g/dL      Hematocrit 31.1 %      MCV 87.0 fL      MCH 29.3 pg      MCHC 33.7 g/dL      RDW 16.5 %      RDW-SD 49.9 fl      MPV 6.3 fL      Platelets 259 10*3/mm3      Neutrophil % 72.0 %      Lymphocyte % 16.0 %      Monocyte % 8.8 %      Eosinophil % 2.5 %      Basophil % 0.7 %      Neutrophils, Absolute 5.50 10*3/mm3      Lymphocytes, Absolute 1.20 10*3/mm3      Monocytes, Absolute 0.70 10*3/mm3      Eosinophils, Absolute 0.20 10*3/mm3      Basophils, Absolute 0.10 10*3/mm3      nRBC 0.0 /100 WBC     Troponin [[id number]]  (Normal) Collected: 02/14/21 1112    Specimen: Blood Updated: 02/14/21 1140     Troponin T <0.010 ng/mL     Narrative:      Troponin T Reference Range:  <= 0.03 ng/mL-   Negative for AMI  >0.03 ng/mL-     Abnormal for myocardial necrosis.  Clinicians would have to utilize clinical acumen, EKG, Troponin and serial changes to determine if it is an Acute Myocardial Infarction or myocardial injury due to an underlying chronic condition.       Results may be falsely decreased if patient taking Biotin.            Imaging Results (Last 24 Hours)     Procedure Component Value Units Date/Time    XR Chest 1 View [[id number]] Collected: 02/14/21 0830     Updated: 02/14/21 0834    Narrative:      DATE OF EXAM:  2/14/2021 3:55 AM      PROCEDURE:  XR CHEST 1 VW-     INDICATIONS:  Shortness of breath, diabetes mellitus.      COMPARISON:  04/10/2020.     TECHNIQUE:   Single radiographic view of the chest was obtained.     FINDINGS:  The heart is enlarged. There is a left-sided transvenous pacemaker in  place. There is no interval change in the mixed interstitial/airspace  disease in the right midlung zone and right lung base. The patient  probably has a trace right pleural effusion. There is continued dense  consolidation in the left mid and lower lung zones. There is bilateral  apical pleural thickening, left greater than right side.  There is  partially imaged fusion hardware in the upper lumbar spine. There are  chronic age-related changes involving the bony thorax and thoracic  aorta.       Impression:      Chronic abnormalities in the chest. This could be related to either  chronic pulmonary edema, chronic pneumonia, or an atypical inflammatory  process. Chronic pleural effusions are also suspected.     Electronically Signed By-Yayo Wang MD On:2/14/2021 8:32 AM  This report was finalized on [id number] by  Yayo Wang MD.      LAB RESULTS (LAST 7 DAYS)    CBC  Results from last 7 days   Lab Units 02/15/21  0324 02/14/21  0338   WBC 10*3/mm3 7.70 11.90*   RBC 10*6/mm3 3.57* 4.03   HEMOGLOBIN g/dL 10.5* 11.7*   HEMATOCRIT % 31.1* 35.1   MCV fL 87.0 87.1   PLATELETS 10*3/mm3 259 335       BMP  Results from last 7 days   Lab Units 02/15/21  0324 02/14/21  0338   SODIUM mmol/L 139 138   POTASSIUM mmol/L 4.2 4.0   CHLORIDE mmol/L 103 102   CO2 mmol/L 27.0 26.0   BUN mg/dL 27* 26*   CREATININE mg/dL 0.96 0.93   GLUCOSE mg/dL 92 103*   MAGNESIUM mg/dL 2.0  --        CMP   Results from last 7 days   Lab Units 02/15/21  0324 02/14/21  0338   SODIUM mmol/L 139 138   POTASSIUM mmol/L 4.2 4.0   CHLORIDE mmol/L 103 102   CO2 mmol/L 27.0 26.0   BUN mg/dL 27* 26*   CREATININE mg/dL 0.96 0.93   GLUCOSE mg/dL 92 103*   ALBUMIN g/dL  --  4.30   BILIRUBIN  mg/dL  --  0.3   ALK PHOS U/L  --  91   AST (SGOT) U/L  --  17   ALT (SGPT) U/L  --  16         BNP        TROPONIN  Results from last 7 days   Lab Units 02/14/21  1112   TROPONIN T ng/mL <0.010       CoAg  Results from last 7 days   Lab Units 02/15/21  0324 02/14/21  0338   INR  2.36 2.78   APTT seconds  --  35.4*       Creatinine Clearance  Estimated Creatinine Clearance: 29.9 mL/min (by C-G formula based on SCr of 0.96 mg/dL).    ABG        Radiology  Xr Chest 1 View    Result Date: 2/14/2021  Chronic abnormalities in the chest. This could be related to either chronic pulmonary edema, chronic pneumonia, or an atypical inflammatory process. Chronic pleural effusions are also suspected.  Electronically Signed By-Yayo Wang MD On:2/14/2021 8:32 AM This report was finalized on [id number] by  Yayo Wang MD.              EKG          I personally viewed and interpreted the patient's EKG/Telemetry data: Atrial paced ventricular sensed rhythm nonspecific ST-T wave abnormalities    ECHOCARDIOGRAM:    Results for orders placed during the hospital encounter of 02/14/21   Adult Transthoracic Echo Complete w/ Color, Spectral and Contrast if Necessary Per Protocol    Narrative · Estimated right ventricular systolic pressure from tricuspid   regurgitation is mildly elevated (35-45 mmHg).  · Mild pulmonary hypertension is present.  · Left ventricular diastolic function is consistent with (grade III w/high   LAP) reversible restrictive pattern.  · Calculated left ventricular EF = 53% Estimated left ventricular EF was   in agreement with the calculated left ventricular EF.  · Left ventricular wall thickness is consistent with mild to moderate   concentric hypertrophy.  · Left atrial volume is moderately increased.     Normal LV systolic function EF 55%  Grade 2-3 diastolic dysfunction  Left atrial enlargement seen moderate by volume  Normal right atrium  Pacemaker lead seen in the right atrium and the right ventricle, not  well   seen  No gross mobile echodensities or vegetations seen  Mild MR no stenosis  Normal aortic valve trileaflet opens well  Mild TR, mildly elevated PA systolic pressure by gradient, no stenosis  Pulmonic valve not well seen  No masses or effusion seen           STRESS MYOVIEW:    Cardiolite (Tc-99m Sestamibi) stress test    CARDIAC CATHETERIZATION:            OTHER:         Assessment/Plan     Active Problems:    Acute hypoxemic respiratory failure (CMS/[facility])      ////////////////////////////  Impression  ===============  -Chest discomfort-possible angina pectoris.  Troponin levels are negative.  EKG showed atrial paced ventricular sensed rhythm.      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  Pacemaker was reprogrammed to DDDR due to long AV delays.  Status post dual-chamber pacemaker pulse generator replacement (Doormen. MRI compatible)-10/9/2020      -history of intermittent atrial fibrillation.  Patient is in Sinus rhythm.     -Anticoagulation-patient is on Coumadin.     -moderate mitral regurgitation.  Echocardiogram 01/28/2019 revealed moderate mitral regurgitation left atrial enlargement normal left ventricle function.      -  Status post  subendocardial myocardial infarction -improved.     Cardiac catheterization 12/29/2015 revealed mild anterior wall hypokinesis with ejection fraction of 50%, 40% mid LAD 70-80% ostial diagonal branch disease (small caliber vessel) and 80% circumflex distal to a large marginal branch.      -status post left carotid endarterectomy cervical spine surgery left hip replacement and recent scalp surgery for carcinoma and grafting.     -hypertension-not well controlled.      -allergy to codeine, morphine and Demerol.     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     ===============    Plan  ================  Chest discomfort-possible angina pectoris.  Troponin levels are negative.  EKG showed  atrial paced ventricular sensed rhythm.  Lexiscan Cardiolite test.  Patient may need cardiac catheterization and coronary arteriography depending on the results of the testing and patient's progress.    Status post pacemaker  Pacemaker site is normal.  Recent integration of the pacemaker revealed excellent pacing parameters.  Patient was in atrial fibrillation.     Anticoagulation status reviewed.  INR today 2.36.  Hold Coumadin in anticipation of possible cardiac catheterization if needed.    Paroxysmal atrial fibrillation  Patient has converted and staying in sinus rhythm.    Mitral regurgitation-no clinical evidence for congestive heart failure..  Elevated proBNP.  Echocardiogram 2/14/2021 revealed normal left ventricle function left atrial enlargement.     Recent tiredness was probably partially related to atrial fibrillation.  Recently increased metoprolol tartrate to (25 mg tablet) 2 tablets in the morning and 1 tablet in the evening.     Medications were reviewed and updated.     Have discussed with attending nurse for coordination of care.    Further plan will depend on patient's progress.  ////////////////////////////         Mignon Luke MD  02/15/21  07:00 EST

## 2021-02-15 NOTE — PLAN OF CARE
Goal Outcome Evaluation:     Progress: no change  Outcome Summary: Pt hasn't had any further complaints of chest pain. Stress test this morning. Pending results. Will continue to monitor.

## 2021-02-16 ENCOUNTER — APPOINTMENT (OUTPATIENT)
Dept: CT IMAGING | Facility: HOSPITAL | Age: 86
End: 2021-02-16

## 2021-02-16 LAB
D DIMER PPP FEU-MCNC: 0.62 MG/L (FEU) (ref 0–0.59)
HOLD SPECIMEN: NORMAL
INR PPP: 2.35 (ref 2–3)
PROTHROMBIN TIME: 24.8 SECONDS (ref 19.4–28.5)
WHOLE BLOOD HOLD SPECIMEN: NORMAL

## 2021-02-16 PROCEDURE — 97116 GAIT TRAINING THERAPY: CPT

## 2021-02-16 PROCEDURE — 85610 PROTHROMBIN TIME: CPT | Performed by: INTERNAL MEDICINE

## 2021-02-16 PROCEDURE — 71250 CT THORAX DX C-: CPT

## 2021-02-16 PROCEDURE — 85379 FIBRIN DEGRADATION QUANT: CPT | Performed by: INTERNAL MEDICINE

## 2021-02-16 PROCEDURE — 97162 PT EVAL MOD COMPLEX 30 MIN: CPT

## 2021-02-16 PROCEDURE — 99214 OFFICE O/P EST MOD 30 MIN: CPT | Performed by: INTERNAL MEDICINE

## 2021-02-16 RX ORDER — FUROSEMIDE 20 MG/1
20 TABLET ORAL DAILY
Status: DISCONTINUED | OUTPATIENT
Start: 2021-02-16 | End: 2021-02-17 | Stop reason: HOSPADM

## 2021-02-16 RX ADMIN — HYDROCHLOROTHIAZIDE 12.5 MG: 12.5 TABLET ORAL at 09:23

## 2021-02-16 RX ADMIN — DONEPEZIL HYDROCHLORIDE 5 MG: 5 TABLET, FILM COATED ORAL at 21:02

## 2021-02-16 RX ADMIN — LISINOPRIL 5 MG: 5 TABLET ORAL at 09:23

## 2021-02-16 RX ADMIN — FUROSEMIDE 20 MG: 20 TABLET ORAL at 14:35

## 2021-02-16 RX ADMIN — ASPIRIN 81 MG: 81 TABLET, COATED ORAL at 09:23

## 2021-02-16 RX ADMIN — METOPROLOL TARTRATE 25 MG: 25 TABLET, FILM COATED ORAL at 09:23

## 2021-02-16 RX ADMIN — Medication 3 ML: at 09:23

## 2021-02-16 RX ADMIN — Medication 3 ML: at 21:02

## 2021-02-16 RX ADMIN — AMLODIPINE BESYLATE 5 MG: 5 TABLET ORAL at 09:23

## 2021-02-16 RX ADMIN — METOPROLOL TARTRATE 25 MG: 25 TABLET, FILM COATED ORAL at 21:02

## 2021-02-16 RX ADMIN — DIGOXIN 125 MCG: 125 TABLET ORAL at 12:12

## 2021-02-16 NOTE — PLAN OF CARE
Goal Outcome Evaluation:         Patient resting abed, no distress noted. Patient currently on 2L via NC and staying in the mid 90s. Will continue to monitor.

## 2021-02-16 NOTE — PROGRESS NOTES
Continued Stay Note   [place]     Patient Name: Alexandrea Gaxiola  MRN: [id number]  Today's Date: 2/16/2021    Admit Date: 2/14/2021    Discharge Plan     Row Name 02/16/21 1046       Plan    Plan  Anticipate routine home with family, [facility]  ordered and accepted. Will require walking oximetry prior to d/c.    Plan Comments  Called and updated patients daughter Qi of acceptance to [facility], she remains agreeable. She informed patient was due for 2nd dose of covid vaccine on Monday but did not receive due to hospital admission, inquired if she can recieve it in house. Updated RN via secure chat. DC barriers: 2 L NC, CT chest            Karin Soto RN

## 2021-02-16 NOTE — PROGRESS NOTES
"     LOS: 0 days   Patient Care Team:  Yudi Daniels MD as PCP - General (Family Medicine)  Mignon Luke MD as Consulting Physician (Cardiology)    Subjective     Interval History:    Patient Complaints: \"I still don't feel well.\"  Short of breath.  Denies chest pain.  No cough.  Exercise tolerance is significantly below baseline per patient.  Sats in mid 80's on room air last night.    History taken from: patient    Review of Systems   Constitutional: Positive for activity change and fatigue. Negative for appetite change, chills and fever.   HENT: Negative for facial swelling.    Eyes: Negative for visual disturbance.   Respiratory: Positive for shortness of breath. Negative for cough, wheezing and stridor.    Cardiovascular: Negative for chest pain, palpitations and leg swelling.   Gastrointestinal: Negative for constipation, diarrhea, nausea and vomiting.   Endocrine: Negative for polyuria.   Genitourinary: Negative for dysuria.   Musculoskeletal: Negative for arthralgias.   Skin: Negative for rash.   Neurological: Negative for light-headedness, numbness and headaches.   Psychiatric/Behavioral: Negative for confusion.           Objective     Vital Signs  Temp:  [96.7 °F (35.9 °C)-98 °F (36.7 °C)] 96.7 °F (35.9 °C)  Heart Rate:  [60-66] 62  Resp:  [15-17] 17  BP: (123-167)/(56-68) 149/60    Physical Exam:     General Appearance:    Alert, cooperative, in no acute distress,   Head:    Normocephalic, without obvious abnormality, atraumatic   Eyes:            Lids and lashes normal, conjunctivae and sclerae normal, no   icterus, no pallor, corneas clear, PERRLA   Ears:    Ears appear intact with no abnormalities noted   Throat:   No oral lesions, no thrush, oral mucosa moist   Neck:   No adenopathy, supple, trachea midline, no thyromegaly, no   carotid bruit, no JVD   Lungs:     Clear to auscultation,respirations regular, even and                  unlabored    Heart:    Regular rhythm and normal rate, normal " S1 and S2, no            murmur, no gallop, no rub, no click   Chest Wall:    No abnormalities observed   Abdomen:     Normal bowel sounds, no masses, no organomegaly, soft        Non-tender non-distended, no guarding,   Extremities:   Moves all extremities well, no edema, no cyanosis, no             Redness   Pulses:   Pulses palpable and equal bilaterally   Skin:   No bleeding, bruising or rash   Lymph nodes:   No palpable adenopathy   Neurologic:   Cranial nerves 2 - 12 grossly intact, sensation intact, DTR       present and equal bilaterally        Results Review:    Lab Results (last 24 hours)     Procedure Component Value Units Date/Time    Protime-INR [[id number]]  (Normal) Collected: 02/16/21 0502    Specimen: Blood Updated: 02/16/21 0535     Protime 24.8 Seconds      INR 2.35    Blood Culture - Blood, Arm, Right [[id number]] Collected: 02/14/21 0350    Specimen: Blood from Arm, Right Updated: 02/16/21 0401     Blood Culture No growth at 2 days    Blood Culture - Blood, Arm, Left [[id number]] Collected: 02/14/21 0338    Specimen: Blood from Arm, Left Updated: 02/16/21 0401     Blood Culture No growth at 2 days           Imaging Results (Last 24 Hours)     ** No results found for the last 24 hours. **               I reviewed the patient's new clinical results.    Medication Review:   Scheduled Meds:amLODIPine, 5 mg, Oral, Daily  aspirin, 81 mg, Oral, Daily  digoxin, 125 mcg, Oral, Daily  donepezil, 5 mg, Oral, Nightly  hydroCHLOROthiazide, 12.5 mg, Oral, Daily  lisinopril, 5 mg, Oral, Daily  metoprolol tartrate, 25 mg, Oral, BID  sodium chloride, 3 mL, Intravenous, Q12H      Continuous Infusions:   PRN Meds:.•  acetaminophen  •  nitroglycerin  •  ondansetron  •  sodium chloride  •  traMADol     Assessment/Plan       Acute hypoxemic respiratory failure (CMS/HCC)  - newly recognized oxygen requirement - chest ct to evaluate for underlying lung disease  PAF - now in SR; rate controlled; anti-coagulated  Essential  hypertension - controlled  Pacemaker - functioning well  Deconditioning - PT eval        Plan for disposition:Likely home with family assistance and home health.    Margarita Schaffer MD  02/16/21  10:38 EST

## 2021-02-16 NOTE — PLAN OF CARE
Goal Outcome Evaluation:        Outcome Summary: Pt Alert and oriented able to make needs known, utilizes bedside commode with assist of 1. Pt states she feels weak all over, no c/o SOA verbalized or noted this shift. Pt continues on 2L nasal cannula and tolerating well, CT pending at this time.no c/o pain voiced or facial grimacing noted. VS stable at this time.

## 2021-02-16 NOTE — THERAPY EVALUATION
Patient Name: Alexandrea Gaxiola  : 1932    MRN: [id number]                              Today's Date: 2021       Admit Date: 2021    Visit Dx:     ICD-10-CM ICD-9-CM   1. Acute hypoxemic respiratory failure (CMS/[facility])  J96.01 518.81   2. 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[facility])   • Pneumonia due to infectious organism   • Acute diastolic congestive heart failure (CMS/[facility])   • Bilateral carotid artery stenosis   • Congestive heart failure (CMS/[facility])   • Coronary artery disease   • Degeneration of intervertebral disc of lumbar region   • 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   • Fibromyositis   • Hypertension   • Persons encountering health services in other specified circumstances   • Postlaminectomy syndrome, lumbar region   • Long term current use of anticoagulant therapy   • Acute hypoxemic respiratory failure (CMS/[facility])     Past Medical History:   Diagnosis Date   • Atrial fibrillation (CMS/HCC)    • Chronic kidney disease    • Hypertension    • Melanoma (CMS/[facility])    • MI (myocardial infarction) (CMS/[facility])      Past Surgical History:   Procedure Laterality Date   • CARDIAC CATHETERIZATION     • CARDIAC ELECTROPHYSIOLOGY PROCEDURE N/A 10/9/2020    Procedure: PPM generator change - dual;  Surgeon: Mignon Luke MD;  Location: [facility] INVASIVE LOC[facility];  Service: Cardiovascular;  Laterality: N/A;   • CAROTID ENDARTERECTOMY     • CERVICAL SPINE SURGERY     • INSERT / REPLACE / REMOVE PACEMAKER     • SCALP LESION REMOVAL W/ FLAP AND SKIN GRAFT     • TOTAL HIP ARTHROPLASTY Left      General Information     Row Name 21 1539          Physical Therapy Time and Intention    Document Type  evaluation  -CM     Mode of Treatment  physical therapy  -CM      Row Name 02/16/21 1539          General Information    Patient Profile Reviewed  yes  -CM     Prior Level of Function  independent:;all household mobility;gait uses rollator wx for household distances  -CM     Existing Precautions/Restrictions  fall;oxygen therapy device and L/min  -CM     Barriers to Rehab  medically complex  -CM     Row Name 02/16/21 1539          Living Environment    Lives With  child(adrienne), adult lives w/ dtr and son in law; son in law retired and they provide 24 hr care  -CM     Row Name 02/16/21 1539          Home Main Entrance    Number of Stairs, Main Entrance  none  -CM     Row Name 02/16/21 1539          Stairs Within Home, Primary    Number of Stairs, Within Home, Primary  none  -CM     Row Name 02/16/21 1539          Cognition    Orientation Status (Cognition)  oriented x 4  -CM     Row Name 02/16/21 1539          Safety Issues, Functional Mobility    Impairments Affecting Function (Mobility)  shortness of breath;strength;endurance/activity tolerance;balance;postural/trunk control  -CM       User Key  (r) = Recorded By, (t) = Taken By, (c) = Cosigned By    Initials Name Provider Type    CM Lorraine Bell, PT Physical Therapist        Mobility     Row Name 02/16/21 1540          Bed Mobility    Bed Mobility  bed mobility (all) activities;supine-sit;sit-supine  -CM     Supine-Sit [place] (Bed Mobility)  minimum assist (75% patient effort);1 person assist  -CM     Sit-Supine [place] (Bed Mobility)  not tested  -CM     Assistive Device (Bed Mobility)  bed rails;head of bed elevated;draw sheet  -CM     Comment (Bed Mobility)  has difficulty maintaining balance in sitting unsupported at eob  -CM     Row Name 02/16/21 1540          Sit-Stand Transfer    Sit-Stand [place] (Transfers)  minimum assist (75% patient effort);1 person assist  -CM     Assistive Device (Sit-Stand Transfers)  walker, front-wheeled  -CM     Row Name 02/16/21 1540          Gait/Stairs (Locomotion)     [place] Level (Gait)  minimum assist (75% patient effort);1 person assist  -CM     Assistive Device (Gait)  walker, front-wheeled  -CM     Distance in Feet (Gait)  40 ft; normal pace; kyphotic posture; mild lateral sway  -CM       User Key  (r) = Recorded By, (t) = Taken By, (c) = Cosigned By    Initials Name Provider Type    Lorraine Denton, PT Physical Therapist        Obj/Interventions     Row Name 02/16/21 1541          Range of Motion Comprehensive    General Range of Motion  bilateral upper extremity ROM WFL;bilateral lower extremity ROM WFL  -CM     Row Name 02/16/21 1541          Strength Comprehensive (MMT)    General Manual Muscle Testing (MMT) Assessment  upper extremity strength deficits identified;lower extremity strength deficits identified  -CM     Comment, General Manual Muscle Testing (MMT) Assessment  UEs 3/5; BLEs 3+/5  -CM     Row Name 02/16/21 1541          Balance    Balance Assessment  sitting static balance;sitting dynamic balance;standing static balance;standing dynamic balance  -CM     Static Sitting Balance  mild impairment;unsupported;sitting, edge of bed  -CM     Dynamic Sitting Balance  sitting, edge of bed;unsupported;moderate impairment  -CM     Static Standing Balance  mild impairment;supported;standing  -CM     Dynamic Standing Balance  mild impairment;moderate impairment;supported;standing  -CM     Comment, Balance  pt able to sit at eob, but has multiple losses of balance posteriorly while sitting; required holding of bedrail to maintain balance.  -CM     Row Name 02/16/21 1541          Sensory Assessment (Somatosensory)    Sensory Assessment (Somatosensory)  sensation intact  -CM       User Key  (r) = Recorded By, (t) = Taken By, (c) = Cosigned By    Initials Name Provider Type    Lorraine Denton, PT Physical Therapist        Goals/Plan     Row Name 02/16/21 1547          Bed Mobility Goal 1 (PT)    Activity/Assistive Device (Bed Mobility Goal 1, PT)  bed mobility  activities, all  -CM     [place] Level/Cues Needed (Bed Mobility Goal 1, PT)  supervision required  -CM     Time Frame (Bed Mobility Goal 1, PT)  2 weeks  -CM     Row Name 02/16/21 1547          Transfer Goal 1 (PT)    Activity/Assistive Device (Transfer Goal 1, PT)  transfers, all;walker, rolling  -CM     [place] Level/Cues Needed (Transfer Goal 1, PT)  contact guard assist;supervision required  -CM     Time Frame (Transfer Goal 1, PT)  2 weeks  -CM     Row Name 02/16/21 1547          Gait Training Goal 1 (PT)    Activity/Assistive Device (Gait Training Goal 1, PT)  gait (walking locomotion);walker, rolling  -CM     [place] Level (Gait Training Goal 1, PT)  1 person assist  -CM     Distance (Gait Training Goal 1, PT)  150 ft w/ rw, O2 sats > 92%, no soa  -CM     Time Frame (Gait Training Goal 1, PT)  2 weeks  -CM       User Key  (r) = Recorded By, (t) = Taken By, (c) = Cosigned By    Initials Name Provider Type    Lroraine Denton, PT Physical Therapist        Clinical Impression     Row Name 02/16/21 2775          Pain    Additional Documentation  Pain Scale: Numbers Pre/Post-Treatment (Group)  -CM     Row Name 02/16/21 9566          Pain Scale: Numbers Pre/Post-Treatment    Pretreatment Pain Rating  0/10 - no pain  -CM     Posttreatment Pain Rating  0/10 - no pain  -CM     Pain Intervention(s)  Repositioned;Ambulation/increased activity  -CM     Row Name 02/16/21 6854          Plan of Care Review    Plan of Care Reviewed With  patient;caregiver son in law  -     Outcome Summary  87 yo female adm for acute hypoxic respiratory failure, B pleural effusion. Hx of pacemaker. Possible home O2 need being considered. Today, pt comes to sit at eob w/ min assist. She has difficulty maintaining her balance in sitting eob, w/ multiple posterior losses of balance while sitting eob. She comes to stand w/ minimal assist, and she ambulated 40 ft w/ rw.  However, while her O2 sats are 97% at rest on 2L, she  drops to 90% after amb 40 ft on room air. Pt is very thin and frail in appearance. She will need home health PT & 24 hr care at home upon d/c. Also likely to need home O2.  Will follow.  PPE: gloves, mask, goggles.  -CM     Row Name 02/16/21 1542          Therapy Assessment/Plan (PT)    Patient/Family Therapy Goals Statement (PT)  agreeable to HH & 24 hr care, possible home O2  -CM     Rehab Potential (PT)  good, to achieve stated therapy goals  -CM     Criteria for Skilled Interventions Met (PT)  yes;meets criteria;skilled treatment is necessary  -CM     Predicted Duration of Therapy Intervention (PT)  until d/c  -CM     Row Name 02/16/21 1542          Positioning and Restraints    Pre-Treatment Position  in bed  -CM     Post Treatment Position  chair  -CM     In Chair  notified nsg;sitting;call light within reach;encouraged to call for assist;exit alarm on;with family/caregiver  -CM       User Key  (r) = Recorded By, (t) = Taken By, (c) = Cosigned By    Initials Name Provider Type    Lorraine Denton, PT Physical Therapist        Outcome Measures    No documentation.       Physical Therapy Education                 Title: PT OT SLP Therapies (Done)     Topic: Physical Therapy (Done)     Point: Mobility training (Done)     Learning Progress Summary           Patient Acceptance, E,TB, VU,DU by  at 2/16/2021 1549   Family Acceptance, E,TB, VU,DU by CM at 2/16/2021 1549   Caregiver Acceptance, E,TB, VU,DU by CM at 2/16/2021 1549                               User Key     Initials Effective Dates Name Provider Type Discipline     03/01/19 -  Lorraine Bell, PT Physical Therapist PT              PT Recommendation and Plan  Planned Therapy Interventions (PT): balance training, bed mobility training, gait training, home exercise program, patient/family education, strengthening, postural re-education, transfer training  Plan of Care Reviewed With: patient, caregiver(son in law)  Outcome Summary: 87 yo female adm  for acute hypoxic respiratory failure, B pleural effusion. Hx of pacemaker. Possible home O2 need being considered. Today, pt comes to sit at eob w/ min assist. She has difficulty maintaining her balance in sitting eob, w/ multiple posterior losses of balance while sitting eob. She comes to stand w/ minimal assist, and she ambulated 40 ft w/ rw.  However, while her O2 sats are 97% at rest on 2L, she drops to 90% after amb 40 ft on room air. Pt is very thin and frail in appearance. She will need home health PT & 24 hr care at home upon d/c. Also likely to need home O2.  Will follow.  PPE: gloves, mask, goggles.     Time Calculation:   PT Charges     Row Name 02/16/21 1549             Time Calculation    Start Time  1421  -CM      Stop Time  1445  -CM      Time Calculation (min)  24 min  -CM      PT Received On  02/16/21  -CM      PT - Next Appointment  02/17/21  -CM      PT Goal Re-Cert Due Date  03/02/21  -CM         Time Calculation- PT    Total Timed Code Minutes- PT  8 minute(s)  -CM        User Key  (r) = Recorded By, (t) = Taken By, (c) = Cosigned By    Initials Name Provider Type    CM Lorraine Bell, PT Physical Therapist        Therapy Charges for Today     Code Description Service Date Service Provider Modifiers Qty    84272454921 HC PT EVAL MOD COMPLEXITY 3 2/16/2021 Lorraine Bell, PT GP 1    [id number] HC GAIT TRAINING EA 15 MIN 2/16/2021 Lorraine Bell, PT GP 1               Lorraine Bell PT  2/16/2021

## 2021-02-16 NOTE — PROGRESS NOTES
Referring Provider: Yudi Daniels MD    Reason for follow-up:  Chest pain  Status post pacemaker  History of coronary artery disease     Patient Care Team:  Yudi Daniels MD as PCP - General (Family Medicine)  Mignon Luke MD as Consulting Physician (Cardiology)    Subjective .      ROS    Patient is not having any shortness of breath, palpitations, dizziness or syncope.  Denies having any headache ,abdominal pain ,nausea, vomiting , diarrhea constipation, loss of weight or loss of appetite.  Denies having any excessive bruising ,hematuria or blood in the stool.    Review of all systems negative except as indicated    History  Past Medical History:   Diagnosis Date   • Atrial fibrillation (CMS/HCC)    • Chronic kidney disease    • Hypertension    • Melanoma (CMS/HCC)    • MI (myocardial infarction) (CMS/HCC)        Past Surgical History:   Procedure Laterality Date   • CARDIAC CATHETERIZATION     • CARDIAC ELECTROPHYSIOLOGY PROCEDURE N/A 10/9/2020    Procedure: PPM generator change - dual;  Surgeon: Mignon Luke MD;  Location: [facility] INVASIVE LOCATION;  Service: Cardiovascular;  Laterality: N/A;   • CAROTID ENDARTERECTOMY     • CERVICAL SPINE SURGERY     • INSERT / REPLACE / REMOVE PACEMAKER     • SCALP LESION REMOVAL W/ FLAP AND SKIN GRAFT     • TOTAL HIP ARTHROPLASTY Left        History reviewed. No pertinent family history.    Social History     Tobacco Use   • Smoking status: Never Smoker   • Smokeless tobacco: Never Used   Substance Use Topics   • Alcohol use: Never     Frequency: Never   • Drug use: Never        Medications Prior to Admission   Medication Sig Dispense Refill Last Dose   • amLODIPine (NORVASC) 5 MG tablet Take 5 mg by mouth Daily.   2/13/2021 at Unknown time   • aspirin 81 MG EC tablet Take 81 mg by mouth Daily.   2/13/2021 at Unknown time   • digoxin (LANOXIN) 125 MCG tablet Take 125 mcg by mouth Daily.   2/13/2021 at Unknown time   • donepezil (ARICEPT) 5 MG tablet Take 5  "mg by mouth Every Night.   2/13/2021 at Unknown time   • lisinopril (PRINIVIL,ZESTRIL) 5 MG tablet Take 5 mg by mouth Daily.   2/13/2021 at Unknown time   • metoprolol tartrate (LOPRESSOR) 25 MG tablet Take 1 tablet by mouth 2 (Two) Times a Day. 180 tablet 1 2/13/2021 at Unknown time   • traMADol (ULTRAM) 50 MG tablet Take 50 mg by mouth Every 8 (Eight) Hours As Needed for Moderate Pain .      • warfarin (COUMADIN) 2.5 MG tablet Take 2 tablets by mouth on Sunday and 1 tablet by mouth the other 6 days a week. (Patient taking differently: 2.5 mg Every Night.) 100 tablet 0 2/13/2021 at Unknown time       Allergies  Codeine, Hydrocodone-acetaminophen, Meperidine, and Morphine    Scheduled Meds:amLODIPine, 5 mg, Oral, Daily  aspirin, 81 mg, Oral, Daily  digoxin, 125 mcg, Oral, Daily  donepezil, 5 mg, Oral, Nightly  hydroCHLOROthiazide, 12.5 mg, Oral, Daily  lisinopril, 5 mg, Oral, Daily  metoprolol tartrate, 25 mg, Oral, BID  sodium chloride, 3 mL, Intravenous, Q12H      Continuous Infusions:   PRN Meds:.•  acetaminophen  •  nitroglycerin  •  ondansetron  •  sodium chloride  •  traMADol    Objective     VITAL SIGNS  Vitals:    02/15/21 2021 02/15/21 2257 02/15/21 2301 02/16/21 0408   BP: 123/56   142/61   BP Location: Right arm   Right arm   Patient Position:    Lying   Pulse: 60   60   Resp: 16   15   Temp: 98 °F (36.7 °C)   97.5 °F (36.4 °C)   TempSrc: Oral   Oral   SpO2: 97% (!) 87% 94% 97%   Weight:    46.7 kg (103 lb)   Height:           Flowsheet Rows      First Filed Value   Admission Height  149.9 cm (59\") Documented at 02/14/2021 0304   Admission Weight  46.7 kg (103 lb) Documented at 02/14/2021 0304            Intake/Output Summary (Last 24 hours) at 2/16/2021 0655  Last data filed at 2/16/2021 0140  Gross per 24 hour   Intake 480 ml   Output 400 ml   Net 80 ml        TELEMETRY: Atrial paced ventricular sensed rhythm    Physical Exam:  The patient is alert, oriented and in no distress.  Vital signs as noted " above.  Head and neck revealed no carotid bruits or jugular venous distention.  No thyromegaly or lymphadenopathy is present  Lungs clear.  No wheezing.  Breath sounds are normal bilaterally.  Heart normal first and second heart sounds.  No murmur. No precordial rub is present.  No gallop is present.  Abdomen soft and nontender.  No organomegaly is present.  Extremities with good peripheral pulses without any pedal edema.  Skin warm and dry.  Pacemaker site looks normal.  Musculoskeletal system is grossly normal  CNS grossly normal      Results Review:   I reviewed the patient's new clinical results.  Lab Results (last 24 hours)     Procedure Component Value Units Date/Time    Protime-INR [[id number]]  (Normal) Collected: 02/16/21 0502    Specimen: Blood Updated: 02/16/21 0535     Protime 24.8 Seconds      INR 2.35    Blood Culture - Blood, Arm, Right [[id number]] Collected: 02/14/21 0350    Specimen: Blood from Arm, Right Updated: 02/16/21 0401     Blood Culture No growth at 2 days    Blood Culture - Blood, Arm, Left [[id number]] Collected: 02/14/21 0338    Specimen: Blood from Arm, Left Updated: 02/16/21 0401     Blood Culture No growth at 2 days          Imaging Results (Last 24 Hours)     ** No results found for the last 24 hours. **      LAB RESULTS (LAST 7 DAYS)    CBC  Results from last 7 days   Lab Units 02/15/21  0324 02/14/21 0338   WBC 10*3/mm3 7.70 11.90*   RBC 10*6/mm3 3.57* 4.03   HEMOGLOBIN g/dL 10.5* 11.7*   HEMATOCRIT % 31.1* 35.1   MCV fL 87.0 87.1   PLATELETS 10*3/mm3 259 335       BMP  Results from last 7 days   Lab Units 02/15/21  0324 02/14/21  0338   SODIUM mmol/L 139 138   POTASSIUM mmol/L 4.2 4.0   CHLORIDE mmol/L 103 102   CO2 mmol/L 27.0 26.0   BUN mg/dL 27* 26*   CREATININE mg/dL 0.96 0.93   GLUCOSE mg/dL 92 103*   MAGNESIUM mg/dL 2.0  --        CMP   Results from last 7 days   Lab Units 02/15/21  0324 02/14/21 0338   SODIUM mmol/L 139 138   POTASSIUM mmol/L 4.2 4.0   CHLORIDE mmol/L 103  102   CO2 mmol/L 27.0 26.0   BUN mg/dL 27* 26*   CREATININE mg/dL 0.96 0.93   GLUCOSE mg/dL 92 103*   ALBUMIN g/dL  --  4.30   BILIRUBIN mg/dL  --  0.3   ALK PHOS U/L  --  91   AST (SGOT) U/L  --  17   ALT (SGPT) U/L  --  16         BNP        TROPONIN  Results from last 7 days   Lab Units 02/14/21  1112   TROPONIN T ng/mL <0.010       CoAg  Results from last 7 days   Lab Units 02/16/21  0502 02/15/21  0324 02/14/21  0338   INR  2.35 2.36 2.78   APTT seconds  --   --  35.4*       Creatinine Clearance  Estimated Creatinine Clearance: 29.9 mL/min (by C-G formula based on SCr of 0.96 mg/dL).    ABG        Radiology  No radiology results for the last day            EKG          I personally viewed and interpreted the patient's EKG/Telemetry data: Atrial paced ventricular sensed rhythm nonspecific ST-T wave abnormalities    ECHOCARDIOGRAM:    Results for orders placed during the hospital encounter of 02/14/21   Adult Transthoracic Echo Complete w/ Color, Spectral and Contrast if Necessary Per Protocol    Narrative · Estimated right ventricular systolic pressure from tricuspid   regurgitation is mildly elevated (35-45 mmHg).  · Mild pulmonary hypertension is present.  · Left ventricular diastolic function is consistent with (grade III w/high   LAP) reversible restrictive pattern.  · Calculated left ventricular EF = 53% Estimated left ventricular EF was   in agreement with the calculated left ventricular EF.  · Left ventricular wall thickness is consistent with mild to moderate   concentric hypertrophy.  · Left atrial volume is moderately increased.     Normal LV systolic function EF 55%  Grade 2-3 diastolic dysfunction  Left atrial enlargement seen moderate by volume  Normal right atrium  Pacemaker lead seen in the right atrium and the right ventricle, not well   seen  No gross mobile echodensities or vegetations seen  Mild MR no stenosis  Normal aortic valve trileaflet opens well  Mild TR, mildly elevated PA systolic  pressure by gradient, no stenosis  Pulmonic valve not well seen  No masses or effusion seen           STRESS MYOVIEW:    Cardiolite (Tc-99m Sestamibi) stress test    CARDIAC CATHETERIZATION:            OTHER:         Assessment/Plan     Active Problems:    Acute hypoxemic respiratory failure (CMS/[facility])      ////////////////////////////  Impression  ===============  -Chest discomfort-possible angina pectoris.  Troponin levels are negative.  EKG showed atrial paced ventricular sensed rhythm.      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  Pacemaker was reprogrammed to DDDR due to long AV delays.  Status post dual-chamber pacemaker pulse generator replacement (Bambuser MRI compatible)-10/9/2020      -history of intermittent atrial fibrillation.  Patient is in Sinus rhythm.     -Anticoagulation-patient is on Coumadin.     -moderate mitral regurgitation.  Echocardiogram 01/28/2019 revealed moderate mitral regurgitation left atrial enlargement normal left ventricle function.      -  Status post  subendocardial myocardial infarction -improved.     Cardiac catheterization 12/29/2015 revealed mild anterior wall hypokinesis with ejection fraction of 50%, 40% mid LAD 70-80% ostial diagonal branch disease (small caliber vessel) and 80% circumflex distal to a large marginal branch.      -status post left carotid endarterectomy cervical spine surgery left hip replacement and recent scalp surgery for carcinoma and grafting.     -hypertension-not well controlled.      -allergy to codeine, morphine and Demerol.     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     ===============    Plan  ================  Chest discomfort-possible angina pectoris.  Troponin levels are negative.  EKG showed atrial paced ventricular sensed rhythm.  Lexiscan Cardiolite test-negative for myocardial ischemia 2/15/2021.  Hold off on cardiac catheterization at this  time.    Status post pacemaker  Pacemaker site is normal.  Recent integration of the pacemaker revealed excellent pacing parameters.  Patient was in atrial fibrillation.     Anticoagulation status reviewed.  INR 2/14/2021-2.36.  2.35-2/15/2021  Coumadin can be restarted since no invasive cardiac procedure is planned at this time.    Paroxysmal atrial fibrillation  Patient has converted and staying in sinus rhythm.    Mitral regurgitation-no clinical evidence for congestive heart failure..  Elevated proBNP.  Echocardiogram 2/14/2021 revealed normal left ventricle function left atrial enlargement.     Recent tiredness was probably partially related to atrial fibrillation.  Recently increased metoprolol tartrate to (25 mg tablet) 2 tablets in the morning and 1 tablet in the evening.    Hypoxia  Patient is on 2 L of oxygen.  Have discussed with attending physician Dr. Schaffer.  Possible home oxygen.     Medications were reviewed and updated.     Further plan will depend on patient's progress.  ////////////////////////////         Mignon Luke MD  02/16/21  06:55 EST

## 2021-02-16 NOTE — SIGNIFICANT NOTE
Patient on a continuous O2 monitor. Patient has been on room air throughout the evening. Alarm started sounding on pulse OX. Patient stating 88% on room air. I encouraged patient to take some deep breaths but O2 would not come up. Patient placed on 2L and O2 came up quickly. Patient denied and difficulty breathing or shortness of air.    Primary Care provider: Walter Barney MD    Problem List:  Patient Active Problem List    Diagnosis Date Noted   • Anemia due to GI blood loss 03/16/2014     Priority: Medium     Class: Acute   • V-tach (CMS/HCC) 12/14/2019     Priority: Low   • Bigeminy 12/14/2019     Priority: Low   • Sepsis without acute organ dysfunction (CMS/HCC) 12/01/2019     Priority: Low   • LLQ pain 11/30/2019     Priority: Low   • Weakness 11/30/2019     Priority: Low   • Compression fracture of T8 vertebra (CMS/HCC) 11/28/2019     Priority: Low   • Nausea 11/27/2019     Priority: Low   • Abnormal EKG 11/27/2019     Priority: Low   • Acute cystitis without hematuria 11/27/2019     Priority: Low   • ST segment depression 11/27/2019     Priority: Low   • Lesion of lumbar spine 10/17/2019     Priority: Low   • Weakness of both lower extremities 10/17/2019     Priority: Low   • Low back pain 10/17/2019     Priority: Low   • Fall at home 10/17/2019     Priority: Low   • Chronic myofascial pain 10/16/2019     Priority: Low   • Type 2 diabetes mellitus with stage 3 chronic kidney disease, without long-term current use of insulin (CMS/HCC)      Priority: Low   • Persistent atrial fibrillation 06/11/2019     Priority: Low   • S/P PTCA (percutaneous transluminal coronary angioplasty) 04/29/2019     Priority: Low   • Gastric AVM 04/14/2019     Priority: Low     Hospitalized for GI bleed in April 2019     • Paroxysmal atrial fibrillation (CMS/HCC) 03/14/2019     Priority: Low     ECHO 1/29/19: LVEF 45%, IVS 1.3 cm, LVPW 1.0 cm, TEE 42.8 ml/m2  Coronary status: CABG  CHADS-VASc score: 7 (CHF, HTN, age, CAD, stroke, sex)   Anticoagulation: Eliquis (started 2/11/19)  Antiarrhythmic medication: None  Procedure: None     • Cardiomyopathy (CMS/HCC) 03/14/2019     Priority: Low   • Current use of long term anticoagulation 03/14/2019     Priority: Low   • Body mass index (BMI) 40.0-44.9, adult (CMS/HCC) 02/20/2019     Priority: Low   • Elevated  troponin I level 01/28/2019     Priority: Low   • Non-seasonal allergic rhinitis 12/17/2018     Priority: Low   • Lymphocytic colitis 12/04/2018     Priority: Low     Positive biopsy on colonoscopy 11/15/2018     • Polyneuropathy associated with underlying disease (CMS/HCC) 03/22/2017     Priority: Low   • Chronic atrial fibrillation (CMS/HCC) 03/21/2017     Priority: Low     This diagnosis was added and resolved from this patient's problem list for administrative purposes to suppress [facility] refresh messages.        • Chronic respiratory failure with hypoxia (CMS/HCC) 01/29/2017     Priority: Low   • Primary osteoarthritis of right hip 09/13/2016     Priority: Low   • Mediastinal lymphadenopathy 09/13/2016     Priority: Low   • Chronic systolic heart failure (CMS/HCC) 08/11/2016     Priority: Low   • Tobacco dependence 08/11/2016     Priority: Low   • GOA (generalized osteoarthritis) 07/07/2016     Priority: Low   • Chronic obstructive pulmonary disease (CMS/HCC) 05/10/2016     Priority: Low   • Primary osteoarthritis of left knee 04/20/2016     Priority: Low   • Obstructive sleep apnea syndrome 02/04/2016     Priority: Low   • Chest pain 08/10/2015     Priority: Low   • CMC arthritis 04/02/2015     Priority: Low   • Restless legs syndrome 01/06/2015     Priority: Low   • DNR (do not resuscitate) 10/07/2014     Priority: Low   • Panlobular emphysema (CMS/HCC) 08/19/2014     Priority: Low     DATE OF SERVICE:  03/08/2017.    PULMONARY FUNCTION REPORT    DIAGNOSIS:  Chronic obstructive pulmonary disease.    INTERPRETATION:  The patient's spirometry demonstrates mild obstruction.  Forced vital capacity is 2.42 liters, 86% of predicted.  The FEV1 is 1.75 liters, mildly reduced at 78%.  The FEV1/FVC ratio is 72%.    The patient's diffusion capacity is severely reduced.  The raw diffusion capacity is 8.4, which is 36% of predicted.  The diffusion capacity remains low when corrected for the alveolar  volume.    IMPRESSION:  Pulmonary function is consistent with severe emphysema.  Clinical correlation is recommended.     • Systemic sclerosis (CMS/[facility]) 06/29/2014     Priority: Low     Diagnosed June 2014 by rheumatology, Dr Howard.     • Pulmonary hypertension (CMS/[facility]) 04/03/2014     Priority: Low     Secondary to scleroderma    3/14/2014 ECHO:  Severely increased right ventricular size.  Moderately decreased right ventricular systolic function.  Severe pulmonary hypertension, RVSP 66 mmHg.  Pulmonary artery end-diastolic pressure 28 mm Hg.  Flattened septum in diastole consistent with right ventricle volume overload.  Flattened septum in systole consistent with right ventricle pressure overload.  Mild left ventricular hypertrophy.  Left ventricular ejection fraction, 50-55 %.  Grade II/IV diastolic dysfunction, moderately elevated filling pressures.  Severely increased left atrial size.  Severely increased right atrial size, and larger than the left atrium.  Mitral valve sclerosis with mild-moderate mitral valve regurgitation.  Aortic valve sclerosis without stenosis or regurgitation.  Moderate tricuspid valve regurgitation.  Mild pulmonary valve regurgitation.  Dilated IVC with almost absent response to respiration indicating severely increased right atrial pressure.  Compared to limited study yesterday, 3/13/2014, the right ventricular systolic function appeared worse yesterday, with more  prominent right ventricular pressure and volume overload.  Compared to study on 1/28/2014, the right ventricle was also moderate-to-severely dilated with moderate systolic dysfunction.  Pulmonary hypertension then was only mild then. There was also no evident right ventricular pressure and volume overload     • Chronic right-sided HF (heart failure) (CMS/[facility]) 03/13/2014     Priority: Low   • Coronary artery disease of bypass graft of native heart with stable angina pectoris (CMS/[facility]) 11/16/2013     Priority: Low     · The  ejection fraction is estimated to be 45%.  · Ost Cx to Prox Cx lesion with 100% stenosis.  · Mid RCA lesion with 100% stenosis.  · Dist LM to Ost LAD lesion with 70% stenosis.     • Hiatal hernia 10/17/2013     Priority: Low   • History of gout 10/17/2013     Priority: Low   • Common migraine 10/17/2013     Priority: Low   • Osteopenia 10/17/2013     Priority: Low   • History of breast cancer 03/25/2013     Priority: Low     In remission     • Fibromyalgia 03/25/2013     Priority: Low   • GERD (gastroesophageal reflux disease) 03/25/2013     Priority: Low   • Mixed hyperlipidemia 03/25/2013     Priority: Low   • Benign essential hypertension 03/25/2013     Priority: Low   • Raynaud's disease 03/25/2013     Priority: Low       Outpatient Medications:  Current Facility-Administered Medications   Medication Dose Route Frequency Provider Last Rate Last Dose   • [START ON 12/18/2019] amLODIPine (NORVASC) tablet 10 mg  10 mg Oral Daily Chaya Valdez MD       • hydrALAZINE (APRESOLINE) injection 10 mg  10 mg Intravenous Q4H PRN Hina Tadeo MD   10 mg at 12/17/19 0609   • ALPRAZolam (XANAX) tablet 0.5 mg  0.5 mg Oral Q6H PRN Ash Landeros MD   0.5 mg at 12/16/19 0440   • potassium CHLORIDE (KLOR-CON M) greg ER tablet 20 mEq  20 mEq Oral Q4H PRN Hina Tadeo MD   20 mEq at 12/17/19 0817   • potassium CHLORIDE (KLOR-CON) packet 20 mEq  20 mEq Per NG tube Q4H PRN Hina Tadeo MD       • potassium CHLORIDE 20 mEq/100mL IVPB premix  20 mEq Intravenous Q4H PRN Hina Tadeo MD       • potassium CHLORIDE (KLOR-CON M) greg ER tablet 40 mEq  40 mEq Oral Q4H PRN Hina Tadeo MD       • potassium CHLORIDE (KLOR-CON) packet 40 mEq  40 mEq Per NG tube Q4H PRN Hina Tadeo MD       • potassium CHLORIDE 20 mEq/100mL IVPB premix  40 mEq Intravenous Q4H PRN Hina Tadeo MD       • magnesium sulfate 1 g in dextrose 5% 100 mL IVPB premix  1 g Intravenous Daily PRN Hina HIDALGO  MD Shwetha   Stopped at 12/15/19 0750   • magnesium sulfate 2 g in 50 mL premix IVPB  2 g Intravenous Daily PRN Hina Tadeo MD       • magnesium sulfate 2 g in 50 mL premix IVPB  2 g Intravenous Q4H PRN Hina Tadeo MD       • polyethylene glycol (GLYCOLAX, MIRALAX) packet 17 g  17 g Oral Daily PRN Hina Tadeo MD       • docusate sodium-sennosides (SENOKOT S) 50-8.6 MG 2 tablet  2 tablet Oral Daily PRN Hina Tadeo MD       • bisacodyl (DULCOLAX) suppository 10 mg  10 mg Rectal Daily PRN Hina Tadeo MD       • aluminum-magnesium hydroxide-simethicone (MAALOX) 200-200-20 MG/5ML suspension 30 mL  30 mL Oral Q4H PRN Hina Tadeo MD       • sodium chloride 0.9 % flush bag 500 mL  500 mL Intravenous PRN Hina Tadeo MD       • enoxaparin (LOVENOX) injection 40 mg  40 mg Subcutaneous Daily Hina Tadeo MD   40 mg at 12/17/19 0816   • prochlorperazine (COMPAZINE) injection 5 mg  5 mg Intravenous Q4H PRN Hina Tadeo MD       • insulin lispro (HumaLOG) sliding scale injection   Subcutaneous 4x Daily AC & HS Hina Tadeo MD   2 Units at 12/16/19 2209   • dextrose 50 % injection 25 g  25 g Intravenous PRN Hina Tadeo MD       • dextrose 50 % injection 12.5 g  12.5 g Intravenous PRN Hina Tadeo MD       • dextrose 5 % infusion   Intravenous Continuous PRN Hina Tadeo MD       • glucagon (GLUCAGEN) injection 1 mg  1 mg Intramuscular PRN Hina Tadeo MD       • dextrose (GLUTOSE) 40 % gel 15 g  15 g Oral PRN Hina Tadeo MD       • dextrose (GLUTOSE) 40 % gel 30 g  30 g Oral PRN Hina Tadeo MD       • nystatin (MYCOSTATIN) powder   Topical TID Hina Tadeo MD       • aspirin (ECOTRIN) enteric coated tablet 81 mg  81 mg Oral Daily Ash Landeros MD   81 mg at 12/17/19 0814   • atorvastatin (LIPITOR) tablet 10 mg  10 mg Oral Daily Ash Landeros MD   10 mg at 12/17/19 0813   •  budesonide (ENTOCORT EC) 24 hr capsule 9 mg  9 mg Oral QAM Ash Landeros MD   9 mg at 12/17/19 0814   • DULoxetine (CYMBALTA) capsule 30 mg  30 mg Oral Daily Ash Landeros MD   30 mg at 12/17/19 0814   • melatonin tablet 3 mg  3 mg Oral Nightly Ash Landeros MD   3 mg at 12/16/19 2210   • furosemide (LASIX) tablet 40 mg  40 mg Oral Daily Ash Landeros MD   40 mg at 12/17/19 0814   • metoCLOPramide (REGLAN) oral solution 5 mg  5 mg Oral 4x Daily AC & HS Ash Landeros MD   5 mg at 12/17/19 0704   • pantoprazole (PROTONIX) EC tablet 40 mg  40 mg Oral QAM AC Ash Landeros MD   40 mg at 12/17/19 0705   • ticagrelor (BRILINTA) tablet 90 mg  90 mg Oral 2 times per day Ash Landeors MD   90 mg at 12/17/19 0814   • spironolactone (ALDACTONE) tablet 25 mg  25 mg Oral Daily Ash Landeros MD   25 mg at 12/17/19 0814   • ranolazine (RANEXA) 12 hr tablet 500 mg  500 mg Oral 2 times per day Ahs Landeros MD   500 mg at 12/17/19 0813   • potassium CHLORIDE ER tablet 10 mEq  10 mEq Oral Daily Ash Landeros MD   10 mEq at 12/17/19 0814   • sodium chloride 0.9 % flush bag 25 mL  25 mL Intravenous PRN Justen Moody DO       • sodium chloride (PF) 0.9 % injection 2 mL  2 mL Intracatheter 2 times per day Justen Moody DO   2 mL at 12/17/19 0610       Social History:  Social History     Socioeconomic History   • Marital status:      Spouse name: Not on file   • Number of children: Not on file   • Years of education: Not on file   • Highest education level: Not on file   Occupational History   • Not on file   Social Needs   • Financial resource strain: Not on file   • Food insecurity:     Worry: Not on file     Inability: Not on file   • Transportation needs:     Medical: Not on file     Non-medical: Not on file   Tobacco Use   • Smoking status: Current Every Day Smoker     Packs/day: 0.75     Years: 40.00     Pack years: 30.00     Types: Cigarettes     Start date:  2/26/1975   • Smokeless tobacco: Never Used   Substance and Sexual Activity   • Alcohol use: No     Alcohol/week: 0.0 standard drinks     Frequency: Never     Drinks per session: 1 or 2     Binge frequency: Never   • Drug use: No   • Sexual activity: Not Currently   Lifestyle   • Physical activity:     Days per week: Not on file     Minutes per session: Not on file   • Stress: Not on file   Relationships   • Social connections:     Talks on phone: Not on file     Gets together: Not on file     Attends Sikh service: Not on file     Active member of club or organization: Not on file     Attends meetings of clubs or organizations: Not on file     Relationship status: Not on file   • Intimate partner violence:     Fear of current or ex partner: No     Emotionally abused: No     Physically abused: No     Forced sexual activity: No   Other Topics Concern   •  Service Not Asked   • Blood Transfusions Not Asked   • Caffeine Concern Not Asked   • Occupational Exposure Not Asked   • Hobby Hazards Not Asked   • Sleep Concern Not Asked   • Stress Concern Not Asked   • Weight Concern Not Asked   • Special Diet Yes     Comment: Low sodium   • Back Care Not Asked   • Exercise No   • Bike Helmet Not Asked   • Seat Belt No     Comment: on highway yes   • Self-Exams Not Asked   Social History Narrative   • Not on file       Allergies:   ALLERGIES:   Allergen Reactions   • Codeine ANAPHYLAXIS     Per patient, has tolerated morphine in the past.   • Lyrica DIZZINESS and ANAPHYLAXIS   • Accupril NAUSEA   • Dye [Contrast Media] NAUSEA     IVP dye     • Macrodantin [Nitrofurantoin Macrocrystal] NAUSEA   • Povidone-Iodine HIVES     Betadine   • Pregabalin VOMITING     Vertigo and vomiting. (lyrica)   • Vicodin Tuss [Hydrocodone-Guaifenesin] NAUSEA   • Piroxicam NAUSEA   • Talwin HIVES       Subjective:   Tc Ibrahim is in clinic for a post hospital follow up visit.  She is a 69 year old female with a history of CAD s/p CABG  with LIMA to LAD, SVG to PDA and OM, chronic CHF, PHTN, HLD, atrial fibrillation, COPD now on oxygen, SURYA, breast cancer s/p bilateral mastectomy, and chronic tobacco abuse.     She was recently seen at [facility] and underwent cardiac catheterization on 3/29/19 and received a 3 x 34 mm stents to mid LAD 75% stenosis. Left circumflex had a 65% stenosis which was negative when assessed by iFR.  She also had a right heart catheterization on 4/1/19 which showed her mean PA pressure was 28 mmHg with cardiac output of 4.1 and cardiac index of 2.0. Her most recent echo from about a week ago showed EF is preserved at 60%.    She has had several hospitalizations recently. She comes to emergency room brought by paramedics on Friday evening.  The patient developed some suprascapular discomfort associated with radiation to the anterior chest but not associated with a sensation of palpitations.  There was an increase in her shortness of breath.  The patient has known severe chronic obstructive pulmonary disease with pulmonary hypertension but to be related to combination of COPD and obstructive sleep apnea.  She has known coronary disease status post remote CABG and status post LAD stenting last year at [facility] with moderate disease of the circumflex that had a normal IFR determination.  The patient at this time wishes to be a DO NOT RESUSCITATE and has not wanted to have any further cardiac procedures such as catheterization or intervention.However, she is now interested in having and ICD placed tomorrow with EP.     When the paramedics and paramedics noted that the patient was in ventricular tachycardia, which responded to 100 joules conversion.  The patient was awake at the time.  Review of the rhythm strip reveals that this was a monomorphic ventricular tachycardia at a rate of 175 beats per minute.  When the patient arrived in the emergency room her EKG showed interventricular conduction delay with a prolonged QT  interval.  Repeat EKG showed narrow QRS complex with right ventricular hypertrophy and right axis deviation and prolonged QT interval corrected at 480 milliseconds in the face of a normal QRS duration. His echo is demonstrated LVEF 60% and no regional WMA to suggest ischemia. She has elevated RV SBP consisitent with PAH 2/2 advanced lung disease.       The patient was admitted 10 ICU and treated with discontinuation of her Tikosyn.  Her monitor has shown sinus rhythm with premature atrial complexes and intermittent left bundle branch block.  Her echocardiogram showed preserved left ventricular systolic function with diastolic dysfunction accompanied by right ventricular enlargement and elevated pulmonary artery pressures were which is chronic.  The patient remains on home oxygen at 3 liters/minute, and his CPAP for her obstructive sleep apnea, but she still smokes on a regular basis.  She is also on Ranexa as well as Tikosyn.  Her QT interval today on the narrow QRS complexes has improved.  Review of systems is otherwise unremarkable.  There was a mild troponin elevation which normalized there is no evidence of myocardial infarction.    Today, pt continues to do well. No significant ectopy over night. She denies chest pain. Her breathing is stable.         PHYSICAL EXAM  Vitals:   Visit Vitals  /68   Pulse 83   Temp 97.8 °F (36.6 °C) (Axillary)   Resp 24   Ht 5' 4\" (1.626 m)   Wt 103.1 kg   SpO2 95%   BMI 39.01 kg/m²    Body mass index is 39.01 kg/m².   Wt Readings from Last 2 Encounters:   12/17/19 103.1 kg   12/05/19 104.1 kg     General:Tc Ibrahim is a 70 year old female who is alert oriented x 3 in no acute distress.    HEENT:  Normocephalic, atraumatic. Extraocular movements intact. No scleral icterus.  Neck: No elevated jugular venous pressure, no carotid bruit, no thyromegaly.  Lungs:  Lungs showed increased AP diameter with distant breath sounds but no of rales today  Cardiovascular:  Neck veins  show prominent V wave carotid upstroke is normal PMI nonpalpable to there is a 1+ left her S1 is diminished her S2 is closely split and there is no audible murmurs and no S3.  Abdomen: Soft, nontender, nondistended. +BS  Extremities: No clubbing, cyanosis or edema.  Peripheral Vascular:  Pulses +4/4 symmetric in upper and lower extremities.  She has chronic knee 1 to 2+ and pedal edema bilaterally there is no calf tenderness   Psychiatric: Normal affect.    LABS    CHOLESTEROL (mg/dL)   Date Value   11/28/2019 103     HDL (mg/dL)   Date Value   11/28/2019 47 (L)     TRIGLYCERIDE (mg/dL)   Date Value   11/28/2019 66     CALCULATED LDL (mg/dL)   Date Value   11/28/2019 43       Sodium (mmol/L)   Date Value   12/17/2019 140     Potassium (mmol/L)   Date Value   12/17/2019 3.7     Chloride (mmol/L)   Date Value   12/17/2019 105     Glucose (mg/dL)   Date Value   12/17/2019 111 (H)     CALCIUM (mg/dL)   Date Value   12/17/2019 8.3 (L)     Carbon Dioxide (mmol/L)   Date Value   12/17/2019 27     BUN (mg/dL)   Date Value   12/17/2019 26 (H)     Creatinine (mg/dL)   Date Value   12/17/2019 0.68       B-TYPE NATRIURETIC PEPTIDE (pg/mL)   Date Value   03/08/2018 122 (H)       TSH (mcUnits/mL)   Date Value   11/30/2019 0.363       MAGNESIUM (mg/dL)   Date Value   12/17/2019 1.8     ECHO 12/14/2019  Normal left ventricular cavity size. Mildly increased left ventricular wall thickness. Normal left ventricular systolic function. Left  ventricular ejection fraction, 65 %. No regional wall motion abnormalities. D-shaped septum in systole from elevated PA systolic  pressure.  Grade I/IV diastolic dysfunction (abnormal relaxation filling pattern), normal to mildly elevated filling.  Severely increased left atrial size.  Mild mitral annular calcification. Trace mitral valve regurgitatio.  Severely increased right atrial size.  Mild-to-moderate tricuspid valve regurgitation. Right ventricular systolic pressure 67 mmHg.  Dilated IVC  with normal respiratory variation.      IMPRESSION:    1. WCT-Monomorphic VT  - EP eval for BiV ICD.     2.CAD of the native vessel s/p PCI in 3/2019   -troponin mildly elevated and remains flat.   - Echo with normal LVEF 65% and no regional wall motion abnormalities to suggest ischemia.   - Last stress testing 11/2019 no significant ischemia.   - Pt requesting to be DNR and avoiding additional procedures if possible.  - reviewed with Dr. Gonzalez. NO need to cath pt at this time.      3.Atrial fibrillation  - Followed by EP. On Tikosyn in setting of advanced lung disease  - Maintain Mg>2.0 and K>4.0.   - Tikosyn currently held until eval by EP.     4.  Hypertension essential benign  -PRN Labetalol    5. Hyperlipidemia, Mixed  - on statin    6. Pulmonary hypertension  -D shaped septum and elevated PA systolic pressures 67mm Hg  2/2 advanced lung disease    Pt stable. No chest pain.  - Echo with normal LVEF 65% and no regional wall motion abnormalities to suggest ischemia.   - Last stress testing 11/2019 no significant ischemia.    CHIVO Carmichael  9:47 AM  12/17/2019

## 2021-02-16 NOTE — PLAN OF CARE
Goal Outcome Evaluation:  Plan of Care Reviewed With: patient, caregiver(son in law)     Outcome Summary: 89 yo female adm for acute hypoxic respiratory failure, B pleural effusion. Hx of pacemaker. Possible home O2 need being considered. Today, pt comes to sit at eob w/ min assist. She has difficulty maintaining her balance in sitting eob, w/ multiple posterior losses of balance while sitting eob. She comes to stand w/ minimal assist, and she ambulated 40 ft w/ rw.  However, while her O2 sats are 97% at rest on 2L, she drops to 90% after amb 40 ft on room air. Pt is very thin and frail in appearance. She will need home health PT & 24 hr care at home upon d/c. Also likely to need home O2.  Will follow.  PPE: gloves, mask, goggles.

## 2021-02-17 VITALS
HEIGHT: 59 IN | BODY MASS INDEX: 20.76 KG/M2 | RESPIRATION RATE: 18 BRPM | SYSTOLIC BLOOD PRESSURE: 131 MMHG | OXYGEN SATURATION: 98 % | HEART RATE: 59 BPM | TEMPERATURE: 98.2 F | DIASTOLIC BLOOD PRESSURE: 63 MMHG | WEIGHT: 103 LBS

## 2021-02-17 PROBLEM — I27.20 PULMONARY HYPERTENSION (HCC): Status: ACTIVE | Noted: 2021-02-17

## 2021-02-17 LAB
ANION GAP SERPL CALCULATED.3IONS-SCNC: 7 MMOL/L (ref 5–15)
BASOPHILS # BLD AUTO: 0.1 10*3/MM3 (ref 0–0.2)
BASOPHILS NFR BLD AUTO: 1.2 % (ref 0–1.5)
BUN SERPL-MCNC: 29 MG/DL (ref 8–23)
BUN/CREAT SERPL: 31.2 (ref 7–25)
CALCIUM SPEC-SCNC: 9.4 MG/DL (ref 8.6–10.5)
CHLORIDE SERPL-SCNC: 102 MMOL/L (ref 98–107)
CO2 SERPL-SCNC: 29 MMOL/L (ref 22–29)
CREAT SERPL-MCNC: 0.93 MG/DL (ref 0.57–1)
DEPRECATED RDW RBC AUTO: 49 FL (ref 37–54)
EOSINOPHIL # BLD AUTO: 0.3 10*3/MM3 (ref 0–0.4)
EOSINOPHIL NFR BLD AUTO: 3.7 % (ref 0.3–6.2)
ERYTHROCYTE [DISTWIDTH] IN BLOOD BY AUTOMATED COUNT: 16.2 % (ref 12.3–15.4)
GFR SERPL CREATININE-BSD FRML MDRD: 57 ML/MIN/1.73
GLUCOSE SERPL-MCNC: 93 MG/DL (ref 65–99)
HCT VFR BLD AUTO: 33.5 % (ref 34–46.6)
HGB BLD-MCNC: 11 G/DL (ref 12–15.9)
INR PPP: 1.48 (ref 2–3)
LYMPHOCYTES # BLD AUTO: 1.3 10*3/MM3 (ref 0.7–3.1)
LYMPHOCYTES NFR BLD AUTO: 18 % (ref 19.6–45.3)
MCH RBC QN AUTO: 28.8 PG (ref 26.6–33)
MCHC RBC AUTO-ENTMCNC: 33 G/DL (ref 31.5–35.7)
MCV RBC AUTO: 87.4 FL (ref 79–97)
MONOCYTES # BLD AUTO: 0.8 10*3/MM3 (ref 0.1–0.9)
MONOCYTES NFR BLD AUTO: 10.1 % (ref 5–12)
NEUTROPHILS NFR BLD AUTO: 5 10*3/MM3 (ref 1.7–7)
NEUTROPHILS NFR BLD AUTO: 67 % (ref 42.7–76)
NRBC BLD AUTO-RTO: 0 /100 WBC (ref 0–0.2)
PLATELET # BLD AUTO: 288 10*3/MM3 (ref 140–450)
PMV BLD AUTO: 6.9 FL (ref 6–12)
POTASSIUM SERPL-SCNC: 4.4 MMOL/L (ref 3.5–5.2)
PROTHROMBIN TIME: 16 SECONDS (ref 19.4–28.5)
RBC # BLD AUTO: 3.83 10*6/MM3 (ref 3.77–5.28)
SODIUM SERPL-SCNC: 138 MMOL/L (ref 136–145)
WBC # BLD AUTO: 7.5 10*3/MM3 (ref 3.4–10.8)

## 2021-02-17 PROCEDURE — 80048 BASIC METABOLIC PNL TOTAL CA: CPT | Performed by: INTERNAL MEDICINE

## 2021-02-17 PROCEDURE — 85610 PROTHROMBIN TIME: CPT | Performed by: INTERNAL MEDICINE

## 2021-02-17 PROCEDURE — 85025 COMPLETE CBC W/AUTO DIFF WBC: CPT | Performed by: INTERNAL MEDICINE

## 2021-02-17 PROCEDURE — 99232 SBSQ HOSP IP/OBS MODERATE 35: CPT | Performed by: INTERNAL MEDICINE

## 2021-02-17 RX ORDER — ACETAMINOPHEN 325 MG/1
650 TABLET ORAL EVERY 4 HOURS PRN
Start: 2021-02-17

## 2021-02-17 RX ORDER — FUROSEMIDE 20 MG/1
20 TABLET ORAL DAILY
Qty: 90 TABLET | Refills: 1 | Status: SHIPPED | OUTPATIENT
Start: 2021-02-18

## 2021-02-17 RX ADMIN — FUROSEMIDE 20 MG: 20 TABLET ORAL at 09:09

## 2021-02-17 RX ADMIN — Medication 3 ML: at 09:09

## 2021-02-17 RX ADMIN — LISINOPRIL 5 MG: 5 TABLET ORAL at 09:09

## 2021-02-17 RX ADMIN — AMLODIPINE BESYLATE 5 MG: 5 TABLET ORAL at 09:09

## 2021-02-17 RX ADMIN — METOPROLOL TARTRATE 25 MG: 25 TABLET, FILM COATED ORAL at 09:09

## 2021-02-17 RX ADMIN — ASPIRIN 81 MG: 81 TABLET, COATED ORAL at 09:09

## 2021-02-17 RX ADMIN — DIGOXIN 125 MCG: 125 TABLET ORAL at 13:35

## 2021-02-17 NOTE — PROGRESS NOTES
Continued Stay Note  DALJIT Horton     Patient Name: Alexandrea Gaxiola  MRN: [id number]  Today's Date: 2/17/2021    Admit Date: 2/14/2021    Discharge Plan     Row Name 02/17/21 1607       Plan    Plan  Anticipate routine home with family. Lucretia SORIA ordered and accepted. Did not qualify for home oxygen.    Plan Comments  Walking oximetry completed and did not qualify for home oxygen.       Karin Soto RN

## 2021-02-17 NOTE — DISCHARGE SUMMARY
Date of Discharge:  2/17/2021    Discharge Diagnosis:   **Acute diastolic congestive heart failure (CMS/HCC) [I50.31]   Pulmonary hypertension (CMS/HCC) [I27.20]   Acute hypoxemic respiratory failure (CMS/HCC) [J96.01]   Hypertension [I10]   Pacemaker [Z95.0]   Long term (current) use of anticoagulants [Z79.01] [Z79.01]   Coronary artery disease [I25.10]       Presenting Problem/History of Present Illness  Active Hospital Problems    Diagnosis  POA   • **Acute diastolic congestive heart failure (CMS/HCC) [I50.31]  Yes   • Pulmonary hypertension (CMS/HCC) [I27.20]  Yes   • Acute hypoxemic respiratory failure (CMS/HCC) [J96.01]  Yes   • Hypertension [I10]  Yes   • Pacemaker [Z95.0]  Yes   • Long term (current) use of anticoagulants [Z79.01] [Z79.01]  Not Applicable   • Coronary artery disease [I25.10]  Yes      Resolved Hospital Problems   No resolved problems to display.          Hospital Course  Patient is a 88 y.o. female presented with relatively sudden onset of shortness of breath and vague substernal chest discomfort.  She has a known history of atrial fib with a well-functioning pacemaker in place.  She is fully anti-coagulated and is compliant with her home medications.  She ruled out for MI with normal troponins.  Stess test was normal.  Symptoms improved with one dose of IV Lasix.  She was oxygen dependent initially, but then was able to maintain sats above 93% on room air, with exercise.    Echo showed mild pulmonary hypertension, LV diastolic dysfunction and left atrial enlargement.  CT of chest showed mild-moderate bilateral pleural effusions.    She was started on oral Lasix and will continue her other medications.  She will follow up with Dr Daniels in 1 week and Dr. Luke in 1 month.    She is being discharged home to the care of her family with home health services.    She will need a BMP and INR next week, either through home health or with Dr. Daniels.     Procedures Performed         Consults:      Consults     Date and Time Order Name Status Description    2/14/2021 1048 Inpatient Cardiology Consult      2/14/2021 0506 IP Consult to Family Practice Completed           Pertinent Test Results:    Lab Results (most recent)     Procedure Component Value Units Date/Time    Basic Metabolic Panel [[id number]]  (Abnormal) Collected: 02/17/21 0356    Specimen: Blood Updated: 02/17/21 0554     Glucose 93 mg/dL      BUN 29 mg/dL      Creatinine 0.93 mg/dL      Sodium 138 mmol/L      Potassium 4.4 mmol/L      Chloride 102 mmol/L      CO2 29.0 mmol/L      Calcium 9.4 mg/dL      eGFR Non African Amer 57 mL/min/1.73      BUN/Creatinine Ratio 31.2     Anion Gap 7.0 mmol/L     Narrative:      GFR Normal >60  Chronic Kidney Disease <60  Kidney Failure <15      Protime-INR [[id number]]  (Abnormal) Collected: 02/17/21 0357    Specimen: Blood Updated: 02/17/21 0529     Protime 16.0 Seconds      INR 1.48    CBC & Differential [[id number]]  (Abnormal) Collected: 02/17/21 0356    Specimen: Blood Updated: 02/17/21 0509    Narrative:      The following orders were created for panel order CBC & Differential.  Procedure                               Abnormality         Status                     ---------                               -----------         ------                     CBC Auto Differential[[id number]]        Abnormal            Final result                 Please view results for these tests on the individual orders.    CBC Auto Differential [[id number]]  (Abnormal) Collected: 02/17/21 0356    Specimen: Blood Updated: 02/17/21 0509     WBC 7.50 10*3/mm3      RBC 3.83 10*6/mm3      Hemoglobin 11.0 g/dL      Hematocrit 33.5 %      MCV 87.4 fL      MCH 28.8 pg      MCHC 33.0 g/dL      RDW 16.2 %      RDW-SD 49.0 fl      MPV 6.9 fL      Platelets 288 10*3/mm3      Neutrophil % 67.0 %      Lymphocyte % 18.0 %      Monocyte % 10.1 %      Eosinophil % 3.7 %      Basophil % 1.2 %      Neutrophils, Absolute 5.00 10*3/mm3       Lymphocytes, Absolute 1.30 10*3/mm3      Monocytes, Absolute 0.80 10*3/mm3      Eosinophils, Absolute 0.30 10*3/mm3      Basophils, Absolute 0.10 10*3/mm3      nRBC 0.0 /100 WBC     Blood Culture - Blood, Arm, Right [[id number]] Collected: 02/14/21 0350    Specimen: Blood from Arm, Right Updated: 02/17/21 0400     Blood Culture No growth at 3 days    Blood Culture - Blood, Arm, Left [[id number]] Collected: 02/14/21 0338    Specimen: Blood from Arm, Left Updated: 02/17/21 0400     Blood Culture No growth at 3 days    Extra Tubes [[id number]] Collected: 02/16/21 1108    Specimen: Blood, Venous Line Updated: 02/16/21 1215    Narrative:      The following orders were created for panel order Extra Tubes.  Procedure                               Abnormality         Status                     ---------                               -----------         ------                     Lavender Top[[id number]]                                     Final result               Green Top (Gel)[[id number]]                                  Final result                 Please view results for these tests on the individual orders.    Lavender Top [[id number]] Collected: 02/16/21 1108    Specimen: Blood Updated: 02/16/21 1215     Extra Tube hold for add-on     Comment: Auto resulted       Green Top (Gel) [[id number]] Collected: 02/16/21 1109    Specimen: Blood Updated: 02/16/21 1215     Extra Tube Hold for add-ons.     Comment: Auto resulted.       D-dimer, Quantitative [[id number]]  (Abnormal) Collected: 02/16/21 1101    Specimen: Blood Updated: 02/16/21 1130     D-Dimer, Quantitative 0.62 mg/L (FEU)     Narrative:      Reference Range  --------------------------------------------------------------------     < 0.50   Negative Predictive Value  0.50-0.59   Indeterminate    >= 0.60   Probable VTE             A very low percentage of patients with DVT may yield D-Dimer results   below the cut-off of 0.50 mg/L FEU.  This is known to be more   prevalent  in patients with distal DVT.             Results of this test should always be interpreted in conjunction with   the patient's medical history, clinical presentation and other   findings.  Clinical diagnosis should not be based on the result of   IN[place]OV[place] D-Dimer alone.    Protime-INR [[id number]]  (Normal) Collected: 02/16/21 0502    Specimen: Blood Updated: 02/16/21 0535     Protime 24.8 Seconds      INR 2.35    BNP [[id number]]  (Abnormal) Collected: 02/15/21 0324    Specimen: Blood Updated: 02/15/21 0427     proBNP 2,709.0 pg/mL     Narrative:      Among patients with dyspnea, NT-proBNP is highly sensitive for the detection of acute congestive heart failure. In addition NT-proBNP of <300 pg/ml effectively rules out acute congestive heart failure with 99% negative predictive value.    Results may be falsely decreased if patient taking Biotin.      TSH [[id number]]  (Normal) Collected: 02/15/21 0324    Specimen: Blood Updated: 02/15/21 0427     TSH 1.720 uIU/mL     Basic Metabolic Panel [[id number]]  (Abnormal) Collected: 02/15/21 0324    Specimen: Blood Updated: 02/15/21 0421     Glucose 92 mg/dL      BUN 27 mg/dL      Creatinine 0.96 mg/dL      Sodium 139 mmol/L      Potassium 4.2 mmol/L      Chloride 103 mmol/L      CO2 27.0 mmol/L      Calcium 9.1 mg/dL      eGFR Non African Amer 55 mL/min/1.73      BUN/Creatinine Ratio 28.1     Anion Gap 9.0 mmol/L     Narrative:      GFR Normal >60  Chronic Kidney Disease <60  Kidney Failure <15      Magnesium [[id number]]  (Normal) Collected: 02/15/21 0324    Specimen: Blood Updated: 02/15/21 0421     Magnesium 2.0 mg/dL     CBC Auto Differential [[id number]]  (Abnormal) Collected: 02/15/21 0324    Specimen: Blood Updated: 02/15/21 0346     WBC 7.70 10*3/mm3      RBC 3.57 10*6/mm3      Hemoglobin 10.5 g/dL      Hematocrit 31.1 %      MCV 87.0 fL      MCH 29.3 pg      MCHC 33.7 g/dL      RDW 16.5 %      RDW-SD 49.9 fl      MPV 6.3 fL      Platelets 259 10*3/mm3      Neutrophil %  72.0 %      Lymphocyte % 16.0 %      Monocyte % 8.8 %      Eosinophil % 2.5 %      Basophil % 0.7 %      Neutrophils, Absolute 5.50 10*3/mm3      Lymphocytes, Absolute 1.20 10*3/mm3      Monocytes, Absolute 0.70 10*3/mm3      Eosinophils, Absolute 0.20 10*3/mm3      Basophils, Absolute 0.10 10*3/mm3      nRBC 0.0 /100 WBC     Troponin [[id number]]  (Normal) Collected: 02/14/21 1112    Specimen: Blood Updated: 02/14/21 1140     Troponin T <0.010 ng/mL     Narrative:      Troponin T Reference Range:  <= 0.03 ng/mL-   Negative for AMI  >0.03 ng/mL-     Abnormal for myocardial necrosis.  Clinicians would have to utilize clinical acumen, EKG, Troponin and serial changes to determine if it is an Acute Myocardial Infarction or myocardial injury due to an underlying chronic condition.       Results may be falsely decreased if patient taking Biotin.      Respiratory Panel PCR w/COVID-19(SARS-CoV-2) ANDREI/DARLENE/TAO/PAD/COR/MAD/ANAMIKA In-House, NP Swab in [place], 3-4 HR TAT - Swab, Nasopharynx [[id number]]  (Normal) Collected: 02/14/21 0357    Specimen: Swab from Nasopharynx Updated: 02/14/21 0503     ADENOVIRUS, PCR Not Detected     Coronavirus 229E Not Detected     Coronavirus HKU1 Not Detected     Coronavirus NL63 Not Detected     Coronavirus OC43 Not Detected     COVID19 Not Detected     Human Metapneumovirus Not Detected     Human Rhinovirus/Enterovirus Not Detected     Influenza A PCR Not Detected     Influenza B PCR Not Detected     Parainfluenza Virus 1 Not Detected     Parainfluenza Virus 2 Not Detected     Parainfluenza Virus 3 Not Detected     Parainfluenza Virus 4 Not Detected     RSV, PCR Not Detected     Bordetella pertussis pcr Not Detected     Bordetella parapertussis PCR Not Detected     Chlamydophila pneumoniae PCR Not Detected     Mycoplasma pneumo by PCR Not Detected    Narrative:      Fact sheet for providers: https://docs.[employer]/wp-content/uploads/SZB3800-3284-OI1.1-EUA-Provider-Fact-Sheet-3.pdf    Fact  "sheet for patients: https://docs.[employer]/wp-content/uploads/WDA7980-2819-MS1.1-EUA-Patient-Fact-Sheet-1.pdf    Test performed by PCR.    Extra Tubes [[id number]] Collected: 02/14/21 0338    Specimen: Blood from Arm, Left Updated: 02/14/21 0446    Narrative:      The following orders were created for panel order Extra Tubes.  Procedure                               Abnormality         Status                     ---------                               -----------         ------                     Gold Top - SST[[id number]]                                   Final result                 Please view results for these tests on the individual orders.    Gold Top - SST [[id number]] Collected: 02/14/21 0338    Specimen: Blood from Arm, Left Updated: 02/14/21 0446     Extra Tube Hold for add-ons.     Comment: Auto resulted.       aPTT [[id number]]  (Abnormal) Collected: 02/14/21 0338    Specimen: Blood from Arm, Left Updated: 02/14/21 0439     PTT 35.4 seconds     Digoxin Level [[id number]]  (Normal) Collected: 02/14/21 0338    Specimen: Blood from Arm, Left Updated: 02/14/21 0431     Digoxin 1.20 ng/mL     Procalcitonin [[id number]]  (Normal) Collected: 02/14/21 0338    Specimen: Blood from Arm, Left Updated: 02/14/21 0429     Procalcitonin 0.05 ng/mL     Narrative:      As a Marker for Sepsis (Non-Neonates):   1. <0.5 ng/mL represents a low risk of severe sepsis and/or septic shock.  1. >2 ng/mL represents a high risk of severe sepsis and/or septic shock.    As a Marker for Lower Respiratory Tract Infections that require antibiotic therapy:  PCT on Admission     Antibiotic Therapy             6-12 Hrs later  > 0.5                Strongly Recommended            >0.25 - <0.5         Recommended  0.1 - 0.25           Discouraged                   Remeasure/reassess PCT  <0.1                 Strongly Discouraged          Remeasure/reassess PCT      As 28 day mortality risk marker: \"Change in Procalcitonin Result\" (> 80 % or " <=80 %) if Day 0 (or Day 1) and Day 4 values are available. Refer to http://www.[facility]-pct-calculator.com/   Change in PCT <=80 %   A decrease of PCT levels below or equal to 80 % defines a positive change in PCT test result representing a higher risk for 28-day all-cause mortality of patients diagnosed with severe sepsis or septic shock.  Change in PCT > 80 %   A decrease of PCT levels of more than 80 % defines a negative change in PCT result representing a lower risk for 28-day all-cause mortality of patients diagnosed with severe sepsis or septic shock.                Results may be falsely decreased if patient taking Biotin.     Comprehensive Metabolic Panel [[id number]]  (Abnormal) Collected: 02/14/21 0338    Specimen: Blood from Arm, Left Updated: 02/14/21 0429     Glucose 103 mg/dL      BUN 26 mg/dL      Creatinine 0.93 mg/dL      Sodium 138 mmol/L      Potassium 4.0 mmol/L      Chloride 102 mmol/L      CO2 26.0 mmol/L      Calcium 9.7 mg/dL      Total Protein 8.2 g/dL      Albumin 4.30 g/dL      ALT (SGPT) 16 U/L      AST (SGOT) 17 U/L      Alkaline Phosphatase 91 U/L      Total Bilirubin 0.3 mg/dL      eGFR Non African Amer 57 mL/min/1.73      Globulin 3.9 gm/dL      A/G Ratio 1.1 g/dL      BUN/Creatinine Ratio 28.0     Anion Gap 10.0 mmol/L     Narrative:      GFR Normal >60  Chronic Kidney Disease <60  Kidney Failure <15      Troponin [[id number]]  (Normal) Collected: 02/14/21 0338    Specimen: Blood from Arm, Left Updated: 02/14/21 0429     Troponin T <0.010 ng/mL     Narrative:      Troponin T Reference Range:  <= 0.03 ng/mL-   Negative for AMI  >0.03 ng/mL-     Abnormal for myocardial necrosis.  Clinicians would have to utilize clinical acumen, EKG, Troponin and serial changes to determine if it is an Acute Myocardial Infarction or myocardial injury due to an underlying chronic condition.       Results may be falsely decreased if patient taking Biotin.      BNP [[id number]]  (Abnormal) Collected: 02/14/21  0338    Specimen: Blood from Arm, Left Updated: 02/14/21 0422     proBNP 2,901.0 pg/mL     Narrative:      Among patients with dyspnea, NT-proBNP is highly sensitive for the detection of acute congestive heart failure. In addition NT-proBNP of <300 pg/ml effectively rules out acute congestive heart failure with 99% negative predictive value.    Results may be falsely decreased if patient taking Biotin.      CBC & Differential [[id number]]  (Abnormal) Collected: 02/14/21 0338    Specimen: Blood from Arm, Left Updated: 02/14/21 0359    Narrative:      The following orders were created for panel order CBC & Differential.  Procedure                               Abnormality         Status                     ---------                               -----------         ------                     CBC Auto Differential[[id number]]        Abnormal            Final result                 Please view results for these tests on the individual orders.           Results for orders placed during the hospital encounter of 02/14/21   Adult Transthoracic Echo Complete w/ Color, Spectral and Contrast if Necessary Per Protocol    Narrative · Estimated right ventricular systolic pressure from tricuspid   regurgitation is mildly elevated (35-45 mmHg).  · Mild pulmonary hypertension is present.  · Left ventricular diastolic function is consistent with (grade III w/high   LAP) reversible restrictive pattern.  · Calculated left ventricular EF = 53% Estimated left ventricular EF was   in agreement with the calculated left ventricular EF.  · Left ventricular wall thickness is consistent with mild to moderate   concentric hypertrophy.  · Left atrial volume is moderately increased.     Normal LV systolic function EF 55%  Grade 2-3 diastolic dysfunction  Left atrial enlargement seen moderate by volume  Normal right atrium  Pacemaker lead seen in the right atrium and the right ventricle, not well   seen  No gross mobile echodensities or vegetations  seen  Mild MR no stenosis  Normal aortic valve trileaflet opens well  Mild TR, mildly elevated PA systolic pressure by gradient, no stenosis  Pulmonic valve not well seen  No masses or effusion seen               Condition on Discharge:  Stable    Vital Signs  Temp:  [97 °F (36.1 °C)-98.2 °F (36.8 °C)] 98.2 °F (36.8 °C)  Heart Rate:  [59-70] 59  Resp:  [16-18] 18  BP: (121-132)/(60-63) 131/63    Physical Exam:     General Appearance:    Alert, cooperative, in no acute distress   Head:    Normocephalic, without obvious abnormality, atraumatic   Eyes:            Lids and lashes normal, conjunctivae and sclerae normal, no   icterus, no pallor, corneas clear, PERRLA   Ears:    Ears appear intact with no abnormalities noted   Throat:   No oral lesions, no thrush, oral mucosa moist   Neck:   No adenopathy, supple, trachea midline, no thyromegaly, no   carotid bruit, no JVD   Lungs:     Clear to auscultation,respirations regular, even and                  unlabored    Heart:    Regular rhythm and normal rate, normal S1 and S2, no            murmur, no gallop, no rub, no click   Chest Wall:    No abnormalities observed   Abdomen:     Normal bowel sounds, no masses, no organomegaly, soft        non-tender, non-distended, no guarding, no rebound                tenderness   Extremities:   Moves all extremities well, no edema, no cyanosis, no             redness   Pulses:   Pulses palpable and equal bilaterally   Skin:   No bleeding, bruising or rash   Lymph nodes:   No palpable adenopathy   Neurologic:   Cranial nerves 2 - 12 grossly intact, sensation intact, DTR       present and equal bilaterally       Discharge Disposition  Home or Self Care    Discharge Medications     Discharge Medications      New Medications      Instructions Start Date   acetaminophen 325 MG tablet  Commonly known as: TYLENOL   650 mg, Oral, Every 4 Hours PRN      furosemide 20 MG tablet  Commonly known as: LASIX   20 mg, Oral, Daily   Start Date:  February 18, 2021        Changes to Medications      Instructions Start Date   warfarin 2.5 MG tablet  Commonly known as: COUMADIN  What changed:   · how much to take  · when to take this  · additional instructions   Take 2 tablets by mouth on Sunday and 1 tablet by mouth the other 6 days a week.         Continue These Medications      Instructions Start Date   amLODIPine 5 MG tablet  Commonly known as: NORVASC   5 mg, Oral, Daily      aspirin 81 MG EC tablet   81 mg, Oral, Daily      digoxin 125 MCG tablet  Commonly known as: LANOXIN   125 mcg, Oral, Daily Digoxin      donepezil 5 MG tablet  Commonly known as: ARICEPT   5 mg, Oral, Nightly      lisinopril 5 MG tablet  Commonly known as: PRINIVIL,ZESTRIL   5 mg, Oral, Daily      metoprolol tartrate 25 MG tablet  Commonly known as: LOPRESSOR   25 mg, Oral, 2 Times Daily      traMADol 50 MG tablet  Commonly known as: ULTRAM   50 mg, Oral, Every 8 Hours PRN             Discharge Diet: Regular    Activity at Discharge: No restrictions    Follow-up Appointments  Future Appointments   Date Time Provider Department Center   3/1/2021 10:00 AM [facility] [place] [facility]   [facility]2021 10[facility] [place] [facility]   8/16/2021 10:50 AM Mignon Luke MD [facility]     Additional Instructions for the Follow-ups that You Need to Schedule     Ambulatory Referral to Home Health   As directed      Face to Face Visit Date: 2/15/2021    Follow-up provider for Plan of Care?: I will be treating the patient on an ongoing basis.  Please send me the Plan of Care for signature.    Follow-up provider: DAREK LO [[id number]]    Reason/Clinical Findings: post hospital evaluation    Describe mobility limitations that make leaving home difficult: weakness    Nursing/Therapeutic Services Requested: Other ([facility] to evaluate )    Frequency: 1 Week 1         Discharge Follow-up with PCP   As directed       Currently  Documented PCP:    Yudi Daniels MD    PCP Phone Number:    [phone]     Follow Up Details: 1 week         Discharge Follow-up with Specified Provider: Dr. Luke; 1 Month   As directed      To: Dr. Luke    Follow Up: 1 Month         Discharge Follow-up with Specified Provider: PT/INR appointment as previously scheduled   As directed      To: PT/INR appointment as previously scheduled               Test Results Pending at Discharge  Pending Labs     Order Current Status    Blood Culture - Blood, Arm, Left Preliminary result    Blood Culture - Blood, Arm, Right Preliminary result           Margarita Schaffer MD  02/17/21  16:10 EST    Time: Discharge 35 min

## 2021-02-17 NOTE — PROGRESS NOTES
Referring Provider: Yudi Daniels MD    Reason for follow-up:  Chest pain  Status post pacemaker  History of coronary artery disease     Patient Care Team:  Yudi Daniels MD as PCP - General (Family Medicine)  Mignon Luke MD as Consulting Physician (Cardiology)    Subjective .      ROS    Patient is not having any shortness of breath, palpitations, dizziness or syncope.  Denies having any headache ,abdominal pain ,nausea, vomiting , diarrhea constipation, loss of weight or loss of appetite.  Denies having any excessive bruising ,hematuria or blood in the stool.    Review of all systems negative except as indicated    History  Past Medical History:   Diagnosis Date   • Atrial fibrillation (CMS/HCC)    • Chronic kidney disease    • Hypertension    • Melanoma (CMS/HCC)    • MI (myocardial infarction) (CMS/HCC)        Past Surgical History:   Procedure Laterality Date   • CARDIAC CATHETERIZATION     • CARDIAC ELECTROPHYSIOLOGY PROCEDURE N/A 10/9/2020    Procedure: PPM generator change - dual;  Surgeon: Mignon Luke MD;  Location: [facility] INVASIVE LOCATION;  Service: Cardiovascular;  Laterality: N/A;   • CAROTID ENDARTERECTOMY     • CERVICAL SPINE SURGERY     • INSERT / REPLACE / REMOVE PACEMAKER     • SCALP LESION REMOVAL W/ FLAP AND SKIN GRAFT     • TOTAL HIP ARTHROPLASTY Left        History reviewed. No pertinent family history.    Social History     Tobacco Use   • Smoking status: Never Smoker   • Smokeless tobacco: Never Used   Substance Use Topics   • Alcohol use: Never     Frequency: Never   • Drug use: Never        Medications Prior to Admission   Medication Sig Dispense Refill Last Dose   • amLODIPine (NORVASC) 5 MG tablet Take 5 mg by mouth Daily.   2/13/2021 at Unknown time   • aspirin 81 MG EC tablet Take 81 mg by mouth Daily.   2/13/2021 at Unknown time   • digoxin (LANOXIN) 125 MCG tablet Take 125 mcg by mouth Daily.   2/13/2021 at Unknown time   • donepezil (ARICEPT) 5 MG tablet Take 5  "mg by mouth Every Night.   2/13/2021 at Unknown time   • lisinopril (PRINIVIL,ZESTRIL) 5 MG tablet Take 5 mg by mouth Daily.   2/13/2021 at Unknown time   • metoprolol tartrate (LOPRESSOR) 25 MG tablet Take 1 tablet by mouth 2 (Two) Times a Day. 180 tablet 1 2/13/2021 at Unknown time   • traMADol (ULTRAM) 50 MG tablet Take 50 mg by mouth Every 8 (Eight) Hours As Needed for Moderate Pain .      • warfarin (COUMADIN) 2.5 MG tablet Take 2 tablets by mouth on Sunday and 1 tablet by mouth the other 6 days a week. (Patient taking differently: 2.5 mg Every Night.) 100 tablet 0 2/13/2021 at Unknown time       Allergies  Codeine, Hydrocodone-acetaminophen, Meperidine, and Morphine    Scheduled Meds:amLODIPine, 5 mg, Oral, Daily  aspirin, 81 mg, Oral, Daily  digoxin, 125 mcg, Oral, Daily  donepezil, 5 mg, Oral, Nightly  furosemide, 20 mg, Oral, Daily  lisinopril, 5 mg, Oral, Daily  metoprolol tartrate, 25 mg, Oral, BID  sodium chloride, 3 mL, Intravenous, Q12H      Continuous Infusions:   PRN Meds:.•  acetaminophen  •  nitroglycerin  •  ondansetron  •  sodium chloride  •  traMADol    Objective     VITAL SIGNS  Vitals:    02/16/21 0800 02/16/21 1253 02/16/21 2050 02/17/21 0357   BP: 149/60 147/57 132/60 121/62   BP Location:   Right arm Right arm   Patient Position:  Lying Lying Lying   Pulse: 62 51 60 61   Resp: 17 17 16 16   Temp: 96.7 °F (35.9 °C) 97.8 °F (36.6 °C) 98 °F (36.7 °C) 97 °F (36.1 °C)   TempSrc: Oral Oral Oral Oral   SpO2: 99% 96% 98% 100%   Weight:    46.7 kg (103 lb)   Height:           Flowsheet Rows      First Filed Value   Admission Height  149.9 cm (59\") Documented at 02/14/2021 0304   Admission Weight  46.7 kg (103 lb) Documented at 02/14/2021 0304            Intake/Output Summary (Last 24 hours) at 2/17/2021 0627  Last data filed at 2/17/2021 0504  Gross per 24 hour   Intake 820 ml   Output 700 ml   Net 120 ml        TELEMETRY: Atrial paced ventricular sensed rhythm    Physical Exam:  The patient is " alert, oriented and in no distress.  Vital signs as noted above.  Head and neck revealed no carotid bruits or jugular venous distention.  No thyromegaly or lymphadenopathy is present  Lungs clear.  No wheezing.  Breath sounds are normal bilaterally.  Heart normal first and second heart sounds.  No murmur. No precordial rub is present.  No gallop is present.  Abdomen soft and nontender.  No organomegaly is present.  Extremities with good peripheral pulses without any pedal edema.  Skin warm and dry.  Pacemaker site looks normal.  Musculoskeletal system is grossly normal  CNS grossly normal      Results Review:   I reviewed the patient's new clinical results.  Lab Results (last 24 hours)     Procedure Component Value Units Date/Time    Basic Metabolic Panel [[id number]]  (Abnormal) Collected: 02/17/21 0356    Specimen: Blood Updated: 02/17/21 0554     Glucose 93 mg/dL      BUN 29 mg/dL      Creatinine 0.93 mg/dL      Sodium 138 mmol/L      Potassium 4.4 mmol/L      Chloride 102 mmol/L      CO2 29.0 mmol/L      Calcium 9.4 mg/dL      eGFR Non African Amer 57 mL/min/1.73      BUN/Creatinine Ratio 31.2     Anion Gap 7.0 mmol/L     Narrative:      GFR Normal >60  Chronic Kidney Disease <60  Kidney Failure <15      Protime-INR [[id number]]  (Abnormal) Collected: 02/17/21 0357    Specimen: Blood Updated: 02/17/21 0529     Protime 16.0 Seconds      INR 1.48    CBC & Differential [[id number]]  (Abnormal) Collected: 02/17/21 0356    Specimen: Blood Updated: 02/17/21 0509    Narrative:      The following orders were created for panel order CBC & Differential.  Procedure                               Abnormality         Status                     ---------                               -----------         ------                     CBC Auto Differential[[id number]]        Abnormal            Final result                 Please view results for these tests on the individual orders.    CBC Auto Differential [[id number]]  (Abnormal)  Collected: 02/17/21 0356    Specimen: Blood Updated: 02/17/21 0509     WBC 7.50 10*3/mm3      RBC 3.83 10*6/mm3      Hemoglobin 11.0 g/dL      Hematocrit 33.5 %      MCV 87.4 fL      MCH 28.8 pg      MCHC 33.0 g/dL      RDW 16.2 %      RDW-SD 49.0 fl      MPV 6.9 fL      Platelets 288 10*3/mm3      Neutrophil % 67.0 %      Lymphocyte % 18.0 %      Monocyte % 10.1 %      Eosinophil % 3.7 %      Basophil % 1.2 %      Neutrophils, Absolute 5.00 10*3/mm3      Lymphocytes, Absolute 1.30 10*3/mm3      Monocytes, Absolute 0.80 10*3/mm3      Eosinophils, Absolute 0.30 10*3/mm3      Basophils, Absolute 0.10 10*3/mm3      nRBC 0.0 /100 WBC     Blood Culture - Blood, Arm, Right [[id number]] Collected: 02/14/21 0350    Specimen: Blood from Arm, Right Updated: 02/17/21 0400     Blood Culture No growth at 3 days    Blood Culture - Blood, Arm, Left [[id number]] Collected: 02/14/21 0338    Specimen: Blood from Arm, Left Updated: 02/17/21 0400     Blood Culture No growth at 3 days    Extra Tubes [[id number]] Collected: 02/16/21 1108    Specimen: Blood, Venous Line Updated: 02/16/21 1215    Narrative:      The following orders were created for panel order Extra Tubes.  Procedure                               Abnormality         Status                     ---------                               -----------         ------                     Lavender Top[[id number]]                                     Final result               Green Top (Gel)[[id number]]                                  Final result                 Please view results for these tests on the individual orders.    Lavender Top [[id number]] Collected: 02/16/21 1108    Specimen: Blood Updated: 02/16/21 1215     Extra Tube hold for add-on     Comment: Auto resulted       Green Top (Gel) [[id number]] Collected: 02/16/21 1109    Specimen: Blood Updated: 02/16/21 1215     Extra Tube Hold for add-ons.     Comment: Auto resulted.       D-dimer, Quantitative [[id number]]  (Abnormal)  Collected: 02/16/21 1101    Specimen: Blood Updated: 02/16/21 1130     D-Dimer, Quantitative 0.62 mg/L (FEU)     Narrative:      Reference Range  --------------------------------------------------------------------     < 0.50   Negative Predictive Value  0.50-0.59   Indeterminate    >= 0.60   Probable VTE             A very low percentage of patients with DVT may yield D-Dimer results   below the cut-off of 0.50 mg/L FEU.  This is known to be more   prevalent in patients with distal DVT.             Results of this test should always be interpreted in conjunction with   the patient's medical history, clinical presentation and other   findings.  Clinical diagnosis should not be based on the result of   INNOVANCE D-Dimer alone.          Imaging Results (Last 24 Hours)     Procedure Component Value Units Date/Time    CT Chest Without Contrast Diagnostic [[id number]] Collected: 02/16/21 1143     Updated: 02/16/21 1213    Narrative:      CT CHEST WO CONTRAST DIAGNOSTIC-     Date of Exam: 2/16/2021 11:20 AM     Indication: Chest pain or SOB, pleurisy or effusion suspected;  J96.01-Acute respiratory failure with hypoxia; I50.9-Heart failure,  unspecified.  Shortness of air, chest pain, cough     Comparison: Radiograph 02/14/2021, CT 04/11/2020     Technique: Serial and axial CT images of the chest were obtained.  Reconstructions in the coronal and sagittal planes were performed.   Automated exposure control and iterative reconstruction methods were  used.     FINDINGS:     Hilum and Mediastinum: No pathologically enlarged lymph nodes. The heart  appears enlarged. Atherosclerotic calcifications are present. There is a  left sided AICD/pacemaker device. Tracheal calcifications are present.  Granulomatous calcifications are present. No evidence of significant  pericardial effusion. Vascular ectasia is present with no evidence of  aneurysmal dilatation.     Lung Parenchyma and Pleura: Small to moderate-sized bilateral  pleural  effusions are present, right greater than left. Mild intralobular septal  thickening is present. No focal consolidations identified. Mild  bibasilar atelectatic changes are present. No suspicious pulmonary  nodule identified. Evaluation is limited due to mild atelectatic changes  present.     Upper Abdomen: No acute process identified.     Soft tissues: Unremarkable.     Osseous structures: No aggressive focal lytic or sclerotic osseous  lesions. Multilevel degenerative changes are present throughout the  spine with a levoscoliotic curvature. Postsurgical changes are seen from  previous thoracolumbar fusion. No evidence of lucency surrounding the  visualized hardware. Disc spacer devices are present. The remaining  osseous structures appear unremarkable.       Impression:         1. Mild intralobular septal thickening with small to moderate-sized  bilateral pleural effusions, right greater than left, likely related to  a mild pulmonary edema pattern. No focal consolidations. Mild bibasilar  atelectatic changes are present. There is cardiomegaly.  2. Ancillary findings as described above.           Electronically Signed By-Monica Machuca MD On:2/16/2021 11:45 AM  This report was finalized on [id number] by  Monica Machuca MD.      LAB RESULTS (LAST 7 DAYS)    CBC  Results from last 7 days   Lab Units 02/17/21  0356 02/15/21  0324 02/14/21  0338   WBC 10*3/mm3 7.50 7.70 11.90*   RBC 10*6/mm3 3.83 3.57* 4.03   HEMOGLOBIN g/dL 11.0* 10.5* 11.7*   HEMATOCRIT % 33.5* 31.1* 35.1   MCV fL 87.4 87.0 87.1   PLATELETS 10*3/mm3 288 259 335       BMP  Results from last 7 days   Lab Units 02/17/21  0356 02/15/21  0324 02/14/21  0338   SODIUM mmol/L 138 139 138   POTASSIUM mmol/L 4.4 4.2 4.0   CHLORIDE mmol/L 102 103 102   CO2 mmol/L 29.0 27.0 26.0   BUN mg/dL 29* 27* 26*   CREATININE mg/dL 0.93 0.96 0.93   GLUCOSE mg/dL 93 92 103*   MAGNESIUM mg/dL  --  2.0  --        CMP   Results from last 7 days   Lab Units  02/17/21  0356 02/15/21  0324 02/14/21  0338   SODIUM mmol/L 138 139 138   POTASSIUM mmol/L 4.4 4.2 4.0   CHLORIDE mmol/L 102 103 102   CO2 mmol/L 29.0 27.0 26.0   BUN mg/dL 29* 27* 26*   CREATININE mg/dL 0.93 0.96 0.93   GLUCOSE mg/dL 93 92 103*   ALBUMIN g/dL  --   --  4.30   BILIRUBIN mg/dL  --   --  0.3   ALK PHOS U/L  --   --  91   AST (SGOT) U/L  --   --  17   ALT (SGPT) U/L  --   --  16         BNP        TROPONIN  Results from last 7 days   Lab Units 02/14/21  1112   TROPONIN T ng/mL <0.010       CoAg  Results from last 7 days   Lab Units 02/17/21  0357 02/16/21  0502 02/15/21  0324 02/14/21  0338   INR  1.48* 2.35 2.36 2.78   APTT seconds  --   --   --  35.4*       Creatinine Clearance  Estimated Creatinine Clearance: 30.8 mL/min (by C-G formula based on SCr of 0.93 mg/dL).    ABG        Radiology  Ct Chest Without Contrast Diagnostic    Result Date: 2/16/2021   1. Mild intralobular septal thickening with small to moderate-sized bilateral pleural effusions, right greater than left, likely related to a mild pulmonary edema pattern. No focal consolidations. Mild bibasilar atelectatic changes are present. There is cardiomegaly. 2. Ancillary findings as described above.    Electronically Signed By-Monica Machuca MD On:2/16/2021 11:45 AM This report was finalized on [id number] by  Monica Machuca MD.              EKG          I personally viewed and interpreted the patient's EKG/Telemetry data: Atrial paced ventricular sensed rhythm nonspecific ST-T wave abnormalities    ECHOCARDIOGRAM:    Results for orders placed during the hospital encounter of 02/14/21   Adult Transthoracic Echo Complete w/ Color, Spectral and Contrast if Necessary Per Protocol    Narrative · Estimated right ventricular systolic pressure from tricuspid   regurgitation is mildly elevated (35-45 mmHg).  · Mild pulmonary hypertension is present.  · Left ventricular diastolic function is consistent with (grade III w/high   LAP) reversible  restrictive pattern.  · Calculated left ventricular EF = 53% Estimated left ventricular EF was   in agreement with the calculated left ventricular EF.  · Left ventricular wall thickness is consistent with mild to moderate   concentric hypertrophy.  · Left atrial volume is moderately increased.     Normal LV systolic function EF 55%  Grade 2-3 diastolic dysfunction  Left atrial enlargement seen moderate by volume  Normal right atrium  Pacemaker lead seen in the right atrium and the right ventricle, not well   seen  No gross mobile echodensities or vegetations seen  Mild MR no stenosis  Normal aortic valve trileaflet opens well  Mild TR, mildly elevated PA systolic pressure by gradient, no stenosis  Pulmonic valve not well seen  No masses or effusion seen           STRESS MYOVIEW:    Cardiolite (Tc-99m Sestamibi) stress test    CARDIAC CATHETERIZATION:            OTHER:         Assessment/Plan     Active Problems:    Acute hypoxemic respiratory failure (CMS/[facility])      ////////////////////////////  Impression  ===============  -Chest discomfort-possible angina pectoris.  Troponin levels are negative.  EKG showed atrial paced ventricular sensed rhythm.      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  Pacemaker was reprogrammed to DDDR due to long AV delays.  Status post dual-chamber pacemaker pulse generator replacement (Medtronic MRI compatible)-10/9/2020      -history of intermittent atrial fibrillation.  Patient is in Sinus rhythm.     -Anticoagulation-patient is on Coumadin.     -moderate mitral regurgitation.  Echocardiogram 01/28/2019 revealed moderate mitral regurgitation left atrial enlargement normal left ventricle function.      -  Status post  subendocardial myocardial infarction -improved.     Cardiac catheterization 12/29/2015 revealed mild anterior wall hypokinesis with ejection fraction of 50%, 40% mid LAD 70-80% ostial diagonal branch disease (small  caliber vessel) and 80% circumflex distal to a large marginal branch.      -status post left carotid endarterectomy cervical spine surgery left hip replacement and recent scalp surgery for carcinoma and grafting.     -hypertension-not well controlled.      -allergy to codeine, morphine and Demerol.     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     ===============    Plan  ================  Chest discomfort-possible angina pectoris.  Troponin levels are negative.  EKG showed atrial paced ventricular sensed rhythm.  Lexiscan Cardiolite test-negative for myocardial ischemia 2/15/2021.  Hold off on cardiac catheterization at this time.    Status post pacemaker  Pacemaker site is normal.  Recent integration of the pacemaker revealed excellent pacing parameters.  Patient was in atrial fibrillation.     Anticoagulation status reviewed.  INR 2/14/2021-2.36.  2.35-2/15/2021  1.48-2/17/2021  Coumadin can be restarted since no invasive cardiac procedure is planned at this time.    Paroxysmal atrial fibrillation  Patient has converted and staying in sinus rhythm.    Mitral regurgitation-no clinical evidence for congestive heart failure..  Elevated proBNP.  Echocardiogram 2/14/2021 revealed normal left ventricle function left atrial enlargement.     Recent tiredness was probably partially related to atrial fibrillation.  Recently increased metoprolol tartrate to (25 mg tablet) 2 tablets in the morning and 1 tablet in the evening.    Hypoxia  Patient is on 2 L of oxygen.  Possible home oxygen.     Medications were reviewed and updated.     Further plan will depend on patient's progress.  ////////////////////////////         Mignon Luke MD  02/17/21  06:27 EST

## 2021-02-17 NOTE — PLAN OF CARE
Goal Outcome Evaluation:  Plan of Care Reviewed With: patient  Progress: improving  Outcome Summary: pt rested comfortably throughout shift, pt voiced no complaints, pt still requiring 2L o2 via nc, pt denied complaints of sob, will continue to monitor.

## 2021-02-17 NOTE — PROGRESS NOTES
Continued Stay Note  DALJIT Horton     Patient Name: Alexandrea Gaxiola  MRN: [id number]  Today's Date: 2/17/2021    Admit Date: 2/14/2021    Discharge Plan     Row Name 02/17/21 1435       Plan    Plan  Anticiapte routine home with family, [facility]  ordered and accepted. Walking oximetry ordered, pending.    Plan Comments  Secure chat message sent to RN and RT requesting walking oximetry results. Walking oximetry ordered on 2/16. Updated patient and family member at bedside of potential need for home oxygen and patient is agreeable and preferred to use [facility] if needed. Discharge anticipated today, awaiting MD rounds.        Met with patient in room wearing PPE: mask, goggles.     Maintained distance greater than six feet and spent less than 15 minutes in the room.            Karin Soto RN

## 2021-02-17 NOTE — PROGRESS NOTES
Exercise Oximetry    Patient Name:Alexandrea Gaxiola   MRN: [id number]   Date: 02/17/21             ROOM AIR BASELINE   SpO2% 94   Heart Rate    Blood Pressure      EXERCISE ON ROOM AIR SpO2% EXERCISE ON O2 @  LPM SpO2%   1 MINUTE 94 1 MINUTE    2 MINUTES 95 2 MINUTES    3 MINUTES 95 3 MINUTES    4 MINUTES 97 4 MINUTES    5 MINUTES 96 5 MINUTES    6 MINUTES 93 6 MINUTES               Distance Walked   Distance Walked   Dyspnea (Randa Scale)   Dyspnea (Randa Scale)   Fatigue (Randa Scale)  Fatigue (Randa Scale)   SpO2% Post Exercise   SpO2% Post Exercise   HR Post Exercise   HR Post Exercise   Time to Recovery   Time to Recovery     Comments: Safia Yang, CRT  15:57 EST  02/17/21   Pt did not drop below 93% during walk.

## 2021-02-17 NOTE — PLAN OF CARE
Goal Outcome Evaluation:        Outcome Summary: Pt in bed at this time, no s/s of SOA. Pt denies pain, pt continues on 2L O2 via nasal cannula with family at bedside. VS stable at this time. Possible discharge home today.

## 2021-02-18 ENCOUNTER — READMISSION MANAGEMENT (OUTPATIENT)
Dept: CALL CENTER | Facility: HOSPITAL | Age: 86
End: 2021-02-18

## 2021-02-18 NOTE — OUTREACH NOTE
Prep Survey      Responses   [facility]?  [facility]   Is LACE score < 7 ?  No   Emergency Room discharge w/ pulse ox?  No   Eligibility  Readm Mgmt   Discharge diagnosis  Acute diastolic CHF   Does the patient have one of the following disease processes/diagnoses(primary or secondary)?  CHF   Does the patient have Home health ordered?  Yes   What is the Home health agency?   [facility]    Is there a DME ordered?  No   Comments regarding appointments  Needs f/u scheduled   Medication alerts for this patient  Continue aspirin and coumadin.  Medications per AVS.   Prep survey completed?  Yes          Summer Burris RN

## 2021-02-18 NOTE — PROGRESS NOTES
Case Management Discharge Note      Final Note: Lucretia Horton     Provided Post Acute Provider List?: Yes  Post Acute Provider List: Home Health  Delivered To: Patient, Support Person  Method of Delivery: In person    Selected Continued Care - Discharged on 2/17/2021 Admission date: 2/14/2021 - Discharge disposition: Home or Self Care        Home Medical Care Coordination complete    Service Provider Selected Services Address Phone Fax Patient Preferred    [facility] [address] [id number] [phone] --                       Final Discharge Disposition Code: 06 - home with home health care

## 2021-02-19 LAB
BACTERIA SPEC AEROBE CULT: NORMAL
BACTERIA SPEC AEROBE CULT: NORMAL

## 2021-02-22 ENCOUNTER — READMISSION MANAGEMENT (OUTPATIENT)
Dept: CALL CENTER | Facility: HOSPITAL | Age: 86
End: 2021-02-22

## 2021-02-22 NOTE — OUTREACH NOTE
CHF Week 1 Survey      Responses   [facility] patient discharged from?  [facility]   Does the patient have one of the following disease processes/diagnoses(primary or secondary)?  CHF   CHF Week 1 attempt successful?  Yes   Call start time  0919   Call end time  0923   Discharge diagnosis  Acute diastolic CHF   Is patient permission given to speak with other caregiver?  Yes   List who call center can speak with  Celso- son in law or Qi- dtr   Person spoke with today (if not patient) and relationship  Celso   Meds reviewed with patient/caregiver?  Yes   Is the patient having any side effects they believe may be caused by any medication additions or changes?  No   Does the patient have all medications ordered at discharge?  Yes   Is the patient taking all medications as directed (includes completed medication regime)?  Yes   Does the patient have a primary care provider?   Yes   Does the patient have an appointment with their PCP within 7 days of discharge?  Yes   Comments regarding PCP  Telehealth appt scheduled 2/24   Has the patient kept scheduled appointments due by today?  N/A   What is the Home health agency?   [facility]    Has home health visited the patient within 72 hours of discharge?  Call prior to 72 hours   Pulse Ox monitoring  None   Psychosocial issues?  No   Comments  Pt's EL reports that she has SOA at baseline. Pt denies chest discomfort or other issues.    Did the patient receive a copy of their discharge instructions?  Yes   Nursing interventions  Reviewed instructions with patient   What is the patient's perception of their health status since discharge?  Improving   Nursing interventions  Nurse provided patient education   Is the patient weighing daily?  No   Does the patient have scales?  Yes   Daily weight interventions  Education provided on importance of daily weight   Is the patient able to teach back Heart Failure diet management?  Yes   Is the patient able to teach  back Heart Failure Zones?  Yes   Is the patient/caregiver able to teach back the hierarchy of who to call/visit for symptoms/problems? PCP, Specialist, Home health nurse, Urgent Care, ED, 911  Yes    CHF Week 1 call completed?  Yes   Wrap up additional comments  EL was in hurry to disconnect call          Ysable Roman RN

## 2021-02-25 ENCOUNTER — LAB REQUISITION (OUTPATIENT)
Dept: LAB | Facility: HOSPITAL | Age: 86
End: 2021-02-25

## 2021-02-25 ENCOUNTER — ANTICOAGULATION VISIT (OUTPATIENT)
Dept: CARDIOLOGY | Facility: CLINIC | Age: 86
End: 2021-02-25

## 2021-02-25 DIAGNOSIS — I48.91 ATRIAL FIBRILLATION, UNSPECIFIED TYPE (HCC): ICD-10-CM

## 2021-02-25 DIAGNOSIS — Z79.01 LONG TERM (CURRENT) USE OF ANTICOAGULANTS: ICD-10-CM

## 2021-02-25 DIAGNOSIS — Z00.00 ENCOUNTER FOR GENERAL ADULT MEDICAL EXAMINATION WITHOUT ABNORMAL FINDINGS: ICD-10-CM

## 2021-02-25 LAB
ANION GAP SERPL CALCULATED.3IONS-SCNC: 9 MMOL/L (ref 5–15)
BUN SERPL-MCNC: 29 MG/DL (ref 8–23)
BUN/CREAT SERPL: 25 (ref 7–25)
CALCIUM SPEC-SCNC: 9.3 MG/DL (ref 8.6–10.5)
CHLORIDE SERPL-SCNC: 101 MMOL/L (ref 98–107)
CO2 SERPL-SCNC: 29 MMOL/L (ref 22–29)
CREAT SERPL-MCNC: 1.16 MG/DL (ref 0.57–1)
GFR SERPL CREATININE-BSD FRML MDRD: 44 ML/MIN/1.73
GLUCOSE SERPL-MCNC: 116 MG/DL (ref 65–99)
INR PPP: 2
POTASSIUM SERPL-SCNC: 4.4 MMOL/L (ref 3.5–5.2)
SODIUM SERPL-SCNC: 139 MMOL/L (ref 136–145)

## 2021-02-25 PROCEDURE — 80048 BASIC METABOLIC PNL TOTAL CA: CPT | Performed by: FAMILY MEDICINE

## 2021-03-03 ENCOUNTER — READMISSION MANAGEMENT (OUTPATIENT)
Dept: CALL CENTER | Facility: HOSPITAL | Age: 86
End: 2021-03-03

## 2021-03-03 NOTE — OUTREACH NOTE
CHF Week 2 Survey      Responses   [facility] patient discharged from?  [facility]   Does the patient have one of the following disease processes/diagnoses(primary or secondary)?  CHF   Week 2 attempt successful?  Yes   Call start time  0955   Call end time  1000   Discharge diagnosis  Acute diastolic CHF   Person spoke with today (if not patient) and relationship  Celso Magallon reviewed with patient/caregiver?  Yes   Is the patient taking all medications as directed (includes completed medication regime)?  No   What is preventing the patient from taking all medications as directed?  Memory issues [Pt forgets meds sometimes. Celso tries to monitor. ]   Nursing Interventions  Nurse provided patient education   Has the patient kept scheduled appointments due by today?  Yes   Pulse Ox monitoring  Intermittent   Pulse Ox device source  Other   O2 Sat comments  home health checks O2 sats   What is the patient's perception of their health status since discharge?  Improving   Nursing interventions  Nurse provided patient education   Is the patient weighing daily?  No [She does weight however not every day. She's not weighed this morning]   Daily weight interventions  Education provided on importance of daily weight   Is the patient able to teach back Heart Failure Zones?  Yes   Is the patient able to teach back signs and symptoms of worsening condition? (i.e. weight gain, shortness of air, etc.)  Yes   If the patient is a current smoker, are they able to teach back resources for cessation?  Not a smoker   CHF Week 2 call completed?  Yes          Yanet Chowdary RN

## 2021-03-11 ENCOUNTER — ANTICOAGULATION VISIT (OUTPATIENT)
Dept: CARDIOLOGY | Facility: CLINIC | Age: 86
End: 2021-03-11

## 2021-03-11 ENCOUNTER — READMISSION MANAGEMENT (OUTPATIENT)
Dept: CALL CENTER | Facility: HOSPITAL | Age: 86
End: 2021-03-11

## 2021-03-11 DIAGNOSIS — Z79.01 LONG TERM (CURRENT) USE OF ANTICOAGULANTS: ICD-10-CM

## 2021-03-11 DIAGNOSIS — I48.91 ATRIAL FIBRILLATION, UNSPECIFIED TYPE (HCC): ICD-10-CM

## 2021-03-11 LAB — INR PPP: 2.3

## 2021-03-11 NOTE — OUTREACH NOTE
CHF Week 3 Survey      Responses   [facility] patient discharged from?  [facility]   Does the patient have one of the following disease processes/diagnoses(primary or secondary)?  CHF   Week 3 attempt successful?  Yes   Call start time  1613   Call end time  1616   Discharge diagnosis  Acute diastolic CHF   Is patient permission given to speak with other caregiver?  Yes   Person spoke with today (if not patient) and relationship  Celso   Meds reviewed with patient/caregiver?  Yes   Is the patient having any side effects they believe may be caused by any medication additions or changes?  No   Does the patient have all medications ordered at discharge?  Yes   Is the patient taking all medications as directed (includes completed medication regime)?  Yes   Does the patient have a primary care provider?   Yes   Does the patient have an appointment with their PCP within 7 days of discharge?  Yes   Has the patient kept scheduled appointments due by today?  Yes   What is the Home health agency?   [facility]    Has home health visited the patient within 72 hours of discharge?  Yes   Pulse Ox monitoring  None   Psychosocial issues?  No   Did the patient receive a copy of their discharge instructions?  Yes   Nursing interventions  Reviewed instructions with patient   What is the patient's perception of their health status since discharge?  Improving   Nursing interventions  Nurse provided patient education   Is the patient weighing daily?  Yes   Does the patient have scales?  Yes   Daily weight interventions  Education provided on importance of daily weight   Is the patient able to teach back Heart Failure diet management?  Yes   Is the patient able to teach back Heart Failure Zones?  Yes   Is the patient able to teach back signs and symptoms of worsening condition? (i.e. weight gain, shortness of air, etc.)  Yes   If the patient is a current smoker, are they able to teach back resources for cessation?  Not  a smoker   Is the patient/caregiver able to teach back the hierarchy of who to call/visit for symptoms/problems? PCP, Specialist, Home health nurse, Urgent Care, ED, 911  Yes   CHF Week 3 call completed?  Yes          Fredrick Harding RN

## 2021-03-19 ENCOUNTER — READMISSION MANAGEMENT (OUTPATIENT)
Dept: CALL CENTER | Facility: HOSPITAL | Age: 86
End: 2021-03-19

## 2021-03-19 NOTE — OUTREACH NOTE
CHF Week 4 Survey      Responses   [facility] patient discharged from?  [facility]   Does the patient have one of the following disease processes/diagnoses(primary or secondary)?  CHF   Week 4 attempt successful?  Yes   Call start time  0952   Call end time  0956   Discharge diagnosis  Acute diastolic CHF   Is patient permission given to speak with other caregiver?  Yes   List who call center can speak with  Celso- son in law or Qi- dtr   Meds reviewed with patient/caregiver?  Yes   Is the patient having any side effects they believe may be caused by any medication additions or changes?  No   Is the patient taking all medications as directed (includes completed medication regime)?  Yes   Has the patient kept scheduled appointments due by today?  Yes   Is the patient still receiving Home Health Services?  Yes   Pulse Ox monitoring  None   Psychosocial issues?  No   Comments  PT is still coming. She is improving and has gotten stronger he says.   What is the patient's perception of their health status since discharge?  Improving   Nursing interventions  Nurse provided patient education   Is the patient weighing daily?  Yes   Does the patient have scales?  Yes   Daily weight interventions  Education provided on importance of daily weight   Is the patient able to teach back Heart Failure diet management?  Yes   Is the patient able to teach back Heart Failure Zones?  Yes   Is the patient able to teach back signs and symptoms of worsening condition? (i.e. weight gain, shortness of air, etc.)  Yes   If the patient is a current smoker, are they able to teach back resources for cessation?  Not a smoker   Is the patient/caregiver able to teach back the hierarchy of who to call/visit for symptoms/problems? PCP, Specialist, Home health nurse, Urgent Care, ED, 911  Yes   Additional teach back comments  Avoids salt, encouraged daily weights, no weight gain since d/c.   Week 4 Call Completed?  Yes   Would the patient like one  additional call?  No   Graduated  Yes   Is the patient interested in additional calls from an ambulatory ?  NOTE:  applies to high risk patients requiring additional follow-up.  No   Did the patient feel the follow up calls were helpful during their recovery period?  Yes   Was the number of calls appropriate?  Yes   Wrap up additional comments  Encouraging to keep up and walking.          Leena Castillo RN

## 2021-03-30 RX ORDER — WARFARIN SODIUM 2.5 MG/1
2.5 TABLET ORAL NIGHTLY
Qty: 90 TABLET | Refills: 0 | Status: SHIPPED | OUTPATIENT
Start: 2021-03-30 | End: 2021-06-28

## 2021-05-02 PROCEDURE — 93294 REM INTERROG EVL PM/LDLS PM: CPT | Performed by: INTERNAL MEDICINE

## 2021-05-02 PROCEDURE — 93296 REM INTERROG EVL PM/IDS: CPT | Performed by: INTERNAL MEDICINE

## 2021-05-03 ENCOUNTER — ANTICOAGULATION VISIT (OUTPATIENT)
Dept: CARDIOLOGY | Facility: CLINIC | Age: 86
End: 2021-05-03

## 2021-05-03 VITALS
WEIGHT: 101 LBS | HEART RATE: 65 BPM | SYSTOLIC BLOOD PRESSURE: 126 MMHG | BODY MASS INDEX: 20.4 KG/M2 | TEMPERATURE: 97.7 F | DIASTOLIC BLOOD PRESSURE: 57 MMHG

## 2021-05-03 DIAGNOSIS — Z79.01 LONG TERM (CURRENT) USE OF ANTICOAGULANTS: Primary | ICD-10-CM

## 2021-05-03 LAB — INR PPP: 2.6 (ref 0.9–1.1)

## 2021-05-03 PROCEDURE — 36416 COLLJ CAPILLARY BLOOD SPEC: CPT | Performed by: INTERNAL MEDICINE

## 2021-05-03 PROCEDURE — 85610 PROTHROMBIN TIME: CPT | Performed by: INTERNAL MEDICINE

## 2021-06-09 ENCOUNTER — ANTICOAGULATION VISIT (OUTPATIENT)
Dept: CARDIOLOGY | Facility: CLINIC | Age: 86
End: 2021-06-09

## 2021-06-09 DIAGNOSIS — I48.91 ATRIAL FIBRILLATION, UNSPECIFIED TYPE (HCC): Primary | ICD-10-CM

## 2021-06-09 DIAGNOSIS — Z79.01 LONG TERM (CURRENT) USE OF ANTICOAGULANTS: ICD-10-CM

## 2021-06-09 LAB
INR PPP: 2.1
INR PPP: 2.1

## 2021-06-17 ENCOUNTER — ANTICOAGULATION VISIT (OUTPATIENT)
Dept: CARDIOLOGY | Facility: CLINIC | Age: 86
End: 2021-06-17

## 2021-06-17 DIAGNOSIS — Z79.01 LONG TERM (CURRENT) USE OF ANTICOAGULANTS: Primary | ICD-10-CM

## 2021-06-17 LAB — INR PPP: 2.7

## 2021-06-28 RX ORDER — WARFARIN SODIUM 2.5 MG/1
TABLET ORAL
Qty: 90 TABLET | Refills: 0 | Status: SHIPPED | OUTPATIENT
Start: 2021-06-28 | End: 2021-11-29 | Stop reason: DRUGHIGH

## 2021-07-08 ENCOUNTER — ANTICOAGULATION VISIT (OUTPATIENT)
Dept: CARDIOLOGY | Facility: CLINIC | Age: 86
End: 2021-07-08

## 2021-07-08 DIAGNOSIS — Z79.01 LONG TERM (CURRENT) USE OF ANTICOAGULANTS: Primary | ICD-10-CM

## 2021-07-08 LAB — INR PPP: 1.7

## 2021-07-09 ENCOUNTER — LAB (OUTPATIENT)
Dept: LAB | Facility: HOSPITAL | Age: 86
End: 2021-07-09

## 2021-07-09 ENCOUNTER — TRANSCRIBE ORDERS (OUTPATIENT)
Dept: LAB | Facility: HOSPITAL | Age: 86
End: 2021-07-09

## 2021-07-09 DIAGNOSIS — E55.9 VITAMIN D DEFICIENCY: ICD-10-CM

## 2021-07-09 DIAGNOSIS — I10 ESSENTIAL HYPERTENSION, MALIGNANT: ICD-10-CM

## 2021-07-09 DIAGNOSIS — N18.2 CHRONIC KIDNEY DISEASE, STAGE II (MILD): ICD-10-CM

## 2021-07-09 DIAGNOSIS — N18.2 CHRONIC KIDNEY DISEASE, STAGE II (MILD): Primary | ICD-10-CM

## 2021-07-09 LAB
ANION GAP SERPL CALCULATED.3IONS-SCNC: 8.3 MMOL/L (ref 5–15)
BASOPHILS # BLD AUTO: 0.06 10*3/MM3 (ref 0–0.2)
BASOPHILS NFR BLD AUTO: 0.7 % (ref 0–1.5)
BILIRUB UR QL STRIP: NEGATIVE
BUN SERPL-MCNC: 22 MG/DL (ref 8–23)
BUN/CREAT SERPL: 19.8 (ref 7–25)
CALCIUM SPEC-SCNC: 9.5 MG/DL (ref 8.6–10.5)
CHLORIDE SERPL-SCNC: 101 MMOL/L (ref 98–107)
CLARITY UR: CLEAR
CO2 SERPL-SCNC: 28.7 MMOL/L (ref 22–29)
COLOR UR: YELLOW
CREAT SERPL-MCNC: 1.11 MG/DL (ref 0.57–1)
CREAT UR-MCNC: 65.2 MG/DL
DEPRECATED RDW RBC AUTO: 46.6 FL (ref 37–54)
EOSINOPHIL # BLD AUTO: 0.19 10*3/MM3 (ref 0–0.4)
EOSINOPHIL NFR BLD AUTO: 2.2 % (ref 0.3–6.2)
ERYTHROCYTE [DISTWIDTH] IN BLOOD BY AUTOMATED COUNT: 14.5 % (ref 12.3–15.4)
GFR SERPL CREATININE-BSD FRML MDRD: 46 ML/MIN/1.73
GLUCOSE SERPL-MCNC: 145 MG/DL (ref 65–99)
GLUCOSE UR STRIP-MCNC: NEGATIVE MG/DL
HCT VFR BLD AUTO: 38.4 % (ref 34–46.6)
HGB BLD-MCNC: 12.5 G/DL (ref 12–15.9)
HGB UR QL STRIP.AUTO: NEGATIVE
IMM GRANULOCYTES # BLD AUTO: 0.06 10*3/MM3 (ref 0–0.05)
IMM GRANULOCYTES NFR BLD AUTO: 0.7 % (ref 0–0.5)
KETONES UR QL STRIP: NEGATIVE
LEUKOCYTE ESTERASE UR QL STRIP.AUTO: NEGATIVE
LYMPHOCYTES # BLD AUTO: 0.97 10*3/MM3 (ref 0.7–3.1)
LYMPHOCYTES NFR BLD AUTO: 11.3 % (ref 19.6–45.3)
MCH RBC QN AUTO: 28.9 PG (ref 26.6–33)
MCHC RBC AUTO-ENTMCNC: 32.6 G/DL (ref 31.5–35.7)
MCV RBC AUTO: 88.9 FL (ref 79–97)
MONOCYTES # BLD AUTO: 0.69 10*3/MM3 (ref 0.1–0.9)
MONOCYTES NFR BLD AUTO: 8 % (ref 5–12)
NEUTROPHILS NFR BLD AUTO: 6.64 10*3/MM3 (ref 1.7–7)
NEUTROPHILS NFR BLD AUTO: 77.1 % (ref 42.7–76)
NITRITE UR QL STRIP: NEGATIVE
NRBC BLD AUTO-RTO: 0 /100 WBC (ref 0–0.2)
PH UR STRIP.AUTO: 6 [PH] (ref 5–8)
PLATELET # BLD AUTO: 321 10*3/MM3 (ref 140–450)
PMV BLD AUTO: 9.1 FL (ref 6–12)
POTASSIUM SERPL-SCNC: 4.8 MMOL/L (ref 3.5–5.2)
PROT UR QL STRIP: ABNORMAL
PROT UR-MCNC: 18 MG/DL
PROT/CREAT UR: 276.1 MG/G CREA (ref 0–200)
RBC # BLD AUTO: 4.32 10*6/MM3 (ref 3.77–5.28)
SODIUM SERPL-SCNC: 138 MMOL/L (ref 136–145)
SP GR UR STRIP: 1.01 (ref 1–1.03)
UROBILINOGEN UR QL STRIP: ABNORMAL
WBC # BLD AUTO: 8.61 10*3/MM3 (ref 3.4–10.8)

## 2021-07-09 PROCEDURE — 80048 BASIC METABOLIC PNL TOTAL CA: CPT

## 2021-07-09 PROCEDURE — 84156 ASSAY OF PROTEIN URINE: CPT

## 2021-07-09 PROCEDURE — 85025 COMPLETE CBC W/AUTO DIFF WBC: CPT

## 2021-07-09 PROCEDURE — 82570 ASSAY OF URINE CREATININE: CPT

## 2021-07-09 PROCEDURE — 36415 COLL VENOUS BLD VENIPUNCTURE: CPT

## 2021-07-09 PROCEDURE — 81003 URINALYSIS AUTO W/O SCOPE: CPT

## 2021-08-01 PROCEDURE — 93294 REM INTERROG EVL PM/LDLS PM: CPT | Performed by: INTERNAL MEDICINE

## 2021-08-01 PROCEDURE — 93296 REM INTERROG EVL PM/IDS: CPT | Performed by: INTERNAL MEDICINE

## 2021-08-02 ENCOUNTER — ANTICOAGULATION VISIT (OUTPATIENT)
Dept: CARDIOLOGY | Facility: CLINIC | Age: 86
End: 2021-08-02

## 2021-08-02 DIAGNOSIS — Z79.01 LONG TERM (CURRENT) USE OF ANTICOAGULANTS: Primary | ICD-10-CM

## 2021-08-02 LAB — INR PPP: 2.4

## 2021-08-04 RX ORDER — AMLODIPINE BESYLATE 5 MG/1
TABLET ORAL
Qty: 90 TABLET | Refills: 3 | Status: SHIPPED | OUTPATIENT
Start: 2021-08-04 | End: 2022-01-03 | Stop reason: HOSPADM

## 2021-08-13 ENCOUNTER — HOSPITAL ENCOUNTER (INPATIENT)
Facility: HOSPITAL | Age: 86
LOS: 9 days | Discharge: NURSING, REHAB OR LONG-TERM CARE | End: 2021-08-24
Attending: INTERNAL MEDICINE | Admitting: INTERNAL MEDICINE

## 2021-08-13 DIAGNOSIS — J18.9 PNEUMONIA DUE TO INFECTIOUS ORGANISM, UNSPECIFIED LATERALITY, UNSPECIFIED PART OF LUNG: ICD-10-CM

## 2021-08-13 DIAGNOSIS — J96.01 ACUTE HYPOXEMIC RESPIRATORY FAILURE (HCC): Primary | ICD-10-CM

## 2021-08-13 PROBLEM — R63.4 UNINTENTIONAL WEIGHT LOSS: Status: ACTIVE | Noted: 2021-08-13

## 2021-08-13 LAB
AMYLASE SERPL-CCNC: 134 U/L (ref 28–100)
INR PPP: 2.44 (ref 2–3)
PROTHROMBIN TIME: 25.4 SECONDS (ref 19.4–28.5)
SARS-COV-2 RNA PNL SPEC NAA+PROBE: NOT DETECTED
TSH SERPL DL<=0.05 MIU/L-ACNC: 2.44 UIU/ML (ref 0.27–4.2)

## 2021-08-13 PROCEDURE — 84443 ASSAY THYROID STIM HORMONE: CPT | Performed by: INTERNAL MEDICINE

## 2021-08-13 PROCEDURE — U0005 INFEC AGEN DETEC AMPLI PROBE: HCPCS | Performed by: INTERNAL MEDICINE

## 2021-08-13 PROCEDURE — 85610 PROTHROMBIN TIME: CPT | Performed by: INTERNAL MEDICINE

## 2021-08-13 PROCEDURE — 82150 ASSAY OF AMYLASE: CPT | Performed by: INTERNAL MEDICINE

## 2021-08-13 PROCEDURE — U0003 INFECTIOUS AGENT DETECTION BY NUCLEIC ACID (DNA OR RNA); SEVERE ACUTE RESPIRATORY SYNDROME CORONAVIRUS 2 (SARS-COV-2) (CORONAVIRUS DISEASE [COVID-19]), AMPLIFIED PROBE TECHNIQUE, MAKING USE OF HIGH THROUGHPUT TECHNOLOGIES AS DESCRIBED BY CMS-2020-01-R: HCPCS | Performed by: INTERNAL MEDICINE

## 2021-08-13 PROCEDURE — G0378 HOSPITAL OBSERVATION PER HR: HCPCS

## 2021-08-13 RX ORDER — SODIUM CHLORIDE 0.9 % (FLUSH) 0.9 %
3 SYRINGE (ML) INJECTION EVERY 12 HOURS SCHEDULED
Status: DISCONTINUED | OUTPATIENT
Start: 2021-08-13 | End: 2021-08-24 | Stop reason: HOSPADM

## 2021-08-13 RX ORDER — WARFARIN SODIUM 2.5 MG/1
2.5 TABLET ORAL
Status: DISCONTINUED | OUTPATIENT
Start: 2021-08-14 | End: 2021-08-24 | Stop reason: HOSPADM

## 2021-08-13 RX ORDER — SODIUM CHLORIDE 0.9 % (FLUSH) 0.9 %
3 SYRINGE (ML) INJECTION EVERY 12 HOURS SCHEDULED
Status: DISCONTINUED | OUTPATIENT
Start: 2021-08-13 | End: 2021-08-13

## 2021-08-13 RX ORDER — SODIUM CHLORIDE 0.9 % (FLUSH) 0.9 %
3-10 SYRINGE (ML) INJECTION AS NEEDED
Status: DISCONTINUED | OUTPATIENT
Start: 2021-08-13 | End: 2021-08-13

## 2021-08-13 RX ORDER — TRAMADOL HYDROCHLORIDE 50 MG/1
50 TABLET ORAL EVERY 8 HOURS PRN
Status: DISCONTINUED | OUTPATIENT
Start: 2021-08-13 | End: 2021-08-24 | Stop reason: HOSPADM

## 2021-08-13 RX ORDER — SODIUM CHLORIDE 0.9 % (FLUSH) 0.9 %
3-10 SYRINGE (ML) INJECTION AS NEEDED
Status: DISCONTINUED | OUTPATIENT
Start: 2021-08-13 | End: 2021-08-24 | Stop reason: HOSPADM

## 2021-08-13 RX ORDER — ONDANSETRON 2 MG/ML
4 INJECTION INTRAMUSCULAR; INTRAVENOUS EVERY 6 HOURS PRN
Status: DISCONTINUED | OUTPATIENT
Start: 2021-08-13 | End: 2021-08-24 | Stop reason: HOSPADM

## 2021-08-13 RX ORDER — SODIUM CHLORIDE 9 MG/ML
100 INJECTION, SOLUTION INTRAVENOUS CONTINUOUS
Status: DISCONTINUED | OUTPATIENT
Start: 2021-08-13 | End: 2021-08-16

## 2021-08-13 RX ORDER — ASPIRIN 81 MG/1
81 TABLET ORAL DAILY
Status: DISCONTINUED | OUTPATIENT
Start: 2021-08-14 | End: 2021-08-24 | Stop reason: HOSPADM

## 2021-08-13 RX ORDER — ACETAMINOPHEN 325 MG/1
650 TABLET ORAL EVERY 4 HOURS PRN
Status: DISCONTINUED | OUTPATIENT
Start: 2021-08-13 | End: 2021-08-24 | Stop reason: HOSPADM

## 2021-08-13 RX ORDER — AMLODIPINE BESYLATE 5 MG/1
5 TABLET ORAL DAILY
Status: DISCONTINUED | OUTPATIENT
Start: 2021-08-14 | End: 2021-08-24 | Stop reason: HOSPADM

## 2021-08-13 RX ORDER — DIGOXIN 125 MCG
125 TABLET ORAL
Status: DISCONTINUED | OUTPATIENT
Start: 2021-08-15 | End: 2021-08-15

## 2021-08-13 RX ORDER — FAMOTIDINE 20 MG/1
40 TABLET, FILM COATED ORAL DAILY
Status: DISCONTINUED | OUTPATIENT
Start: 2021-08-13 | End: 2021-08-18

## 2021-08-13 RX ORDER — DONEPEZIL HYDROCHLORIDE 5 MG/1
5 TABLET, FILM COATED ORAL NIGHTLY
Status: DISCONTINUED | OUTPATIENT
Start: 2021-08-13 | End: 2021-08-24 | Stop reason: HOSPADM

## 2021-08-13 RX ORDER — LISINOPRIL 5 MG/1
5 TABLET ORAL DAILY
Status: DISCONTINUED | OUTPATIENT
Start: 2021-08-13 | End: 2021-08-24 | Stop reason: HOSPADM

## 2021-08-13 RX ADMIN — Medication 3 ML: at 22:01

## 2021-08-13 RX ADMIN — FAMOTIDINE 40 MG: 20 TABLET, FILM COATED ORAL at 22:01

## 2021-08-13 RX ADMIN — METOPROLOL TARTRATE 25 MG: 25 TABLET, FILM COATED ORAL at 22:01

## 2021-08-13 RX ADMIN — SODIUM CHLORIDE 100 ML/HR: 9 INJECTION, SOLUTION INTRAVENOUS at 22:44

## 2021-08-13 RX ADMIN — DONEPEZIL HYDROCHLORIDE 5 MG: 5 TABLET, FILM COATED ORAL at 22:01

## 2021-08-14 ENCOUNTER — APPOINTMENT (OUTPATIENT)
Dept: CT IMAGING | Facility: HOSPITAL | Age: 86
End: 2021-08-14

## 2021-08-14 LAB
ALBUMIN SERPL-MCNC: 3.2 G/DL (ref 3.5–5.2)
ALBUMIN SERPL-MCNC: 3.2 G/DL (ref 3.5–5.2)
ALBUMIN/GLOB SERPL: 0.9 G/DL
ALBUMIN/GLOB SERPL: 0.9 G/DL
ALP SERPL-CCNC: 89 U/L (ref 39–117)
ALP SERPL-CCNC: 90 U/L (ref 39–117)
ALT SERPL W P-5'-P-CCNC: 12 U/L (ref 1–33)
ALT SERPL W P-5'-P-CCNC: 14 U/L (ref 1–33)
ANION GAP SERPL CALCULATED.3IONS-SCNC: 7 MMOL/L (ref 5–15)
ANION GAP SERPL CALCULATED.3IONS-SCNC: 8 MMOL/L (ref 5–15)
AST SERPL-CCNC: 13 U/L (ref 1–32)
AST SERPL-CCNC: 14 U/L (ref 1–32)
BASOPHILS # BLD AUTO: 0.1 10*3/MM3 (ref 0–0.2)
BASOPHILS # BLD AUTO: 0.1 10*3/MM3 (ref 0–0.2)
BASOPHILS NFR BLD AUTO: 0.8 % (ref 0–1.5)
BASOPHILS NFR BLD AUTO: 0.9 % (ref 0–1.5)
BILIRUB SERPL-MCNC: 0.2 MG/DL (ref 0–1.2)
BILIRUB SERPL-MCNC: 0.2 MG/DL (ref 0–1.2)
BILIRUB UR QL STRIP: NEGATIVE
BUN SERPL-MCNC: 19 MG/DL (ref 8–23)
BUN SERPL-MCNC: 21 MG/DL (ref 8–23)
BUN/CREAT SERPL: 22.6 (ref 7–25)
BUN/CREAT SERPL: 26 (ref 7–25)
CALCIUM SPEC-SCNC: 8.4 MG/DL (ref 8.6–10.5)
CALCIUM SPEC-SCNC: 8.6 MG/DL (ref 8.6–10.5)
CHLORIDE SERPL-SCNC: 102 MMOL/L (ref 98–107)
CHLORIDE SERPL-SCNC: 105 MMOL/L (ref 98–107)
CLARITY UR: ABNORMAL
CO2 SERPL-SCNC: 26 MMOL/L (ref 22–29)
CO2 SERPL-SCNC: 27 MMOL/L (ref 22–29)
COLOR UR: YELLOW
CREAT SERPL-MCNC: 0.73 MG/DL (ref 0.57–1)
CREAT SERPL-MCNC: 0.93 MG/DL (ref 0.57–1)
DEPRECATED RDW RBC AUTO: 45.1 FL (ref 37–54)
DEPRECATED RDW RBC AUTO: 45.9 FL (ref 37–54)
DIGOXIN SERPL-MCNC: 1.1 NG/ML (ref 0.6–1.2)
DIGOXIN SERPL-MCNC: 1.7 NG/ML (ref 0.6–1.2)
EOSINOPHIL # BLD AUTO: 0.3 10*3/MM3 (ref 0–0.4)
EOSINOPHIL # BLD AUTO: 0.4 10*3/MM3 (ref 0–0.4)
EOSINOPHIL NFR BLD AUTO: 4.3 % (ref 0.3–6.2)
EOSINOPHIL NFR BLD AUTO: 5.8 % (ref 0.3–6.2)
ERYTHROCYTE [DISTWIDTH] IN BLOOD BY AUTOMATED COUNT: 15.4 % (ref 12.3–15.4)
ERYTHROCYTE [DISTWIDTH] IN BLOOD BY AUTOMATED COUNT: 15.4 % (ref 12.3–15.4)
GFR SERPL CREATININE-BSD FRML MDRD: 57 ML/MIN/1.73
GFR SERPL CREATININE-BSD FRML MDRD: 75 ML/MIN/1.73
GLOBULIN UR ELPH-MCNC: 3.4 GM/DL
GLOBULIN UR ELPH-MCNC: 3.5 GM/DL
GLUCOSE SERPL-MCNC: 123 MG/DL (ref 65–99)
GLUCOSE SERPL-MCNC: 92 MG/DL (ref 65–99)
GLUCOSE UR STRIP-MCNC: NEGATIVE MG/DL
HCT VFR BLD AUTO: 32.4 % (ref 34–46.6)
HCT VFR BLD AUTO: 32.9 % (ref 34–46.6)
HGB BLD-MCNC: 10.9 G/DL (ref 12–15.9)
HGB BLD-MCNC: 10.9 G/DL (ref 12–15.9)
HGB UR QL STRIP.AUTO: NEGATIVE
INR PPP: 2.3 (ref 2–3)
INR PPP: 2.48 (ref 2–3)
KETONES UR QL STRIP: NEGATIVE
LEUKOCYTE ESTERASE UR QL STRIP.AUTO: NEGATIVE
LYMPHOCYTES # BLD AUTO: 0.9 10*3/MM3 (ref 0.7–3.1)
LYMPHOCYTES # BLD AUTO: 1.2 10*3/MM3 (ref 0.7–3.1)
LYMPHOCYTES NFR BLD AUTO: 13.2 % (ref 19.6–45.3)
LYMPHOCYTES NFR BLD AUTO: 16.3 % (ref 19.6–45.3)
MAGNESIUM SERPL-MCNC: 1.8 MG/DL (ref 1.6–2.4)
MCH RBC QN AUTO: 27.9 PG (ref 26.6–33)
MCH RBC QN AUTO: 28.1 PG (ref 26.6–33)
MCHC RBC AUTO-ENTMCNC: 33 G/DL (ref 31.5–35.7)
MCHC RBC AUTO-ENTMCNC: 33.6 G/DL (ref 31.5–35.7)
MCV RBC AUTO: 83.8 FL (ref 79–97)
MCV RBC AUTO: 84.5 FL (ref 79–97)
MONOCYTES # BLD AUTO: 0.6 10*3/MM3 (ref 0.1–0.9)
MONOCYTES # BLD AUTO: 0.7 10*3/MM3 (ref 0.1–0.9)
MONOCYTES NFR BLD AUTO: 9.4 % (ref 5–12)
MONOCYTES NFR BLD AUTO: 9.8 % (ref 5–12)
NEUTROPHILS NFR BLD AUTO: 4.9 10*3/MM3 (ref 1.7–7)
NEUTROPHILS NFR BLD AUTO: 5 10*3/MM3 (ref 1.7–7)
NEUTROPHILS NFR BLD AUTO: 67.2 % (ref 42.7–76)
NEUTROPHILS NFR BLD AUTO: 72.3 % (ref 42.7–76)
NITRITE UR QL STRIP: NEGATIVE
NRBC BLD AUTO-RTO: 0 /100 WBC (ref 0–0.2)
NRBC BLD AUTO-RTO: 0.1 /100 WBC (ref 0–0.2)
PH UR STRIP.AUTO: 6 [PH] (ref 5–8)
PLATELET # BLD AUTO: 302 10*3/MM3 (ref 140–450)
PLATELET # BLD AUTO: 311 10*3/MM3 (ref 140–450)
PMV BLD AUTO: 6.6 FL (ref 6–12)
PMV BLD AUTO: 6.7 FL (ref 6–12)
POTASSIUM SERPL-SCNC: 4.3 MMOL/L (ref 3.5–5.2)
POTASSIUM SERPL-SCNC: 4.4 MMOL/L (ref 3.5–5.2)
PROT SERPL-MCNC: 6.6 G/DL (ref 6–8.5)
PROT SERPL-MCNC: 6.7 G/DL (ref 6–8.5)
PROT UR QL STRIP: NEGATIVE
PROTHROMBIN TIME: 24 SECONDS (ref 19.4–28.5)
PROTHROMBIN TIME: 25.7 SECONDS (ref 19.4–28.5)
RBC # BLD AUTO: 3.86 10*6/MM3 (ref 3.77–5.28)
RBC # BLD AUTO: 3.89 10*6/MM3 (ref 3.77–5.28)
SODIUM SERPL-SCNC: 136 MMOL/L (ref 136–145)
SODIUM SERPL-SCNC: 139 MMOL/L (ref 136–145)
SP GR UR STRIP: 1.02 (ref 1–1.03)
UROBILINOGEN UR QL STRIP: ABNORMAL
WBC # BLD AUTO: 6.9 10*3/MM3 (ref 3.4–10.8)
WBC # BLD AUTO: 7.2 10*3/MM3 (ref 3.4–10.8)

## 2021-08-14 PROCEDURE — 83735 ASSAY OF MAGNESIUM: CPT | Performed by: INTERNAL MEDICINE

## 2021-08-14 PROCEDURE — 81003 URINALYSIS AUTO W/O SCOPE: CPT | Performed by: INTERNAL MEDICINE

## 2021-08-14 PROCEDURE — G0378 HOSPITAL OBSERVATION PER HR: HCPCS

## 2021-08-14 PROCEDURE — 74176 CT ABD & PELVIS W/O CONTRAST: CPT

## 2021-08-14 PROCEDURE — 80053 COMPREHEN METABOLIC PANEL: CPT | Performed by: INTERNAL MEDICINE

## 2021-08-14 PROCEDURE — 85610 PROTHROMBIN TIME: CPT | Performed by: INTERNAL MEDICINE

## 2021-08-14 PROCEDURE — 80162 ASSAY OF DIGOXIN TOTAL: CPT | Performed by: INTERNAL MEDICINE

## 2021-08-14 PROCEDURE — 85025 COMPLETE CBC W/AUTO DIFF WBC: CPT | Performed by: FAMILY MEDICINE

## 2021-08-14 PROCEDURE — 25010000002 ONDANSETRON PER 1 MG: Performed by: INTERNAL MEDICINE

## 2021-08-14 PROCEDURE — 85025 COMPLETE CBC W/AUTO DIFF WBC: CPT | Performed by: INTERNAL MEDICINE

## 2021-08-14 RX ADMIN — WARFARIN 2.5 MG: 2.5 TABLET ORAL at 17:33

## 2021-08-14 RX ADMIN — ASPIRIN 81 MG: 81 TABLET, COATED ORAL at 15:03

## 2021-08-14 RX ADMIN — LISINOPRIL 5 MG: 5 TABLET ORAL at 15:03

## 2021-08-14 RX ADMIN — METOPROLOL TARTRATE 25 MG: 25 TABLET, FILM COATED ORAL at 15:03

## 2021-08-14 RX ADMIN — SODIUM CHLORIDE 100 ML/HR: 9 INJECTION, SOLUTION INTRAVENOUS at 12:05

## 2021-08-14 RX ADMIN — ONDANSETRON 4 MG: 2 INJECTION INTRAMUSCULAR; INTRAVENOUS at 07:32

## 2021-08-14 RX ADMIN — DONEPEZIL HYDROCHLORIDE 5 MG: 5 TABLET, FILM COATED ORAL at 20:10

## 2021-08-14 RX ADMIN — FAMOTIDINE 40 MG: 20 TABLET, FILM COATED ORAL at 15:03

## 2021-08-14 RX ADMIN — METOPROLOL TARTRATE 25 MG: 25 TABLET, FILM COATED ORAL at 20:10

## 2021-08-14 RX ADMIN — AMLODIPINE BESYLATE 5 MG: 5 TABLET ORAL at 15:03

## 2021-08-14 RX ADMIN — SODIUM CHLORIDE 100 ML/HR: 9 INJECTION, SOLUTION INTRAVENOUS at 23:20

## 2021-08-14 RX ADMIN — Medication 3 ML: at 20:10

## 2021-08-14 RX ADMIN — Medication 3 ML: at 12:06

## 2021-08-15 PROBLEM — R63.4 ABNORMAL WEIGHT LOSS: Status: ACTIVE | Noted: 2021-08-15

## 2021-08-15 RX ORDER — DIGOXIN 125 MCG
125 TABLET ORAL EVERY OTHER DAY
Status: DISCONTINUED | OUTPATIENT
Start: 2021-08-15 | End: 2021-08-24 | Stop reason: HOSPADM

## 2021-08-15 RX ORDER — BISACODYL 5 MG/1
10 TABLET, DELAYED RELEASE ORAL DAILY PRN
Status: DISCONTINUED | OUTPATIENT
Start: 2021-08-15 | End: 2021-08-24 | Stop reason: HOSPADM

## 2021-08-15 RX ORDER — POLYETHYLENE GLYCOL 3350 17 G/17G
17 POWDER, FOR SOLUTION ORAL DAILY
Status: DISCONTINUED | OUTPATIENT
Start: 2021-08-15 | End: 2021-08-24 | Stop reason: HOSPADM

## 2021-08-15 RX ADMIN — AMLODIPINE BESYLATE 5 MG: 5 TABLET ORAL at 09:26

## 2021-08-15 RX ADMIN — Medication 3 ML: at 20:41

## 2021-08-15 RX ADMIN — SODIUM CHLORIDE 100 ML/HR: 9 INJECTION, SOLUTION INTRAVENOUS at 11:49

## 2021-08-15 RX ADMIN — METOPROLOL TARTRATE 25 MG: 25 TABLET, FILM COATED ORAL at 09:26

## 2021-08-15 RX ADMIN — WARFARIN 2.5 MG: 2.5 TABLET ORAL at 17:18

## 2021-08-15 RX ADMIN — POLYETHYLENE GLYCOL 3350 17 G: 17 POWDER, FOR SOLUTION ORAL at 09:26

## 2021-08-15 RX ADMIN — FAMOTIDINE 40 MG: 20 TABLET, FILM COATED ORAL at 09:26

## 2021-08-15 RX ADMIN — ASPIRIN 81 MG: 81 TABLET, COATED ORAL at 09:26

## 2021-08-15 RX ADMIN — DONEPEZIL HYDROCHLORIDE 5 MG: 5 TABLET, FILM COATED ORAL at 20:41

## 2021-08-15 RX ADMIN — LISINOPRIL 5 MG: 5 TABLET ORAL at 09:26

## 2021-08-15 RX ADMIN — SODIUM CHLORIDE 100 ML/HR: 9 INJECTION, SOLUTION INTRAVENOUS at 20:41

## 2021-08-15 RX ADMIN — METOPROLOL TARTRATE 25 MG: 25 TABLET, FILM COATED ORAL at 20:41

## 2021-08-16 ENCOUNTER — APPOINTMENT (OUTPATIENT)
Dept: GENERAL RADIOLOGY | Facility: HOSPITAL | Age: 86
End: 2021-08-16

## 2021-08-16 ENCOUNTER — APPOINTMENT (OUTPATIENT)
Dept: CARDIOLOGY | Facility: HOSPITAL | Age: 86
End: 2021-08-16

## 2021-08-16 LAB
ALBUMIN SERPL-MCNC: 3.1 G/DL (ref 3.5–5.2)
ALBUMIN/GLOB SERPL: 0.9 G/DL
ALP SERPL-CCNC: 90 U/L (ref 39–117)
ALT SERPL W P-5'-P-CCNC: 8 U/L (ref 1–33)
AMYLASE SERPL-CCNC: 74 U/L (ref 28–100)
ANION GAP SERPL CALCULATED.3IONS-SCNC: 7 MMOL/L (ref 5–15)
AST SERPL-CCNC: 13 U/L (ref 1–32)
BASOPHILS # BLD AUTO: 0.1 10*3/MM3 (ref 0–0.2)
BASOPHILS NFR BLD AUTO: 0.7 % (ref 0–1.5)
BH CV ECHO MEAS - ACS: 1.5 CM
BH CV ECHO MEAS - AO MAX PG (FULL): 6.1 MMHG
BH CV ECHO MEAS - AO MAX PG: 11.4 MMHG
BH CV ECHO MEAS - AO MEAN PG (FULL): 2.7 MMHG
BH CV ECHO MEAS - AO MEAN PG: 5.7 MMHG
BH CV ECHO MEAS - AO ROOT AREA (BSA CORRECTED): 1.8
BH CV ECHO MEAS - AO ROOT AREA: 5.2 CM^2
BH CV ECHO MEAS - AO ROOT DIAM: 2.6 CM
BH CV ECHO MEAS - AO V2 MAX: 168.8 CM/SEC
BH CV ECHO MEAS - AO V2 MEAN: 111.7 CM/SEC
BH CV ECHO MEAS - AO V2 VTI: 34 CM
BH CV ECHO MEAS - AORTIC HR: 73.3 BPM
BH CV ECHO MEAS - AORTIC R-R: 0.82 SEC
BH CV ECHO MEAS - ASC AORTA: 2.5 CM
BH CV ECHO MEAS - AVA(I,A): 1.9 CM^2
BH CV ECHO MEAS - AVA(I,D): 1.9 CM^2
BH CV ECHO MEAS - AVA(V,A): 1.9 CM^2
BH CV ECHO MEAS - AVA(V,D): 1.9 CM^2
BH CV ECHO MEAS - BSA(HAYCOCK): 1.4 M^2
BH CV ECHO MEAS - BSA: 1.4 M^2
BH CV ECHO MEAS - BZI_BMI: 21.8 KILOGRAMS/M^2
BH CV ECHO MEAS - BZI_METRIC_HEIGHT: 149.9 CM
BH CV ECHO MEAS - BZI_METRIC_WEIGHT: 49 KG
BH CV ECHO MEAS - CI(AO): 9.1 L/MIN/M^2
BH CV ECHO MEAS - CI(LVOT): 3.4 L/MIN/M^2
BH CV ECHO MEAS - CO(AO): 13 L/MIN
BH CV ECHO MEAS - CO(LVOT): 4.8 L/MIN
BH CV ECHO MEAS - EDV(CUBED): 80.1 ML
BH CV ECHO MEAS - EDV(MOD-SP4): 28.9 ML
BH CV ECHO MEAS - EDV(TEICH): 83.6 ML
BH CV ECHO MEAS - EF(CUBED): 69.4 %
BH CV ECHO MEAS - EF(TEICH): 61.3 %
BH CV ECHO MEAS - ESV(CUBED): 24.5 ML
BH CV ECHO MEAS - ESV(MOD-SP4): 8.2 ML
BH CV ECHO MEAS - ESV(TEICH): 32.3 ML
BH CV ECHO MEAS - FS: 32.6 %
BH CV ECHO MEAS - IVS/LVPW: 0.99
BH CV ECHO MEAS - IVSD: 1.1 CM
BH CV ECHO MEAS - LA DIMENSION(2D): 3.2 CM
BH CV ECHO MEAS - LV DIASTOLIC VOL/BSA (35-75): 20.4 ML/M^2
BH CV ECHO MEAS - LV MASS(C)D: 166 GRAMS
BH CV ECHO MEAS - LV MASS(C)DI: 117 GRAMS/M^2
BH CV ECHO MEAS - LV MAX PG: 5.3 MMHG
BH CV ECHO MEAS - LV MEAN PG: 3 MMHG
BH CV ECHO MEAS - LV SYSTOLIC VOL/BSA (12-30): 5.8 ML/M^2
BH CV ECHO MEAS - LV V1 MAX: 114.6 CM/SEC
BH CV ECHO MEAS - LV V1 MEAN: 83.2 CM/SEC
BH CV ECHO MEAS - LV V1 VTI: 24.1 CM
BH CV ECHO MEAS - LVIDD: 4.3 CM
BH CV ECHO MEAS - LVIDS: 2.9 CM
BH CV ECHO MEAS - LVOT AREA: 2.7 CM^2
BH CV ECHO MEAS - LVOT DIAM: 1.9 CM
BH CV ECHO MEAS - LVPWD: 1.1 CM
BH CV ECHO MEAS - MR MAX PG: 102.8 MMHG
BH CV ECHO MEAS - MR MAX VEL: 506.9 CM/SEC
BH CV ECHO MEAS - MV DEC TIME: 0.34 SEC
BH CV ECHO MEAS - MV E MAX VEL: 176.6 CM/SEC
BH CV ECHO MEAS - MV MAX PG: 14.3 MMHG
BH CV ECHO MEAS - MV MEAN PG: 3.2 MMHG
BH CV ECHO MEAS - MV V2 MAX: 189.4 CM/SEC
BH CV ECHO MEAS - MV V2 MEAN: 74.8 CM/SEC
BH CV ECHO MEAS - MV V2 VTI: 45 CM
BH CV ECHO MEAS - MVA(VTI): 1.5 CM^2
BH CV ECHO MEAS - PA MAX PG (FULL): 2.4 MMHG
BH CV ECHO MEAS - PA MAX PG: 4.5 MMHG
BH CV ECHO MEAS - PA MEAN PG (FULL): 1.3 MMHG
BH CV ECHO MEAS - PA MEAN PG: 2.5 MMHG
BH CV ECHO MEAS - PA V2 MAX: 106.4 CM/SEC
BH CV ECHO MEAS - PA V2 MEAN: 75.1 CM/SEC
BH CV ECHO MEAS - PA V2 VTI: 24.9 CM
BH CV ECHO MEAS - PI END-D VEL: 121.4 CM/SEC
BH CV ECHO MEAS - PI MAX PG: 22.2 MMHG
BH CV ECHO MEAS - PI MAX VEL: 229.9 CM/SEC
BH CV ECHO MEAS - RAP SYSTOLE: 3 MMHG
BH CV ECHO MEAS - RV MAX PG: 2.1 MMHG
BH CV ECHO MEAS - RV MEAN PG: 1.1 MMHG
BH CV ECHO MEAS - RV V1 MAX: 72.3 CM/SEC
BH CV ECHO MEAS - RV V1 MEAN: 50 CM/SEC
BH CV ECHO MEAS - RV V1 VTI: 15.4 CM
BH CV ECHO MEAS - RVDD: 2 CM
BH CV ECHO MEAS - RVSP: 50.2 MMHG
BH CV ECHO MEAS - SI(AO): 124.6 ML/M^2
BH CV ECHO MEAS - SI(CUBED): 39.2 ML/M^2
BH CV ECHO MEAS - SI(LVOT): 46.6 ML/M^2
BH CV ECHO MEAS - SI(MOD-SP4): 14.6 ML/M^2
BH CV ECHO MEAS - SI(TEICH): 36.1 ML/M^2
BH CV ECHO MEAS - SV(AO): 176.9 ML
BH CV ECHO MEAS - SV(CUBED): 55.6 ML
BH CV ECHO MEAS - SV(LVOT): 66.1 ML
BH CV ECHO MEAS - SV(MOD-SP4): 20.7 ML
BH CV ECHO MEAS - SV(TEICH): 51.2 ML
BH CV ECHO MEAS - TR MAX VEL: 342.7 CM/SEC
BILIRUB SERPL-MCNC: 0.3 MG/DL (ref 0–1.2)
BUN SERPL-MCNC: 13 MG/DL (ref 8–23)
BUN/CREAT SERPL: 16.7 (ref 7–25)
CALCIUM SPEC-SCNC: 8.3 MG/DL (ref 8.6–10.5)
CHLORIDE SERPL-SCNC: 107 MMOL/L (ref 98–107)
CO2 SERPL-SCNC: 24 MMOL/L (ref 22–29)
CREAT SERPL-MCNC: 0.78 MG/DL (ref 0.57–1)
D DIMER PPP FEU-MCNC: 0.62 MG/L (FEU) (ref 0–0.59)
DEPRECATED RDW RBC AUTO: 46.4 FL (ref 37–54)
EOSINOPHIL # BLD AUTO: 0.5 10*3/MM3 (ref 0–0.4)
EOSINOPHIL NFR BLD AUTO: 5.3 % (ref 0.3–6.2)
ERYTHROCYTE [DISTWIDTH] IN BLOOD BY AUTOMATED COUNT: 15.7 % (ref 12.3–15.4)
GFR SERPL CREATININE-BSD FRML MDRD: 70 ML/MIN/1.73
GLOBULIN UR ELPH-MCNC: 3.4 GM/DL
GLUCOSE SERPL-MCNC: 94 MG/DL (ref 65–99)
HCT VFR BLD AUTO: 32.8 % (ref 34–46.6)
HGB BLD-MCNC: 10.9 G/DL (ref 12–15.9)
INR PPP: 2.4 (ref 2–3)
LIPASE SERPL-CCNC: 56 U/L (ref 13–60)
LV EF 2D ECHO EST: 60 %
LYMPHOCYTES # BLD AUTO: 1.1 10*3/MM3 (ref 0.7–3.1)
LYMPHOCYTES NFR BLD AUTO: 12.3 % (ref 19.6–45.3)
MCH RBC QN AUTO: 28.3 PG (ref 26.6–33)
MCHC RBC AUTO-ENTMCNC: 33.3 G/DL (ref 31.5–35.7)
MCV RBC AUTO: 84.8 FL (ref 79–97)
MONOCYTES # BLD AUTO: 0.7 10*3/MM3 (ref 0.1–0.9)
MONOCYTES NFR BLD AUTO: 8.5 % (ref 5–12)
NEUTROPHILS NFR BLD AUTO: 6.4 10*3/MM3 (ref 1.7–7)
NEUTROPHILS NFR BLD AUTO: 73.2 % (ref 42.7–76)
NRBC BLD AUTO-RTO: 0.1 /100 WBC (ref 0–0.2)
NT-PROBNP SERPL-MCNC: 5039 PG/ML (ref 0–1800)
PLATELET # BLD AUTO: 283 10*3/MM3 (ref 140–450)
PMV BLD AUTO: 6.5 FL (ref 6–12)
POTASSIUM SERPL-SCNC: 4.4 MMOL/L (ref 3.5–5.2)
PROT SERPL-MCNC: 6.5 G/DL (ref 6–8.5)
PROTHROMBIN TIME: 25 SECONDS (ref 19.4–28.5)
RBC # BLD AUTO: 3.86 10*6/MM3 (ref 3.77–5.28)
SODIUM SERPL-SCNC: 138 MMOL/L (ref 136–145)
TROPONIN T SERPL-MCNC: <0.01 NG/ML (ref 0–0.03)
WBC # BLD AUTO: 8.8 10*3/MM3 (ref 3.4–10.8)

## 2021-08-16 PROCEDURE — 82150 ASSAY OF AMYLASE: CPT | Performed by: FAMILY MEDICINE

## 2021-08-16 PROCEDURE — 83690 ASSAY OF LIPASE: CPT | Performed by: FAMILY MEDICINE

## 2021-08-16 PROCEDURE — 93306 TTE W/DOPPLER COMPLETE: CPT

## 2021-08-16 PROCEDURE — 85025 COMPLETE CBC W/AUTO DIFF WBC: CPT | Performed by: INTERNAL MEDICINE

## 2021-08-16 PROCEDURE — 83880 ASSAY OF NATRIURETIC PEPTIDE: CPT | Performed by: NURSE PRACTITIONER

## 2021-08-16 PROCEDURE — 85379 FIBRIN DEGRADATION QUANT: CPT | Performed by: NURSE PRACTITIONER

## 2021-08-16 PROCEDURE — 80053 COMPREHEN METABOLIC PANEL: CPT | Performed by: INTERNAL MEDICINE

## 2021-08-16 PROCEDURE — 93005 ELECTROCARDIOGRAM TRACING: CPT | Performed by: NURSE PRACTITIONER

## 2021-08-16 PROCEDURE — 97162 PT EVAL MOD COMPLEX 30 MIN: CPT

## 2021-08-16 PROCEDURE — 93306 TTE W/DOPPLER COMPLETE: CPT | Performed by: INTERNAL MEDICINE

## 2021-08-16 PROCEDURE — 85610 PROTHROMBIN TIME: CPT | Performed by: INTERNAL MEDICINE

## 2021-08-16 PROCEDURE — 99222 1ST HOSP IP/OBS MODERATE 55: CPT | Performed by: INTERNAL MEDICINE

## 2021-08-16 PROCEDURE — 93010 ELECTROCARDIOGRAM REPORT: CPT | Performed by: INTERNAL MEDICINE

## 2021-08-16 PROCEDURE — 71045 X-RAY EXAM CHEST 1 VIEW: CPT

## 2021-08-16 PROCEDURE — 84484 ASSAY OF TROPONIN QUANT: CPT | Performed by: NURSE PRACTITIONER

## 2021-08-16 RX ORDER — BUMETANIDE 0.25 MG/ML
2 INJECTION INTRAMUSCULAR; INTRAVENOUS ONCE
Status: COMPLETED | OUTPATIENT
Start: 2021-08-16 | End: 2021-08-16

## 2021-08-16 RX ORDER — IPRATROPIUM BROMIDE AND ALBUTEROL SULFATE 2.5; .5 MG/3ML; MG/3ML
3 SOLUTION RESPIRATORY (INHALATION) EVERY 4 HOURS PRN
Status: DISCONTINUED | OUTPATIENT
Start: 2021-08-16 | End: 2021-08-18

## 2021-08-16 RX ADMIN — ASPIRIN 81 MG: 81 TABLET, COATED ORAL at 08:57

## 2021-08-16 RX ADMIN — METOPROLOL TARTRATE 25 MG: 25 TABLET, FILM COATED ORAL at 20:05

## 2021-08-16 RX ADMIN — METOPROLOL TARTRATE 25 MG: 25 TABLET, FILM COATED ORAL at 08:57

## 2021-08-16 RX ADMIN — FAMOTIDINE 40 MG: 20 TABLET, FILM COATED ORAL at 08:57

## 2021-08-16 RX ADMIN — ACETAMINOPHEN 650 MG: 325 TABLET, FILM COATED ORAL at 20:05

## 2021-08-16 RX ADMIN — WARFARIN 2.5 MG: 2.5 TABLET ORAL at 17:02

## 2021-08-16 RX ADMIN — POLYETHYLENE GLYCOL 3350 17 G: 17 POWDER, FOR SOLUTION ORAL at 08:57

## 2021-08-16 RX ADMIN — LISINOPRIL 5 MG: 5 TABLET ORAL at 08:57

## 2021-08-16 RX ADMIN — AMLODIPINE BESYLATE 5 MG: 5 TABLET ORAL at 08:57

## 2021-08-16 RX ADMIN — Medication 3 ML: at 08:57

## 2021-08-16 RX ADMIN — BUMETANIDE 2 MG: 0.25 INJECTION, SOLUTION INTRAMUSCULAR; INTRAVENOUS at 09:42

## 2021-08-16 RX ADMIN — DONEPEZIL HYDROCHLORIDE 5 MG: 5 TABLET, FILM COATED ORAL at 20:05

## 2021-08-16 RX ADMIN — Medication 3 ML: at 20:05

## 2021-08-17 LAB
ALBUMIN SERPL-MCNC: 2.9 G/DL (ref 3.5–5.2)
ALBUMIN/GLOB SERPL: 0.8 G/DL
ALP SERPL-CCNC: 87 U/L (ref 39–117)
ALT SERPL W P-5'-P-CCNC: 8 U/L (ref 1–33)
ANION GAP SERPL CALCULATED.3IONS-SCNC: 6 MMOL/L (ref 5–15)
AST SERPL-CCNC: 14 U/L (ref 1–32)
BASOPHILS # BLD AUTO: 0.1 10*3/MM3 (ref 0–0.2)
BASOPHILS NFR BLD AUTO: 0.8 % (ref 0–1.5)
BILIRUB SERPL-MCNC: 0.3 MG/DL (ref 0–1.2)
BUN SERPL-MCNC: 17 MG/DL (ref 8–23)
BUN/CREAT SERPL: 18.3 (ref 7–25)
CALCIUM SPEC-SCNC: 8.2 MG/DL (ref 8.6–10.5)
CHLORIDE SERPL-SCNC: 104 MMOL/L (ref 98–107)
CO2 SERPL-SCNC: 28 MMOL/L (ref 22–29)
CREAT SERPL-MCNC: 0.93 MG/DL (ref 0.57–1)
DEPRECATED RDW RBC AUTO: 45.1 FL (ref 37–54)
EOSINOPHIL # BLD AUTO: 0.4 10*3/MM3 (ref 0–0.4)
EOSINOPHIL NFR BLD AUTO: 5 % (ref 0.3–6.2)
ERYTHROCYTE [DISTWIDTH] IN BLOOD BY AUTOMATED COUNT: 15.3 % (ref 12.3–15.4)
GFR SERPL CREATININE-BSD FRML MDRD: 57 ML/MIN/1.73
GLOBULIN UR ELPH-MCNC: 3.6 GM/DL
GLUCOSE SERPL-MCNC: 89 MG/DL (ref 65–99)
HCT VFR BLD AUTO: 28.4 % (ref 34–46.6)
HGB BLD-MCNC: 9.5 G/DL (ref 12–15.9)
INR PPP: 2.62 (ref 2–3)
LYMPHOCYTES # BLD AUTO: 1.1 10*3/MM3 (ref 0.7–3.1)
LYMPHOCYTES NFR BLD AUTO: 13.8 % (ref 19.6–45.3)
MCH RBC QN AUTO: 27.9 PG (ref 26.6–33)
MCHC RBC AUTO-ENTMCNC: 33.4 G/DL (ref 31.5–35.7)
MCV RBC AUTO: 83.7 FL (ref 79–97)
MONOCYTES # BLD AUTO: 0.6 10*3/MM3 (ref 0.1–0.9)
MONOCYTES NFR BLD AUTO: 7.6 % (ref 5–12)
NEUTROPHILS NFR BLD AUTO: 6 10*3/MM3 (ref 1.7–7)
NEUTROPHILS NFR BLD AUTO: 72.8 % (ref 42.7–76)
NRBC BLD AUTO-RTO: 0 /100 WBC (ref 0–0.2)
PLATELET # BLD AUTO: 276 10*3/MM3 (ref 140–450)
PMV BLD AUTO: 6.5 FL (ref 6–12)
POTASSIUM SERPL-SCNC: 4.3 MMOL/L (ref 3.5–5.2)
PROT SERPL-MCNC: 6.5 G/DL (ref 6–8.5)
PROTHROMBIN TIME: 27.1 SECONDS (ref 19.4–28.5)
RBC # BLD AUTO: 3.39 10*6/MM3 (ref 3.77–5.28)
SODIUM SERPL-SCNC: 138 MMOL/L (ref 136–145)
WBC # BLD AUTO: 8.3 10*3/MM3 (ref 3.4–10.8)

## 2021-08-17 PROCEDURE — 80053 COMPREHEN METABOLIC PANEL: CPT | Performed by: INTERNAL MEDICINE

## 2021-08-17 PROCEDURE — 85025 COMPLETE CBC W/AUTO DIFF WBC: CPT | Performed by: INTERNAL MEDICINE

## 2021-08-17 PROCEDURE — 97116 GAIT TRAINING THERAPY: CPT

## 2021-08-17 PROCEDURE — 85610 PROTHROMBIN TIME: CPT | Performed by: INTERNAL MEDICINE

## 2021-08-17 PROCEDURE — 99232 SBSQ HOSP IP/OBS MODERATE 35: CPT | Performed by: INTERNAL MEDICINE

## 2021-08-17 PROCEDURE — 25010000002 ONDANSETRON PER 1 MG: Performed by: INTERNAL MEDICINE

## 2021-08-17 PROCEDURE — 97530 THERAPEUTIC ACTIVITIES: CPT

## 2021-08-17 PROCEDURE — 93010 ELECTROCARDIOGRAM REPORT: CPT | Performed by: INTERNAL MEDICINE

## 2021-08-17 PROCEDURE — 25010000002 NA FERRIC GLUC CPLX PER 12.5 MG: Performed by: INTERNAL MEDICINE

## 2021-08-17 PROCEDURE — 25010000002 FUROSEMIDE PER 20 MG: Performed by: INTERNAL MEDICINE

## 2021-08-17 PROCEDURE — 93005 ELECTROCARDIOGRAM TRACING: CPT | Performed by: INTERNAL MEDICINE

## 2021-08-17 RX ORDER — FUROSEMIDE 10 MG/ML
20 INJECTION INTRAMUSCULAR; INTRAVENOUS ONCE
Status: COMPLETED | OUTPATIENT
Start: 2021-08-17 | End: 2021-08-17

## 2021-08-17 RX ADMIN — METOPROLOL TARTRATE 25 MG: 25 TABLET, FILM COATED ORAL at 21:09

## 2021-08-17 RX ADMIN — METOPROLOL TARTRATE 25 MG: 25 TABLET, FILM COATED ORAL at 09:04

## 2021-08-17 RX ADMIN — WARFARIN 2.5 MG: 2.5 TABLET ORAL at 16:06

## 2021-08-17 RX ADMIN — DONEPEZIL HYDROCHLORIDE 5 MG: 5 TABLET, FILM COATED ORAL at 21:09

## 2021-08-17 RX ADMIN — SODIUM CHLORIDE 250 MG: 9 INJECTION, SOLUTION INTRAVENOUS at 16:06

## 2021-08-17 RX ADMIN — FAMOTIDINE 40 MG: 20 TABLET, FILM COATED ORAL at 09:04

## 2021-08-17 RX ADMIN — AMLODIPINE BESYLATE 5 MG: 5 TABLET ORAL at 09:04

## 2021-08-17 RX ADMIN — POLYETHYLENE GLYCOL 3350 17 G: 17 POWDER, FOR SOLUTION ORAL at 09:04

## 2021-08-17 RX ADMIN — Medication 3 ML: at 09:04

## 2021-08-17 RX ADMIN — FUROSEMIDE 20 MG: 10 INJECTION, SOLUTION INTRAMUSCULAR; INTRAVENOUS at 15:45

## 2021-08-17 RX ADMIN — LISINOPRIL 5 MG: 5 TABLET ORAL at 09:04

## 2021-08-17 RX ADMIN — Medication 3 ML: at 21:10

## 2021-08-17 RX ADMIN — ASPIRIN 81 MG: 81 TABLET, COATED ORAL at 09:04

## 2021-08-17 RX ADMIN — DIGOXIN 125 MCG: 125 TABLET ORAL at 12:32

## 2021-08-17 RX ADMIN — ONDANSETRON 4 MG: 2 INJECTION INTRAMUSCULAR; INTRAVENOUS at 19:23

## 2021-08-17 RX ADMIN — ACETAMINOPHEN 650 MG: 325 TABLET, FILM COATED ORAL at 19:23

## 2021-08-18 ENCOUNTER — APPOINTMENT (OUTPATIENT)
Dept: GENERAL RADIOLOGY | Facility: HOSPITAL | Age: 86
End: 2021-08-18

## 2021-08-18 ENCOUNTER — APPOINTMENT (OUTPATIENT)
Dept: CT IMAGING | Facility: HOSPITAL | Age: 86
End: 2021-08-18

## 2021-08-18 LAB
ANION GAP SERPL CALCULATED.3IONS-SCNC: 7 MMOL/L (ref 5–15)
B PARAPERT DNA SPEC QL NAA+PROBE: NOT DETECTED
B PERT DNA SPEC QL NAA+PROBE: NOT DETECTED
BASOPHILS # BLD AUTO: 0 10*3/MM3 (ref 0–0.2)
BASOPHILS NFR BLD AUTO: 0.3 % (ref 0–1.5)
BILIRUB UR QL STRIP: NEGATIVE
BUN SERPL-MCNC: 23 MG/DL (ref 8–23)
BUN/CREAT SERPL: 18.4 (ref 7–25)
C PNEUM DNA NPH QL NAA+NON-PROBE: NOT DETECTED
CALCIUM SPEC-SCNC: 8.3 MG/DL (ref 8.6–10.5)
CHLORIDE SERPL-SCNC: 101 MMOL/L (ref 98–107)
CLARITY UR: CLEAR
CO2 SERPL-SCNC: 28 MMOL/L (ref 22–29)
COLOR UR: YELLOW
CREAT SERPL-MCNC: 1.25 MG/DL (ref 0.57–1)
DEPRECATED RDW RBC AUTO: 45.1 FL (ref 37–54)
EOSINOPHIL # BLD AUTO: 0.1 10*3/MM3 (ref 0–0.4)
EOSINOPHIL NFR BLD AUTO: 0.8 % (ref 0.3–6.2)
ERYTHROCYTE [DISTWIDTH] IN BLOOD BY AUTOMATED COUNT: 15.3 % (ref 12.3–15.4)
FLUAV SUBTYP SPEC NAA+PROBE: NOT DETECTED
FLUBV RNA ISLT QL NAA+PROBE: NOT DETECTED
GFR SERPL CREATININE-BSD FRML MDRD: 40 ML/MIN/1.73
GLUCOSE SERPL-MCNC: 101 MG/DL (ref 65–99)
GLUCOSE UR STRIP-MCNC: NEGATIVE MG/DL
HADV DNA SPEC NAA+PROBE: NOT DETECTED
HCOV 229E RNA SPEC QL NAA+PROBE: NOT DETECTED
HCOV HKU1 RNA SPEC QL NAA+PROBE: NOT DETECTED
HCOV NL63 RNA SPEC QL NAA+PROBE: NOT DETECTED
HCOV OC43 RNA SPEC QL NAA+PROBE: NOT DETECTED
HCT VFR BLD AUTO: 31.4 % (ref 34–46.6)
HGB BLD-MCNC: 10.9 G/DL (ref 12–15.9)
HGB UR QL STRIP.AUTO: NEGATIVE
HMPV RNA NPH QL NAA+NON-PROBE: NOT DETECTED
HPIV1 RNA SPEC QL NAA+PROBE: NOT DETECTED
HPIV2 RNA SPEC QL NAA+PROBE: NOT DETECTED
HPIV3 RNA NPH QL NAA+PROBE: NOT DETECTED
HPIV4 P GENE NPH QL NAA+PROBE: NOT DETECTED
INR PPP: 2.56 (ref 2–3)
KETONES UR QL STRIP: NEGATIVE
LEUKOCYTE ESTERASE UR QL STRIP.AUTO: NEGATIVE
LYMPHOCYTES # BLD AUTO: 1.2 10*3/MM3 (ref 0.7–3.1)
LYMPHOCYTES NFR BLD AUTO: 9.5 % (ref 19.6–45.3)
M PNEUMO IGG SER IA-ACNC: NOT DETECTED
MCH RBC QN AUTO: 29 PG (ref 26.6–33)
MCHC RBC AUTO-ENTMCNC: 34.6 G/DL (ref 31.5–35.7)
MCV RBC AUTO: 83.8 FL (ref 79–97)
MONOCYTES # BLD AUTO: 0.7 10*3/MM3 (ref 0.1–0.9)
MONOCYTES NFR BLD AUTO: 5.5 % (ref 5–12)
NEUTROPHILS NFR BLD AUTO: 10.4 10*3/MM3 (ref 1.7–7)
NEUTROPHILS NFR BLD AUTO: 83.9 % (ref 42.7–76)
NITRITE UR QL STRIP: NEGATIVE
NRBC BLD AUTO-RTO: 0 /100 WBC (ref 0–0.2)
PH UR STRIP.AUTO: 6 [PH] (ref 5–8)
PLATELET # BLD AUTO: 298 10*3/MM3 (ref 140–450)
PMV BLD AUTO: 6.6 FL (ref 6–12)
POTASSIUM SERPL-SCNC: 4.9 MMOL/L (ref 3.5–5.2)
PROT UR QL STRIP: NEGATIVE
PROTHROMBIN TIME: 26.5 SECONDS (ref 19.4–28.5)
RBC # BLD AUTO: 3.74 10*6/MM3 (ref 3.77–5.28)
RHINOVIRUS RNA SPEC NAA+PROBE: NOT DETECTED
RSV RNA NPH QL NAA+NON-PROBE: NOT DETECTED
SODIUM SERPL-SCNC: 136 MMOL/L (ref 136–145)
SP GR UR STRIP: 1.01 (ref 1–1.03)
UROBILINOGEN UR QL STRIP: NORMAL
WBC # BLD AUTO: 12.5 10*3/MM3 (ref 3.4–10.8)

## 2021-08-18 PROCEDURE — 94799 UNLISTED PULMONARY SVC/PX: CPT

## 2021-08-18 PROCEDURE — 94640 AIRWAY INHALATION TREATMENT: CPT

## 2021-08-18 PROCEDURE — 25010000002 CEFTRIAXONE PER 250 MG: Performed by: NURSE PRACTITIONER

## 2021-08-18 PROCEDURE — 87633 RESP VIRUS 12-25 TARGETS: CPT | Performed by: NURSE PRACTITIONER

## 2021-08-18 PROCEDURE — 74230 X-RAY XM SWLNG FUNCJ C+: CPT

## 2021-08-18 PROCEDURE — 71250 CT THORAX DX C-: CPT

## 2021-08-18 PROCEDURE — 99232 SBSQ HOSP IP/OBS MODERATE 35: CPT | Performed by: INTERNAL MEDICINE

## 2021-08-18 PROCEDURE — 81003 URINALYSIS AUTO W/O SCOPE: CPT | Performed by: NURSE PRACTITIONER

## 2021-08-18 PROCEDURE — 85025 COMPLETE CBC W/AUTO DIFF WBC: CPT | Performed by: INTERNAL MEDICINE

## 2021-08-18 PROCEDURE — 92611 MOTION FLUOROSCOPY/SWALLOW: CPT

## 2021-08-18 PROCEDURE — 80048 BASIC METABOLIC PNL TOTAL CA: CPT | Performed by: INTERNAL MEDICINE

## 2021-08-18 PROCEDURE — 87040 BLOOD CULTURE FOR BACTERIA: CPT | Performed by: NURSE PRACTITIONER

## 2021-08-18 PROCEDURE — 92610 EVALUATE SWALLOWING FUNCTION: CPT

## 2021-08-18 PROCEDURE — 97116 GAIT TRAINING THERAPY: CPT

## 2021-08-18 PROCEDURE — 85610 PROTHROMBIN TIME: CPT | Performed by: INTERNAL MEDICINE

## 2021-08-18 RX ORDER — IPRATROPIUM BROMIDE AND ALBUTEROL SULFATE 2.5; .5 MG/3ML; MG/3ML
3 SOLUTION RESPIRATORY (INHALATION)
Status: DISCONTINUED | OUTPATIENT
Start: 2021-08-18 | End: 2021-08-24 | Stop reason: HOSPADM

## 2021-08-18 RX ADMIN — IPRATROPIUM BROMIDE AND ALBUTEROL SULFATE 3 ML: 2.5; .5 SOLUTION RESPIRATORY (INHALATION) at 10:51

## 2021-08-18 RX ADMIN — Medication 3 ML: at 09:13

## 2021-08-18 RX ADMIN — AMLODIPINE BESYLATE 5 MG: 5 TABLET ORAL at 09:13

## 2021-08-18 RX ADMIN — FAMOTIDINE 40 MG: 20 TABLET, FILM COATED ORAL at 09:13

## 2021-08-18 RX ADMIN — POLYETHYLENE GLYCOL 3350 17 G: 17 POWDER, FOR SOLUTION ORAL at 09:12

## 2021-08-18 RX ADMIN — CEFTRIAXONE SODIUM 1 G: 1 INJECTION, POWDER, FOR SOLUTION INTRAMUSCULAR; INTRAVENOUS at 10:32

## 2021-08-18 RX ADMIN — METOPROLOL TARTRATE 25 MG: 25 TABLET, FILM COATED ORAL at 09:12

## 2021-08-18 RX ADMIN — IPRATROPIUM BROMIDE AND ALBUTEROL SULFATE 3 ML: 2.5; .5 SOLUTION RESPIRATORY (INHALATION) at 18:29

## 2021-08-18 RX ADMIN — ACETAMINOPHEN 650 MG: 325 TABLET, FILM COATED ORAL at 19:39

## 2021-08-18 RX ADMIN — BARIUM SULFATE 50 ML: 400 SUSPENSION ORAL at 12:08

## 2021-08-18 RX ADMIN — LISINOPRIL 5 MG: 5 TABLET ORAL at 09:13

## 2021-08-18 RX ADMIN — WARFARIN 2.5 MG: 2.5 TABLET ORAL at 17:21

## 2021-08-18 RX ADMIN — METOPROLOL TARTRATE 25 MG: 25 TABLET, FILM COATED ORAL at 21:01

## 2021-08-18 RX ADMIN — Medication 3 ML: at 21:01

## 2021-08-18 RX ADMIN — IPRATROPIUM BROMIDE AND ALBUTEROL SULFATE 3 ML: 2.5; .5 SOLUTION RESPIRATORY (INHALATION) at 14:51

## 2021-08-18 RX ADMIN — ASPIRIN 81 MG: 81 TABLET, COATED ORAL at 09:12

## 2021-08-18 RX ADMIN — DONEPEZIL HYDROCHLORIDE 5 MG: 5 TABLET, FILM COATED ORAL at 21:01

## 2021-08-19 LAB
BASOPHILS # BLD AUTO: 0.1 10*3/MM3 (ref 0–0.2)
BASOPHILS NFR BLD AUTO: 0.5 % (ref 0–1.5)
CREAT SERPL-MCNC: 0.91 MG/DL (ref 0.57–1)
DEPRECATED RDW RBC AUTO: 45.5 FL (ref 37–54)
EOSINOPHIL # BLD AUTO: 0.5 10*3/MM3 (ref 0–0.4)
EOSINOPHIL NFR BLD AUTO: 4.5 % (ref 0.3–6.2)
ERYTHROCYTE [DISTWIDTH] IN BLOOD BY AUTOMATED COUNT: 15.5 % (ref 12.3–15.4)
GFR SERPL CREATININE-BSD FRML MDRD: 58 ML/MIN/1.73
HCT VFR BLD AUTO: 29.9 % (ref 34–46.6)
HGB BLD-MCNC: 9.9 G/DL (ref 12–15.9)
INR PPP: 2.4
INR PPP: 2.42 (ref 2–3)
LYMPHOCYTES # BLD AUTO: 0.6 10*3/MM3 (ref 0.7–3.1)
LYMPHOCYTES NFR BLD AUTO: 5.1 % (ref 19.6–45.3)
MCH RBC QN AUTO: 28.1 PG (ref 26.6–33)
MCHC RBC AUTO-ENTMCNC: 33.3 G/DL (ref 31.5–35.7)
MCV RBC AUTO: 84.5 FL (ref 79–97)
MONOCYTES # BLD AUTO: 0.7 10*3/MM3 (ref 0.1–0.9)
MONOCYTES NFR BLD AUTO: 5.8 % (ref 5–12)
NEUTROPHILS NFR BLD AUTO: 84.1 % (ref 42.7–76)
NEUTROPHILS NFR BLD AUTO: 9.7 10*3/MM3 (ref 1.7–7)
NRBC BLD AUTO-RTO: 0 /100 WBC (ref 0–0.2)
PLATELET # BLD AUTO: 283 10*3/MM3 (ref 140–450)
PMV BLD AUTO: 6.9 FL (ref 6–12)
PROTHROMBIN TIME: 25.2 SECONDS (ref 19.4–28.5)
RBC # BLD AUTO: 3.53 10*6/MM3 (ref 3.77–5.28)
WBC # BLD AUTO: 11.5 10*3/MM3 (ref 3.4–10.8)

## 2021-08-19 PROCEDURE — 25010000002 CEFTRIAXONE PER 250 MG: Performed by: NURSE PRACTITIONER

## 2021-08-19 PROCEDURE — 82565 ASSAY OF CREATININE: CPT | Performed by: INTERNAL MEDICINE

## 2021-08-19 PROCEDURE — 99232 SBSQ HOSP IP/OBS MODERATE 35: CPT | Performed by: INTERNAL MEDICINE

## 2021-08-19 PROCEDURE — 25010000002 PIPERACILLIN SOD-TAZOBACTAM PER 1 G: Performed by: INTERNAL MEDICINE

## 2021-08-19 PROCEDURE — 85025 COMPLETE CBC W/AUTO DIFF WBC: CPT | Performed by: INTERNAL MEDICINE

## 2021-08-19 PROCEDURE — 25010000002 ONDANSETRON PER 1 MG: Performed by: INTERNAL MEDICINE

## 2021-08-19 PROCEDURE — 85610 PROTHROMBIN TIME: CPT | Performed by: INTERNAL MEDICINE

## 2021-08-19 PROCEDURE — 63710000001 PROMETHAZINE PER 25 MG: Performed by: INTERNAL MEDICINE

## 2021-08-19 RX ORDER — PROMETHAZINE HYDROCHLORIDE 25 MG/1
25 TABLET ORAL EVERY 6 HOURS PRN
Status: DISCONTINUED | OUTPATIENT
Start: 2021-08-19 | End: 2021-08-23

## 2021-08-19 RX ORDER — IBUPROFEN 400 MG/1
400 TABLET ORAL EVERY 6 HOURS PRN
Status: DISCONTINUED | OUTPATIENT
Start: 2021-08-19 | End: 2021-08-24 | Stop reason: HOSPADM

## 2021-08-19 RX ORDER — PROMETHAZINE HYDROCHLORIDE 6.25 MG/5ML
12.5 SYRUP ORAL EVERY 6 HOURS PRN
Status: DISCONTINUED | OUTPATIENT
Start: 2021-08-19 | End: 2021-08-19 | Stop reason: RX

## 2021-08-19 RX ORDER — PROMETHAZINE HYDROCHLORIDE 25 MG/1
25 SUPPOSITORY RECTAL EVERY 6 HOURS PRN
Status: DISCONTINUED | OUTPATIENT
Start: 2021-08-19 | End: 2021-08-24 | Stop reason: HOSPADM

## 2021-08-19 RX ADMIN — ONDANSETRON 4 MG: 2 INJECTION INTRAMUSCULAR; INTRAVENOUS at 19:10

## 2021-08-19 RX ADMIN — AMLODIPINE BESYLATE 5 MG: 5 TABLET ORAL at 09:16

## 2021-08-19 RX ADMIN — ONDANSETRON 4 MG: 2 INJECTION INTRAMUSCULAR; INTRAVENOUS at 06:04

## 2021-08-19 RX ADMIN — PROMETHAZINE HYDROCHLORIDE 25 MG: 25 TABLET ORAL at 21:36

## 2021-08-19 RX ADMIN — Medication 3 ML: at 09:16

## 2021-08-19 RX ADMIN — CEFTRIAXONE SODIUM 1 G: 1 INJECTION, POWDER, FOR SOLUTION INTRAMUSCULAR; INTRAVENOUS at 09:20

## 2021-08-19 RX ADMIN — Medication 3 ML: at 21:37

## 2021-08-19 RX ADMIN — PIPERACILLIN AND TAZOBACTAM 3.38 G: 3; .375 INJECTION, POWDER, LYOPHILIZED, FOR SOLUTION INTRAVENOUS at 18:07

## 2021-08-19 RX ADMIN — IBUPROFEN 400 MG: 400 TABLET ORAL at 15:11

## 2021-08-19 RX ADMIN — ASPIRIN 81 MG: 81 TABLET, COATED ORAL at 09:16

## 2021-08-19 RX ADMIN — DONEPEZIL HYDROCHLORIDE 5 MG: 5 TABLET, FILM COATED ORAL at 21:36

## 2021-08-19 RX ADMIN — PIPERACILLIN AND TAZOBACTAM 3.38 G: 3; .375 INJECTION, POWDER, LYOPHILIZED, FOR SOLUTION INTRAVENOUS at 12:10

## 2021-08-19 RX ADMIN — METOPROLOL TARTRATE 25 MG: 25 TABLET, FILM COATED ORAL at 09:16

## 2021-08-19 RX ADMIN — LISINOPRIL 5 MG: 5 TABLET ORAL at 09:16

## 2021-08-19 RX ADMIN — ACETAMINOPHEN 650 MG: 325 TABLET, FILM COATED ORAL at 12:44

## 2021-08-19 RX ADMIN — ACETAMINOPHEN 650 MG: 325 TABLET, FILM COATED ORAL at 04:19

## 2021-08-19 RX ADMIN — DIGOXIN 125 MCG: 125 TABLET ORAL at 12:11

## 2021-08-19 RX ADMIN — METOPROLOL TARTRATE 25 MG: 25 TABLET, FILM COATED ORAL at 21:37

## 2021-08-19 RX ADMIN — WARFARIN 2.5 MG: 2.5 TABLET ORAL at 18:07

## 2021-08-20 ENCOUNTER — ANTICOAGULATION VISIT (OUTPATIENT)
Dept: CARDIOLOGY | Facility: CLINIC | Age: 86
End: 2021-08-20

## 2021-08-20 DIAGNOSIS — Z79.01 LONG TERM (CURRENT) USE OF ANTICOAGULANTS: ICD-10-CM

## 2021-08-20 DIAGNOSIS — I48.91 ATRIAL FIBRILLATION, UNSPECIFIED TYPE (HCC): Primary | ICD-10-CM

## 2021-08-20 LAB
ANION GAP SERPL CALCULATED.3IONS-SCNC: 8 MMOL/L (ref 5–15)
BASOPHILS # BLD AUTO: 0.1 10*3/MM3 (ref 0–0.2)
BASOPHILS NFR BLD AUTO: 0.8 % (ref 0–1.5)
BUN SERPL-MCNC: 14 MG/DL (ref 8–23)
BUN/CREAT SERPL: 14.7 (ref 7–25)
CALCIUM SPEC-SCNC: 8.8 MG/DL (ref 8.6–10.5)
CHLORIDE SERPL-SCNC: 98 MMOL/L (ref 98–107)
CO2 SERPL-SCNC: 27 MMOL/L (ref 22–29)
CREAT SERPL-MCNC: 0.95 MG/DL (ref 0.57–1)
DEPRECATED RDW RBC AUTO: 46.8 FL (ref 37–54)
EOSINOPHIL # BLD AUTO: 0.2 10*3/MM3 (ref 0–0.4)
EOSINOPHIL NFR BLD AUTO: 1.8 % (ref 0.3–6.2)
ERYTHROCYTE [DISTWIDTH] IN BLOOD BY AUTOMATED COUNT: 15.7 % (ref 12.3–15.4)
GFR SERPL CREATININE-BSD FRML MDRD: 56 ML/MIN/1.73
GLUCOSE SERPL-MCNC: 113 MG/DL (ref 65–99)
HCT VFR BLD AUTO: 31.8 % (ref 34–46.6)
HGB BLD-MCNC: 10.5 G/DL (ref 12–15.9)
INR PPP: 2.2 (ref 2–3)
LYMPHOCYTES # BLD AUTO: 0.6 10*3/MM3 (ref 0.7–3.1)
LYMPHOCYTES NFR BLD AUTO: 5.3 % (ref 19.6–45.3)
MCH RBC QN AUTO: 28 PG (ref 26.6–33)
MCHC RBC AUTO-ENTMCNC: 32.9 G/DL (ref 31.5–35.7)
MCV RBC AUTO: 85.2 FL (ref 79–97)
MONOCYTES # BLD AUTO: 0.5 10*3/MM3 (ref 0.1–0.9)
MONOCYTES NFR BLD AUTO: 4.8 % (ref 5–12)
NEUTROPHILS NFR BLD AUTO: 87.3 % (ref 42.7–76)
NEUTROPHILS NFR BLD AUTO: 9.7 10*3/MM3 (ref 1.7–7)
NRBC BLD AUTO-RTO: 0 /100 WBC (ref 0–0.2)
PLATELET # BLD AUTO: 263 10*3/MM3 (ref 140–450)
PMV BLD AUTO: 6.6 FL (ref 6–12)
POTASSIUM SERPL-SCNC: 4.3 MMOL/L (ref 3.5–5.2)
PROTHROMBIN TIME: 23 SECONDS (ref 19.4–28.5)
RBC # BLD AUTO: 3.73 10*6/MM3 (ref 3.77–5.28)
SODIUM SERPL-SCNC: 133 MMOL/L (ref 136–145)
WBC # BLD AUTO: 11.1 10*3/MM3 (ref 3.4–10.8)

## 2021-08-20 PROCEDURE — 80048 BASIC METABOLIC PNL TOTAL CA: CPT | Performed by: INTERNAL MEDICINE

## 2021-08-20 PROCEDURE — 85610 PROTHROMBIN TIME: CPT | Performed by: INTERNAL MEDICINE

## 2021-08-20 PROCEDURE — 85025 COMPLETE CBC W/AUTO DIFF WBC: CPT | Performed by: INTERNAL MEDICINE

## 2021-08-20 PROCEDURE — 97530 THERAPEUTIC ACTIVITIES: CPT

## 2021-08-20 PROCEDURE — 25010000002 ONDANSETRON PER 1 MG: Performed by: INTERNAL MEDICINE

## 2021-08-20 PROCEDURE — 25010000002 PIPERACILLIN SOD-TAZOBACTAM PER 1 G: Performed by: INTERNAL MEDICINE

## 2021-08-20 PROCEDURE — 99232 SBSQ HOSP IP/OBS MODERATE 35: CPT | Performed by: INTERNAL MEDICINE

## 2021-08-20 RX ORDER — MEGESTROL ACETATE 40 MG/ML
400 SUSPENSION ORAL 2 TIMES DAILY WITH MEALS
Status: DISCONTINUED | OUTPATIENT
Start: 2021-08-20 | End: 2021-08-24 | Stop reason: HOSPADM

## 2021-08-20 RX ADMIN — PIPERACILLIN AND TAZOBACTAM 3.38 G: 3; .375 INJECTION, POWDER, LYOPHILIZED, FOR SOLUTION INTRAVENOUS at 18:16

## 2021-08-20 RX ADMIN — Medication 3 ML: at 23:01

## 2021-08-20 RX ADMIN — METOPROLOL TARTRATE 25 MG: 25 TABLET, FILM COATED ORAL at 08:47

## 2021-08-20 RX ADMIN — METOPROLOL TARTRATE 25 MG: 25 TABLET, FILM COATED ORAL at 21:17

## 2021-08-20 RX ADMIN — AMLODIPINE BESYLATE 5 MG: 5 TABLET ORAL at 08:47

## 2021-08-20 RX ADMIN — ASPIRIN 81 MG: 81 TABLET, COATED ORAL at 08:48

## 2021-08-20 RX ADMIN — ONDANSETRON 4 MG: 2 INJECTION INTRAMUSCULAR; INTRAVENOUS at 22:31

## 2021-08-20 RX ADMIN — Medication 3 ML: at 08:47

## 2021-08-20 RX ADMIN — MEGESTROL ACETATE 400 MG: 40 SUSPENSION ORAL at 18:16

## 2021-08-20 RX ADMIN — DONEPEZIL HYDROCHLORIDE 5 MG: 5 TABLET, FILM COATED ORAL at 21:17

## 2021-08-20 RX ADMIN — ONDANSETRON 4 MG: 2 INJECTION INTRAMUSCULAR; INTRAVENOUS at 08:58

## 2021-08-20 RX ADMIN — WARFARIN 2.5 MG: 2.5 TABLET ORAL at 18:16

## 2021-08-20 RX ADMIN — PIPERACILLIN AND TAZOBACTAM 3.38 G: 3; .375 INJECTION, POWDER, LYOPHILIZED, FOR SOLUTION INTRAVENOUS at 03:25

## 2021-08-20 RX ADMIN — PIPERACILLIN AND TAZOBACTAM 3.38 G: 3; .375 INJECTION, POWDER, LYOPHILIZED, FOR SOLUTION INTRAVENOUS at 11:20

## 2021-08-20 RX ADMIN — ACETAMINOPHEN 650 MG: 325 TABLET, FILM COATED ORAL at 21:17

## 2021-08-20 RX ADMIN — POLYETHYLENE GLYCOL 3350 17 G: 17 POWDER, FOR SOLUTION ORAL at 08:48

## 2021-08-20 RX ADMIN — LISINOPRIL 5 MG: 5 TABLET ORAL at 08:48

## 2021-08-21 LAB
ANION GAP SERPL CALCULATED.3IONS-SCNC: 10 MMOL/L (ref 5–15)
BASOPHILS # BLD AUTO: 0.1 10*3/MM3 (ref 0–0.2)
BASOPHILS NFR BLD AUTO: 0.6 % (ref 0–1.5)
BUN SERPL-MCNC: 19 MG/DL (ref 8–23)
BUN/CREAT SERPL: 17.6 (ref 7–25)
CALCIUM SPEC-SCNC: 8.3 MG/DL (ref 8.6–10.5)
CHLORIDE SERPL-SCNC: 97 MMOL/L (ref 98–107)
CO2 SERPL-SCNC: 26 MMOL/L (ref 22–29)
CREAT SERPL-MCNC: 1.08 MG/DL (ref 0.57–1)
DEPRECATED RDW RBC AUTO: 46.8 FL (ref 37–54)
EOSINOPHIL # BLD AUTO: 0.4 10*3/MM3 (ref 0–0.4)
EOSINOPHIL NFR BLD AUTO: 4.6 % (ref 0.3–6.2)
ERYTHROCYTE [DISTWIDTH] IN BLOOD BY AUTOMATED COUNT: 15.6 % (ref 12.3–15.4)
GFR SERPL CREATININE-BSD FRML MDRD: 48 ML/MIN/1.73
GLUCOSE SERPL-MCNC: 109 MG/DL (ref 65–99)
HCT VFR BLD AUTO: 29.4 % (ref 34–46.6)
HGB BLD-MCNC: 9.9 G/DL (ref 12–15.9)
INR PPP: 2.03 (ref 2–3)
LYMPHOCYTES # BLD AUTO: 0.8 10*3/MM3 (ref 0.7–3.1)
LYMPHOCYTES NFR BLD AUTO: 9.1 % (ref 19.6–45.3)
MCH RBC QN AUTO: 28.6 PG (ref 26.6–33)
MCHC RBC AUTO-ENTMCNC: 33.8 G/DL (ref 31.5–35.7)
MCV RBC AUTO: 84.7 FL (ref 79–97)
MONOCYTES # BLD AUTO: 0.9 10*3/MM3 (ref 0.1–0.9)
MONOCYTES NFR BLD AUTO: 10.5 % (ref 5–12)
NEUTROPHILS NFR BLD AUTO: 6.7 10*3/MM3 (ref 1.7–7)
NEUTROPHILS NFR BLD AUTO: 75.2 % (ref 42.7–76)
NRBC BLD AUTO-RTO: 0.1 /100 WBC (ref 0–0.2)
PLATELET # BLD AUTO: 250 10*3/MM3 (ref 140–450)
PMV BLD AUTO: 6.5 FL (ref 6–12)
POTASSIUM SERPL-SCNC: 3.5 MMOL/L (ref 3.5–5.2)
PROTHROMBIN TIME: 21.4 SECONDS (ref 19.4–28.5)
RBC # BLD AUTO: 3.47 10*6/MM3 (ref 3.77–5.28)
SODIUM SERPL-SCNC: 133 MMOL/L (ref 136–145)
WBC # BLD AUTO: 8.9 10*3/MM3 (ref 3.4–10.8)

## 2021-08-21 PROCEDURE — 25010000002 PIPERACILLIN SOD-TAZOBACTAM PER 1 G: Performed by: INTERNAL MEDICINE

## 2021-08-21 PROCEDURE — 94799 UNLISTED PULMONARY SVC/PX: CPT

## 2021-08-21 PROCEDURE — 85025 COMPLETE CBC W/AUTO DIFF WBC: CPT | Performed by: INTERNAL MEDICINE

## 2021-08-21 PROCEDURE — 85610 PROTHROMBIN TIME: CPT | Performed by: INTERNAL MEDICINE

## 2021-08-21 PROCEDURE — 80048 BASIC METABOLIC PNL TOTAL CA: CPT | Performed by: INTERNAL MEDICINE

## 2021-08-21 PROCEDURE — 25010000002 ONDANSETRON PER 1 MG: Performed by: INTERNAL MEDICINE

## 2021-08-21 RX ADMIN — DIGOXIN 125 MCG: 125 TABLET ORAL at 15:07

## 2021-08-21 RX ADMIN — ONDANSETRON 4 MG: 2 INJECTION INTRAMUSCULAR; INTRAVENOUS at 05:43

## 2021-08-21 RX ADMIN — MEGESTROL ACETATE 400 MG: 40 SUSPENSION ORAL at 09:54

## 2021-08-21 RX ADMIN — ACETAMINOPHEN 650 MG: 325 TABLET, FILM COATED ORAL at 17:14

## 2021-08-21 RX ADMIN — LISINOPRIL 5 MG: 5 TABLET ORAL at 09:55

## 2021-08-21 RX ADMIN — Medication 3 ML: at 23:58

## 2021-08-21 RX ADMIN — PIPERACILLIN AND TAZOBACTAM 3.38 G: 3; .375 INJECTION, POWDER, LYOPHILIZED, FOR SOLUTION INTRAVENOUS at 05:26

## 2021-08-21 RX ADMIN — IPRATROPIUM BROMIDE AND ALBUTEROL SULFATE 3 ML: 2.5; .5 SOLUTION RESPIRATORY (INHALATION) at 11:09

## 2021-08-21 RX ADMIN — POLYETHYLENE GLYCOL 3350 17 G: 17 POWDER, FOR SOLUTION ORAL at 09:54

## 2021-08-21 RX ADMIN — ASPIRIN 81 MG: 81 TABLET, COATED ORAL at 09:56

## 2021-08-21 RX ADMIN — AMLODIPINE BESYLATE 5 MG: 5 TABLET ORAL at 09:55

## 2021-08-21 RX ADMIN — MEGESTROL ACETATE 400 MG: 40 SUSPENSION ORAL at 17:14

## 2021-08-21 RX ADMIN — METOPROLOL TARTRATE 25 MG: 25 TABLET, FILM COATED ORAL at 09:55

## 2021-08-21 RX ADMIN — IPRATROPIUM BROMIDE AND ALBUTEROL SULFATE 3 ML: 2.5; .5 SOLUTION RESPIRATORY (INHALATION) at 07:43

## 2021-08-21 RX ADMIN — METOPROLOL TARTRATE 25 MG: 25 TABLET, FILM COATED ORAL at 21:06

## 2021-08-21 RX ADMIN — PIPERACILLIN AND TAZOBACTAM 3.38 G: 3; .375 INJECTION, POWDER, LYOPHILIZED, FOR SOLUTION INTRAVENOUS at 18:28

## 2021-08-21 RX ADMIN — WARFARIN 2.5 MG: 2.5 TABLET ORAL at 17:14

## 2021-08-21 RX ADMIN — PIPERACILLIN AND TAZOBACTAM 3.38 G: 3; .375 INJECTION, POWDER, LYOPHILIZED, FOR SOLUTION INTRAVENOUS at 10:00

## 2021-08-21 RX ADMIN — IPRATROPIUM BROMIDE AND ALBUTEROL SULFATE 3 ML: 2.5; .5 SOLUTION RESPIRATORY (INHALATION) at 15:39

## 2021-08-21 RX ADMIN — Medication 3 ML: at 09:55

## 2021-08-21 RX ADMIN — DONEPEZIL HYDROCHLORIDE 5 MG: 5 TABLET, FILM COATED ORAL at 21:06

## 2021-08-22 LAB
ANION GAP SERPL CALCULATED.3IONS-SCNC: 7 MMOL/L (ref 5–15)
BASOPHILS # BLD AUTO: 0.1 10*3/MM3 (ref 0–0.2)
BASOPHILS NFR BLD AUTO: 0.8 % (ref 0–1.5)
BUN SERPL-MCNC: 15 MG/DL (ref 8–23)
BUN/CREAT SERPL: 16.5 (ref 7–25)
CALCIUM SPEC-SCNC: 8.1 MG/DL (ref 8.6–10.5)
CHLORIDE SERPL-SCNC: 100 MMOL/L (ref 98–107)
CO2 SERPL-SCNC: 28 MMOL/L (ref 22–29)
CREAT SERPL-MCNC: 0.91 MG/DL (ref 0.57–1)
DEPRECATED RDW RBC AUTO: 44.2 FL (ref 37–54)
EOSINOPHIL # BLD AUTO: 0.7 10*3/MM3 (ref 0–0.4)
EOSINOPHIL NFR BLD AUTO: 9.5 % (ref 0.3–6.2)
ERYTHROCYTE [DISTWIDTH] IN BLOOD BY AUTOMATED COUNT: 15.1 % (ref 12.3–15.4)
GFR SERPL CREATININE-BSD FRML MDRD: 58 ML/MIN/1.73
GLUCOSE SERPL-MCNC: 95 MG/DL (ref 65–99)
HCT VFR BLD AUTO: 28 % (ref 34–46.6)
HGB BLD-MCNC: 9.6 G/DL (ref 12–15.9)
INR PPP: 2.7 (ref 2–3)
LYMPHOCYTES # BLD AUTO: 0.9 10*3/MM3 (ref 0.7–3.1)
LYMPHOCYTES NFR BLD AUTO: 11.1 % (ref 19.6–45.3)
MCH RBC QN AUTO: 28.6 PG (ref 26.6–33)
MCHC RBC AUTO-ENTMCNC: 34.3 G/DL (ref 31.5–35.7)
MCV RBC AUTO: 83.5 FL (ref 79–97)
MONOCYTES # BLD AUTO: 0.8 10*3/MM3 (ref 0.1–0.9)
MONOCYTES NFR BLD AUTO: 10.2 % (ref 5–12)
NEUTROPHILS NFR BLD AUTO: 5.3 10*3/MM3 (ref 1.7–7)
NEUTROPHILS NFR BLD AUTO: 68.4 % (ref 42.7–76)
NRBC BLD AUTO-RTO: 0 /100 WBC (ref 0–0.2)
PLATELET # BLD AUTO: 249 10*3/MM3 (ref 140–450)
PMV BLD AUTO: 6.6 FL (ref 6–12)
POTASSIUM SERPL-SCNC: 3.8 MMOL/L (ref 3.5–5.2)
PROTHROMBIN TIME: 27.8 SECONDS (ref 19.4–28.5)
RBC # BLD AUTO: 3.36 10*6/MM3 (ref 3.77–5.28)
SODIUM SERPL-SCNC: 135 MMOL/L (ref 136–145)
WBC # BLD AUTO: 7.8 10*3/MM3 (ref 3.4–10.8)

## 2021-08-22 PROCEDURE — 97112 NEUROMUSCULAR REEDUCATION: CPT

## 2021-08-22 PROCEDURE — 94799 UNLISTED PULMONARY SVC/PX: CPT

## 2021-08-22 PROCEDURE — 25010000002 ONDANSETRON PER 1 MG: Performed by: INTERNAL MEDICINE

## 2021-08-22 PROCEDURE — 80048 BASIC METABOLIC PNL TOTAL CA: CPT | Performed by: INTERNAL MEDICINE

## 2021-08-22 PROCEDURE — 25010000002 PIPERACILLIN SOD-TAZOBACTAM PER 1 G: Performed by: INTERNAL MEDICINE

## 2021-08-22 PROCEDURE — 85025 COMPLETE CBC W/AUTO DIFF WBC: CPT | Performed by: INTERNAL MEDICINE

## 2021-08-22 PROCEDURE — 97530 THERAPEUTIC ACTIVITIES: CPT

## 2021-08-22 PROCEDURE — 85610 PROTHROMBIN TIME: CPT | Performed by: INTERNAL MEDICINE

## 2021-08-22 RX ADMIN — WARFARIN 2.5 MG: 2.5 TABLET ORAL at 17:03

## 2021-08-22 RX ADMIN — MEGESTROL ACETATE 400 MG: 40 SUSPENSION ORAL at 17:03

## 2021-08-22 RX ADMIN — ACETAMINOPHEN 650 MG: 325 TABLET, FILM COATED ORAL at 18:20

## 2021-08-22 RX ADMIN — AMLODIPINE BESYLATE 5 MG: 5 TABLET ORAL at 10:42

## 2021-08-22 RX ADMIN — PIPERACILLIN AND TAZOBACTAM 3.38 G: 3; .375 INJECTION, POWDER, LYOPHILIZED, FOR SOLUTION INTRAVENOUS at 10:42

## 2021-08-22 RX ADMIN — Medication 3 ML: at 20:01

## 2021-08-22 RX ADMIN — METOPROLOL TARTRATE 25 MG: 25 TABLET, FILM COATED ORAL at 10:42

## 2021-08-22 RX ADMIN — Medication 3 ML: at 10:43

## 2021-08-22 RX ADMIN — ASPIRIN 81 MG: 81 TABLET, COATED ORAL at 10:42

## 2021-08-22 RX ADMIN — IPRATROPIUM BROMIDE AND ALBUTEROL SULFATE 3 ML: 2.5; .5 SOLUTION RESPIRATORY (INHALATION) at 07:25

## 2021-08-22 RX ADMIN — MEGESTROL ACETATE 400 MG: 40 SUSPENSION ORAL at 10:42

## 2021-08-22 RX ADMIN — IPRATROPIUM BROMIDE AND ALBUTEROL SULFATE 3 ML: 2.5; .5 SOLUTION RESPIRATORY (INHALATION) at 15:20

## 2021-08-22 RX ADMIN — METOPROLOL TARTRATE 25 MG: 25 TABLET, FILM COATED ORAL at 20:01

## 2021-08-22 RX ADMIN — PIPERACILLIN AND TAZOBACTAM 3.38 G: 3; .375 INJECTION, POWDER, LYOPHILIZED, FOR SOLUTION INTRAVENOUS at 20:00

## 2021-08-22 RX ADMIN — ONDANSETRON 4 MG: 2 INJECTION INTRAMUSCULAR; INTRAVENOUS at 10:43

## 2021-08-22 RX ADMIN — LISINOPRIL 5 MG: 5 TABLET ORAL at 10:42

## 2021-08-22 RX ADMIN — IPRATROPIUM BROMIDE AND ALBUTEROL SULFATE 3 ML: 2.5; .5 SOLUTION RESPIRATORY (INHALATION) at 19:35

## 2021-08-22 RX ADMIN — PIPERACILLIN AND TAZOBACTAM 3.38 G: 3; .375 INJECTION, POWDER, LYOPHILIZED, FOR SOLUTION INTRAVENOUS at 03:23

## 2021-08-22 RX ADMIN — IPRATROPIUM BROMIDE AND ALBUTEROL SULFATE 3 ML: 2.5; .5 SOLUTION RESPIRATORY (INHALATION) at 10:45

## 2021-08-22 RX ADMIN — DONEPEZIL HYDROCHLORIDE 5 MG: 5 TABLET, FILM COATED ORAL at 20:01

## 2021-08-22 RX ADMIN — POLYETHYLENE GLYCOL 3350 17 G: 17 POWDER, FOR SOLUTION ORAL at 10:42

## 2021-08-23 ENCOUNTER — APPOINTMENT (OUTPATIENT)
Dept: GENERAL RADIOLOGY | Facility: HOSPITAL | Age: 86
End: 2021-08-23

## 2021-08-23 LAB
ANION GAP SERPL CALCULATED.3IONS-SCNC: 6 MMOL/L (ref 5–15)
BACTERIA SPEC AEROBE CULT: NORMAL
BACTERIA SPEC AEROBE CULT: NORMAL
BASOPHILS # BLD AUTO: 0.1 10*3/MM3 (ref 0–0.2)
BASOPHILS NFR BLD AUTO: 0.9 % (ref 0–1.5)
BUN SERPL-MCNC: 13 MG/DL (ref 8–23)
BUN/CREAT SERPL: 14 (ref 7–25)
CALCIUM SPEC-SCNC: 8.3 MG/DL (ref 8.6–10.5)
CHLORIDE SERPL-SCNC: 101 MMOL/L (ref 98–107)
CO2 SERPL-SCNC: 28 MMOL/L (ref 22–29)
CREAT SERPL-MCNC: 0.93 MG/DL (ref 0.57–1)
DEPRECATED RDW RBC AUTO: 45.5 FL (ref 37–54)
EOSINOPHIL # BLD AUTO: 0.5 10*3/MM3 (ref 0–0.4)
EOSINOPHIL NFR BLD AUTO: 7.8 % (ref 0.3–6.2)
ERYTHROCYTE [DISTWIDTH] IN BLOOD BY AUTOMATED COUNT: 15.5 % (ref 12.3–15.4)
GFR SERPL CREATININE-BSD FRML MDRD: 57 ML/MIN/1.73
GLUCOSE SERPL-MCNC: 94 MG/DL (ref 65–99)
HCT VFR BLD AUTO: 28.5 % (ref 34–46.6)
HGB BLD-MCNC: 9.8 G/DL (ref 12–15.9)
INR PPP: 2.36 (ref 2–3)
LYMPHOCYTES # BLD AUTO: 0.9 10*3/MM3 (ref 0.7–3.1)
LYMPHOCYTES NFR BLD AUTO: 14.1 % (ref 19.6–45.3)
MCH RBC QN AUTO: 29 PG (ref 26.6–33)
MCHC RBC AUTO-ENTMCNC: 34.3 G/DL (ref 31.5–35.7)
MCV RBC AUTO: 84.7 FL (ref 79–97)
MONOCYTES # BLD AUTO: 0.8 10*3/MM3 (ref 0.1–0.9)
MONOCYTES NFR BLD AUTO: 11.5 % (ref 5–12)
NEUTROPHILS NFR BLD AUTO: 4.4 10*3/MM3 (ref 1.7–7)
NEUTROPHILS NFR BLD AUTO: 65.7 % (ref 42.7–76)
NRBC BLD AUTO-RTO: 0 /100 WBC (ref 0–0.2)
PLATELET # BLD AUTO: 255 10*3/MM3 (ref 140–450)
PMV BLD AUTO: 6.7 FL (ref 6–12)
POTASSIUM SERPL-SCNC: 4.1 MMOL/L (ref 3.5–5.2)
PROTHROMBIN TIME: 24.6 SECONDS (ref 19.4–28.5)
RBC # BLD AUTO: 3.36 10*6/MM3 (ref 3.77–5.28)
SODIUM SERPL-SCNC: 135 MMOL/L (ref 136–145)
WBC # BLD AUTO: 6.7 10*3/MM3 (ref 3.4–10.8)

## 2021-08-23 PROCEDURE — 85025 COMPLETE CBC W/AUTO DIFF WBC: CPT | Performed by: INTERNAL MEDICINE

## 2021-08-23 PROCEDURE — 25010000002 METOCLOPRAMIDE PER 10 MG: Performed by: INTERNAL MEDICINE

## 2021-08-23 PROCEDURE — 25010000002 PIPERACILLIN SOD-TAZOBACTAM PER 1 G: Performed by: INTERNAL MEDICINE

## 2021-08-23 PROCEDURE — 71045 X-RAY EXAM CHEST 1 VIEW: CPT

## 2021-08-23 PROCEDURE — 94799 UNLISTED PULMONARY SVC/PX: CPT

## 2021-08-23 PROCEDURE — 80048 BASIC METABOLIC PNL TOTAL CA: CPT | Performed by: INTERNAL MEDICINE

## 2021-08-23 PROCEDURE — 85610 PROTHROMBIN TIME: CPT | Performed by: INTERNAL MEDICINE

## 2021-08-23 PROCEDURE — 99232 SBSQ HOSP IP/OBS MODERATE 35: CPT | Performed by: INTERNAL MEDICINE

## 2021-08-23 RX ORDER — METOCLOPRAMIDE HYDROCHLORIDE 5 MG/ML
5 INJECTION INTRAMUSCULAR; INTRAVENOUS
Status: DISCONTINUED | OUTPATIENT
Start: 2021-08-23 | End: 2021-08-24 | Stop reason: HOSPADM

## 2021-08-23 RX ORDER — METOCLOPRAMIDE 5 MG/1
5 TABLET ORAL
Status: ON HOLD
Start: 2021-08-23 | End: 2021-12-28

## 2021-08-23 RX ORDER — BISACODYL 5 MG/1
10 TABLET, DELAYED RELEASE ORAL DAILY PRN
Start: 2021-08-23 | End: 2021-09-29

## 2021-08-23 RX ORDER — DIGOXIN 125 MCG
125 TABLET ORAL EVERY OTHER DAY
Start: 2021-08-25 | End: 2021-09-29

## 2021-08-23 RX ORDER — AMOXICILLIN AND CLAVULANATE POTASSIUM 875; 125 MG/1; MG/1
1 TABLET, FILM COATED ORAL 2 TIMES DAILY
Start: 2021-08-23 | End: 2021-08-29

## 2021-08-23 RX ORDER — POLYETHYLENE GLYCOL 3350 17 G/17G
17 POWDER, FOR SOLUTION ORAL DAILY
Start: 2021-08-24

## 2021-08-23 RX ORDER — MEGESTROL ACETATE 40 MG/ML
400 SUSPENSION ORAL 2 TIMES DAILY WITH MEALS
Status: ON HOLD
Start: 2021-08-23 | End: 2021-12-28

## 2021-08-23 RX ORDER — IPRATROPIUM BROMIDE AND ALBUTEROL SULFATE 2.5; .5 MG/3ML; MG/3ML
3 SOLUTION RESPIRATORY (INHALATION) 4 TIMES DAILY PRN
Qty: 360 ML | Status: ON HOLD
Start: 2021-08-23 | End: 2021-12-28

## 2021-08-23 RX ADMIN — IPRATROPIUM BROMIDE AND ALBUTEROL SULFATE 3 ML: 2.5; .5 SOLUTION RESPIRATORY (INHALATION) at 07:34

## 2021-08-23 RX ADMIN — PIPERACILLIN AND TAZOBACTAM 3.38 G: 3; .375 INJECTION, POWDER, LYOPHILIZED, FOR SOLUTION INTRAVENOUS at 11:59

## 2021-08-23 RX ADMIN — PIPERACILLIN AND TAZOBACTAM 3.38 G: 3; .375 INJECTION, POWDER, LYOPHILIZED, FOR SOLUTION INTRAVENOUS at 02:48

## 2021-08-23 RX ADMIN — LISINOPRIL 5 MG: 5 TABLET ORAL at 08:36

## 2021-08-23 RX ADMIN — METOPROLOL TARTRATE 25 MG: 25 TABLET, FILM COATED ORAL at 08:36

## 2021-08-23 RX ADMIN — MEGESTROL ACETATE 400 MG: 40 SUSPENSION ORAL at 17:44

## 2021-08-23 RX ADMIN — WARFARIN 2.5 MG: 2.5 TABLET ORAL at 17:45

## 2021-08-23 RX ADMIN — DONEPEZIL HYDROCHLORIDE 5 MG: 5 TABLET, FILM COATED ORAL at 20:50

## 2021-08-23 RX ADMIN — POLYETHYLENE GLYCOL 3350 17 G: 17 POWDER, FOR SOLUTION ORAL at 08:37

## 2021-08-23 RX ADMIN — IPRATROPIUM BROMIDE AND ALBUTEROL SULFATE 3 ML: 2.5; .5 SOLUTION RESPIRATORY (INHALATION) at 15:06

## 2021-08-23 RX ADMIN — Medication 3 ML: at 08:37

## 2021-08-23 RX ADMIN — MEGESTROL ACETATE 400 MG: 40 SUSPENSION ORAL at 08:36

## 2021-08-23 RX ADMIN — METOCLOPRAMIDE 5 MG: 5 INJECTION, SOLUTION INTRAMUSCULAR; INTRAVENOUS at 12:00

## 2021-08-23 RX ADMIN — ASPIRIN 81 MG: 81 TABLET, COATED ORAL at 08:36

## 2021-08-23 RX ADMIN — METOPROLOL TARTRATE 25 MG: 25 TABLET, FILM COATED ORAL at 20:50

## 2021-08-23 RX ADMIN — IBUPROFEN 400 MG: 400 TABLET ORAL at 08:47

## 2021-08-23 RX ADMIN — IPRATROPIUM BROMIDE AND ALBUTEROL SULFATE 3 ML: 2.5; .5 SOLUTION RESPIRATORY (INHALATION) at 19:55

## 2021-08-23 RX ADMIN — DIGOXIN 125 MCG: 125 TABLET ORAL at 12:00

## 2021-08-23 RX ADMIN — AMLODIPINE BESYLATE 5 MG: 5 TABLET ORAL at 08:37

## 2021-08-23 RX ADMIN — IPRATROPIUM BROMIDE AND ALBUTEROL SULFATE 3 ML: 2.5; .5 SOLUTION RESPIRATORY (INHALATION) at 11:24

## 2021-08-24 VITALS
RESPIRATION RATE: 18 BRPM | HEIGHT: 59 IN | DIASTOLIC BLOOD PRESSURE: 58 MMHG | TEMPERATURE: 98.2 F | OXYGEN SATURATION: 94 % | BODY MASS INDEX: 22.04 KG/M2 | WEIGHT: 109.35 LBS | HEART RATE: 60 BPM | SYSTOLIC BLOOD PRESSURE: 136 MMHG

## 2021-08-24 PROBLEM — E44.0 MODERATE MALNUTRITION: Status: ACTIVE | Noted: 2021-08-24

## 2021-08-24 LAB
ANION GAP SERPL CALCULATED.3IONS-SCNC: 9 MMOL/L (ref 5–15)
BASOPHILS # BLD AUTO: 0.1 10*3/MM3 (ref 0–0.2)
BASOPHILS NFR BLD AUTO: 0.7 % (ref 0–1.5)
BUN SERPL-MCNC: 17 MG/DL (ref 8–23)
BUN/CREAT SERPL: 18.1 (ref 7–25)
CALCIUM SPEC-SCNC: 8.2 MG/DL (ref 8.6–10.5)
CHLORIDE SERPL-SCNC: 101 MMOL/L (ref 98–107)
CO2 SERPL-SCNC: 25 MMOL/L (ref 22–29)
CREAT SERPL-MCNC: 0.94 MG/DL (ref 0.57–1)
DEPRECATED RDW RBC AUTO: 46.4 FL (ref 37–54)
EOSINOPHIL # BLD AUTO: 0.4 10*3/MM3 (ref 0–0.4)
EOSINOPHIL NFR BLD AUTO: 4.2 % (ref 0.3–6.2)
ERYTHROCYTE [DISTWIDTH] IN BLOOD BY AUTOMATED COUNT: 15.8 % (ref 12.3–15.4)
GFR SERPL CREATININE-BSD FRML MDRD: 56 ML/MIN/1.73
GLUCOSE SERPL-MCNC: 98 MG/DL (ref 65–99)
HCT VFR BLD AUTO: 27.6 % (ref 34–46.6)
HGB BLD-MCNC: 9.4 G/DL (ref 12–15.9)
INR PPP: 2.42 (ref 2–3)
LYMPHOCYTES # BLD AUTO: 1.1 10*3/MM3 (ref 0.7–3.1)
LYMPHOCYTES NFR BLD AUTO: 12.5 % (ref 19.6–45.3)
MCH RBC QN AUTO: 28.6 PG (ref 26.6–33)
MCHC RBC AUTO-ENTMCNC: 34.1 G/DL (ref 31.5–35.7)
MCV RBC AUTO: 83.7 FL (ref 79–97)
MONOCYTES # BLD AUTO: 0.8 10*3/MM3 (ref 0.1–0.9)
MONOCYTES NFR BLD AUTO: 8.8 % (ref 5–12)
NEUTROPHILS NFR BLD AUTO: 6.4 10*3/MM3 (ref 1.7–7)
NEUTROPHILS NFR BLD AUTO: 73.8 % (ref 42.7–76)
NRBC BLD AUTO-RTO: 0 /100 WBC (ref 0–0.2)
PLATELET # BLD AUTO: 287 10*3/MM3 (ref 140–450)
PMV BLD AUTO: 6.7 FL (ref 6–12)
POTASSIUM SERPL-SCNC: 4.4 MMOL/L (ref 3.5–5.2)
PROTHROMBIN TIME: 25.2 SECONDS (ref 19.4–28.5)
RBC # BLD AUTO: 3.3 10*6/MM3 (ref 3.77–5.28)
SODIUM SERPL-SCNC: 135 MMOL/L (ref 136–145)
WBC # BLD AUTO: 8.7 10*3/MM3 (ref 3.4–10.8)

## 2021-08-24 PROCEDURE — 85610 PROTHROMBIN TIME: CPT | Performed by: INTERNAL MEDICINE

## 2021-08-24 PROCEDURE — 94799 UNLISTED PULMONARY SVC/PX: CPT

## 2021-08-24 PROCEDURE — 80048 BASIC METABOLIC PNL TOTAL CA: CPT | Performed by: INTERNAL MEDICINE

## 2021-08-24 PROCEDURE — 99232 SBSQ HOSP IP/OBS MODERATE 35: CPT | Performed by: INTERNAL MEDICINE

## 2021-08-24 PROCEDURE — 85025 COMPLETE CBC W/AUTO DIFF WBC: CPT | Performed by: INTERNAL MEDICINE

## 2021-08-24 PROCEDURE — 97530 THERAPEUTIC ACTIVITIES: CPT

## 2021-08-24 RX ORDER — AMOXICILLIN AND CLAVULANATE POTASSIUM 875; 125 MG/1; MG/1
1 TABLET, FILM COATED ORAL EVERY 12 HOURS SCHEDULED
Status: DISCONTINUED | OUTPATIENT
Start: 2021-08-24 | End: 2021-08-24 | Stop reason: HOSPADM

## 2021-08-24 RX ORDER — AMOXICILLIN AND CLAVULANATE POTASSIUM 875; 125 MG/1; MG/1
1 TABLET, FILM COATED ORAL EVERY 12 HOURS SCHEDULED
Qty: 14 TABLET | Refills: 0
Start: 2021-08-24 | End: 2021-08-31

## 2021-08-24 RX ADMIN — AMLODIPINE BESYLATE 5 MG: 5 TABLET ORAL at 10:08

## 2021-08-24 RX ADMIN — POLYETHYLENE GLYCOL 3350 17 G: 17 POWDER, FOR SOLUTION ORAL at 10:07

## 2021-08-24 RX ADMIN — METOPROLOL TARTRATE 25 MG: 25 TABLET, FILM COATED ORAL at 10:08

## 2021-08-24 RX ADMIN — LISINOPRIL 5 MG: 5 TABLET ORAL at 10:08

## 2021-08-24 RX ADMIN — ASPIRIN 81 MG: 81 TABLET, COATED ORAL at 10:08

## 2021-08-24 RX ADMIN — IPRATROPIUM BROMIDE AND ALBUTEROL SULFATE 3 ML: 2.5; .5 SOLUTION RESPIRATORY (INHALATION) at 14:30

## 2021-08-24 RX ADMIN — MEGESTROL ACETATE 400 MG: 40 SUSPENSION ORAL at 10:07

## 2021-08-24 RX ADMIN — IPRATROPIUM BROMIDE AND ALBUTEROL SULFATE 3 ML: 2.5; .5 SOLUTION RESPIRATORY (INHALATION) at 07:08

## 2021-08-24 RX ADMIN — ACETAMINOPHEN 650 MG: 325 TABLET, FILM COATED ORAL at 10:07

## 2021-08-27 LAB
QT INTERVAL: 367 MS
QT INTERVAL: 392 MS

## 2021-09-01 ENCOUNTER — ANTICOAGULATION VISIT (OUTPATIENT)
Dept: CARDIOLOGY | Facility: CLINIC | Age: 86
End: 2021-09-01

## 2021-09-01 ENCOUNTER — TELEPHONE (OUTPATIENT)
Dept: CARDIOLOGY | Facility: CLINIC | Age: 86
End: 2021-09-01

## 2021-09-01 DIAGNOSIS — Z79.01 LONG TERM (CURRENT) USE OF ANTICOAGULANTS: Primary | ICD-10-CM

## 2021-09-01 NOTE — TELEPHONE ENCOUNTER
Called spoke with patient dtr. The patient is currently INPT at [facility]. [facility] jose miguel Dean Is following INRs.

## 2021-09-09 ENCOUNTER — HOME HEALTH ADMISSION (OUTPATIENT)
Dept: HOME HEALTH SERVICES | Facility: HOME HEALTHCARE | Age: 86
End: 2021-09-09

## 2021-09-09 ENCOUNTER — TRANSCRIBE ORDERS (OUTPATIENT)
Dept: HOME HEALTH SERVICES | Facility: HOME HEALTHCARE | Age: 86
End: 2021-09-09

## 2021-09-09 DIAGNOSIS — J18.9 PNEUMONIA DUE TO INFECTIOUS ORGANISM, UNSPECIFIED LATERALITY, UNSPECIFIED PART OF LUNG: Primary | ICD-10-CM

## 2021-09-12 ENCOUNTER — HOME CARE VISIT (OUTPATIENT)
Dept: HOME HEALTH SERVICES | Facility: HOME HEALTHCARE | Age: 86
End: 2021-09-12

## 2021-09-12 VITALS
OXYGEN SATURATION: 97 % | TEMPERATURE: 98 F | DIASTOLIC BLOOD PRESSURE: 74 MMHG | HEART RATE: 71 BPM | SYSTOLIC BLOOD PRESSURE: 134 MMHG

## 2021-09-12 PROCEDURE — G0299 HHS/HOSPICE OF RN EA 15 MIN: HCPCS

## 2021-09-12 NOTE — HOME HEALTH
Patient is a pleasant 88 y.o. female with a h/o pacemaker placement, bilateral hip replacement, HTN and CHF whom was intially admitted to [facility] 8/13/21 for dehydration and then discharged to [facility]. While at [facility], patient developed PNA and tested positive for COVID-19. Patient returned home 9/11/21 without COVID-19 symptoms and is requesting home health services due to unresolved weakness. Patient will require PT and OT for lower and upper body strength and mobility training. Patient at this time is only able to transfer with maximal caregiver assistance. Patient is familiar with home health services. SN will be required for monitoring health progression and checking PT/INR as needed. Family is able to check PT/INRs at home and report to provider, but may require assistance at times.

## 2021-09-13 ENCOUNTER — HOME CARE VISIT (OUTPATIENT)
Dept: HOME HEALTH SERVICES | Facility: HOME HEALTHCARE | Age: 86
End: 2021-09-13

## 2021-09-13 VITALS — TEMPERATURE: 97.1 F | DIASTOLIC BLOOD PRESSURE: 56 MMHG | SYSTOLIC BLOOD PRESSURE: 138 MMHG | HEART RATE: 78 BPM

## 2021-09-13 PROCEDURE — G0151 HHCP-SERV OF PT,EA 15 MIN: HCPCS

## 2021-09-13 NOTE — HOME HEALTH
Pt is an 89 yo female referred for PT sp hospitalization and rehab stay for dehydration. Pt also developed pnuemonia in rehab and also tested positive for COVID 19 on 9.4.21. Pt is currently asymptomatice. Son and daughter tested negative fof COVID 19.    PLOF Residential ambulator with use of rollator. Requires assist with ADL.     Environment Lives with daughter and son in law in a single story house with one step to get in and out of house. Son in law is primary caregiver. House is clutter free.     Pt's main complaint is weakness and not feeling well. Pt requires min to mod assist with bed mobility and transfers with use of rollator to steady herself. Pt required min assist to ambulate x 20 ft with use of rollator. Pt and family wants patient to return to [facility].

## 2021-09-14 ENCOUNTER — ANTICOAGULATION VISIT (OUTPATIENT)
Dept: CARDIOLOGY | Facility: CLINIC | Age: 86
End: 2021-09-14

## 2021-09-14 ENCOUNTER — HOME CARE VISIT (OUTPATIENT)
Dept: HOME HEALTH SERVICES | Facility: HOME HEALTHCARE | Age: 86
End: 2021-09-14

## 2021-09-14 VITALS
SYSTOLIC BLOOD PRESSURE: 120 MMHG | RESPIRATION RATE: 17 BRPM | OXYGEN SATURATION: 95 % | DIASTOLIC BLOOD PRESSURE: 51 MMHG | TEMPERATURE: 97.9 F | HEART RATE: 62 BPM

## 2021-09-14 DIAGNOSIS — Z79.01 LONG TERM (CURRENT) USE OF ANTICOAGULANTS: Primary | ICD-10-CM

## 2021-09-14 LAB — INR PPP: 1.7

## 2021-09-14 PROCEDURE — G0300 HHS/HOSPICE OF LPN EA 15 MIN: HCPCS

## 2021-09-14 NOTE — HOME HEALTH
Patient denies pain, has had no falls, and there have been no medication or insurance changes. Patient lying abed, reports being very tired..  PT/INR: 20.1/1.7 - Warfarin 2.5mg daily. Spoke with Beba at Dr. Luke's office. No changes to current doseage. Check INR in 1 week 9/21/21    NEXT VISIT:  CP assess  Safety, Pain, Falls assess  PT/INR due 9/21/21

## 2021-09-15 ENCOUNTER — HOME CARE VISIT (OUTPATIENT)
Dept: HOME HEALTH SERVICES | Facility: HOME HEALTHCARE | Age: 86
End: 2021-09-15

## 2021-09-17 ENCOUNTER — HOME CARE VISIT (OUTPATIENT)
Dept: HOME HEALTH SERVICES | Facility: HOME HEALTHCARE | Age: 86
End: 2021-09-17

## 2021-09-20 ENCOUNTER — HOME CARE VISIT (OUTPATIENT)
Dept: HOME HEALTH SERVICES | Facility: HOME HEALTHCARE | Age: 86
End: 2021-09-20

## 2021-09-20 PROCEDURE — G0157 HHC PT ASSISTANT EA 15: HCPCS

## 2021-09-20 NOTE — TELEPHONE ENCOUNTER
Rx Refill Note  Requested Prescriptions     Pending Prescriptions Disp Refills   • metoprolol tartrate (LOPRESSOR) 25 MG tablet [Pharmacy Med Name: METOPROLOL TARTRATE 25 MG TAB] 180 tablet 1     Sig: TAKE ONE TABLET BY MOUTH TWICE A DAY      Last office visit with prescribing clinician: 2/1/2021      Next office visit with prescribing clinician: Visit date not found            Rox Rangel MA  09/20/21, 12:47 EDT

## 2021-09-21 ENCOUNTER — HOME CARE VISIT (OUTPATIENT)
Dept: HOME HEALTH SERVICES | Facility: HOME HEALTHCARE | Age: 86
End: 2021-09-21

## 2021-09-21 ENCOUNTER — ANTICOAGULATION VISIT (OUTPATIENT)
Dept: CARDIOLOGY | Facility: CLINIC | Age: 86
End: 2021-09-21

## 2021-09-21 VITALS
HEART RATE: 78 BPM | DIASTOLIC BLOOD PRESSURE: 54 MMHG | OXYGEN SATURATION: 96 % | SYSTOLIC BLOOD PRESSURE: 134 MMHG | RESPIRATION RATE: 17 BRPM | TEMPERATURE: 97.8 F

## 2021-09-21 DIAGNOSIS — Z79.01 LONG TERM (CURRENT) USE OF ANTICOAGULANTS: Primary | ICD-10-CM

## 2021-09-21 LAB
HH POC INTERNATIONAL NORMALIZATION RATIO: 2.7
HH POC PROTIME: 31.1 SECONDS
INR PPP: 2.7

## 2021-09-21 PROCEDURE — G0152 HHCP-SERV OF OT,EA 15 MIN: HCPCS

## 2021-09-21 PROCEDURE — G0299 HHS/HOSPICE OF RN EA 15 MIN: HCPCS

## 2021-09-21 NOTE — HOME HEALTH
pt sitting up and ready for PT session,    pt states she is very weak today and very much wants to improve.    pt is motivated to improve to return to daily ambulation and standing.   daughter pushing pt on the rollator walker on arrival, reporting pt is having great difficulty standing and ambulating and spends most of the time in the bed.         pt was very limited today and with significant limitations in the ability to stand and ambulate with fatigue and weakness reported.    pt was challenged to sit unsupported,  often needing to sit back with trunk support.  pt was educated when standing to properly contract quads and to stand upright with proper lumbar extension.    pt was given several cues today to deep plb for energy conservation.

## 2021-09-22 ENCOUNTER — HOME CARE VISIT (OUTPATIENT)
Dept: HOME HEALTH SERVICES | Facility: HOME HEALTHCARE | Age: 86
End: 2021-09-22

## 2021-09-22 VITALS — HEART RATE: 61 BPM | SYSTOLIC BLOOD PRESSURE: 98 MMHG | OXYGEN SATURATION: 96 % | DIASTOLIC BLOOD PRESSURE: 60 MMHG

## 2021-09-22 VITALS
DIASTOLIC BLOOD PRESSURE: 72 MMHG | SYSTOLIC BLOOD PRESSURE: 122 MMHG | HEART RATE: 68 BPM | RESPIRATION RATE: 18 BRPM | OXYGEN SATURATION: 95 % | TEMPERATURE: 97.9 F

## 2021-09-22 NOTE — HOME HEALTH
Pt is an 88 y.o female who lives in a 1 story home with son and DIL.  Pt recently hospitalized secondary to dehydration.  Pt then developed pneumonia and Covid while in rehab    PLOF pt independent with feeding grooming and toileting.  Pt required set up with dressing and CGA with bathing,  Pts son and DIL maintain the home.    Currently pt indepdent with feeding after set up and requires MAX to total assist with all other self care tasks.    Skilled OT for ther ex adl training and transfers.  Pt and therapist in agreement with goals and poc.

## 2021-09-27 ENCOUNTER — HOME CARE VISIT (OUTPATIENT)
Dept: HOME HEALTH SERVICES | Facility: HOME HEALTHCARE | Age: 86
End: 2021-09-27

## 2021-09-27 VITALS
TEMPERATURE: 97 F | RESPIRATION RATE: 20 BRPM | DIASTOLIC BLOOD PRESSURE: 50 MMHG | HEART RATE: 62 BPM | SYSTOLIC BLOOD PRESSURE: 110 MMHG | OXYGEN SATURATION: 97 %

## 2021-09-27 PROCEDURE — G0151 HHCP-SERV OF PT,EA 15 MIN: HCPCS

## 2021-09-28 ENCOUNTER — HOME CARE VISIT (OUTPATIENT)
Dept: HOME HEALTH SERVICES | Facility: HOME HEALTHCARE | Age: 86
End: 2021-09-28

## 2021-09-28 ENCOUNTER — ANTICOAGULATION VISIT (OUTPATIENT)
Dept: CARDIOLOGY | Facility: CLINIC | Age: 86
End: 2021-09-28

## 2021-09-28 VITALS
SYSTOLIC BLOOD PRESSURE: 100 MMHG | RESPIRATION RATE: 17 BRPM | HEART RATE: 60 BPM | OXYGEN SATURATION: 98 % | TEMPERATURE: 97.9 F | DIASTOLIC BLOOD PRESSURE: 40 MMHG

## 2021-09-28 DIAGNOSIS — Z79.01 LONG TERM (CURRENT) USE OF ANTICOAGULANTS: Primary | ICD-10-CM

## 2021-09-28 LAB
HH POC INTERNATIONAL NORMALIZATION RATIO: 3.1
HH POC PROTIME: 35.5 SECONDS
INR PPP: 3.1

## 2021-09-28 PROCEDURE — G0300 HHS/HOSPICE OF LPN EA 15 MIN: HCPCS

## 2021-09-29 ENCOUNTER — HOME CARE VISIT (OUTPATIENT)
Dept: HOME HEALTH SERVICES | Facility: HOME HEALTHCARE | Age: 86
End: 2021-09-29

## 2021-09-29 ENCOUNTER — APPOINTMENT (OUTPATIENT)
Dept: GENERAL RADIOLOGY | Facility: HOSPITAL | Age: 86
End: 2021-09-29

## 2021-09-29 ENCOUNTER — HOSPITAL ENCOUNTER (INPATIENT)
Facility: HOSPITAL | Age: 86
LOS: 2 days | Discharge: NURSING, REHAB OR LONG-TERM CARE | End: 2021-10-02
Attending: EMERGENCY MEDICINE | Admitting: INTERNAL MEDICINE

## 2021-09-29 VITALS
OXYGEN SATURATION: 96 % | SYSTOLIC BLOOD PRESSURE: 120 MMHG | HEART RATE: 66 BPM | DIASTOLIC BLOOD PRESSURE: 32 MMHG | TEMPERATURE: 96.6 F

## 2021-09-29 DIAGNOSIS — N39.0 URINARY TRACT INFECTION WITHOUT HEMATURIA, SITE UNSPECIFIED: ICD-10-CM

## 2021-09-29 DIAGNOSIS — R77.8 ELEVATED TROPONIN: ICD-10-CM

## 2021-09-29 DIAGNOSIS — R53.1 GENERAL WEAKNESS: Primary | ICD-10-CM

## 2021-09-29 LAB
ALBUMIN SERPL-MCNC: 3.3 G/DL (ref 3.5–5.2)
ALBUMIN/GLOB SERPL: 1 G/DL
ALP SERPL-CCNC: 70 U/L (ref 39–117)
ALT SERPL W P-5'-P-CCNC: 44 U/L (ref 1–33)
ANION GAP SERPL CALCULATED.3IONS-SCNC: 11 MMOL/L (ref 5–15)
APTT PPP: 35.5 SECONDS (ref 24–31)
AST SERPL-CCNC: 33 U/L (ref 1–32)
BACTERIA UR QL AUTO: ABNORMAL /HPF
BASOPHILS # BLD MANUAL: 0.21 10*3/MM3 (ref 0–0.2)
BASOPHILS NFR BLD AUTO: 2 % (ref 0–1.5)
BILIRUB SERPL-MCNC: 0.2 MG/DL (ref 0–1.2)
BILIRUB UR QL STRIP: NEGATIVE
BUN SERPL-MCNC: 45 MG/DL (ref 8–23)
BUN/CREAT SERPL: 39.5 (ref 7–25)
CALCIUM SPEC-SCNC: 8.3 MG/DL (ref 8.6–10.5)
CHLORIDE SERPL-SCNC: 111 MMOL/L (ref 98–107)
CLARITY UR: CLEAR
CO2 SERPL-SCNC: 18 MMOL/L (ref 22–29)
COLOR UR: YELLOW
CREAT SERPL-MCNC: 1.14 MG/DL (ref 0.57–1)
DACRYOCYTES BLD QL SMEAR: ABNORMAL
DEPRECATED RDW RBC AUTO: 56.9 FL (ref 37–54)
DIGOXIN SERPL-MCNC: 1.9 NG/ML (ref 0.6–1.2)
ELLIPTOCYTES BLD QL SMEAR: ABNORMAL
EOSINOPHIL # BLD MANUAL: 0.21 10*3/MM3 (ref 0–0.4)
EOSINOPHIL NFR BLD MANUAL: 2 % (ref 0.3–6.2)
ERYTHROCYTE [DISTWIDTH] IN BLOOD BY AUTOMATED COUNT: 19.1 % (ref 12.3–15.4)
GFR SERPL CREATININE-BSD FRML MDRD: 45 ML/MIN/1.73
GLOBULIN UR ELPH-MCNC: 3.3 GM/DL
GLUCOSE SERPL-MCNC: 116 MG/DL (ref 65–99)
GLUCOSE UR STRIP-MCNC: NEGATIVE MG/DL
HCT VFR BLD AUTO: 30.9 % (ref 34–46.6)
HGB BLD-MCNC: 10.7 G/DL (ref 12–15.9)
HGB UR QL STRIP.AUTO: NEGATIVE
HOLD SPECIMEN: NORMAL
HYALINE CASTS UR QL AUTO: ABNORMAL /LPF
INR PPP: 3.52 (ref 2–3)
KETONES UR QL STRIP: NEGATIVE
LEUKOCYTE ESTERASE UR QL STRIP.AUTO: ABNORMAL
LYMPHOCYTES # BLD MANUAL: 1.05 10*3/MM3 (ref 0.7–3.1)
LYMPHOCYTES NFR BLD MANUAL: 3 % (ref 5–12)
LYMPHOCYTES NFR BLD MANUAL: 6 % (ref 19.6–45.3)
MCH RBC QN AUTO: 30.1 PG (ref 26.6–33)
MCHC RBC AUTO-ENTMCNC: 34.6 G/DL (ref 31.5–35.7)
MCV RBC AUTO: 87 FL (ref 79–97)
MONOCYTES # BLD AUTO: 0.32 10*3/MM3 (ref 0.1–0.9)
MYELOCYTES NFR BLD MANUAL: 1 % (ref 0–0)
NEUTROPHILS # BLD AUTO: 8.61 10*3/MM3 (ref 1.7–7)
NEUTROPHILS NFR BLD MANUAL: 80 % (ref 42.7–76)
NEUTS BAND NFR BLD MANUAL: 2 % (ref 0–5)
NITRITE UR QL STRIP: NEGATIVE
PH UR STRIP.AUTO: 6 [PH] (ref 5–8)
PLAT MORPH BLD: NORMAL
PLATELET # BLD AUTO: 312 10*3/MM3 (ref 140–450)
PMV BLD AUTO: 6.6 FL (ref 6–12)
POIKILOCYTOSIS BLD QL SMEAR: ABNORMAL
POTASSIUM SERPL-SCNC: 4.2 MMOL/L (ref 3.5–5.2)
PROT SERPL-MCNC: 6.6 G/DL (ref 6–8.5)
PROT UR QL STRIP: NEGATIVE
PROTHROMBIN TIME: 35.6 SECONDS (ref 19.4–28.5)
RBC # BLD AUTO: 3.55 10*6/MM3 (ref 3.77–5.28)
RBC # UR: ABNORMAL /HPF
REF LAB TEST METHOD: ABNORMAL
SODIUM SERPL-SCNC: 140 MMOL/L (ref 136–145)
SP GR UR STRIP: 1.02 (ref 1–1.03)
SQUAMOUS #/AREA URNS HPF: ABNORMAL /HPF
TROPONIN T SERPL-MCNC: 0.03 NG/ML (ref 0–0.03)
UROBILINOGEN UR QL STRIP: ABNORMAL
VARIANT LYMPHS NFR BLD MANUAL: 4 % (ref 0–5)
WBC # BLD AUTO: 10.5 10*3/MM3 (ref 3.4–10.8)
WBC MORPH BLD: NORMAL
WBC UR QL AUTO: ABNORMAL /HPF
YEAST URNS QL MICRO: ABNORMAL /HPF

## 2021-09-29 PROCEDURE — 85610 PROTHROMBIN TIME: CPT | Performed by: EMERGENCY MEDICINE

## 2021-09-29 PROCEDURE — 80053 COMPREHEN METABOLIC PANEL: CPT | Performed by: EMERGENCY MEDICINE

## 2021-09-29 PROCEDURE — 85025 COMPLETE CBC W/AUTO DIFF WBC: CPT | Performed by: EMERGENCY MEDICINE

## 2021-09-29 PROCEDURE — 71045 X-RAY EXAM CHEST 1 VIEW: CPT

## 2021-09-29 PROCEDURE — 87086 URINE CULTURE/COLONY COUNT: CPT | Performed by: EMERGENCY MEDICINE

## 2021-09-29 PROCEDURE — P9612 CATHETERIZE FOR URINE SPEC: HCPCS

## 2021-09-29 PROCEDURE — 85007 BL SMEAR W/DIFF WBC COUNT: CPT | Performed by: EMERGENCY MEDICINE

## 2021-09-29 PROCEDURE — 80162 ASSAY OF DIGOXIN TOTAL: CPT | Performed by: EMERGENCY MEDICINE

## 2021-09-29 PROCEDURE — G0158 HHC OT ASSISTANT EA 15: HCPCS

## 2021-09-29 PROCEDURE — G0378 HOSPITAL OBSERVATION PER HR: HCPCS

## 2021-09-29 PROCEDURE — 81001 URINALYSIS AUTO W/SCOPE: CPT | Performed by: EMERGENCY MEDICINE

## 2021-09-29 PROCEDURE — 25010000002 CEFTRIAXONE PER 250 MG: Performed by: EMERGENCY MEDICINE

## 2021-09-29 PROCEDURE — 85730 THROMBOPLASTIN TIME PARTIAL: CPT | Performed by: EMERGENCY MEDICINE

## 2021-09-29 PROCEDURE — 99284 EMERGENCY DEPT VISIT MOD MDM: CPT

## 2021-09-29 PROCEDURE — 84484 ASSAY OF TROPONIN QUANT: CPT | Performed by: EMERGENCY MEDICINE

## 2021-09-29 PROCEDURE — 93005 ELECTROCARDIOGRAM TRACING: CPT | Performed by: EMERGENCY MEDICINE

## 2021-09-29 RX ORDER — AMLODIPINE BESYLATE 5 MG/1
5 TABLET ORAL DAILY
Status: DISCONTINUED | OUTPATIENT
Start: 2021-09-30 | End: 2021-10-01

## 2021-09-29 RX ORDER — SODIUM CHLORIDE 9 MG/ML
75 INJECTION, SOLUTION INTRAVENOUS CONTINUOUS
Status: DISCONTINUED | OUTPATIENT
Start: 2021-09-29 | End: 2021-10-01

## 2021-09-29 RX ORDER — IPRATROPIUM BROMIDE AND ALBUTEROL SULFATE 2.5; .5 MG/3ML; MG/3ML
3 SOLUTION RESPIRATORY (INHALATION) 4 TIMES DAILY PRN
Status: DISCONTINUED | OUTPATIENT
Start: 2021-09-29 | End: 2021-10-02 | Stop reason: HOSPADM

## 2021-09-29 RX ORDER — SODIUM CHLORIDE 0.9 % (FLUSH) 0.9 %
3-10 SYRINGE (ML) INJECTION AS NEEDED
Status: DISCONTINUED | OUTPATIENT
Start: 2021-09-29 | End: 2021-10-02 | Stop reason: HOSPADM

## 2021-09-29 RX ORDER — ALBUTEROL SULFATE 90 UG/1
1 AEROSOL, METERED RESPIRATORY (INHALATION) EVERY 4 HOURS PRN
COMMUNITY

## 2021-09-29 RX ORDER — DIGOXIN 125 MCG
125 TABLET ORAL
COMMUNITY
End: 2022-01-03 | Stop reason: HOSPADM

## 2021-09-29 RX ORDER — MEGESTROL ACETATE 40 MG/ML
400 SUSPENSION ORAL 2 TIMES DAILY WITH MEALS
Status: DISCONTINUED | OUTPATIENT
Start: 2021-09-30 | End: 2021-10-02 | Stop reason: HOSPADM

## 2021-09-29 RX ORDER — SODIUM CHLORIDE 0.9 % (FLUSH) 0.9 %
3 SYRINGE (ML) INJECTION EVERY 12 HOURS SCHEDULED
Status: DISCONTINUED | OUTPATIENT
Start: 2021-09-29 | End: 2021-10-02 | Stop reason: HOSPADM

## 2021-09-29 RX ORDER — DIGOXIN 125 MCG
125 TABLET ORAL
Status: DISCONTINUED | OUTPATIENT
Start: 2021-09-30 | End: 2021-09-30

## 2021-09-29 RX ORDER — SODIUM CHLORIDE 0.9 % (FLUSH) 0.9 %
10 SYRINGE (ML) INJECTION AS NEEDED
Status: DISCONTINUED | OUTPATIENT
Start: 2021-09-29 | End: 2021-10-02 | Stop reason: HOSPADM

## 2021-09-29 RX ORDER — ASPIRIN 81 MG/1
81 TABLET ORAL DAILY
Status: DISCONTINUED | OUTPATIENT
Start: 2021-09-30 | End: 2021-10-02 | Stop reason: HOSPADM

## 2021-09-29 RX ORDER — ONDANSETRON 2 MG/ML
4 INJECTION INTRAMUSCULAR; INTRAVENOUS EVERY 6 HOURS PRN
Status: DISCONTINUED | OUTPATIENT
Start: 2021-09-29 | End: 2021-10-02 | Stop reason: HOSPADM

## 2021-09-29 RX ORDER — ATORVASTATIN CALCIUM 20 MG/1
20 TABLET, FILM COATED ORAL DAILY
Status: DISCONTINUED | OUTPATIENT
Start: 2021-09-30 | End: 2021-10-02 | Stop reason: HOSPADM

## 2021-09-29 RX ORDER — DONEPEZIL HYDROCHLORIDE 5 MG/1
5 TABLET, FILM COATED ORAL NIGHTLY
Status: DISCONTINUED | OUTPATIENT
Start: 2021-09-29 | End: 2021-10-02 | Stop reason: HOSPADM

## 2021-09-29 RX ORDER — ACETAMINOPHEN 325 MG/1
650 TABLET ORAL EVERY 4 HOURS PRN
Status: DISCONTINUED | OUTPATIENT
Start: 2021-09-29 | End: 2021-10-02 | Stop reason: HOSPADM

## 2021-09-29 RX ORDER — WARFARIN SODIUM 2.5 MG/1
2.5 TABLET ORAL NIGHTLY
Status: DISCONTINUED | OUTPATIENT
Start: 2021-09-29 | End: 2021-09-30

## 2021-09-29 RX ORDER — FAMOTIDINE 20 MG/1
40 TABLET, FILM COATED ORAL DAILY
Status: DISCONTINUED | OUTPATIENT
Start: 2021-09-30 | End: 2021-09-30

## 2021-09-29 RX ORDER — LISINOPRIL 5 MG/1
5 TABLET ORAL DAILY
Status: DISCONTINUED | OUTPATIENT
Start: 2021-09-30 | End: 2021-10-01

## 2021-09-29 RX ORDER — POTASSIUM CHLORIDE 20 MEQ/1
40 TABLET, EXTENDED RELEASE ORAL AS NEEDED
Status: DISCONTINUED | OUTPATIENT
Start: 2021-09-29 | End: 2021-10-02 | Stop reason: HOSPADM

## 2021-09-29 RX ORDER — POLYETHYLENE GLYCOL 3350 17 G/17G
17 POWDER, FOR SOLUTION ORAL DAILY
Status: DISCONTINUED | OUTPATIENT
Start: 2021-09-30 | End: 2021-10-02 | Stop reason: HOSPADM

## 2021-09-29 RX ORDER — METOCLOPRAMIDE 5 MG/1
5 TABLET ORAL
Status: DISCONTINUED | OUTPATIENT
Start: 2021-09-30 | End: 2021-10-02 | Stop reason: HOSPADM

## 2021-09-29 RX ORDER — POTASSIUM CHLORIDE 1.5 G/1.77G
40 POWDER, FOR SOLUTION ORAL AS NEEDED
Status: DISCONTINUED | OUTPATIENT
Start: 2021-09-29 | End: 2021-10-02 | Stop reason: HOSPADM

## 2021-09-29 RX ORDER — TRAMADOL HYDROCHLORIDE 50 MG/1
50 TABLET ORAL EVERY 8 HOURS PRN
Status: DISCONTINUED | OUTPATIENT
Start: 2021-09-29 | End: 2021-10-02 | Stop reason: HOSPADM

## 2021-09-29 RX ADMIN — Medication 3 ML: at 22:42

## 2021-09-29 RX ADMIN — DONEPEZIL HYDROCHLORIDE 5 MG: 5 TABLET, FILM COATED ORAL at 20:50

## 2021-09-29 RX ADMIN — METOPROLOL TARTRATE 25 MG: 25 TABLET, FILM COATED ORAL at 20:50

## 2021-09-29 RX ADMIN — TRAMADOL HYDROCHLORIDE 50 MG: 50 TABLET, FILM COATED ORAL at 20:50

## 2021-09-29 RX ADMIN — SODIUM CHLORIDE 75 ML/HR: 9 INJECTION, SOLUTION INTRAVENOUS at 22:40

## 2021-09-29 RX ADMIN — CEFTRIAXONE 1 G: 10 INJECTION, POWDER, FOR SOLUTION INTRAVENOUS at 17:54

## 2021-09-29 NOTE — HOME HEALTH
"Family voiced that patient has been \"more tired than usual\" and has a rash on her upper torso, SN assessed.  MD called about fatigue, rash and low BP during SN visit. Awaiting call back  INR obtained, MD notified.     PLAN FOR NEXT VISIT    CP assess  pain assess  fall/safety assess  f/u on new lab orders"

## 2021-09-29 NOTE — HOME HEALTH
"Pt's son in law, daughter and paid caregiver greeted AQUINO at the door. Pt was laying in bed and states she is so exhausted. AQUINO assisted patient from supine to sitting EOB however pt quickly became dizzy and laid back down. AQUINO assessed BP lying on left side in bed at 120/34. Pt then able to sit up with CGA/MIN A support for upright sitting EOB and BP at 110/38. Pt again dizzy and nauseated and requesting to lay back down. AQUINO called Jeanine BENNETT, who visited with patient yesterday and discussed conversation with MD yesterday and instructed to call back. I called Bernadine JIMENEZ, who is covering for Dr. Daniels as he is out this week. She asked if pt withheld BP last  night and family confirms they did. BP this am prior to taking medication was 152/69. Patient side lying on L side resting as AQUINO awaited call back from MD and pt having hot/cold flashes. BP again taken with pt laying on L side at 114/30. Caregiver then reports that pt has not had any medication this morning, did not eat much breakfast and requested to lay back down. Pt and daughter making executive decision to send to ED secondary to patient stating, \"I really don't feel good\". AQUINO called EMS for transport and called patient info to [facility] ED."

## 2021-09-30 LAB
ANION GAP SERPL CALCULATED.3IONS-SCNC: 13 MMOL/L (ref 5–15)
ANISOCYTOSIS BLD QL: ABNORMAL
BACTERIA SPEC AEROBE CULT: ABNORMAL
BUN SERPL-MCNC: 36 MG/DL (ref 8–23)
BUN/CREAT SERPL: 36 (ref 7–25)
CALCIUM SPEC-SCNC: 8.1 MG/DL (ref 8.6–10.5)
CHLORIDE SERPL-SCNC: 107 MMOL/L (ref 98–107)
CO2 SERPL-SCNC: 17 MMOL/L (ref 22–29)
CREAT SERPL-MCNC: 1 MG/DL (ref 0.57–1)
DEPRECATED RDW RBC AUTO: 55.6 FL (ref 37–54)
DIGOXIN SERPL-MCNC: 2.1 NG/ML (ref 0.6–1.2)
EOSINOPHIL # BLD MANUAL: 0.29 10*3/MM3 (ref 0–0.4)
EOSINOPHIL NFR BLD MANUAL: 2 % (ref 0.3–6.2)
ERYTHROCYTE [DISTWIDTH] IN BLOOD BY AUTOMATED COUNT: 18.5 % (ref 12.3–15.4)
GFR SERPL CREATININE-BSD FRML MDRD: 52 ML/MIN/1.73
GLUCOSE SERPL-MCNC: 106 MG/DL (ref 65–99)
HCT VFR BLD AUTO: 31.5 % (ref 34–46.6)
HGB BLD-MCNC: 10.7 G/DL (ref 12–15.9)
INR PPP: 3.05 (ref 2–3)
LYMPHOCYTES # BLD MANUAL: 0.73 10*3/MM3 (ref 0.7–3.1)
LYMPHOCYTES NFR BLD MANUAL: 5 % (ref 19.6–45.3)
LYMPHOCYTES NFR BLD MANUAL: 7 % (ref 5–12)
MCH RBC QN AUTO: 29.6 PG (ref 26.6–33)
MCHC RBC AUTO-ENTMCNC: 34 G/DL (ref 31.5–35.7)
MCV RBC AUTO: 87 FL (ref 79–97)
MONOCYTES # BLD AUTO: 1.02 10*3/MM3 (ref 0.1–0.9)
NEUTROPHILS # BLD AUTO: 12.47 10*3/MM3 (ref 1.7–7)
NEUTROPHILS NFR BLD MANUAL: 82 % (ref 42.7–76)
NEUTS BAND NFR BLD MANUAL: 4 % (ref 0–5)
PLAT MORPH BLD: NORMAL
PLATELET # BLD AUTO: 317 10*3/MM3 (ref 140–450)
PMV BLD AUTO: 7 FL (ref 6–12)
POIKILOCYTOSIS BLD QL SMEAR: ABNORMAL
POTASSIUM SERPL-SCNC: 4.5 MMOL/L (ref 3.5–5.2)
PROTHROMBIN TIME: 31.2 SECONDS (ref 19.4–28.5)
QT INTERVAL: 429 MS
RBC # BLD AUTO: 3.62 10*6/MM3 (ref 3.77–5.28)
SCAN SLIDE: NORMAL
SODIUM SERPL-SCNC: 137 MMOL/L (ref 136–145)
TOXIC GRANULATION: ABNORMAL
TROPONIN T SERPL-MCNC: 0.04 NG/ML (ref 0–0.03)
WBC # BLD AUTO: 14.5 10*3/MM3 (ref 3.4–10.8)

## 2021-09-30 PROCEDURE — 85007 BL SMEAR W/DIFF WBC COUNT: CPT | Performed by: INTERNAL MEDICINE

## 2021-09-30 PROCEDURE — 80162 ASSAY OF DIGOXIN TOTAL: CPT | Performed by: INTERNAL MEDICINE

## 2021-09-30 PROCEDURE — 85025 COMPLETE CBC W/AUTO DIFF WBC: CPT | Performed by: INTERNAL MEDICINE

## 2021-09-30 PROCEDURE — 84484 ASSAY OF TROPONIN QUANT: CPT | Performed by: INTERNAL MEDICINE

## 2021-09-30 PROCEDURE — 25010000002 CEFTRIAXONE PER 250 MG: Performed by: INTERNAL MEDICINE

## 2021-09-30 PROCEDURE — 97530 THERAPEUTIC ACTIVITIES: CPT

## 2021-09-30 PROCEDURE — 80048 BASIC METABOLIC PNL TOTAL CA: CPT | Performed by: INTERNAL MEDICINE

## 2021-09-30 PROCEDURE — 85610 PROTHROMBIN TIME: CPT | Performed by: INTERNAL MEDICINE

## 2021-09-30 PROCEDURE — 97162 PT EVAL MOD COMPLEX 30 MIN: CPT

## 2021-09-30 RX ORDER — WARFARIN SODIUM 2.5 MG/1
2.5 TABLET ORAL
Status: DISCONTINUED | OUTPATIENT
Start: 2021-09-30 | End: 2021-10-02

## 2021-09-30 RX ORDER — FLUCONAZOLE 100 MG/1
100 TABLET ORAL EVERY 24 HOURS
Status: DISCONTINUED | OUTPATIENT
Start: 2021-09-30 | End: 2021-10-02 | Stop reason: HOSPADM

## 2021-09-30 RX ADMIN — FLUCONAZOLE 100 MG: 100 TABLET ORAL at 18:03

## 2021-09-30 RX ADMIN — MEGESTROL ACETATE 400 MG: 40 SUSPENSION ORAL at 08:24

## 2021-09-30 RX ADMIN — POLYETHYLENE GLYCOL 3350 17 G: 17 POWDER, FOR SOLUTION ORAL at 08:23

## 2021-09-30 RX ADMIN — LISINOPRIL 5 MG: 5 TABLET ORAL at 08:24

## 2021-09-30 RX ADMIN — SODIUM CHLORIDE 75 ML/HR: 9 INJECTION, SOLUTION INTRAVENOUS at 15:06

## 2021-09-30 RX ADMIN — FAMOTIDINE 40 MG: 20 TABLET ORAL at 08:23

## 2021-09-30 RX ADMIN — ATORVASTATIN CALCIUM 20 MG: 20 TABLET, FILM COATED ORAL at 08:23

## 2021-09-30 RX ADMIN — ASPIRIN 81 MG: 81 TABLET, COATED ORAL at 08:24

## 2021-09-30 RX ADMIN — CEFTRIAXONE SODIUM 1 G: 1 INJECTION, POWDER, FOR SOLUTION INTRAMUSCULAR; INTRAVENOUS at 16:15

## 2021-09-30 RX ADMIN — METOPROLOL TARTRATE 25 MG: 25 TABLET, FILM COATED ORAL at 08:23

## 2021-09-30 RX ADMIN — METOCLOPRAMIDE 5 MG: 5 TABLET ORAL at 18:02

## 2021-09-30 RX ADMIN — Medication 3 ML: at 21:17

## 2021-09-30 RX ADMIN — METOCLOPRAMIDE 5 MG: 5 TABLET ORAL at 08:23

## 2021-09-30 RX ADMIN — MEGESTROL ACETATE 400 MG: 40 SUSPENSION ORAL at 18:02

## 2021-09-30 RX ADMIN — TRAMADOL HYDROCHLORIDE 50 MG: 50 TABLET, FILM COATED ORAL at 08:37

## 2021-09-30 RX ADMIN — DONEPEZIL HYDROCHLORIDE 5 MG: 5 TABLET, FILM COATED ORAL at 21:17

## 2021-09-30 RX ADMIN — AMLODIPINE BESYLATE 5 MG: 5 TABLET ORAL at 08:24

## 2021-09-30 RX ADMIN — Medication 3 ML: at 08:23

## 2021-09-30 RX ADMIN — TRAMADOL HYDROCHLORIDE 50 MG: 50 TABLET, FILM COATED ORAL at 16:14

## 2021-10-01 ENCOUNTER — HOME CARE VISIT (OUTPATIENT)
Dept: HOME HEALTH SERVICES | Facility: HOME HEALTHCARE | Age: 86
End: 2021-10-01

## 2021-10-01 LAB
ALBUMIN SERPL-MCNC: 2.9 G/DL (ref 3.5–5.2)
ALBUMIN/GLOB SERPL: 0.9 G/DL
ALP SERPL-CCNC: 58 U/L (ref 39–117)
ALT SERPL W P-5'-P-CCNC: 68 U/L (ref 1–33)
ANION GAP SERPL CALCULATED.3IONS-SCNC: 10 MMOL/L (ref 5–15)
AST SERPL-CCNC: 35 U/L (ref 1–32)
BILIRUB SERPL-MCNC: 0.3 MG/DL (ref 0–1.2)
BUN SERPL-MCNC: 33 MG/DL (ref 8–23)
BUN/CREAT SERPL: 33.7 (ref 7–25)
CALCIUM SPEC-SCNC: 7.9 MG/DL (ref 8.6–10.5)
CHLORIDE SERPL-SCNC: 105 MMOL/L (ref 98–107)
CO2 SERPL-SCNC: 17 MMOL/L (ref 22–29)
CREAT SERPL-MCNC: 0.98 MG/DL (ref 0.57–1)
DEPRECATED RDW RBC AUTO: 59.5 FL (ref 37–54)
ERYTHROCYTE [DISTWIDTH] IN BLOOD BY AUTOMATED COUNT: 19.5 % (ref 12.3–15.4)
GFR SERPL CREATININE-BSD FRML MDRD: 54 ML/MIN/1.73
GLOBULIN UR ELPH-MCNC: 3.4 GM/DL
GLUCOSE SERPL-MCNC: 118 MG/DL (ref 65–99)
HCT VFR BLD AUTO: 31.1 % (ref 34–46.6)
HGB BLD-MCNC: 10.3 G/DL (ref 12–15.9)
INR PPP: 2.25 (ref 2–3)
MCH RBC QN AUTO: 29.2 PG (ref 26.6–33)
MCHC RBC AUTO-ENTMCNC: 33.1 G/DL (ref 31.5–35.7)
MCV RBC AUTO: 88.3 FL (ref 79–97)
PLATELET # BLD AUTO: 264 10*3/MM3 (ref 140–450)
PMV BLD AUTO: 6.7 FL (ref 6–12)
POTASSIUM SERPL-SCNC: 4.7 MMOL/L (ref 3.5–5.2)
PROT SERPL-MCNC: 6.3 G/DL (ref 6–8.5)
PROTHROMBIN TIME: 23.5 SECONDS (ref 19.4–28.5)
RBC # BLD AUTO: 3.52 10*6/MM3 (ref 3.77–5.28)
SODIUM SERPL-SCNC: 132 MMOL/L (ref 136–145)
WBC # BLD AUTO: 12 10*3/MM3 (ref 3.4–10.8)

## 2021-10-01 PROCEDURE — 85027 COMPLETE CBC AUTOMATED: CPT | Performed by: INTERNAL MEDICINE

## 2021-10-01 PROCEDURE — 85610 PROTHROMBIN TIME: CPT | Performed by: INTERNAL MEDICINE

## 2021-10-01 PROCEDURE — 80053 COMPREHEN METABOLIC PANEL: CPT | Performed by: INTERNAL MEDICINE

## 2021-10-01 RX ORDER — WARFARIN SODIUM 2 MG/1
2 TABLET ORAL
Status: DISCONTINUED | OUTPATIENT
Start: 2021-10-02 | End: 2021-10-02 | Stop reason: HOSPADM

## 2021-10-01 RX ORDER — WARFARIN SODIUM 2.5 MG/1
2.5 TABLET ORAL
Status: COMPLETED | OUTPATIENT
Start: 2021-10-01 | End: 2021-10-01

## 2021-10-01 RX ADMIN — METOCLOPRAMIDE 5 MG: 5 TABLET ORAL at 08:26

## 2021-10-01 RX ADMIN — TRAMADOL HYDROCHLORIDE 50 MG: 50 TABLET, FILM COATED ORAL at 14:17

## 2021-10-01 RX ADMIN — MEGESTROL ACETATE 400 MG: 40 SUSPENSION ORAL at 17:07

## 2021-10-01 RX ADMIN — Medication 3 ML: at 22:40

## 2021-10-01 RX ADMIN — WARFARIN 2.5 MG: 2.5 TABLET ORAL at 17:06

## 2021-10-01 RX ADMIN — METOCLOPRAMIDE 5 MG: 5 TABLET ORAL at 17:06

## 2021-10-01 RX ADMIN — MEGESTROL ACETATE 400 MG: 40 SUSPENSION ORAL at 08:25

## 2021-10-01 RX ADMIN — ATORVASTATIN CALCIUM 20 MG: 20 TABLET, FILM COATED ORAL at 08:26

## 2021-10-01 RX ADMIN — POLYETHYLENE GLYCOL 3350 17 G: 17 POWDER, FOR SOLUTION ORAL at 08:26

## 2021-10-01 RX ADMIN — METOPROLOL TARTRATE 25 MG: 25 TABLET, FILM COATED ORAL at 08:25

## 2021-10-01 RX ADMIN — Medication 3 ML: at 08:26

## 2021-10-01 RX ADMIN — METOPROLOL TARTRATE 25 MG: 25 TABLET, FILM COATED ORAL at 22:38

## 2021-10-01 RX ADMIN — FLUCONAZOLE 100 MG: 100 TABLET ORAL at 17:06

## 2021-10-01 RX ADMIN — DONEPEZIL HYDROCHLORIDE 5 MG: 5 TABLET, FILM COATED ORAL at 22:38

## 2021-10-01 RX ADMIN — ASPIRIN 81 MG: 81 TABLET, COATED ORAL at 08:26

## 2021-10-01 NOTE — PLAN OF CARE
Goal Outcome Evaluation:  Plan of Care Reviewed With: patient        Pts metoprolol was held last night due to low BP and HR, vitals are within normal range now. Pt rested throughout shift with no complaints. Will continue to monitor.

## 2021-10-01 NOTE — PROGRESS NOTES
"Pharmacy dosing service  Anticoagulant  Warfarin     Subjective:    Alexandrea Gaxiola is a 88 y.o.female being continued on warfarin for atrial fibrillation (CHADS-VASC= 5).  PMH: HF, HTN    INR Goal: 2 - 3  Home medication?: Yes, warfarin 2.5 mg PO every day at 1800 (was told to hold dose x1 day on 9/29 at outpt anticoag visit 9/28 d/t slightly high INR 3.1)  Bridge Therapy Present?:  No  Interacting Medications Evaluation (New/Present/Discontinued): tramadol (home med, may increase INR); ceftriaxone x2days 9/30-10/1 (new, may increase INR); fluconazole x7d started 9/30 (new, may increase INR)  Additional Contributing Factors: acute illness      Assessment/Plan:    INR supratherapeutic on admission. Ordered dose of 2.5mg x1 yesterday as INR was decreasing and nearly therapeutic. However, dose was held per NP d/t DDI w/newly added fluconazole.   Will order 2.5 mg x1 today since INR significantly decreased to lower end of therapeutic range today and expected to continue to decrease d/t multiple days held doses. Anticipate reducing maintenance dose to 2mg daily tomorrow (~20% reduction) given elevated INR on admit and new DDI. Will need close monitoring to assess INR response to DDI.    Continue to monitor and adjust based on INR.         Date 9/29 9/30 10/1         INR 3.52 3.05 2.25         Dose ---- --- 2.5 mg             Objective:  [Ht: 147.3 cm (58\"); Wt: 51 kg (112 lb 7 oz); BMI: Body mass index is 23.5 kg/m².]    Lab Results   Component Value Date    ALBUMIN 2.90 (L) 10/01/2021     Lab Results   Component Value Date    INR 2.25 10/01/2021    INR 3.05 (H) 09/30/2021    INR 3.52 (H) 09/29/2021    PROTIME 23.5 10/01/2021    PROTIME 31.2 (H) 09/30/2021    PROTIME 35.6 (H) 09/29/2021     Lab Results   Component Value Date    HGB 10.3 (L) 10/01/2021    HGB 10.7 (L) 09/30/2021    HGB 10.7 (L) 09/29/2021     Lab Results   Component Value Date    HCT 31.1 (L) 10/01/2021    HCT 31.5 (L) 09/30/2021    HCT 30.9 (L) " 09/29/2021       Nimisha Nicole, Yuri, BCPS  10/01/21 09:03 EDT

## 2021-10-01 NOTE — SIGNIFICANT NOTE
10/01/21 1435   OTHER   Discipline physical therapist   Rehab Time/Intention   Session Not Performed other (see comments)  (hospital d/c pending later today)   Recommendation   PT - Next Appointment 10/03/21

## 2021-10-01 NOTE — DISCHARGE PLACEMENT REQUEST
"Ruddy Gaxiola (88 y.o. Female)     Date of Birth Social Security Number Address Home Phone MRN    11/12/1932  [id number] [address] [phone] [id number]    Congregational Marital Status          Muslim        Admission Date Admission Type Admitting Provider Attending Provider Department, Room/Bed    9/29/21 Emergency Margarita Schaffer MD Lowney, Kay, MD [facility] 3C MEDICAL INPATIENT, 358/1    Discharge Date Discharge Disposition Discharge Destination                       Attending Provider: Margarita Schaffer MD    Allergies: Codeine, Hydrocodone-acetaminophen, Meperidine, Morphine, Antihistamines, Loratadine-type    Isolation: None   Infection: COVID (History) (09/29/21)   Code Status: CPR    Ht: 147.3 cm (58\")   Wt: 51 kg (112 lb 7 oz)    Admission Cmt: None   Principal Problem: None                Active Insurance as of 9/29/2021     Primary Coverage     Payor Plan Insurance Group Employer/Plan Group    MEDICARE MEDICARE A & B      Payor Plan Address Payor Plan Phone Number Payor Plan Fax Number Effective Dates    [address] [phone]  9/1/1992 - None Entered    [facility] [id number]       Subscriber Name Subscriber Birth Date Member ID       RUDDY GAXIOLA 11/12/1932 [id number]           Secondary Coverage     Payor Plan Insurance Group Employer/Plan Group    [facility] SUPP [id number]     Payor Plan Address Payor Plan Phone Number Payor Plan Fax Number Effective Dates    [address]   12/1/2016 - None Entered    [facility] [id number]       Subscriber Name Subscriber Birth Date Member ID       RUDDY GAXIOLA 11/12/1932 [id number]                 Emergency Contacts      (Rel.) Home Phone Work Phone Mobile Phone    Qi Rutledge (Daughter) [phone] -- [phone]    Celso Rutledge (Relative) [phone] -- [phone]            Insurance Information                MEDICARE/MEDICARE A & B Phone: [phone]    Subscriber: " Alexandrea Gaxiola Subscriber#: [id number]    Group#:  Precert#:         EUGENE NICHOLE/EUGENE SILVA  SUPP Phone:     Subscriber: Alexandrea Gaxiola Subscriber#: [id number]    Group#: [id number] Precert#:

## 2021-10-01 NOTE — CASE MANAGEMENT/SOCIAL WORK
Continued Stay Note   [facility]     Patient Name: Alexandrea Gaxiola  MRN: [id number]  Today's Date: 10/1/2021    Admit Date: 9/29/2021    Discharge Plan     Row Name 10/01/21 0847       Plan    Plan Comments  Called son - in - law, Celso and verified approval of DC plan, referral to [place]. [facility]IS[facility] for [facility] placed.        Phone communication or documentation only - no physical contact with patient or family.     Expected Discharge Date and Time     Expected Discharge Date Expected Discharge Time    Oct 5, 2021             Steve Retana RN

## 2021-10-01 NOTE — PROGRESS NOTES
LOS: 1 day   Patient Care Team:  Yudi Daniels MD as PCP - General (Family Medicine)  Mignon Luke MD as Consulting Physician (Cardiology)    Subjective     Interval History:    Patient Complaints: Generally weak, no specific complaints.  Ate breakfast well.    History taken from: patient    Review of Systems   Constitutional: Positive for activity change and appetite change.   HENT: Negative for facial swelling.    Eyes: Negative for visual disturbance.   Respiratory: Negative for cough, shortness of breath, wheezing and stridor.    Cardiovascular: Negative for chest pain, palpitations and leg swelling.   Gastrointestinal: Negative for abdominal pain, diarrhea, nausea and vomiting.   Endocrine: Negative for polyuria.   Genitourinary: Negative for dysuria.   Musculoskeletal: Negative for arthralgias, back pain and gait problem.   Skin: Negative for rash and wound.   Neurological: Negative for headaches.   Psychiatric/Behavioral: Negative for confusion.           Objective     Vital Signs  Temp:  [97.2 °F (36.2 °C)-98.1 °F (36.7 °C)] 97.2 °F (36.2 °C)  Heart Rate:  [60] 60  Resp:  [16-18] 18  BP: ()/(46-58) 95/55    Physical Exam:     General Appearance:    Alert, cooperative, in no acute distress, alopecia   Head:    Normocephalic, without obvious abnormality, atraumatic   Eyes:            Lids and lashes normal, conjunctivae and sclerae normal, no   icterus, no pallor, corneas clear, PERRLA   Ears:    Ears appear intact with no abnormalities noted   Throat:   No oral lesions, no thrush, oral mucosa moist   Neck:   No adenopathy, supple, trachea midline, no thyromegaly, no   carotid bruit, no JVD   Lungs:     Clear to auscultation,respirations regular, even and                  unlabored    Heart:    Regular rhythm and normal rate, normal S1 and S2, II/VI           murmur, no gallop, no rub, no click   Chest Wall:    No abnormalities observed   Abdomen:     Normal bowel sounds, no masses, no  Who is calling:  Patient  Reason for Call:  Patient stated that Leticia had sent a letter thru his [employer] for work and is not able to view it thru [employer] and is wondering if the letter can be emailed to [email or web address].  Date of last appointment with primary care: 12.23.19  Okay to leave a detailed message: Yes       organomegaly, soft        Non-tender non-distended, no guarding,   Extremities:   Moves all extremities well, no edema, no cyanosis, no             Redness   Pulses:   Pulses palpable and equal bilaterally   Skin:   No bleeding, bruising or rash   Lymph nodes:   No palpable adenopathy   Neurologic:   Cranial nerves 2 - 12 grossly intact, sensation intact, DTR       present and equal bilaterally        Results Review:    Lab Results (last 24 hours)     Procedure Component Value Units Date/Time    Comprehensive Metabolic Panel [[id number]]  (Abnormal) Collected: 10/01/21 0424    Specimen: Blood Updated: 10/01/21 0516     Glucose 118 mg/dL      BUN 33 mg/dL      Creatinine 0.98 mg/dL      Sodium 132 mmol/L      Potassium 4.7 mmol/L      Chloride 105 mmol/L      CO2 17.0 mmol/L      Calcium 7.9 mg/dL      Total Protein 6.3 g/dL      Albumin 2.90 g/dL      ALT (SGPT) 68 U/L      AST (SGOT) 35 U/L      Alkaline Phosphatase 58 U/L      Total Bilirubin 0.3 mg/dL      eGFR Non African Amer 54 mL/min/1.73      Globulin 3.4 gm/dL      A/G Ratio 0.9 g/dL      BUN/Creatinine Ratio 33.7     Anion Gap 10.0 mmol/L     Narrative:      GFR Normal >60  Chronic Kidney Disease <60  Kidney Failure <15      Protime-INR [[id number]]  (Normal) Collected: 10/01/21 0424    Specimen: Blood Updated: 10/01/21 0512     Protime 23.5 Seconds      INR 2.25    CBC (No Diff) [[id number]]  (Abnormal) Collected: 10/01/21 0424    Specimen: Blood Updated: 10/01/21 0451     WBC 12.00 10*3/mm3      RBC 3.52 10*6/mm3      Hemoglobin 10.3 g/dL      Hematocrit 31.1 %      MCV 88.3 fL      MCH 29.2 pg      MCHC 33.1 g/dL      RDW 19.5 %      RDW-SD 59.5 fl      MPV 6.7 fL      Platelets 264 10*3/mm3     Urine Culture - Urine, Urine, Catheter [[id number]]  (Abnormal) Collected: 09/29/21 1515    Specimen: Urine, Catheter Updated: 09/30/21 1623     Urine Culture Yeast isolated    Narrative:      No further workup           Imaging Results (Last 24 Hours)     ** No  results found for the last 24 hours. **               I reviewed the patient's new clinical results.    Medication Review:   Scheduled Meds:amLODIPine, 5 mg, Oral, Daily  aspirin, 81 mg, Oral, Daily  atorvastatin, 20 mg, Oral, Daily  donepezil, 5 mg, Oral, Nightly  fluconazole, 100 mg, Oral, Q24H  lisinopril, 5 mg, Oral, Daily  megestrol acetate, 400 mg, Oral, BID With Meals  metoclopramide, 5 mg, Oral, BID AC  metoprolol tartrate, 25 mg, Oral, BID  polyethylene glycol, 17 g, Oral, Daily  sodium chloride, 3 mL, Intravenous, Q12H  [START ON 10/2/2021] warfarin, 2 mg, Oral, Daily  warfarin, 2.5 mg, Oral, Once  warfarin, 2.5 mg, Oral, Once      Continuous Infusions:Pharmacy to dose warfarin,   sodium chloride, 75 mL/hr, Last Rate: 75 mL/hr (09/30/21 1506)      PRN Meds:.•  acetaminophen  •  ipratropium-albuterol  •  ondansetron  •  Pharmacy to dose warfarin  •  potassium chloride  •  potassium chloride  •  [COMPLETED] Insert peripheral IV **AND** sodium chloride  •  sodium chloride  •  traMADol     Assessment/Plan       General weakness  - pt is is need of inpatient PT/OT.  She has not fully recovered from recent pneumonia.  Family is agreeable to inpatient rehab.  Yeast UTI - fluconazole  Recent pneumonia - currently without signs of pneumonia but pt is at risk for recurrence.  Monitor closely  PAF - currently in SR; anticoagulated; monitor INR closely while on fluconazole with warfarin  Chronic CAD - compensated  Chronic systolic CHF-compensated  Essential hypertension - d/c amlodipine and lisinopril.  Continue metoprolol.          Plan for disposition:Skilled rehab when bed is available.    Margarita Schaffer MD  10/01/21  11:40 EDT

## 2021-10-01 NOTE — PLAN OF CARE
Goal Outcome Evaluation:  Plan of Care Reviewed With: patient           Outcome Summary: patient resting in bed through out shift. patient has some complaints of back pain given PRN pain medication see MAR. patient to discharge to [facility] tomorrow. will cotninue with current plan of care.

## 2021-10-02 VITALS
DIASTOLIC BLOOD PRESSURE: 50 MMHG | RESPIRATION RATE: 17 BRPM | SYSTOLIC BLOOD PRESSURE: 121 MMHG | HEIGHT: 58 IN | OXYGEN SATURATION: 97 % | HEART RATE: 59 BPM | WEIGHT: 120.37 LBS | BODY MASS INDEX: 25.27 KG/M2 | TEMPERATURE: 97.9 F

## 2021-10-02 PROBLEM — R77.8 ELEVATED TROPONIN: Status: ACTIVE | Noted: 2021-10-02

## 2021-10-02 PROBLEM — N39.0 URINARY TRACT INFECTION WITHOUT HEMATURIA: Status: ACTIVE | Noted: 2021-10-02

## 2021-10-02 LAB
ALBUMIN SERPL-MCNC: 2.8 G/DL (ref 3.5–5.2)
ALBUMIN/GLOB SERPL: 1 G/DL
ALP SERPL-CCNC: 51 U/L (ref 39–117)
ALT SERPL W P-5'-P-CCNC: 83 U/L (ref 1–33)
ANION GAP SERPL CALCULATED.3IONS-SCNC: 9 MMOL/L (ref 5–15)
AST SERPL-CCNC: 32 U/L (ref 1–32)
BILIRUB SERPL-MCNC: <0.2 MG/DL (ref 0–1.2)
BUN SERPL-MCNC: 33 MG/DL (ref 8–23)
BUN/CREAT SERPL: 34.7 (ref 7–25)
CALCIUM SPEC-SCNC: 7.6 MG/DL (ref 8.6–10.5)
CHLORIDE SERPL-SCNC: 106 MMOL/L (ref 98–107)
CO2 SERPL-SCNC: 17 MMOL/L (ref 22–29)
CREAT SERPL-MCNC: 0.95 MG/DL (ref 0.57–1)
DEPRECATED RDW RBC AUTO: 56.4 FL (ref 37–54)
ERYTHROCYTE [DISTWIDTH] IN BLOOD BY AUTOMATED COUNT: 18.8 % (ref 12.3–15.4)
GFR SERPL CREATININE-BSD FRML MDRD: 56 ML/MIN/1.73
GLOBULIN UR ELPH-MCNC: 2.9 GM/DL
GLUCOSE SERPL-MCNC: 130 MG/DL (ref 65–99)
HCT VFR BLD AUTO: 27.6 % (ref 34–46.6)
HGB BLD-MCNC: 9.3 G/DL (ref 12–15.9)
INR PPP: 1.93 (ref 2–3)
MCH RBC QN AUTO: 29.2 PG (ref 26.6–33)
MCHC RBC AUTO-ENTMCNC: 33.7 G/DL (ref 31.5–35.7)
MCV RBC AUTO: 86.7 FL (ref 79–97)
PLATELET # BLD AUTO: 258 10*3/MM3 (ref 140–450)
PMV BLD AUTO: 6.7 FL (ref 6–12)
POTASSIUM SERPL-SCNC: 4.6 MMOL/L (ref 3.5–5.2)
PROT SERPL-MCNC: 5.7 G/DL (ref 6–8.5)
PROTHROMBIN TIME: 20.4 SECONDS (ref 19.4–28.5)
RBC # BLD AUTO: 3.19 10*6/MM3 (ref 3.77–5.28)
SODIUM SERPL-SCNC: 132 MMOL/L (ref 136–145)
WBC # BLD AUTO: 11.6 10*3/MM3 (ref 3.4–10.8)

## 2021-10-02 PROCEDURE — 80053 COMPREHEN METABOLIC PANEL: CPT | Performed by: INTERNAL MEDICINE

## 2021-10-02 PROCEDURE — 85027 COMPLETE CBC AUTOMATED: CPT | Performed by: INTERNAL MEDICINE

## 2021-10-02 PROCEDURE — 85610 PROTHROMBIN TIME: CPT | Performed by: INTERNAL MEDICINE

## 2021-10-02 RX ORDER — FLUCONAZOLE 100 MG/1
100 TABLET ORAL EVERY 24 HOURS
Qty: 5 TABLET | Refills: 0
Start: 2021-10-02 | End: 2021-10-07

## 2021-10-02 RX ADMIN — Medication 3 ML: at 09:18

## 2021-10-02 RX ADMIN — METOPROLOL TARTRATE 25 MG: 25 TABLET, FILM COATED ORAL at 09:18

## 2021-10-02 RX ADMIN — WARFARIN 2 MG: 2 TABLET ORAL at 17:45

## 2021-10-02 RX ADMIN — MEGESTROL ACETATE 400 MG: 40 SUSPENSION ORAL at 17:45

## 2021-10-02 RX ADMIN — METOCLOPRAMIDE 5 MG: 5 TABLET ORAL at 09:18

## 2021-10-02 RX ADMIN — METOCLOPRAMIDE 5 MG: 5 TABLET ORAL at 17:45

## 2021-10-02 RX ADMIN — FLUCONAZOLE 100 MG: 100 TABLET ORAL at 17:45

## 2021-10-02 RX ADMIN — MEGESTROL ACETATE 400 MG: 40 SUSPENSION ORAL at 09:18

## 2021-10-02 RX ADMIN — POLYETHYLENE GLYCOL 3350 17 G: 17 POWDER, FOR SOLUTION ORAL at 09:18

## 2021-10-02 RX ADMIN — ATORVASTATIN CALCIUM 20 MG: 20 TABLET, FILM COATED ORAL at 09:18

## 2021-10-02 RX ADMIN — ASPIRIN 81 MG: 81 TABLET, COATED ORAL at 09:18

## 2021-10-02 NOTE — DISCHARGE SUMMARY
Date of Discharge:  10/2/2021    Discharge Diagnosis: Generalizes weakness, urinary tract infection, elevated troponin, atrial fibrillation, CKD, hypertension    Presenting Problem/History of Present Illness  Active Hospital Problems    Diagnosis  POA   • Urinary tract infection without hematuria [N39.0]  Unknown   • Elevated troponin [R77.8]  Unknown   • General weakness [R53.1]  Yes      Resolved Hospital Problems   No resolved problems to display.          Hospital Course    Patient is a 88 y.o. female presented with complaints of generalized weakness for 3 to 4 days. She was hospitalized earlier last month with community acquired pneumonia and required rehab. Family was unhappy with rehab as patient was exposed to covid and did test positive however was not symptomatic. She was doing well with home therapy but had decreased oral intake and increased fatigue. Upon admission she was found to have UTI with isolated yeast, an elevated troponin without symptoms that was likely due from CKD.   She will be discharged to [facility] today for further treatment and will need follow up on INR with PCP    Procedures Performed      DATE OF EXAM:  9/29/2021 2:09 PM     PROCEDURE:  XR CHEST 1 VW-     INDICATIONS:  general weakness       COMPARISON:  AP portable chest 8/23/2021     TECHNIQUE:   Single radiographic view of the chest was obtained.     FINDINGS:  Thoracolumbar shaped scoliotic curvature is redemonstrated. The  mediastinal position to left of midline is similar to the prior exam.  Bulky megaly appears unchanged. The cardiac silhouette obscures the left  lung base. Underlying left lower lobe atelectasis or pneumonia is  suspected. Right lung appears clear. Pacemaker appears unchanged. There  are surgical changes of the lumbar spine. Osteopenic changes are  present. There are degenerative changes of both shoulders.     IMPRESSION:     1. Opacity at the left base may represent atelectasis or pneumonia.  Left  basilar aeration is thought to be improved compared to 8/23/2021.  2. Right lung airspace disease has resolved since 8/23/2021.         Consults:   Consults     Date and Time Order Name Status Description    9/29/2021  3:47 PM Family Medicine Consult Completed           Pertinent Test Results:    Lab Results (most recent)     Procedure Component Value Units Date/Time    Comprehensive Metabolic Panel [[id number]]  (Abnormal) Collected: 10/02/21 0527    Specimen: Blood Updated: 10/02/21 0631     Glucose 130 mg/dL      BUN 33 mg/dL      Creatinine 0.95 mg/dL      Sodium 132 mmol/L      Potassium 4.6 mmol/L      Chloride 106 mmol/L      CO2 17.0 mmol/L      Calcium 7.6 mg/dL      Total Protein 5.7 g/dL      Albumin 2.80 g/dL      ALT (SGPT) 83 U/L      AST (SGOT) 32 U/L      Alkaline Phosphatase 51 U/L      Total Bilirubin <0.2 mg/dL      eGFR Non African Amer 56 mL/min/1.73      Globulin 2.9 gm/dL      A/G Ratio 1.0 g/dL      BUN/Creatinine Ratio 34.7     Anion Gap 9.0 mmol/L     Narrative:      GFR Normal >60  Chronic Kidney Disease <60  Kidney Failure <15      Protime-INR [[id number]]  (Abnormal) Collected: 10/02/21 0527    Specimen: Blood Updated: 10/02/21 0617     Protime 20.4 Seconds      INR 1.93    CBC (No Diff) [[id number]]  (Abnormal) Collected: 10/02/21 0527    Specimen: Blood Updated: 10/02/21 0608     WBC 11.60 10*3/mm3      RBC 3.19 10*6/mm3      Hemoglobin 9.3 g/dL      Hematocrit 27.6 %      MCV 86.7 fL      MCH 29.2 pg      MCHC 33.7 g/dL      RDW 18.8 %      RDW-SD 56.4 fl      MPV 6.7 fL      Platelets 258 10*3/mm3     Comprehensive Metabolic Panel [[id number]]  (Abnormal) Collected: 10/01/21 0424    Specimen: Blood Updated: 10/01/21 0516     Glucose 118 mg/dL      BUN 33 mg/dL      Creatinine 0.98 mg/dL      Sodium 132 mmol/L      Potassium 4.7 mmol/L      Chloride 105 mmol/L      CO2 17.0 mmol/L      Calcium 7.9 mg/dL      Total Protein 6.3 g/dL      Albumin 2.90 g/dL      ALT (SGPT) 68 U/L       AST (SGOT) 35 U/L      Alkaline Phosphatase 58 U/L      Total Bilirubin 0.3 mg/dL      eGFR Non African Amer 54 mL/min/1.73      Globulin 3.4 gm/dL      A/G Ratio 0.9 g/dL      BUN/Creatinine Ratio 33.7     Anion Gap 10.0 mmol/L     Narrative:      GFR Normal >60  Chronic Kidney Disease <60  Kidney Failure <15      Protime-INR [[id number]]  (Normal) Collected: 10/01/21 0424    Specimen: Blood Updated: 10/01/21 0512     Protime 23.5 Seconds      INR 2.25    CBC (No Diff) [[id number]]  (Abnormal) Collected: 10/01/21 0424    Specimen: Blood Updated: 10/01/21 0451     WBC 12.00 10*3/mm3      RBC 3.52 10*6/mm3      Hemoglobin 10.3 g/dL      Hematocrit 31.1 %      MCV 88.3 fL      MCH 29.2 pg      MCHC 33.1 g/dL      RDW 19.5 %      RDW-SD 59.5 fl      MPV 6.7 fL      Platelets 264 10*3/mm3     Urine Culture - Urine, Urine, Catheter [[id number]]  (Abnormal) Collected: 09/29/21 1515    Specimen: Urine, Catheter Updated: 09/30/21 1623     Urine Culture Yeast isolated    Narrative:      No further workup    Manual Differential [[id number]]  (Abnormal) Collected: 09/30/21 0403    Specimen: Blood Updated: 09/30/21 0626     Neutrophil % 82.0 %      Lymphocyte % 5.0 %      Monocyte % 7.0 %      Eosinophil % 2.0 %      Bands %  4.0 %      Neutrophils Absolute 12.47 10*3/mm3      Lymphocytes Absolute 0.73 10*3/mm3      Monocytes Absolute 1.02 10*3/mm3      Eosinophils Absolute 0.29 10*3/mm3      Anisocytosis Slight/1+     Poikilocytes Slight/1+     Toxic Granulation Slight/1+     Platelet Morphology Normal    Scan Slide [[id number]] Collected: 09/30/21 0403    Specimen: Blood Updated: 09/30/21 0626     Scan Slide --     Comment: See Manual Differential Results       CBC Auto Differential [[id number]]  (Abnormal) Collected: 09/30/21 0403    Specimen: Blood Updated: 09/30/21 0626     WBC 14.50 10*3/mm3      RBC 3.62 10*6/mm3      Hemoglobin 10.7 g/dL      Hematocrit 31.5 %      MCV 87.0 fL      MCH 29.6 pg      MCHC 34.0 g/dL      RDW  18.5 %      RDW-SD 55.6 fl      MPV 7.0 fL      Platelets 317 10*3/mm3     Narrative:      The previously reported component NRBC is no longer being reported. Previous result was 0.0 /100 WBC (Reference Range: 0.0-0.2 /100 WBC) on 9/30/2021 at 0516 EDT.    Digoxin Level [[id number]]  (Abnormal) Collected: 09/30/21 0403    Specimen: Blood Updated: 09/30/21 0459     Digoxin 2.10 ng/mL     Troponin [[id number]]  (Abnormal) Collected: 09/30/21 0403    Specimen: Blood Updated: 09/30/21 0456     Troponin T 0.036 ng/mL     Narrative:      Troponin T Reference Range:  <= 0.03 ng/mL-   Negative for AMI  >0.03 ng/mL-     Abnormal for myocardial necrosis.  Clinicians would have to utilize clinical acumen, EKG, Troponin and serial changes to determine if it is an Acute Myocardial Infarction or myocardial injury due to an underlying chronic condition.       Results may be falsely decreased if patient taking Biotin.      Basic Metabolic Panel [[id number]]  (Abnormal) Collected: 09/30/21 0403    Specimen: Blood Updated: 09/30/21 0454     Glucose 106 mg/dL      BUN 36 mg/dL      Creatinine 1.00 mg/dL      Sodium 137 mmol/L      Potassium 4.5 mmol/L      Chloride 107 mmol/L      CO2 17.0 mmol/L      Calcium 8.1 mg/dL      eGFR Non African Amer 52 mL/min/1.73      BUN/Creatinine Ratio 36.0     Anion Gap 13.0 mmol/L     Narrative:      GFR Normal >60  Chronic Kidney Disease <60  Kidney Failure <15      Extra Tubes [[id number]] Collected: 09/29/21 1446    Specimen: Blood, Venous Line Updated: 09/29/21 1601    Narrative:      The following orders were created for panel order Extra Tubes.  Procedure                               Abnormality         Status                     ---------                               -----------         ------                     Gold [facility][[id number]]                                   Final result                 Please view results for these tests on the individual orders.    Gold Top - SST [[id number]]  Collected: 09/29/21 1446    Specimen: Blood Updated: 09/29/21 1601     Extra Tube Hold for add-ons.     Comment: Auto resulted.       Manual Differential [[id number]]  (Abnormal) Collected: 09/29/21 1446    Specimen: Blood Updated: 09/29/21 1554     Neutrophil % 80.0 %      Lymphocyte % 6.0 %      Monocyte % 3.0 %      Eosinophil % 2.0 %      Basophil % 2.0 %      Bands %  2.0 %      Myelocyte % 1.0 %      Atypical Lymphocyte % 4.0 %      Neutrophils Absolute 8.61 10*3/mm3      Lymphocytes Absolute 1.05 10*3/mm3      Monocytes Absolute 0.32 10*3/mm3      Eosinophils Absolute 0.21 10*3/mm3      Basophils Absolute 0.21 10*3/mm3      Dacrocytes Slight/1+     Elliptocytes Slight/1+     Poikilocytes Mod/2+     WBC Morphology Normal     Platelet Morphology Normal    CBC & Differential [[id number]]  (Abnormal) Collected: 09/29/21 1446    Specimen: Blood Updated: 09/29/21 1554    Narrative:      The following orders were created for panel order CBC & Differential.  Procedure                               Abnormality         Status                     ---------                               -----------         ------                     CBC Auto Differential[[id number]]        Abnormal            Final result               Scan Slide[[id number]]                                                                    Please view results for these tests on the individual orders.    CBC Auto Differential [[id number]]  (Abnormal) Collected: 09/29/21 1446    Specimen: Blood Updated: 09/29/21 1554     WBC 10.50 10*3/mm3      RBC 3.55 10*6/mm3      Hemoglobin 10.7 g/dL      Hematocrit 30.9 %      MCV 87.0 fL      MCH 30.1 pg      MCHC 34.6 g/dL      RDW 19.1 %      RDW-SD 56.9 fl      MPV 6.6 fL      Platelets 312 10*3/mm3     Urinalysis, Microscopic Only - Urine, Catheter [[id number]]  (Abnormal) Collected: 09/29/21 1515    Specimen: Urine, Catheter Updated: 09/29/21 1538     RBC, UA None Seen /HPF      WBC, UA 13-20 /HPF      Bacteria, UA  None Seen /HPF      Squamous Epithelial Cells, UA 0-2 /HPF      Yeast, UA Moderate/2+ Budding Yeast /HPF      Hyaline Casts, UA 3-6 /LPF      Methodology Manual Light Microscopy    Digoxin Level [[id number]]  (Abnormal) Collected: 09/29/21 1446    Specimen: Blood Updated: 09/29/21 1537     Digoxin 1.90 ng/mL     Urinalysis With Culture If Indicated - Urine, Catheter [[id number]]  (Abnormal) Collected: 09/29/21 1515    Specimen: Urine, Catheter Updated: 09/29/21 1530     Color, UA Yellow     Appearance, UA Clear     pH, UA 6.0     Specific Gravity, UA 1.018     Glucose, UA Negative     Ketones, UA Negative     Bilirubin, UA Negative     Blood, UA Negative     Protein, UA Negative     Leuk Esterase, UA Small (1+)     Nitrite, UA Negative     Urobilinogen, UA 0.2 E.U./dL    Troponin [[id number]]  (Abnormal) Collected: 09/29/21 1446    Specimen: Blood Updated: 09/29/21 1525     Troponin T 0.032 ng/mL     Narrative:      Troponin T Reference Range:  <= 0.03 ng/mL-   Negative for AMI  >0.03 ng/mL-     Abnormal for myocardial necrosis.  Clinicians would have to utilize clinical acumen, EKG, Troponin and serial changes to determine if it is an Acute Myocardial Infarction or myocardial injury due to an underlying chronic condition.       Results may be falsely decreased if patient taking Biotin.      aPTT [[id number]]  (Abnormal) Collected: 09/29/21 1446    Specimen: Blood Updated: 09/29/21 1513     PTT 35.5 seconds            Results for orders placed during the hospital encounter of 08/13/21    Adult Transthoracic Echo Complete W/ Cont if Necessary Per Protocol    Interpretation Summary  · Estimated left ventricular EF = 60% Left ventricular systolic function is normal.    Indications  Shortness of breath    Technically satisfactory study.  Mitral valve is thickened with adequate opening motion.  Moderate mitral regurgitation is present.  Tricuspid valve is structurally normal.  Aortic valve is structurally normal.  Pulmonic  valve could not be well visualized.  No evidence for mitral tricuspid or aortic regurgitation is seen by Doppler study.  Biatrial enlargement is present.  Left ventricle is normal in size and contractility with ejection fraction of 60%.  Right ventricle is normal in size.  Atrial septum is intact.  Aorta is normal.  No pericardial effusion or intracardiac thrombus is seen.    Impression  Moderate mitral regurgitation.  Biatrial enlargement.  Left ventricular size and contractility is normal with ejection fraction of 60%.  No pericardial effusion or intracardiac thrombus is seen.       Condition on Discharge:  Stable    Vital Signs  Temp:  [97.2 °F (36.2 °C)-98.1 °F (36.7 °C)] 98.1 °F (36.7 °C)  Heart Rate:  [60] 60  Resp:  [18-19] 18  BP: ()/(51-58) 128/51    Physical Exam:  Physical Exam  Vitals reviewed.   Constitutional:       Appearance: Normal appearance.   HENT:      Head: Normocephalic and atraumatic.      Right Ear: External ear normal.      Nose: Nose normal.      Mouth/Throat:      Mouth: Mucous membranes are moist.   Eyes:      Conjunctiva/sclera: Conjunctivae normal.   Cardiovascular:      Rate and Rhythm: Normal rate and regular rhythm.      Pulses: Normal pulses.      Heart sounds: Normal heart sounds.   Pulmonary:      Effort: Pulmonary effort is normal.      Breath sounds: Normal breath sounds.   Abdominal:      General: Bowel sounds are normal.      Palpations: Abdomen is soft.   Musculoskeletal:         General: Normal range of motion.      Cervical back: Normal range of motion.   Skin:     General: Skin is warm and dry.   Neurological:      General: No focal deficit present.      Mental Status: She is alert and oriented to person, place, and time.   Psychiatric:         Behavior: Behavior normal.              Discharge Disposition  Rehab Facility or Unit (DC - External)    Discharge Medications     Discharge Medications      New Medications      Instructions Start Date   fluconazole 100 MG  tablet  Commonly known as: DIFLUCAN   100 mg, Oral, Every 24 Hours         Continue These Medications      Instructions Start Date   acetaminophen 325 MG tablet  Commonly known as: TYLENOL   650 mg, Oral, Every 4 Hours PRN      albuterol sulfate  (90 Base) MCG/ACT inhaler  Commonly known as: PROVENTIL HFA;VENTOLIN HFA;PROAIR HFA   1 puff, Inhalation, Every 4 Hours PRN      amLODIPine 5 MG tablet  Commonly known as: NORVASC   TAKE ONE TABLET BY MOUTH DAILY      aspirin 81 MG EC tablet   81 mg, Oral, Daily      atorvastatin 20 MG tablet  Commonly known as: LIPITOR   20 mg, Oral, Daily      digoxin 125 MCG tablet  Commonly known as: LANOXIN   125 mcg, Oral, Daily Digoxin      donepezil 5 MG tablet  Commonly known as: ARICEPT   5 mg, Oral, Nightly      furosemide 20 MG tablet  Commonly known as: LASIX   20 mg, Oral, Daily      ipratropium-albuterol 0.5-2.5 mg/3 ml nebulizer  Commonly known as: DUO-NEB   3 mL, Nebulization, 4 Times Daily PRN      lisinopril 5 MG tablet  Commonly known as: PRINIVIL,ZESTRIL   5 mg, Oral, Daily      megestrol acetate 400 MG/10ML suspension oral suspension  Commonly known as: MEGACE   400 mg, Oral, 2 Times Daily With Meals      metoclopramide 5 MG tablet  Commonly known as: Reglan   5 mg, Oral, 2 Times Daily Before Meals      metoprolol tartrate 25 MG tablet  Commonly known as: LOPRESSOR   TAKE ONE TABLET BY MOUTH TWICE A DAY      polyethylene glycol 17 g packet  Commonly known as: MIRALAX   17 g, Oral, Daily      traMADol 50 MG tablet  Commonly known as: ULTRAM   50 mg, Oral, Every 8 Hours PRN      warfarin 2.5 MG tablet  Commonly known as: COUMADIN   TAKE ONE TABLET BY MOUTH ONCE NIGHTLY             Discharge Diet: Regular wit supplements    Activity at Discharge: as tolerated    Follow-up Appointments  No future appointments.  Additional Instructions for the Follow-ups that You Need to Schedule     Ambulatory Referral to Home Health   As directed      Face to Face Visit Date:  10/1/2021    Follow-up provider for Plan of Care?: I treated the patient in an acute care facility and will not continue treatment after discharge.    Follow-up provider: DAREK LO [[id number]]    Reason/Clinical Findings: Abnormal urine - yeast infection    Describe mobility limitations that make leaving home difficult: Weakness    Nursing/Therapeutic Services Requested: Skilled Nursing (eval and treat) Other    Skilled nursing orders: Other (eval and treat)    Frequency: 1 Week 1               Test Results Pending at Discharge       FELIZ Solorzano  10/02/21  10:32 EDT    Time: Discharge 25 min

## 2021-10-02 NOTE — PLAN OF CARE
Problem: Adult Inpatient Plan of Care  Goal: Plan of Care Review  Outcome: Ongoing, Progressing  Flowsheets (Taken 10/2/2021 1653)  Outcome Summary: Patient resting abed at this time. Daughter at bedside. Spoke to them regarding discharge to [facility] this evening. Daughter stated she will transport to facility. No complaints of pain or distress. VSS. Report called to facility and spoke with Yanet  Goal: Patient-Specific Goal (Individualized)  Outcome: Ongoing, Progressing  Goal: Absence of Hospital-Acquired Illness or Injury  Outcome: Ongoing, Progressing  Intervention: Identify and Manage Fall Risk  Recent Flowsheet Documentation  Taken 10/2/2021 1312 by Melissa Mcmanus LPN  Safety Promotion/Fall Prevention: safety round/check completed  Taken 10/2/2021 1100 by Melissa Mcmanus LPN  Safety Promotion/Fall Prevention: safety round/check completed  Taken 10/2/2021 0900 by Melissa Mcmansu LPN  Safety Promotion/Fall Prevention: safety round/check completed  Taken 10/2/2021 0727 by Melissa Mcmanus LPN  Safety Promotion/Fall Prevention:   assistive device/personal items within reach   clutter free environment maintained   fall prevention program maintained   gait belt   lighting adjusted   mobility aid in reach   nonskid shoes/slippers when out of bed   room organization consistent   safety round/check completed  Intervention: Prevent Skin Injury  Recent Flowsheet Documentation  Taken 10/2/2021 0727 by Melissa Mcmanus LPN  Skin Protection: adhesive use limited  Intervention: Prevent Infection  Recent Flowsheet Documentation  Taken 10/2/2021 0727 by Melissa Mcmanus LPN  Infection Prevention:   environmental surveillance performed   equipment surfaces disinfected   hand hygiene promoted   personal protective equipment utilized   rest/sleep promoted   single patient room provided  Goal: Optimal Comfort and Wellbeing  Outcome: Ongoing, Progressing  Goal: Readiness for Transition of Care  Outcome: Ongoing,  Progressing     Problem: Fall Injury Risk  Goal: Absence of Fall and Fall-Related Injury  Outcome: Ongoing, Progressing  Intervention: Identify and Manage Contributors to Fall Injury Risk  Recent Flowsheet Documentation  Taken 10/2/2021 1312 by Melissa Mcmanus LPN  Medication Review/Management: medications reviewed  Taken 10/2/2021 1100 by Melissa Mcmanus LPN  Medication Review/Management: medications reviewed  Taken 10/2/2021 0900 by Melissa Mcmanus LPN  Medication Review/Management: medications reviewed  Taken 10/2/2021 0727 by Melissa Mcmanus LPN  Medication Review/Management: medications reviewed  Intervention: Promote Injury-Free Environment  Recent Flowsheet Documentation  Taken 10/2/2021 1312 by Melissa Mcmanus LPN  Safety Promotion/Fall Prevention: safety round/check completed  Taken 10/2/2021 1100 by Melissa Mcmanus LPN  Safety Promotion/Fall Prevention: safety round/check completed  Taken 10/2/2021 0900 by Melissa Mcmanus LPN  Safety Promotion/Fall Prevention: safety round/check completed  Taken 10/2/2021 0727 by Melissa Mcmanus LPN  Safety Promotion/Fall Prevention:   assistive device/personal items within reach   clutter free environment maintained   fall prevention program maintained   gait belt   lighting adjusted   mobility aid in reach   nonskid shoes/slippers when out of bed   room organization consistent   safety round/check completed     Problem: Pain Acute  Goal: Optimal Pain Control  Outcome: Ongoing, Progressing     Problem: Skin Injury Risk Increased  Goal: Skin Health and Integrity  Outcome: Ongoing, Progressing  Intervention: Optimize Skin Protection  Recent Flowsheet Documentation  Taken 10/2/2021 0727 by Melissa Mcmanus LPN  Pressure Reduction Techniques:   frequent weight shift encouraged   weight shift assistance provided  Pressure Reduction Devices: pressure-redistributing mattress utilized  Skin Protection: adhesive use limited  Intervention: Promote and Optimize Oral  Intake  Recent Flowsheet Documentation  Taken 10/2/2021 0714 by Melissa Mcmanus LPN  Oral Nutrition Promotion: (Boost breeze nutrional supplement given) other (see comments)   Goal Outcome Evaluation:              Outcome Summary: Patient resting abed at this time. Daughter at bedside. Spoke to them regarding discharge to [facility] this evening. Daughter stated she will transport to facility. No complaints of pain or distress. VSS. Report called to facility and spoke with Yanet

## 2021-10-02 NOTE — PROGRESS NOTES
LOS: 2 days   Patient Care Team:  Yudi Daniels MD as PCP - General (Family Medicine)  Mignon Luke MD as Consulting Physician (Cardiology)    Subjective:  Looks and feels better    Objective:   Afebrile      Review of Systems:   Review of Systems   Constitutional: Positive for activity change and appetite change.   Respiratory: Negative.    Cardiovascular: Negative.    Neurological: Positive for weakness.           Vital Signs  Temp:  [97.2 °F (36.2 °C)-98.1 °F (36.7 °C)] 98.1 °F (36.7 °C)  Heart Rate:  [60] 60  Resp:  [18-19] 18  BP: ()/(51-58) 128/51    Physical Exam:  Physical Exam  Vitals and nursing note reviewed.   Constitutional:       General: She is not in acute distress.  Cardiovascular:      Rate and Rhythm: Rhythm irregular.      Heart sounds: Normal heart sounds.   Pulmonary:      Breath sounds: Normal breath sounds.   Skin:     General: Skin is warm.   Neurological:      General: No focal deficit present.      Mental Status: She is alert.          Radiology:  XR Chest 1 View    Result Date: 9/29/2021   1. Opacity at the left base may represent atelectasis or pneumonia. Left basilar aeration is thought to be improved compared to 8/23/2021. 2. Right lung airspace disease has resolved since 8/23/2021.  Electronically Signed By-Cynthia Lee MD On:9/29/2021 2:29 PM This report was finalized on [id number] by  Cynthia Lee MD.         Results Review:     I reviewed the patient's new clinical results.  I reviewed the patient's new imaging results and agree with the interpretation.    Medication Review:   Scheduled Meds:aspirin, 81 mg, Oral, Daily  atorvastatin, 20 mg, Oral, Daily  donepezil, 5 mg, Oral, Nightly  fluconazole, 100 mg, Oral, Q24H  megestrol acetate, 400 mg, Oral, BID With Meals  metoclopramide, 5 mg, Oral, BID AC  metoprolol tartrate, 25 mg, Oral, BID  polyethylene glycol, 17 g, Oral, Daily  sodium chloride, 3 mL, Intravenous, Q12H  warfarin, 2 mg, Oral,  Daily      Continuous Infusions:Pharmacy to dose warfarin,       PRN Meds:.•  acetaminophen  •  ipratropium-albuterol  •  ondansetron  •  Pharmacy to dose warfarin  •  potassium chloride  •  potassium chloride  •  [COMPLETED] Insert peripheral IV **AND** sodium chloride  •  sodium chloride  •  traMADol    Labs:    CBC    Results from last 7 days   Lab Units 10/02/21  0527 10/01/21  0424 09/30/21  0403 09/29/21  1446   WBC 10*3/mm3 11.60* 12.00* 14.50* 10.50   HEMOGLOBIN g/dL 9.3* 10.3* 10.7* 10.7*   PLATELETS 10*3/mm3 258 264 317 312     BMP   Results from last 7 days   Lab Units 10/02/21  0527 10/01/21  0424 09/30/21  0403 09/29/21  1446   SODIUM mmol/L 132* 132* 137 140   POTASSIUM mmol/L 4.6 4.7 4.5 4.2   CHLORIDE mmol/L 106 105 107 111*   CO2 mmol/L 17.0* 17.0* 17.0* 18.0*   BUN mg/dL 33* 33* 36* 45*   CREATININE mg/dL 0.95 0.98 1.00 1.14*   GLUCOSE mg/dL 130* 118* 106* 116*     Cr Clearance Estimated Creatinine Clearance: 35.3 mL/min (by C-G formula based on SCr of 0.95 mg/dL).  Coag   Results from last 7 days   Lab Units 10/02/21  0527 10/01/21  0424 09/30/21  0403 09/29/21  1446 09/28/21  0000   INR  1.93* 2.25 3.05* 3.52* 3.10   APTT seconds  --   --   --  35.5*  --      HbA1C No results found for: HGBA1C  Blood Glucose No results found for: POCGLU  Infection   Results from last 7 days   Lab Units 09/29/21  1515   URINECX  Yeast isolated*     CMP   Results from last 7 days   Lab Units 10/02/21  0527 10/01/21  0424 09/30/21  0403 09/29/21  1446   SODIUM mmol/L 132* 132* 137 140   POTASSIUM mmol/L 4.6 4.7 4.5 4.2   CHLORIDE mmol/L 106 105 107 111*   CO2 mmol/L 17.0* 17.0* 17.0* 18.0*   BUN mg/dL 33* 33* 36* 45*   CREATININE mg/dL 0.95 0.98 1.00 1.14*   GLUCOSE mg/dL 130* 118* 106* 116*   ALBUMIN g/dL 2.80* 2.90*  --  3.30*   BILIRUBIN mg/dL <0.2 0.3  --  0.2   ALK PHOS U/L 51 58  --  70   AST (SGOT) U/L 32 35*  --  33*   ALT (SGPT) U/L 83* 68*  --  44*     UA    Results from last 7 days   Lab Units  09/29/21  1515   NITRITE UA  Negative   WBC UA /HPF 13-20*   BACTERIA UA /HPF None Seen   SQUAM EPITHEL UA /HPF 0-2   URINECX  Yeast isolated*     Radiology(recent) No radiology results for the last day   Assessment:    Acute yeast urinary tract infection  Physical deconditioning  Paroxysmal atrial fibrillation  Hypercoagulable state secondary to atrial fibrillation  Oral anticoagulation therapy  Atherosclerotic heart disease of native coronary arteries with angina pectoris  Chronic systolic congestive heart failure  Chronic kidney disease stage 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

## 2021-10-02 NOTE — PROGRESS NOTES
"Pharmacy dosing service  Anticoagulant  Warfarin     Subjective:    Alexandrea Gaxiola is a 88 y.o.female being continued on warfarin for atrial fibrillation (CHADS-VASC= 5).  PMH: HF, HTN    INR Goal: 2 - 3  Home medication?: Yes, warfarin 2.5 mg PO every day at 1800 (was told to hold dose x1 day on 9/29 at outpt anticoag visit 9/28 d/t slightly high INR 3.1)  Bridge Therapy Present?:  No  Interacting Medications Evaluation (New/Present/Discontinued): tramadol (home med, may increase INR); ceftriaxone x2days 9/30-10/1 (new, may increase INR); fluconazole x7d started 9/30 (new, may increase INR)  Additional Contributing Factors: acute illness      Assessment/Plan:    INR supratherapeutic on admission. INR slightly subtherapeutic today likely d/t doses held for 2 days. Pt received 2.5 mg dose yesterday. Will start maintenance dose of 2mg daily today (~20% reduction) given elevated INR on admit and new DDI w/fluconazole which is expected to begin to affect INR in the next couple of days. Will need close monitoring to assess INR response to DDI.    Continue to monitor and adjust based on INR.         Date 9/29 9/30 10/1 10/2        INR 3.52 3.05 2.25 1.93        Dose ---- --- 2.5 mg 2 mg            Objective:  [Ht: 147.3 cm (58\"); Wt: 54.6 kg (120 lb 5.9 oz); BMI: Body mass index is 25.16 kg/m².]    Lab Results   Component Value Date    ALBUMIN 2.80 (L) 10/02/2021     Lab Results   Component Value Date    INR 1.93 (L) 10/02/2021    INR 2.25 10/01/2021    INR 3.05 (H) 09/30/2021    PROTIME 20.4 10/02/2021    PROTIME 23.5 10/01/2021    PROTIME 31.2 (H) 09/30/2021     Lab Results   Component Value Date    HGB 9.3 (L) 10/02/2021    HGB 10.3 (L) 10/01/2021    HGB 10.7 (L) 09/30/2021     Lab Results   Component Value Date    HCT 27.6 (L) 10/02/2021    HCT 31.1 (L) 10/01/2021    HCT 31.5 (L) 09/30/2021       Nimisha Nicole PharmD, BCPS  10/02/21 07:02 EDT   "

## 2021-10-02 NOTE — CASE MANAGEMENT/SOCIAL WORK
Continued Stay Note  DALJIT Clifford     Patient Name: Alexandrea Gaxiola  MRN: [id number]  Today's Date: 10/2/2021    Admit Date: 9/29/2021    Discharge Plan     Row Name 10/02/21 1029       Plan    Plan  [facility] accepted. Bed available today. No precert or PASRR needed.    Patient/Family in Agreement with Plan  yes    Plan Comments  Contacted liaison to confirm bed availability today after 6 p.m. RN and MD notified.        Phone communication or documentation only - no physical contact with patient or family.    Beckie Mix, TIO

## 2021-10-02 NOTE — PLAN OF CARE
Goal Outcome Evaluation:  Plan of Care Reviewed With: patient           Pt rested throughout the night with no complaints. Will continue to monitor.

## 2021-10-06 NOTE — CASE MANAGEMENT/SOCIAL WORK
Case Management Discharge Note      Final Note: [facility]         Selected Continued Care - Discharged on 10/2/2021 Admission date: 9/29/2021 - Discharge disposition: Rehab Facility or Unit (DC - External)          Selected Continued Care - Prior Encounters Includes selections from prior encounters from 7/1/2021 to 10/2/2021    Discharged on 8/24/2021 Admission date: 8/13/2021 - Discharge disposition: Rehab Facility or Unit (DC - External)    Destination     Service Provider Selected Services Address Phone Fax Patient Preferred    [facility]  Inpatient Rehabilitation [address] [id number] [phone] --                         Final Discharge Disposition Code: 62 - inpatient rehab facility

## 2021-10-15 ENCOUNTER — HOME CARE VISIT (OUTPATIENT)
Dept: HOME HEALTH SERVICES | Facility: HOME HEALTHCARE | Age: 86
End: 2021-10-15

## 2021-10-20 ENCOUNTER — HOME HEALTH ADMISSION (OUTPATIENT)
Dept: HOME HEALTH SERVICES | Facility: HOME HEALTHCARE | Age: 86
End: 2021-10-20

## 2021-10-20 ENCOUNTER — TRANSCRIBE ORDERS (OUTPATIENT)
Dept: HOME HEALTH SERVICES | Facility: HOME HEALTHCARE | Age: 86
End: 2021-10-20

## 2021-10-20 DIAGNOSIS — I48.20 CHRONIC A-FIB (HCC): Primary | ICD-10-CM

## 2021-10-21 ENCOUNTER — ANTICOAGULATION VISIT (OUTPATIENT)
Dept: CARDIOLOGY | Facility: CLINIC | Age: 86
End: 2021-10-21

## 2021-10-21 ENCOUNTER — HOME CARE VISIT (OUTPATIENT)
Dept: HOME HEALTH SERVICES | Facility: HOME HEALTHCARE | Age: 86
End: 2021-10-21

## 2021-10-21 DIAGNOSIS — Z79.01 LONG TERM (CURRENT) USE OF ANTICOAGULANTS: ICD-10-CM

## 2021-10-21 DIAGNOSIS — I48.11 LONGSTANDING PERSISTENT ATRIAL FIBRILLATION (HCC): Primary | ICD-10-CM

## 2021-10-21 LAB — INR PPP: 3.1

## 2021-10-21 PROCEDURE — G0299 HHS/HOSPICE OF RN EA 15 MIN: HCPCS

## 2021-10-22 ENCOUNTER — HOME CARE VISIT (OUTPATIENT)
Dept: HOME HEALTH SERVICES | Facility: HOME HEALTHCARE | Age: 86
End: 2021-10-22

## 2021-10-22 VITALS
DIASTOLIC BLOOD PRESSURE: 70 MMHG | SYSTOLIC BLOOD PRESSURE: 110 MMHG | HEART RATE: 62 BPM | OXYGEN SATURATION: 99 % | TEMPERATURE: 97.6 F

## 2021-10-22 PROCEDURE — G0151 HHCP-SERV OF PT,EA 15 MIN: HCPCS

## 2021-10-22 NOTE — HOME HEALTH
Pt is an 89 yo female referred for PT sp hospital and rehab stay for UTI. Prior to that pt was just recently dc home from hospitalizaton and rehab stay from pneumonia.    PLOF Residential ambulator with use of rollator, requires assist with ADL.     Environment Lives with daughter and son in law in a single story house. Pt has 24/7 assist and appropriate DME.     Pt complaining of extreme fatigue. Pt appears to be severely deconditioned from back to back hospital stay. Pt requires max assist with bed mobility and transfers with use of rollator for support. Pt requires max assist to ambulate x 20 ft on level surface with use of rollator.    Plan for next visit: strengthening bed mobility, transfer training, balance and gait training with use of rollator.

## 2021-10-25 VITALS
DIASTOLIC BLOOD PRESSURE: 68 MMHG | SYSTOLIC BLOOD PRESSURE: 116 MMHG | HEART RATE: 72 BPM | OXYGEN SATURATION: 95 % | TEMPERATURE: 98 F | RESPIRATION RATE: 18 BRPM

## 2021-10-25 NOTE — HOME HEALTH
Patient is an 88 year old female referred to [facility] for weakness. She discharged from rehab a week ago and since being home has mostly stayed in bed. She uses a rollator to ambulate with assistance. Family states she has slept a lot but will eat. She denies pain at this time. INR checked today and INR was 3.1, Dr Luke wants her to hold todays dose then go to 2mg daily and recheck in a week. PT and SLP melinda put in as well has a visit from [facility] [place].    Plan for next visit: CP assess, assess pain/safety/falls, anticoagulant education, PT/INR recheck, assess skin

## 2021-10-26 ENCOUNTER — HOME CARE VISIT (OUTPATIENT)
Dept: HOME HEALTH SERVICES | Facility: HOME HEALTHCARE | Age: 86
End: 2021-10-26

## 2021-10-26 PROCEDURE — G0156 HHCP-SVS OF AIDE,EA 15 MIN: HCPCS

## 2021-10-27 ENCOUNTER — HOME CARE VISIT (OUTPATIENT)
Dept: HOME HEALTH SERVICES | Facility: HOME HEALTHCARE | Age: 86
End: 2021-10-27

## 2021-10-27 VITALS
HEART RATE: 63 BPM | RESPIRATION RATE: 18 BRPM | OXYGEN SATURATION: 99 % | SYSTOLIC BLOOD PRESSURE: 110 MMHG | DIASTOLIC BLOOD PRESSURE: 60 MMHG | TEMPERATURE: 96.3 F

## 2021-10-27 PROCEDURE — G0151 HHCP-SERV OF PT,EA 15 MIN: HCPCS

## 2021-10-27 NOTE — HOME HEALTH
Pt's daughter greeted this PT upon arrival with pt sitting in the recliner chair . Pt reported still feeling weak but not any worse than last week. Pt agreeable to this PT session and willing to try. Per daughter no falls, ER use or insurance changes since been back home but reported visiting nurse called after getting instructions from Dr Luke last Thursday 10.21.21 to hold warfarin for 1 day and decreased to 2 mg for 1 wk and will re test PT INR on next SN visit on 10.28.21. Pt required frequent rest break due to feeling tired and weak.

## 2021-10-28 ENCOUNTER — HOME CARE VISIT (OUTPATIENT)
Dept: HOME HEALTH SERVICES | Facility: HOME HEALTHCARE | Age: 86
End: 2021-10-28

## 2021-10-28 ENCOUNTER — ANTICOAGULATION VISIT (OUTPATIENT)
Dept: CARDIOLOGY | Facility: CLINIC | Age: 86
End: 2021-10-28

## 2021-10-28 DIAGNOSIS — I48.91 ATRIAL FIBRILLATION, UNSPECIFIED TYPE (HCC): Primary | ICD-10-CM

## 2021-10-28 DIAGNOSIS — Z79.01 LONG TERM (CURRENT) USE OF ANTICOAGULANTS: ICD-10-CM

## 2021-10-28 LAB — INR PPP: 2.9

## 2021-10-28 PROCEDURE — G0152 HHCP-SERV OF OT,EA 15 MIN: HCPCS

## 2021-10-28 PROCEDURE — G0299 HHS/HOSPICE OF RN EA 15 MIN: HCPCS

## 2021-10-29 ENCOUNTER — HOME CARE VISIT (OUTPATIENT)
Dept: HOME HEALTH SERVICES | Facility: HOME HEALTHCARE | Age: 86
End: 2021-10-29

## 2021-10-29 VITALS — OXYGEN SATURATION: 99 % | SYSTOLIC BLOOD PRESSURE: 100 MMHG | DIASTOLIC BLOOD PRESSURE: 58 MMHG | HEART RATE: 66 BPM

## 2021-10-29 VITALS — RESPIRATION RATE: 18 BRPM | TEMPERATURE: 98.2 F | OXYGEN SATURATION: 97 % | HEART RATE: 82 BPM

## 2021-10-29 PROCEDURE — G0151 HHCP-SERV OF PT,EA 15 MIN: HCPCS

## 2021-10-29 NOTE — HOME HEALTH
Patient states that she has been having nausea the last few days. Denies any emesis. Patient has Reglan that she used to take and is wondering if she can start it back up. Message sent to PCP    INR- 2.9  Continue current dose- re-check in 1 week

## 2021-10-29 NOTE — HOME HEALTH
Pt is an 88 y.o female who lives in a 1 story home with son and DIL.  Pt recently hospitalized and then to rehab secondary to UTI.      PLOF pt independent with feeding after set up.  Pt required MAX assist with toileting bathing and dressing.      Currently pt independent with feeding after set up and total assist with bathing dressing grooming and toileting.       Skilled OT for ther ex and ADL training.  Pt and therapist in agreement [facility] goals and poc.  Pt and therapist in agreement with goals and poc.      M 1 w 4

## 2021-10-31 PROCEDURE — 93296 REM INTERROG EVL PM/IDS: CPT | Performed by: INTERNAL MEDICINE

## 2021-10-31 PROCEDURE — 93294 REM INTERROG EVL PM/LDLS PM: CPT | Performed by: INTERNAL MEDICINE

## 2021-11-01 ENCOUNTER — HOME CARE VISIT (OUTPATIENT)
Dept: HOME HEALTH SERVICES | Facility: HOME HEALTHCARE | Age: 86
End: 2021-11-01

## 2021-11-01 VITALS
TEMPERATURE: 97.7 F | SYSTOLIC BLOOD PRESSURE: 110 MMHG | DIASTOLIC BLOOD PRESSURE: 48 MMHG | HEART RATE: 75 BPM | OXYGEN SATURATION: 98 %

## 2021-11-01 VITALS
DIASTOLIC BLOOD PRESSURE: 56 MMHG | OXYGEN SATURATION: 98 % | SYSTOLIC BLOOD PRESSURE: 110 MMHG | TEMPERATURE: 97.5 F | RESPIRATION RATE: 18 BRPM | HEART RATE: 82 BPM

## 2021-11-01 PROCEDURE — G0158 HHC OT ASSISTANT EA 15: HCPCS

## 2021-11-01 NOTE — HOME HEALTH
Son in law and daughter pleased with ability to walk this visit stating she had not been able to walker in the last few days and had been using w/c to push her in the house. Progressing slowly toward established mobility goals

## 2021-11-02 ENCOUNTER — HOME CARE VISIT (OUTPATIENT)
Dept: HOME HEALTH SERVICES | Facility: HOME HEALTHCARE | Age: 86
End: 2021-11-02

## 2021-11-02 VITALS — DIASTOLIC BLOOD PRESSURE: 60 MMHG | SYSTOLIC BLOOD PRESSURE: 130 MMHG | TEMPERATURE: 97.1 F

## 2021-11-02 PROCEDURE — G0151 HHCP-SERV OF PT,EA 15 MIN: HCPCS

## 2021-11-02 NOTE — HOME HEALTH
"Pt was seated in living room recliner upon AQUINO arrival, son and daughter in law were present for OT session. CG\"s report patient having an increase in edema in B feet as well as an increased stuttering with speech x 3 days. AQUINO notified first nurse scheduled to see patient and was informed the patient was handed off to another nurse. AQUINO then reached out to that nurse with no response as of this time to request orders for UA from MD as pt frequent with UTI's causing decreased BP (110/48 today) and other issues expressed. Pt to continued skilled OT for ther ex, transfer training, self care training and education to increase pt safety and I with functional tasks."

## 2021-11-02 NOTE — HOME HEALTH
Noting localized swelling on both ankles with pt denies of  ankle/calf pain and negative for Homans sign. SN to see pt tomorrow. No redness or swelling on bilat calf noted. Son in law ( Celso) verbalized increased stuttering for the past 5 days , PT called Dr Daniels's office and spoke with medical assistant relaying family's concern recommending to PT f/u appt with Dr Daniels after recent hospital stay . PT called family Celso back and conveyed recommendation. Family denies falls, ER use, MED and insurance changes since last clinician visit. Pt ready and agreeable to this PT session with pt very talkative and reports feeling better.

## 2021-11-03 ENCOUNTER — HOME CARE VISIT (OUTPATIENT)
Dept: HOME HEALTH SERVICES | Facility: HOME HEALTHCARE | Age: 86
End: 2021-11-03

## 2021-11-03 ENCOUNTER — LAB REQUISITION (OUTPATIENT)
Dept: LAB | Facility: HOSPITAL | Age: 86
End: 2021-11-03

## 2021-11-03 DIAGNOSIS — N39.0 URINARY TRACT INFECTION, SITE NOT SPECIFIED: ICD-10-CM

## 2021-11-03 LAB
BILIRUB UR QL STRIP: NEGATIVE
CLARITY UR: CLEAR
COLOR UR: YELLOW
GLUCOSE UR STRIP-MCNC: NEGATIVE MG/DL
HGB UR QL STRIP.AUTO: NEGATIVE
KETONES UR QL STRIP: NEGATIVE
LEUKOCYTE ESTERASE UR QL STRIP.AUTO: NEGATIVE
NITRITE UR QL STRIP: NEGATIVE
PH UR STRIP.AUTO: 6 [PH] (ref 5–8)
PROT UR QL STRIP: NEGATIVE
SP GR UR STRIP: 1.01 (ref 1–1.03)
UROBILINOGEN UR QL STRIP: NORMAL

## 2021-11-03 PROCEDURE — 81003 URINALYSIS AUTO W/O SCOPE: CPT | Performed by: FAMILY MEDICINE

## 2021-11-03 PROCEDURE — G0299 HHS/HOSPICE OF RN EA 15 MIN: HCPCS

## 2021-11-04 ENCOUNTER — HOME CARE VISIT (OUTPATIENT)
Dept: HOME HEALTH SERVICES | Facility: HOME HEALTHCARE | Age: 86
End: 2021-11-04

## 2021-11-04 VITALS
TEMPERATURE: 98 F | OXYGEN SATURATION: 95 % | DIASTOLIC BLOOD PRESSURE: 72 MMHG | HEART RATE: 74 BPM | RESPIRATION RATE: 18 BRPM | SYSTOLIC BLOOD PRESSURE: 124 MMHG

## 2021-11-04 PROCEDURE — G0157 HHC PT ASSISTANT EA 15: HCPCS

## 2021-11-05 ENCOUNTER — ANTICOAGULATION VISIT (OUTPATIENT)
Dept: CARDIOLOGY | Facility: CLINIC | Age: 86
End: 2021-11-05

## 2021-11-05 ENCOUNTER — HOME CARE VISIT (OUTPATIENT)
Dept: HOME HEALTH SERVICES | Facility: HOME HEALTHCARE | Age: 86
End: 2021-11-05

## 2021-11-05 VITALS
OXYGEN SATURATION: 97 % | TEMPERATURE: 98 F | HEART RATE: 72 BPM | DIASTOLIC BLOOD PRESSURE: 64 MMHG | RESPIRATION RATE: 18 BRPM | SYSTOLIC BLOOD PRESSURE: 132 MMHG

## 2021-11-05 VITALS
HEART RATE: 62 BPM | OXYGEN SATURATION: 99 % | DIASTOLIC BLOOD PRESSURE: 78 MMHG | SYSTOLIC BLOOD PRESSURE: 122 MMHG | TEMPERATURE: 97.7 F

## 2021-11-05 DIAGNOSIS — Z79.01 LONG TERM (CURRENT) USE OF ANTICOAGULANTS: Primary | ICD-10-CM

## 2021-11-05 LAB
HH POC INTERNATIONAL NORMALIZATION RATIO: 2.9
HH POC PROTIME: 32.3 SECONDS
INR PPP: 2.9

## 2021-11-05 PROCEDURE — G0299 HHS/HOSPICE OF RN EA 15 MIN: HCPCS

## 2021-11-05 NOTE — HOME HEALTH
Patient seated in living room upon arrival of SN. She ambulated to bathroom to provide urine specimen for ordered UA. Son in law also concerned about her right foot, it has 1+ pitting edema, no other s/s, no pain or redness, states she has been up in her chair more and they have been assisting her to walk more.     Plan for next visit, CP Assess, PT/INR, assess pain/safety/falls, assess edema in BLE

## 2021-11-05 NOTE — HOME HEALTH
Patient was in the living room and her daughter answered the door.   Patient rated her pain as 0/10.

## 2021-11-07 NOTE — HOME HEALTH
no changes in status since last SN visit, PT/INR done today, awaiting call back from Dr. Luke.      Next SN visit: CP assess, follow up PT/INR, pain assess, falls assess

## 2021-11-08 ENCOUNTER — HOME CARE VISIT (OUTPATIENT)
Dept: HOME HEALTH SERVICES | Facility: HOME HEALTHCARE | Age: 86
End: 2021-11-08

## 2021-11-08 VITALS
DIASTOLIC BLOOD PRESSURE: 62 MMHG | TEMPERATURE: 97.8 F | OXYGEN SATURATION: 97 % | SYSTOLIC BLOOD PRESSURE: 124 MMHG | HEART RATE: 65 BPM

## 2021-11-08 PROCEDURE — G0158 HHC OT ASSISTANT EA 15: HCPCS

## 2021-11-10 ENCOUNTER — HOME CARE VISIT (OUTPATIENT)
Dept: HOME HEALTH SERVICES | Facility: HOME HEALTHCARE | Age: 86
End: 2021-11-10

## 2021-11-11 ENCOUNTER — HOME CARE VISIT (OUTPATIENT)
Dept: HOME HEALTH SERVICES | Facility: HOME HEALTHCARE | Age: 86
End: 2021-11-11

## 2021-11-11 VITALS
HEART RATE: 78 BPM | TEMPERATURE: 98.1 F | RESPIRATION RATE: 18 BRPM | OXYGEN SATURATION: 96 % | SYSTOLIC BLOOD PRESSURE: 115 MMHG | DIASTOLIC BLOOD PRESSURE: 75 MMHG

## 2021-11-11 PROCEDURE — G0151 HHCP-SERV OF PT,EA 15 MIN: HCPCS

## 2021-11-12 ENCOUNTER — HOME CARE VISIT (OUTPATIENT)
Dept: HOME HEALTH SERVICES | Facility: HOME HEALTHCARE | Age: 86
End: 2021-11-12

## 2021-11-12 PROCEDURE — G0299 HHS/HOSPICE OF RN EA 15 MIN: HCPCS

## 2021-11-12 NOTE — HOME HEALTH
Pt reports no pain today but pt's daughter reports pt has had pain in R foot due to new wound.  PT assessed R foot and noted wound to R medial foot.  PT applied 3x3 mepilex and notified RN for further evaluation at next visit 11/12/21.  PT inquired about pt's biggest difficulty at this time and pt reports she is always fatigued and never wants to get up.  Pt's family concurs and adds that pt conts to have difficulty with transfers and upright mobility.  PT session focused on improved anterior weight shift to reduce fear of falls and reduce pt's posterior LOB when upright.  Pt progressed gait this date and was able to ambulate from living room to bathroom and back (after seated rest).  This distance was approximately 75 ft x 2 bouts.  Pt participated in various transfers from walker to commode and arm chair for caregiver education and progression of mobility.

## 2021-11-14 VITALS
SYSTOLIC BLOOD PRESSURE: 100 MMHG | RESPIRATION RATE: 17 BRPM | HEART RATE: 85 BPM | TEMPERATURE: 97 F | OXYGEN SATURATION: 96 % | DIASTOLIC BLOOD PRESSURE: 58 MMHG

## 2021-11-14 LAB
HH POC INTERNATIONAL NORMALIZATION RATIO: 2
HH POC PROTIME: 21 SECONDS

## 2021-11-14 NOTE — HOME HEALTH
PT/INR due today, see results.  Pt. has wound on right foot, SN to ask MD for wound care orders      Next Visit: CP, assess, pain assess, PT/INR, wound assess

## 2021-11-16 ENCOUNTER — HOME CARE VISIT (OUTPATIENT)
Dept: HOME HEALTH SERVICES | Facility: HOME HEALTHCARE | Age: 86
End: 2021-11-16

## 2021-11-16 VITALS
HEART RATE: 72 BPM | DIASTOLIC BLOOD PRESSURE: 57 MMHG | RESPIRATION RATE: 18 BRPM | TEMPERATURE: 97.7 F | SYSTOLIC BLOOD PRESSURE: 145 MMHG | OXYGEN SATURATION: 93 %

## 2021-11-16 VITALS
OXYGEN SATURATION: 98 % | HEART RATE: 77 BPM | RESPIRATION RATE: 18 BRPM | TEMPERATURE: 98.1 F | SYSTOLIC BLOOD PRESSURE: 115 MMHG | DIASTOLIC BLOOD PRESSURE: 70 MMHG

## 2021-11-16 PROCEDURE — G0153 HHCP-SVS OF S/L PATH,EA 15MN: HCPCS

## 2021-11-16 PROCEDURE — G0151 HHCP-SERV OF PT,EA 15 MIN: HCPCS

## 2021-11-16 NOTE — HOME HEALTH
Pt reports feeling well today but states she has had itching in her L ankle and back.  Pt's daughter reports she has been using lotion for itching.  Pt reports some back pain but is well tolerated.  PT completed formal 30 day reassessment of pt's mobility and balance.  Pt conts to demo impaired sit to stand due to fear of falling.  PT focusing on anterior weight shift and progression of mobility/transfers.  PT will also focus on continued gait training, strengthening, progression of mobility and caregiver education.  PT to extend POC.  Pt and pt's family agreeable to extended POC

## 2021-11-19 ENCOUNTER — HOME CARE VISIT (OUTPATIENT)
Dept: HOME HEALTH SERVICES | Facility: HOME HEALTHCARE | Age: 86
End: 2021-11-19

## 2021-11-19 VITALS
RESPIRATION RATE: 17 BRPM | SYSTOLIC BLOOD PRESSURE: 100 MMHG | OXYGEN SATURATION: 98 % | TEMPERATURE: 97 F | DIASTOLIC BLOOD PRESSURE: 74 MMHG | HEART RATE: 80 BPM

## 2021-11-19 PROCEDURE — G0299 HHS/HOSPICE OF RN EA 15 MIN: HCPCS

## 2021-11-19 PROCEDURE — G0152 HHCP-SERV OF OT,EA 15 MIN: HCPCS

## 2021-11-19 NOTE — HOME HEALTH
no changes in pt. status, PT/INR due today.  Pt.'s wound care performed, tolerated well.       Next SN visit: CP assess, falls and pain assess, PT/INR due next Friday

## 2021-11-21 VITALS — SYSTOLIC BLOOD PRESSURE: 110 MMHG | HEART RATE: 67 BPM | OXYGEN SATURATION: 97 % | DIASTOLIC BLOOD PRESSURE: 54 MMHG

## 2021-11-22 ENCOUNTER — HOME CARE VISIT (OUTPATIENT)
Dept: HOME HEALTH SERVICES | Facility: HOME HEALTHCARE | Age: 86
End: 2021-11-22

## 2021-11-22 PROCEDURE — G0153 HHCP-SVS OF S/L PATH,EA 15MN: HCPCS

## 2021-11-22 NOTE — HOME HEALTH
Pt sitting up in recliner in livingroom upon therapist arrival.  Therapy focused on ther ex and STS transfers.  For activity see interventions    Pt denies any falls or med changes

## 2021-11-23 ENCOUNTER — HOME CARE VISIT (OUTPATIENT)
Dept: HOME HEALTH SERVICES | Facility: HOME HEALTHCARE | Age: 86
End: 2021-11-23

## 2021-11-23 VITALS
SYSTOLIC BLOOD PRESSURE: 140 MMHG | RESPIRATION RATE: 18 BRPM | TEMPERATURE: 97.7 F | OXYGEN SATURATION: 98 % | HEART RATE: 67 BPM | DIASTOLIC BLOOD PRESSURE: 50 MMHG

## 2021-11-23 VITALS
HEART RATE: 76 BPM | SYSTOLIC BLOOD PRESSURE: 135 MMHG | RESPIRATION RATE: 18 BRPM | TEMPERATURE: 97.6 F | DIASTOLIC BLOOD PRESSURE: 61 MMHG | OXYGEN SATURATION: 98 %

## 2021-11-23 PROCEDURE — G0151 HHCP-SERV OF PT,EA 15 MIN: HCPCS

## 2021-11-23 NOTE — HOME HEALTH
"Pt reports feeling increased fatigue this date.  Pt states \"I can't keep my eyes open\".  Pt agreeable to ther ex and one bout of ambulation this date.  Pt limited due to fatigue.  PT encouraged pt and pt's family to perform daily ambulation and daily anterior weight shift training"

## 2021-11-24 ENCOUNTER — HOME CARE VISIT (OUTPATIENT)
Dept: HOME HEALTH SERVICES | Facility: HOME HEALTHCARE | Age: 86
End: 2021-11-24

## 2021-11-24 ENCOUNTER — ANTICOAGULATION VISIT (OUTPATIENT)
Dept: CARDIOLOGY | Facility: CLINIC | Age: 86
End: 2021-11-24

## 2021-11-24 DIAGNOSIS — Z79.01 LONG TERM (CURRENT) USE OF ANTICOAGULANTS: Primary | ICD-10-CM

## 2021-11-24 LAB — INR PPP: 2

## 2021-11-24 PROCEDURE — G0152 HHCP-SERV OF OT,EA 15 MIN: HCPCS

## 2021-11-26 ENCOUNTER — HOME CARE VISIT (OUTPATIENT)
Dept: HOME HEALTH SERVICES | Facility: HOME HEALTHCARE | Age: 86
End: 2021-11-26

## 2021-11-26 ENCOUNTER — ANTICOAGULATION VISIT (OUTPATIENT)
Dept: CARDIOLOGY | Facility: CLINIC | Age: 86
End: 2021-11-26

## 2021-11-26 DIAGNOSIS — Z79.01 LONG TERM (CURRENT) USE OF ANTICOAGULANTS: Primary | ICD-10-CM

## 2021-11-26 LAB
HH POC INTERNATIONAL NORMALIZATION RATIO: 3.2
HH POC PROTIME: 35.6 SECONDS
INR PPP: 3.2

## 2021-11-26 PROCEDURE — G0299 HHS/HOSPICE OF RN EA 15 MIN: HCPCS

## 2021-11-26 NOTE — HOME HEALTH
Patient complains of right hip pain and states it has hurt for the last week or so and doesn't know what is causing it. Wound treatment to right pressure area completed, patient tolerated well. Patient has red splotchy raised areas along the back of her heel and outer left foot, caregiver unsure if she is scratching that area.     PT/INR today INR 3.2, PT 35.6, current dose is 2mg MWF and 2.5 all other days

## 2021-11-28 VITALS — HEART RATE: 60 BPM | SYSTOLIC BLOOD PRESSURE: 124 MMHG | DIASTOLIC BLOOD PRESSURE: 56 MMHG | OXYGEN SATURATION: 97 %

## 2021-11-28 VITALS
SYSTOLIC BLOOD PRESSURE: 128 MMHG | TEMPERATURE: 98 F | HEART RATE: 84 BPM | RESPIRATION RATE: 17 BRPM | OXYGEN SATURATION: 96 % | DIASTOLIC BLOOD PRESSURE: 64 MMHG

## 2021-11-29 ENCOUNTER — HOME CARE VISIT (OUTPATIENT)
Dept: HOME HEALTH SERVICES | Facility: HOME HEALTHCARE | Age: 86
End: 2021-11-29

## 2021-11-29 PROCEDURE — G0153 HHCP-SVS OF S/L PATH,EA 15MN: HCPCS

## 2021-11-29 RX ORDER — WARFARIN SODIUM 2 MG/1
2 TABLET ORAL DAILY
Qty: 90 TABLET | Refills: 0 | Status: SHIPPED | OUTPATIENT
Start: 2021-11-29

## 2021-11-29 RX ORDER — WARFARIN SODIUM 2 MG/1
2 TABLET ORAL NIGHTLY
Qty: 90 TABLET | Refills: 0 | Status: SHIPPED | OUTPATIENT
Start: 2021-11-29 | End: 2021-11-29

## 2021-11-29 NOTE — HOME HEALTH
Must be completed and at least every 30 days.     Clinical condition of patient at initial or last assessment: At initial assessment pt independent with feeding after set up and total assist with bathing dressing grooming and toileting.  Current clinical condition of patient: Currently pt independent with feeding after set up and Max assist with bathing dressing grooming and toileting.      Overall progress towards measurable treatment goals: good    Effectiveness of current plan: good    Plan for continuing or discontinuing service: plan is to continue    Changes to goals or care plan update to be completed in guideline section and communicated to MD: no changes    Statement of expectation of continued progress toward goals: pt expected to make progress      Why is it necessary for therapy to continue? to further increase independence with ADLs and BUE strength              Pt sitting up in wc at table upon therapist arrival.  Therapy focused on ther ex and transfers.  For activity see interventions.      Pt denies any falls or med changes

## 2021-11-29 NOTE — TELEPHONE ENCOUNTER
Incoming Refill Request      Medication requested (name and dose): Warfarin 2.5 MG  Warfarin 2.0 MG    Pharmacy where request should be sent: [facility]    Additional details provided by patient:  SHE IS OUT OF THE 2.0. VERY LOW ON THE 2.5    Best call back number: [phone]  Does the patient have less than a 3 day supply:  [x] Yes  [] No    Shanice Merlos, Jose Rep  11/29/21, 10:11 EST

## 2021-11-29 NOTE — TELEPHONE ENCOUNTER
Rx Refill Note  Requested Prescriptions     Pending Prescriptions Disp Refills   • warfarin (COUMADIN) 2.5 MG tablet [Pharmacy Med Name: WARFARIN SODIUM 2.5 MG TABLET] 90 tablet      Sig: TAKE ONE TABLET BY MOUTH ONCE NIGHTLY      Last office visit with prescribing clinician: 2/1/2021      Next office visit with prescribing clinician: Visit date not found   Anticoagulation Visit 11/26/21 INR 3.2           Mary Arreaga RN  11/29/21, 17:52 EST

## 2021-11-29 NOTE — TELEPHONE ENCOUNTER
Rx Refill Note  Requested Prescriptions      No prescriptions requested or ordered in this encounter      Last office visit with prescribing clinician: 2/1/2021      Next office visit with prescribing clinician: Visit date not found     Anticoagulation visit INR 11/26/21 INR 3.2       Mary Arreaga RN  11/29/21, 10:28 EST

## 2021-11-30 ENCOUNTER — HOME CARE VISIT (OUTPATIENT)
Dept: HOME HEALTH SERVICES | Facility: HOME HEALTHCARE | Age: 86
End: 2021-11-30

## 2021-11-30 VITALS
OXYGEN SATURATION: 92 % | RESPIRATION RATE: 18 BRPM | TEMPERATURE: 97.9 F | SYSTOLIC BLOOD PRESSURE: 138 MMHG | HEART RATE: 71 BPM | DIASTOLIC BLOOD PRESSURE: 67 MMHG

## 2021-11-30 PROCEDURE — G0157 HHC PT ASSISTANT EA 15: HCPCS

## 2021-12-01 ENCOUNTER — HOME CARE VISIT (OUTPATIENT)
Dept: HOME HEALTH SERVICES | Facility: HOME HEALTHCARE | Age: 86
End: 2021-12-01

## 2021-12-01 VITALS
HEART RATE: 71 BPM | TEMPERATURE: 97.1 F | OXYGEN SATURATION: 97 % | DIASTOLIC BLOOD PRESSURE: 64 MMHG | SYSTOLIC BLOOD PRESSURE: 122 MMHG

## 2021-12-01 VITALS
SYSTOLIC BLOOD PRESSURE: 126 MMHG | DIASTOLIC BLOOD PRESSURE: 76 MMHG | TEMPERATURE: 97.7 F | OXYGEN SATURATION: 99 % | HEART RATE: 77 BPM

## 2021-12-01 PROCEDURE — G0158 HHC OT ASSISTANT EA 15: HCPCS

## 2021-12-02 ENCOUNTER — HOME CARE VISIT (OUTPATIENT)
Dept: HOME HEALTH SERVICES | Facility: HOME HEALTHCARE | Age: 86
End: 2021-12-02

## 2021-12-02 ENCOUNTER — ANTICOAGULATION VISIT (OUTPATIENT)
Dept: CARDIOLOGY | Facility: CLINIC | Age: 86
End: 2021-12-02

## 2021-12-02 DIAGNOSIS — Z79.01 LONG TERM (CURRENT) USE OF ANTICOAGULANTS: Primary | ICD-10-CM

## 2021-12-02 LAB
HH POC INTERNATIONAL NORMALIZATION RATIO: 2.8
HH POC PROTIME: 28.1 SECONDS
INR PPP: 2.8

## 2021-12-02 PROCEDURE — G0299 HHS/HOSPICE OF RN EA 15 MIN: HCPCS

## 2021-12-02 NOTE — HOME HEALTH
The patient's son greeted AQUINO at the door, pt was seated in recliner upon AQUINO arrival. Pt continues to show improvement overall with strength and is achieving max functional limits. The patient will be seen for skilled OT session for ther ex and transfer training and education with pt and son in agreement. Pt was fully engaged in skilled OT session and is motivated to improve.

## 2021-12-06 ENCOUNTER — HOME CARE VISIT (OUTPATIENT)
Dept: HOME HEALTH SERVICES | Facility: HOME HEALTHCARE | Age: 86
End: 2021-12-06

## 2021-12-06 VITALS
RESPIRATION RATE: 18 BRPM | OXYGEN SATURATION: 91 % | TEMPERATURE: 97 F | HEART RATE: 67 BPM | DIASTOLIC BLOOD PRESSURE: 65 MMHG | SYSTOLIC BLOOD PRESSURE: 152 MMHG

## 2021-12-06 PROCEDURE — G0153 HHCP-SVS OF S/L PATH,EA 15MN: HCPCS

## 2021-12-07 ENCOUNTER — HOME CARE VISIT (OUTPATIENT)
Dept: HOME HEALTH SERVICES | Facility: HOME HEALTHCARE | Age: 86
End: 2021-12-07

## 2021-12-07 VITALS — OXYGEN SATURATION: 95 % | SYSTOLIC BLOOD PRESSURE: 130 MMHG | DIASTOLIC BLOOD PRESSURE: 60 MMHG | HEART RATE: 76 BPM

## 2021-12-07 PROCEDURE — G0151 HHCP-SERV OF PT,EA 15 MIN: HCPCS

## 2021-12-07 NOTE — HOME HEALTH
Patient was seen today, agreeable with PT. Patient compliant with plan of care. Tolerated all management well with sufficient rest breaks provided. If patient was complaining of SOB, patient was recommended to perform pursed lip breathing. PT today received thera ex, gait training, balance ex, HEP teaching and transfer training has good family support       Plan next visit: for PT discharge next visit

## 2021-12-08 ENCOUNTER — HOME CARE VISIT (OUTPATIENT)
Dept: HOME HEALTH SERVICES | Facility: HOME HEALTHCARE | Age: 86
End: 2021-12-08

## 2021-12-08 VITALS
HEART RATE: 79 BPM | DIASTOLIC BLOOD PRESSURE: 62 MMHG | SYSTOLIC BLOOD PRESSURE: 148 MMHG | TEMPERATURE: 97.1 F | OXYGEN SATURATION: 97 %

## 2021-12-08 PROCEDURE — G0158 HHC OT ASSISTANT EA 15: HCPCS

## 2021-12-09 ENCOUNTER — ANTICOAGULATION VISIT (OUTPATIENT)
Dept: CARDIOLOGY | Facility: CLINIC | Age: 86
End: 2021-12-09

## 2021-12-09 ENCOUNTER — HOME CARE VISIT (OUTPATIENT)
Dept: HOME HEALTH SERVICES | Facility: HOME HEALTHCARE | Age: 86
End: 2021-12-09

## 2021-12-09 VITALS
HEART RATE: 80 BPM | RESPIRATION RATE: 18 BRPM | SYSTOLIC BLOOD PRESSURE: 104 MMHG | OXYGEN SATURATION: 87 % | DIASTOLIC BLOOD PRESSURE: 64 MMHG | TEMPERATURE: 97 F

## 2021-12-09 DIAGNOSIS — I48.91 ATRIAL FIBRILLATION, UNSPECIFIED TYPE (HCC): Primary | ICD-10-CM

## 2021-12-09 DIAGNOSIS — Z79.01 LONG TERM (CURRENT) USE OF ANTICOAGULANTS: ICD-10-CM

## 2021-12-09 LAB
HH POC INTERNATIONAL NORMALIZATION RATIO: 3.6
HH POC PROTIME: 40.5 SECONDS
INR PPP: 3.6

## 2021-12-09 PROCEDURE — G0299 HHS/HOSPICE OF RN EA 15 MIN: HCPCS

## 2021-12-09 NOTE — HOME HEALTH
The patient was in bed asleep upon AQUINO arrival. She was agreeable to getting OOB and completing skilled OT session and is motivated to improve and lessen the burden of caregiver support. Next OT visit to be a discharge and pt/caregivers in agreement.

## 2021-12-09 NOTE — HOME HEALTH
no changes in pt. status, PT/INR done today, INR is 3.6, PT 40.5, pt to hold today, take 1 mg on Mondays and Fridays and 2mg the other days, repeat in 1 week.         Next SN visit: PT/INR, CP assess, falls assess, pain assess

## 2021-12-13 ENCOUNTER — HOME CARE VISIT (OUTPATIENT)
Dept: HOME HEALTH SERVICES | Facility: HOME HEALTHCARE | Age: 86
End: 2021-12-13

## 2021-12-13 PROCEDURE — G0152 HHCP-SERV OF OT,EA 15 MIN: HCPCS

## 2021-12-14 ENCOUNTER — HOME CARE VISIT (OUTPATIENT)
Dept: HOME HEALTH SERVICES | Facility: HOME HEALTHCARE | Age: 86
End: 2021-12-14

## 2021-12-14 VITALS
OXYGEN SATURATION: 94 % | SYSTOLIC BLOOD PRESSURE: 140 MMHG | TEMPERATURE: 97.7 F | HEART RATE: 68 BPM | DIASTOLIC BLOOD PRESSURE: 60 MMHG

## 2021-12-14 VITALS — HEART RATE: 78 BPM | DIASTOLIC BLOOD PRESSURE: 60 MMHG | OXYGEN SATURATION: 96 % | SYSTOLIC BLOOD PRESSURE: 120 MMHG

## 2021-12-14 PROCEDURE — G0151 HHCP-SERV OF PT,EA 15 MIN: HCPCS

## 2021-12-14 NOTE — HOME HEALTH
Denies medication changes or adverse reactions. No insurance change. No falls reported. Pt using wheeled walker for support with assist from family. Family able to assist patient with HEP and ambulation. Pt and family agrees with PT DC.

## 2021-12-14 NOTE — HOME HEALTH
Pt in bed upon therapist arrival.  Therapy focused on ther ex.  For activity see interventions.   Pt discharged from OT services this date as pt is at MAX level of ADL function.  Pt therapist and cg in agreement with more.

## 2021-12-16 ENCOUNTER — ANTICOAGULATION VISIT (OUTPATIENT)
Dept: CARDIOLOGY | Facility: CLINIC | Age: 86
End: 2021-12-16

## 2021-12-16 ENCOUNTER — HOME CARE VISIT (OUTPATIENT)
Dept: HOME HEALTH SERVICES | Facility: HOME HEALTHCARE | Age: 86
End: 2021-12-16

## 2021-12-16 DIAGNOSIS — I48.11 LONGSTANDING PERSISTENT ATRIAL FIBRILLATION (HCC): Primary | ICD-10-CM

## 2021-12-16 DIAGNOSIS — Z79.01 LONG TERM (CURRENT) USE OF ANTICOAGULANTS: ICD-10-CM

## 2021-12-16 LAB
HH POC INTERNATIONAL NORMALIZATION RATIO: 1.9
HH POC PROTIME: 23.1 SECONDS
INR PPP: 1.9

## 2021-12-16 PROCEDURE — G0299 HHS/HOSPICE OF RN EA 15 MIN: HCPCS

## 2021-12-16 NOTE — HOME HEALTH
pt. due for recertification today, for monitoring of PT/INR's, wound care.       Next SN visit: CP assess, pain assess, falls assess, PT/INR, wound assess and care

## 2021-12-16 NOTE — PROGRESS NOTES
12/16/21 spoke with Celia KINSEY with [facility] advised continue current plan recheck in 1 week apLPN

## 2021-12-17 VITALS
DIASTOLIC BLOOD PRESSURE: 64 MMHG | WEIGHT: 109 LBS | SYSTOLIC BLOOD PRESSURE: 118 MMHG | HEART RATE: 86 BPM | TEMPERATURE: 97.9 F | RESPIRATION RATE: 17 BRPM | OXYGEN SATURATION: 94 % | BODY MASS INDEX: 22.78 KG/M2

## 2021-12-23 ENCOUNTER — ANTICOAGULATION VISIT (OUTPATIENT)
Dept: CARDIOLOGY | Facility: CLINIC | Age: 86
End: 2021-12-23

## 2021-12-23 ENCOUNTER — HOME CARE VISIT (OUTPATIENT)
Dept: HOME HEALTH SERVICES | Facility: HOME HEALTHCARE | Age: 86
End: 2021-12-23

## 2021-12-23 VITALS
WEIGHT: 112.8 LBS | DIASTOLIC BLOOD PRESSURE: 54 MMHG | BODY MASS INDEX: 23.58 KG/M2 | HEART RATE: 77 BPM | OXYGEN SATURATION: 97 % | TEMPERATURE: 97.1 F | SYSTOLIC BLOOD PRESSURE: 126 MMHG | RESPIRATION RATE: 18 BRPM

## 2021-12-23 DIAGNOSIS — Z79.01 LONG TERM (CURRENT) USE OF ANTICOAGULANTS: ICD-10-CM

## 2021-12-23 DIAGNOSIS — I48.91 ATRIAL FIBRILLATION, UNSPECIFIED TYPE (HCC): Primary | ICD-10-CM

## 2021-12-23 LAB
HH POC INTERNATIONAL NORMALIZATION RATIO: 2.5
HH POC PROTIME: 28.1 SECONDS
INR PPP: 2.5

## 2021-12-23 PROCEDURE — G0299 HHS/HOSPICE OF RN EA 15 MIN: HCPCS

## 2021-12-24 NOTE — HOME HEALTH
Patient has been experiencing intermittent HA, chest pressure, nausea, abdominal discomfort and SOA related to chronic condition. Tremors and speech are worsening. Dr. Daniels has been informed of patient's worsening symptoms and family is current awaiting response from physician about treatment. No falls reported. PT/INR checked and reported to Dr. Luke. Patient to continue current warfarin dosage and recheck in 2 weeks.      Plan for next visit: educate falls prevention, skilled assessment of symptoms, BLE edema assessment, wound care, medication review, disease management education.

## 2021-12-28 ENCOUNTER — APPOINTMENT (OUTPATIENT)
Dept: GENERAL RADIOLOGY | Facility: HOSPITAL | Age: 86
End: 2021-12-28

## 2021-12-28 ENCOUNTER — HOSPITAL ENCOUNTER (INPATIENT)
Facility: HOSPITAL | Age: 86
LOS: 6 days | Discharge: NURSING, REHAB OR LONG-TERM CARE | End: 2022-01-03
Attending: EMERGENCY MEDICINE | Admitting: INTERNAL MEDICINE

## 2021-12-28 DIAGNOSIS — R77.8 ELEVATED TROPONIN: ICD-10-CM

## 2021-12-28 DIAGNOSIS — J18.9 PNEUMONIA OF BOTH LOWER LOBES DUE TO INFECTIOUS ORGANISM: ICD-10-CM

## 2021-12-28 DIAGNOSIS — R06.09 DYSPNEA ON EXERTION: ICD-10-CM

## 2021-12-28 DIAGNOSIS — R06.00 DYSPNEA, UNSPECIFIED TYPE: Primary | ICD-10-CM

## 2021-12-28 DIAGNOSIS — R09.02 HYPOXIA: ICD-10-CM

## 2021-12-28 LAB
ANION GAP SERPL CALCULATED.3IONS-SCNC: 13 MMOL/L (ref 5–15)
ANISOCYTOSIS BLD QL: ABNORMAL
ARTERIAL PATENCY WRIST A: POSITIVE
ATMOSPHERIC PRESS: ABNORMAL MM[HG]
B PARAPERT DNA SPEC QL NAA+PROBE: NOT DETECTED
B PERT DNA SPEC QL NAA+PROBE: NOT DETECTED
BASE EXCESS BLDA CALC-SCNC: -2.8 MMOL/L (ref 0–3)
BASOPHILS # BLD MANUAL: 0.13 10*3/MM3 (ref 0–0.2)
BASOPHILS NFR BLD MANUAL: 1 % (ref 0–1.5)
BDY SITE: ABNORMAL
BUN SERPL-MCNC: 34 MG/DL (ref 8–23)
BUN/CREAT SERPL: 23.9 (ref 7–25)
C PNEUM DNA NPH QL NAA+NON-PROBE: NOT DETECTED
CALCIUM SPEC-SCNC: 9 MG/DL (ref 8.6–10.5)
CHLORIDE SERPL-SCNC: 105 MMOL/L (ref 98–107)
CO2 BLDA-SCNC: 22.1 MMOL/L (ref 22–29)
CO2 SERPL-SCNC: 20 MMOL/L (ref 22–29)
CREAT SERPL-MCNC: 1.42 MG/DL (ref 0.57–1)
DEPRECATED RDW RBC AUTO: 55.1 FL (ref 37–54)
EOSINOPHIL # BLD MANUAL: 0.13 10*3/MM3 (ref 0–0.4)
EOSINOPHIL NFR BLD MANUAL: 1 % (ref 0.3–6.2)
ERYTHROCYTE [DISTWIDTH] IN BLOOD BY AUTOMATED COUNT: 16.9 % (ref 12.3–15.4)
FLUAV SUBTYP SPEC NAA+PROBE: NOT DETECTED
FLUBV RNA ISLT QL NAA+PROBE: NOT DETECTED
GFR SERPL CREATININE-BSD FRML MDRD: 35 ML/MIN/1.73
GLUCOSE SERPL-MCNC: 90 MG/DL (ref 65–99)
HADV DNA SPEC NAA+PROBE: NOT DETECTED
HCO3 BLDA-SCNC: 21.1 MMOL/L (ref 21–28)
HCOV 229E RNA SPEC QL NAA+PROBE: NOT DETECTED
HCOV HKU1 RNA SPEC QL NAA+PROBE: NOT DETECTED
HCOV NL63 RNA SPEC QL NAA+PROBE: NOT DETECTED
HCOV OC43 RNA SPEC QL NAA+PROBE: DETECTED
HCT VFR BLD AUTO: 34.4 % (ref 34–46.6)
HEMODILUTION: NO
HGB BLD-MCNC: 11 G/DL (ref 12–15.9)
HMPV RNA NPH QL NAA+NON-PROBE: NOT DETECTED
HOLD SPECIMEN: NORMAL
HOLD SPECIMEN: NORMAL
HPIV1 RNA ISLT QL NAA+PROBE: NOT DETECTED
HPIV2 RNA SPEC QL NAA+PROBE: NOT DETECTED
HPIV3 RNA NPH QL NAA+PROBE: NOT DETECTED
HPIV4 P GENE NPH QL NAA+PROBE: NOT DETECTED
INHALED O2 CONCENTRATION: 21 %
INR PPP: 3.55 (ref 2–3)
LYMPHOCYTES # BLD MANUAL: 1.43 10*3/MM3 (ref 0.7–3.1)
LYMPHOCYTES NFR BLD MANUAL: 9 % (ref 5–12)
M PNEUMO IGG SER IA-ACNC: NOT DETECTED
MCH RBC QN AUTO: 29.4 PG (ref 26.6–33)
MCHC RBC AUTO-ENTMCNC: 31.9 G/DL (ref 31.5–35.7)
MCV RBC AUTO: 92 FL (ref 79–97)
MODALITY: ABNORMAL
MONOCYTES # BLD: 1.17 10*3/MM3 (ref 0.1–0.9)
MYELOCYTES NFR BLD MANUAL: 2 % (ref 0–0)
NEUTROPHILS # BLD AUTO: 9.88 10*3/MM3 (ref 1.7–7)
NEUTROPHILS NFR BLD MANUAL: 66 % (ref 42.7–76)
NEUTS BAND NFR BLD MANUAL: 10 % (ref 0–5)
NT-PROBNP SERPL-MCNC: 9066 PG/ML (ref 0–1800)
PCO2 BLDA: 32.3 MM HG (ref 35–48)
PH BLDA: 7.42 PH UNITS (ref 7.35–7.45)
PLAT MORPH BLD: NORMAL
PLATELET # BLD AUTO: 338 10*3/MM3 (ref 140–450)
PMV BLD AUTO: 6.8 FL (ref 6–12)
PO2 BLDA: 42.2 MM HG (ref 83–108)
POIKILOCYTOSIS BLD QL SMEAR: ABNORMAL
POTASSIUM SERPL-SCNC: 5.2 MMOL/L (ref 3.5–5.2)
PROTHROMBIN TIME: 35.9 SECONDS (ref 19.4–28.5)
QT INTERVAL: 341 MS
RBC # BLD AUTO: 3.74 10*6/MM3 (ref 3.77–5.28)
RHINOVIRUS RNA SPEC NAA+PROBE: NOT DETECTED
RSV RNA NPH QL NAA+NON-PROBE: NOT DETECTED
SAO2 % BLDCOA: 79.4 % (ref 94–98)
SARS-COV-2 RNA NPH QL NAA+NON-PROBE: NOT DETECTED
SCAN SLIDE: NORMAL
SODIUM SERPL-SCNC: 138 MMOL/L (ref 136–145)
TROPONIN T SERPL-MCNC: 0.05 NG/ML (ref 0–0.03)
VARIANT LYMPHS NFR BLD MANUAL: 11 % (ref 19.6–45.3)
WBC MORPH BLD: NORMAL
WBC NRBC COR # BLD: 13 10*3/MM3 (ref 3.4–10.8)

## 2021-12-28 PROCEDURE — 82803 BLOOD GASES ANY COMBINATION: CPT

## 2021-12-28 PROCEDURE — 36600 WITHDRAWAL OF ARTERIAL BLOOD: CPT

## 2021-12-28 PROCEDURE — 84484 ASSAY OF TROPONIN QUANT: CPT | Performed by: EMERGENCY MEDICINE

## 2021-12-28 PROCEDURE — 80048 BASIC METABOLIC PNL TOTAL CA: CPT | Performed by: EMERGENCY MEDICINE

## 2021-12-28 PROCEDURE — 85025 COMPLETE CBC W/AUTO DIFF WBC: CPT | Performed by: EMERGENCY MEDICINE

## 2021-12-28 PROCEDURE — 93010 ELECTROCARDIOGRAM REPORT: CPT | Performed by: INTERNAL MEDICINE

## 2021-12-28 PROCEDURE — 93005 ELECTROCARDIOGRAM TRACING: CPT

## 2021-12-28 PROCEDURE — 85610 PROTHROMBIN TIME: CPT | Performed by: EMERGENCY MEDICINE

## 2021-12-28 PROCEDURE — 71045 X-RAY EXAM CHEST 1 VIEW: CPT

## 2021-12-28 PROCEDURE — 0 AMPICILLIN-SULBACTAM PER 1.5 G: Performed by: INTERNAL MEDICINE

## 2021-12-28 PROCEDURE — 85007 BL SMEAR W/DIFF WBC COUNT: CPT | Performed by: EMERGENCY MEDICINE

## 2021-12-28 PROCEDURE — 99223 1ST HOSP IP/OBS HIGH 75: CPT | Performed by: INTERNAL MEDICINE

## 2021-12-28 PROCEDURE — 25010000002 CEFTRIAXONE PER 250 MG: Performed by: EMERGENCY MEDICINE

## 2021-12-28 PROCEDURE — 25010000002 LORAZEPAM PER 2 MG: Performed by: EMERGENCY MEDICINE

## 2021-12-28 PROCEDURE — 83880 ASSAY OF NATRIURETIC PEPTIDE: CPT | Performed by: EMERGENCY MEDICINE

## 2021-12-28 PROCEDURE — 99284 EMERGENCY DEPT VISIT MOD MDM: CPT

## 2021-12-28 PROCEDURE — 0202U NFCT DS 22 TRGT SARS-COV-2: CPT | Performed by: EMERGENCY MEDICINE

## 2021-12-28 PROCEDURE — 87040 BLOOD CULTURE FOR BACTERIA: CPT | Performed by: EMERGENCY MEDICINE

## 2021-12-28 PROCEDURE — 93005 ELECTROCARDIOGRAM TRACING: CPT | Performed by: EMERGENCY MEDICINE

## 2021-12-28 PROCEDURE — 25010000002 AZITHROMYCIN PER 500 MG: Performed by: EMERGENCY MEDICINE

## 2021-12-28 PROCEDURE — 25010000002 FUROSEMIDE PER 20 MG: Performed by: INTERNAL MEDICINE

## 2021-12-28 RX ORDER — AMLODIPINE BESYLATE 5 MG/1
5 TABLET ORAL DAILY
Status: DISCONTINUED | OUTPATIENT
Start: 2021-12-28 | End: 2021-12-30

## 2021-12-28 RX ORDER — LORAZEPAM 2 MG/ML
0.5 INJECTION INTRAMUSCULAR ONCE
Status: COMPLETED | OUTPATIENT
Start: 2021-12-28 | End: 2021-12-28

## 2021-12-28 RX ORDER — POLYETHYLENE GLYCOL 3350 17 G/17G
17 POWDER, FOR SOLUTION ORAL DAILY
Status: DISCONTINUED | OUTPATIENT
Start: 2021-12-29 | End: 2022-01-03 | Stop reason: HOSPADM

## 2021-12-28 RX ORDER — ATORVASTATIN CALCIUM 20 MG/1
20 TABLET, FILM COATED ORAL DAILY
Status: DISCONTINUED | OUTPATIENT
Start: 2021-12-28 | End: 2022-01-03 | Stop reason: HOSPADM

## 2021-12-28 RX ORDER — POTASSIUM CHLORIDE 1.5 G/1.77G
40 POWDER, FOR SOLUTION ORAL AS NEEDED
Status: DISCONTINUED | OUTPATIENT
Start: 2021-12-28 | End: 2022-01-03 | Stop reason: HOSPADM

## 2021-12-28 RX ORDER — DIGOXIN 125 MCG
125 TABLET ORAL
Status: DISCONTINUED | OUTPATIENT
Start: 2021-12-29 | End: 2022-01-01

## 2021-12-28 RX ORDER — DONEPEZIL HYDROCHLORIDE 5 MG/1
5 TABLET, FILM COATED ORAL NIGHTLY
Status: DISCONTINUED | OUTPATIENT
Start: 2021-12-28 | End: 2022-01-03 | Stop reason: HOSPADM

## 2021-12-28 RX ORDER — LISINOPRIL 5 MG/1
5 TABLET ORAL DAILY
Status: DISCONTINUED | OUTPATIENT
Start: 2021-12-28 | End: 2021-12-30

## 2021-12-28 RX ORDER — MEGESTROL ACETATE 40 MG/ML
400 SUSPENSION ORAL
Status: DISCONTINUED | OUTPATIENT
Start: 2021-12-29 | End: 2022-01-03 | Stop reason: HOSPADM

## 2021-12-28 RX ORDER — SERTRALINE HYDROCHLORIDE 100 MG/1
100 TABLET, FILM COATED ORAL DAILY
COMMUNITY

## 2021-12-28 RX ORDER — ONDANSETRON 2 MG/ML
4 INJECTION INTRAMUSCULAR; INTRAVENOUS EVERY 6 HOURS PRN
Status: DISCONTINUED | OUTPATIENT
Start: 2021-12-28 | End: 2022-01-03 | Stop reason: HOSPADM

## 2021-12-28 RX ORDER — MEGESTROL ACETATE 40 MG/ML
400 SUSPENSION ORAL EVERY MORNING
COMMUNITY
End: 2022-01-03 | Stop reason: HOSPADM

## 2021-12-28 RX ORDER — ACETAMINOPHEN 325 MG/1
650 TABLET ORAL EVERY 4 HOURS PRN
Status: DISCONTINUED | OUTPATIENT
Start: 2021-12-28 | End: 2022-01-03 | Stop reason: HOSPADM

## 2021-12-28 RX ORDER — IPRATROPIUM BROMIDE AND ALBUTEROL SULFATE 2.5; .5 MG/3ML; MG/3ML
3 SOLUTION RESPIRATORY (INHALATION) 4 TIMES DAILY PRN
Status: DISCONTINUED | OUTPATIENT
Start: 2021-12-28 | End: 2021-12-29

## 2021-12-28 RX ORDER — FUROSEMIDE 10 MG/ML
20 INJECTION INTRAMUSCULAR; INTRAVENOUS EVERY 12 HOURS
Status: DISCONTINUED | OUTPATIENT
Start: 2021-12-28 | End: 2021-12-30

## 2021-12-28 RX ORDER — CHOLECALCIFEROL (VITAMIN D3) 125 MCG
5 CAPSULE ORAL NIGHTLY PRN
Status: DISCONTINUED | OUTPATIENT
Start: 2021-12-28 | End: 2022-01-03 | Stop reason: HOSPADM

## 2021-12-28 RX ORDER — METOCLOPRAMIDE 5 MG/1
5 TABLET ORAL
Status: DISCONTINUED | OUTPATIENT
Start: 2021-12-28 | End: 2021-12-29

## 2021-12-28 RX ORDER — TRAMADOL HYDROCHLORIDE 50 MG/1
50 TABLET ORAL EVERY 8 HOURS PRN
Status: DISCONTINUED | OUTPATIENT
Start: 2021-12-28 | End: 2022-01-03 | Stop reason: HOSPADM

## 2021-12-28 RX ORDER — SODIUM CHLORIDE 0.9 % (FLUSH) 0.9 %
3-10 SYRINGE (ML) INJECTION AS NEEDED
Status: DISCONTINUED | OUTPATIENT
Start: 2021-12-28 | End: 2022-01-03 | Stop reason: HOSPADM

## 2021-12-28 RX ORDER — POTASSIUM CHLORIDE 20 MEQ/1
40 TABLET, EXTENDED RELEASE ORAL AS NEEDED
Status: DISCONTINUED | OUTPATIENT
Start: 2021-12-28 | End: 2022-01-03 | Stop reason: HOSPADM

## 2021-12-28 RX ORDER — FAMOTIDINE 20 MG/1
40 TABLET, FILM COATED ORAL DAILY
Status: DISCONTINUED | OUTPATIENT
Start: 2021-12-28 | End: 2021-12-30

## 2021-12-28 RX ORDER — NITROGLYCERIN 0.4 MG/1
0.4 TABLET SUBLINGUAL
Status: DISCONTINUED | OUTPATIENT
Start: 2021-12-28 | End: 2022-01-03 | Stop reason: HOSPADM

## 2021-12-28 RX ORDER — SODIUM CHLORIDE 0.9 % (FLUSH) 0.9 %
10 SYRINGE (ML) INJECTION AS NEEDED
Status: DISCONTINUED | OUTPATIENT
Start: 2021-12-28 | End: 2022-01-03 | Stop reason: HOSPADM

## 2021-12-28 RX ORDER — ASPIRIN 81 MG/1
81 TABLET ORAL DAILY
Status: DISCONTINUED | OUTPATIENT
Start: 2021-12-29 | End: 2022-01-03 | Stop reason: HOSPADM

## 2021-12-28 RX ORDER — SERTRALINE HYDROCHLORIDE 100 MG/1
100 TABLET, FILM COATED ORAL DAILY
Status: DISCONTINUED | OUTPATIENT
Start: 2021-12-29 | End: 2022-01-03 | Stop reason: HOSPADM

## 2021-12-28 RX ORDER — SODIUM CHLORIDE 0.9 % (FLUSH) 0.9 %
3 SYRINGE (ML) INJECTION EVERY 12 HOURS SCHEDULED
Status: DISCONTINUED | OUTPATIENT
Start: 2021-12-28 | End: 2022-01-03 | Stop reason: HOSPADM

## 2021-12-28 RX ADMIN — FUROSEMIDE 20 MG: 10 INJECTION INTRAMUSCULAR; INTRAVENOUS at 21:10

## 2021-12-28 RX ADMIN — DONEPEZIL HYDROCHLORIDE 5 MG: 5 TABLET, FILM COATED ORAL at 21:09

## 2021-12-28 RX ADMIN — LISINOPRIL 5 MG: 5 TABLET ORAL at 21:10

## 2021-12-28 RX ADMIN — SODIUM CHLORIDE, PRESERVATIVE FREE 3 ML: 5 INJECTION INTRAVENOUS at 21:10

## 2021-12-28 RX ADMIN — AMPICILLIN SODIUM AND SULBACTAM SODIUM 3 G: 2; 1 INJECTION, POWDER, FOR SOLUTION INTRAMUSCULAR; INTRAVENOUS at 21:09

## 2021-12-28 RX ADMIN — NITROGLYCERIN 1 INCH: 20 OINTMENT TOPICAL at 16:47

## 2021-12-28 RX ADMIN — AZITHROMYCIN MONOHYDRATE 500 MG: 500 INJECTION, POWDER, LYOPHILIZED, FOR SOLUTION INTRAVENOUS at 13:49

## 2021-12-28 RX ADMIN — ATORVASTATIN CALCIUM 20 MG: 20 TABLET, FILM COATED ORAL at 21:09

## 2021-12-28 RX ADMIN — Medication 5 MG: at 21:10

## 2021-12-28 RX ADMIN — CEFTRIAXONE 2 G: 10 INJECTION, POWDER, FOR SOLUTION INTRAVENOUS at 13:46

## 2021-12-28 RX ADMIN — LORAZEPAM 0.5 MG: 2 INJECTION INTRAMUSCULAR; INTRAVENOUS at 11:39

## 2021-12-28 RX ADMIN — AMLODIPINE BESYLATE 5 MG: 5 TABLET ORAL at 21:09

## 2021-12-29 ENCOUNTER — HOME CARE VISIT (OUTPATIENT)
Dept: HOME HEALTH SERVICES | Facility: HOME HEALTHCARE | Age: 86
End: 2021-12-29

## 2021-12-29 ENCOUNTER — APPOINTMENT (OUTPATIENT)
Dept: CT IMAGING | Facility: HOSPITAL | Age: 86
End: 2021-12-29

## 2021-12-29 ENCOUNTER — APPOINTMENT (OUTPATIENT)
Dept: CARDIOLOGY | Facility: HOSPITAL | Age: 86
End: 2021-12-29

## 2021-12-29 LAB
ANION GAP SERPL CALCULATED.3IONS-SCNC: 11 MMOL/L (ref 5–15)
BASOPHILS # BLD AUTO: 0 10*3/MM3 (ref 0–0.2)
BASOPHILS NFR BLD AUTO: 0.3 % (ref 0–1.5)
BH CV ECHO MEAS - ACS: 1.7 CM
BH CV ECHO MEAS - AO MAX PG: 7.4 MMHG
BH CV ECHO MEAS - AO MEAN PG: 4.1 MMHG
BH CV ECHO MEAS - AO ROOT AREA (BSA CORRECTED): 1.7
BH CV ECHO MEAS - AO ROOT AREA: 4.8 CM^2
BH CV ECHO MEAS - AO ROOT DIAM: 2.5 CM
BH CV ECHO MEAS - AO V2 MAX: 135.6 CM/SEC
BH CV ECHO MEAS - AO V2 MEAN: 94.2 CM/SEC
BH CV ECHO MEAS - AO V2 VTI: 23.5 CM
BH CV ECHO MEAS - ASC AORTA: 2.5 CM
BH CV ECHO MEAS - BSA(HAYCOCK): 1.5 M^2
BH CV ECHO MEAS - BSA: 1.4 M^2
BH CV ECHO MEAS - BZI_BMI: 27.4 KILOGRAMS/M^2
BH CV ECHO MEAS - BZI_METRIC_HEIGHT: 142.2 CM
BH CV ECHO MEAS - BZI_METRIC_WEIGHT: 55.3 KG
BH CV ECHO MEAS - EDV(CUBED): 42 ML
BH CV ECHO MEAS - EDV(MOD-SP4): 31.3 ML
BH CV ECHO MEAS - EDV(TEICH): 50.1 ML
BH CV ECHO MEAS - EF(CUBED): 64.2 %
BH CV ECHO MEAS - EF(MOD-BP): 58 %
BH CV ECHO MEAS - EF(MOD-SP4): 57.9 %
BH CV ECHO MEAS - EF(TEICH): 56.8 %
BH CV ECHO MEAS - ESV(CUBED): 15 ML
BH CV ECHO MEAS - ESV(MOD-SP4): 13.2 ML
BH CV ECHO MEAS - ESV(TEICH): 21.6 ML
BH CV ECHO MEAS - FS: 29 %
BH CV ECHO MEAS - IVS/LVPW: 1.1
BH CV ECHO MEAS - IVSD: 1.5 CM
BH CV ECHO MEAS - LA DIMENSION(2D): 3.7 CM
BH CV ECHO MEAS - LV DIASTOLIC VOL/BSA (35-75): 21.8 ML/M^2
BH CV ECHO MEAS - LV MASS(C)D: 176.4 GRAMS
BH CV ECHO MEAS - LV MASS(C)DI: 122.6 GRAMS/M^2
BH CV ECHO MEAS - LV SYSTOLIC VOL/BSA (12-30): 9.2 ML/M^2
BH CV ECHO MEAS - LVIDD: 3.5 CM
BH CV ECHO MEAS - LVIDS: 2.5 CM
BH CV ECHO MEAS - LVOT AREA: 2.5 CM^2
BH CV ECHO MEAS - LVOT DIAM: 1.8 CM
BH CV ECHO MEAS - LVPWD: 1.3 CM
BH CV ECHO MEAS - MR MAX PG: 120.2 MMHG
BH CV ECHO MEAS - MR MAX VEL: 548.2 CM/SEC
BH CV ECHO MEAS - MV MAX PG: 16.8 MMHG
BH CV ECHO MEAS - MV MEAN PG: 5.9 MMHG
BH CV ECHO MEAS - MV V2 MAX: 205 CM/SEC
BH CV ECHO MEAS - MV V2 MEAN: 108.6 CM/SEC
BH CV ECHO MEAS - MV V2 VTI: 47.1 CM
BH CV ECHO MEAS - PA ACC TIME: 0.09 SEC
BH CV ECHO MEAS - PA MAX PG (FULL): 2.1 MMHG
BH CV ECHO MEAS - PA MAX PG: 7 MMHG
BH CV ECHO MEAS - PA MEAN PG (FULL): 1.1 MMHG
BH CV ECHO MEAS - PA MEAN PG: 3.6 MMHG
BH CV ECHO MEAS - PA PR(ACCEL): 39.6 MMHG
BH CV ECHO MEAS - PA V2 MAX: 132.6 CM/SEC
BH CV ECHO MEAS - PA V2 MEAN: 86.4 CM/SEC
BH CV ECHO MEAS - PA V2 VTI: 25.9 CM
BH CV ECHO MEAS - PI END-D VEL: 95.4 CM/SEC
BH CV ECHO MEAS - PI MAX PG: 22.8 MMHG
BH CV ECHO MEAS - PI MAX VEL: 238.8 CM/SEC
BH CV ECHO MEAS - PULM DIAS VEL: 71.4 CM/SEC
BH CV ECHO MEAS - PULM S/D: 0.85
BH CV ECHO MEAS - PULM SYS VEL: 60.5 CM/SEC
BH CV ECHO MEAS - PVA(I,A): 5 CM^2
BH CV ECHO MEAS - PVA(I,D): 5 CM^2
BH CV ECHO MEAS - PVA(V,A): 5.1 CM^2
BH CV ECHO MEAS - PVA(V,D): 5.1 CM^2
BH CV ECHO MEAS - RAP SYSTOLE: 3 MMHG
BH CV ECHO MEAS - RV MAX PG: 5 MMHG
BH CV ECHO MEAS - RV MEAN PG: 2.5 MMHG
BH CV ECHO MEAS - RV V1 MAX: 111.5 CM/SEC
BH CV ECHO MEAS - RV V1 MEAN: 73.7 CM/SEC
BH CV ECHO MEAS - RV V1 VTI: 21.1 CM
BH CV ECHO MEAS - RVDD: 1.9 CM
BH CV ECHO MEAS - RVOT AREA: 6.1 CM^2
BH CV ECHO MEAS - RVOT DIAM: 2.8 CM
BH CV ECHO MEAS - RVSP: 13.8 MMHG
BH CV ECHO MEAS - SI(AO): 77.6 ML/M^2
BH CV ECHO MEAS - SI(CUBED): 18.8 ML/M^2
BH CV ECHO MEAS - SI(MOD-SP4): 12.6 ML/M^2
BH CV ECHO MEAS - SI(TEICH): 19.8 ML/M^2
BH CV ECHO MEAS - SV(AO): 111.7 ML
BH CV ECHO MEAS - SV(CUBED): 27 ML
BH CV ECHO MEAS - SV(MOD-SP4): 18.1 ML
BH CV ECHO MEAS - SV(RVOT): 128.4 ML
BH CV ECHO MEAS - SV(TEICH): 28.4 ML
BH CV ECHO MEAS - TR MAX VEL: 164.3 CM/SEC
BUN SERPL-MCNC: 29 MG/DL (ref 8–23)
BUN/CREAT SERPL: 24 (ref 7–25)
CALCIUM SPEC-SCNC: 8.8 MG/DL (ref 8.6–10.5)
CHLORIDE SERPL-SCNC: 107 MMOL/L (ref 98–107)
CO2 SERPL-SCNC: 22 MMOL/L (ref 22–29)
CREAT SERPL-MCNC: 1.21 MG/DL (ref 0.57–1)
DEPRECATED RDW RBC AUTO: 52.1 FL (ref 37–54)
EOSINOPHIL # BLD AUTO: 0.1 10*3/MM3 (ref 0–0.4)
EOSINOPHIL NFR BLD AUTO: 1.2 % (ref 0.3–6.2)
ERYTHROCYTE [DISTWIDTH] IN BLOOD BY AUTOMATED COUNT: 16.4 % (ref 12.3–15.4)
GFR SERPL CREATININE-BSD FRML MDRD: 42 ML/MIN/1.73
GLUCOSE SERPL-MCNC: 100 MG/DL (ref 65–99)
HCT VFR BLD AUTO: 28.4 % (ref 34–46.6)
HGB BLD-MCNC: 9.4 G/DL (ref 12–15.9)
INR PPP: 3.4 (ref 2–3)
LV EF 2D ECHO EST: 60 %
LYMPHOCYTES # BLD AUTO: 0.2 10*3/MM3 (ref 0.7–3.1)
LYMPHOCYTES NFR BLD AUTO: 1.6 % (ref 19.6–45.3)
MAGNESIUM SERPL-MCNC: 2 MG/DL (ref 1.6–2.4)
MCH RBC QN AUTO: 30 PG (ref 26.6–33)
MCHC RBC AUTO-ENTMCNC: 33.2 G/DL (ref 31.5–35.7)
MCV RBC AUTO: 90.2 FL (ref 79–97)
MONOCYTES # BLD AUTO: 0.6 10*3/MM3 (ref 0.1–0.9)
MONOCYTES NFR BLD AUTO: 5.1 % (ref 5–12)
NEUTROPHILS NFR BLD AUTO: 10.6 10*3/MM3 (ref 1.7–7)
NEUTROPHILS NFR BLD AUTO: 91.8 % (ref 42.7–76)
NRBC BLD AUTO-RTO: 0.4 /100 WBC (ref 0–0.2)
PLATELET # BLD AUTO: 272 10*3/MM3 (ref 140–450)
PMV BLD AUTO: 6.8 FL (ref 6–12)
POTASSIUM SERPL-SCNC: 4.5 MMOL/L (ref 3.5–5.2)
PROTHROMBIN TIME: 34.5 SECONDS (ref 19.4–28.5)
RBC # BLD AUTO: 3.15 10*6/MM3 (ref 3.77–5.28)
SODIUM SERPL-SCNC: 140 MMOL/L (ref 136–145)
TROPONIN T SERPL-MCNC: 0.06 NG/ML (ref 0–0.03)
WBC NRBC COR # BLD: 11.6 10*3/MM3 (ref 3.4–10.8)

## 2021-12-29 PROCEDURE — 25010000002 METHYLPREDNISOLONE PER 40 MG: Performed by: NURSE PRACTITIONER

## 2021-12-29 PROCEDURE — 93306 TTE W/DOPPLER COMPLETE: CPT | Performed by: INTERNAL MEDICINE

## 2021-12-29 PROCEDURE — 97161 PT EVAL LOW COMPLEX 20 MIN: CPT

## 2021-12-29 PROCEDURE — 84484 ASSAY OF TROPONIN QUANT: CPT | Performed by: INTERNAL MEDICINE

## 2021-12-29 PROCEDURE — 80048 BASIC METABOLIC PNL TOTAL CA: CPT | Performed by: INTERNAL MEDICINE

## 2021-12-29 PROCEDURE — 25010000002 FUROSEMIDE PER 20 MG: Performed by: INTERNAL MEDICINE

## 2021-12-29 PROCEDURE — 0 AMPICILLIN-SULBACTAM PER 1.5 G: Performed by: INTERNAL MEDICINE

## 2021-12-29 PROCEDURE — 85610 PROTHROMBIN TIME: CPT | Performed by: INTERNAL MEDICINE

## 2021-12-29 PROCEDURE — 99233 SBSQ HOSP IP/OBS HIGH 50: CPT | Performed by: INTERNAL MEDICINE

## 2021-12-29 PROCEDURE — 92610 EVALUATE SWALLOWING FUNCTION: CPT

## 2021-12-29 PROCEDURE — 97166 OT EVAL MOD COMPLEX 45 MIN: CPT

## 2021-12-29 PROCEDURE — 94799 UNLISTED PULMONARY SVC/PX: CPT

## 2021-12-29 PROCEDURE — 85025 COMPLETE CBC W/AUTO DIFF WBC: CPT | Performed by: INTERNAL MEDICINE

## 2021-12-29 PROCEDURE — 70450 CT HEAD/BRAIN W/O DYE: CPT

## 2021-12-29 PROCEDURE — 93005 ELECTROCARDIOGRAM TRACING: CPT | Performed by: INTERNAL MEDICINE

## 2021-12-29 PROCEDURE — 83735 ASSAY OF MAGNESIUM: CPT | Performed by: INTERNAL MEDICINE

## 2021-12-29 PROCEDURE — 93306 TTE W/DOPPLER COMPLETE: CPT

## 2021-12-29 PROCEDURE — 94640 AIRWAY INHALATION TREATMENT: CPT

## 2021-12-29 PROCEDURE — 36415 COLL VENOUS BLD VENIPUNCTURE: CPT | Performed by: INTERNAL MEDICINE

## 2021-12-29 RX ORDER — LORAZEPAM 0.5 MG/1
0.5 TABLET ORAL EVERY 6 HOURS PRN
Status: DISCONTINUED | OUTPATIENT
Start: 2021-12-29 | End: 2022-01-03 | Stop reason: HOSPADM

## 2021-12-29 RX ORDER — GUAIFENESIN 600 MG/1
600 TABLET, EXTENDED RELEASE ORAL EVERY 12 HOURS SCHEDULED
Status: DISCONTINUED | OUTPATIENT
Start: 2021-12-29 | End: 2022-01-03 | Stop reason: HOSPADM

## 2021-12-29 RX ORDER — METHYLPREDNISOLONE SODIUM SUCCINATE 40 MG/ML
20 INJECTION, POWDER, LYOPHILIZED, FOR SOLUTION INTRAMUSCULAR; INTRAVENOUS 2 TIMES DAILY
Status: DISCONTINUED | OUTPATIENT
Start: 2021-12-29 | End: 2022-01-03

## 2021-12-29 RX ORDER — IPRATROPIUM BROMIDE AND ALBUTEROL SULFATE 2.5; .5 MG/3ML; MG/3ML
3 SOLUTION RESPIRATORY (INHALATION)
Status: DISCONTINUED | OUTPATIENT
Start: 2021-12-29 | End: 2022-01-03 | Stop reason: HOSPADM

## 2021-12-29 RX ORDER — METHYLPREDNISOLONE SODIUM SUCCINATE 40 MG/ML
40 INJECTION, POWDER, LYOPHILIZED, FOR SOLUTION INTRAMUSCULAR; INTRAVENOUS EVERY 8 HOURS
Status: DISCONTINUED | OUTPATIENT
Start: 2021-12-29 | End: 2021-12-29

## 2021-12-29 RX ADMIN — SODIUM CHLORIDE, PRESERVATIVE FREE 3 ML: 5 INJECTION INTRAVENOUS at 21:06

## 2021-12-29 RX ADMIN — SERTRALINE HYDROCHLORIDE 100 MG: 100 TABLET ORAL at 09:07

## 2021-12-29 RX ADMIN — POLYETHYLENE GLYCOL 3350 17 G: 17 POWDER, FOR SOLUTION ORAL at 09:09

## 2021-12-29 RX ADMIN — GUAIFENESIN 600 MG: 600 TABLET, EXTENDED RELEASE ORAL at 09:09

## 2021-12-29 RX ADMIN — Medication 5 MG: at 21:07

## 2021-12-29 RX ADMIN — SODIUM CHLORIDE, PRESERVATIVE FREE 3 ML: 5 INJECTION INTRAVENOUS at 09:09

## 2021-12-29 RX ADMIN — FAMOTIDINE 40 MG: 20 TABLET, FILM COATED ORAL at 09:08

## 2021-12-29 RX ADMIN — DONEPEZIL HYDROCHLORIDE 5 MG: 5 TABLET, FILM COATED ORAL at 21:06

## 2021-12-29 RX ADMIN — AMPICILLIN SODIUM AND SULBACTAM SODIUM 3 G: 2; 1 INJECTION, POWDER, FOR SOLUTION INTRAMUSCULAR; INTRAVENOUS at 09:10

## 2021-12-29 RX ADMIN — ASPIRIN 81 MG: 81 TABLET, COATED ORAL at 09:07

## 2021-12-29 RX ADMIN — AMPICILLIN SODIUM AND SULBACTAM SODIUM 3 G: 2; 1 INJECTION, POWDER, FOR SOLUTION INTRAMUSCULAR; INTRAVENOUS at 21:06

## 2021-12-29 RX ADMIN — LISINOPRIL 5 MG: 5 TABLET ORAL at 09:07

## 2021-12-29 RX ADMIN — GUAIFENESIN 600 MG: 600 TABLET, EXTENDED RELEASE ORAL at 21:06

## 2021-12-29 RX ADMIN — METHYLPREDNISOLONE SODIUM SUCCINATE 20 MG: 40 INJECTION, POWDER, FOR SOLUTION INTRAMUSCULAR; INTRAVENOUS at 21:06

## 2021-12-29 RX ADMIN — ACETAMINOPHEN 650 MG: 325 TABLET ORAL at 00:45

## 2021-12-29 RX ADMIN — AMLODIPINE BESYLATE 5 MG: 5 TABLET ORAL at 09:07

## 2021-12-29 RX ADMIN — FUROSEMIDE 20 MG: 10 INJECTION INTRAMUSCULAR; INTRAVENOUS at 21:06

## 2021-12-29 RX ADMIN — MEGESTROL ACETATE 400 MG: 40 SUSPENSION ORAL at 09:09

## 2021-12-29 RX ADMIN — FUROSEMIDE 20 MG: 10 INJECTION INTRAMUSCULAR; INTRAVENOUS at 09:10

## 2021-12-29 RX ADMIN — METOPROLOL TARTRATE 25 MG: 25 TABLET, FILM COATED ORAL at 09:06

## 2021-12-29 RX ADMIN — METHYLPREDNISOLONE SODIUM SUCCINATE 20 MG: 40 INJECTION, POWDER, FOR SOLUTION INTRAMUSCULAR; INTRAVENOUS at 09:09

## 2021-12-29 RX ADMIN — ATORVASTATIN CALCIUM 20 MG: 20 TABLET, FILM COATED ORAL at 09:07

## 2021-12-29 RX ADMIN — IPRATROPIUM BROMIDE AND ALBUTEROL SULFATE 3 ML: 2.5; .5 SOLUTION RESPIRATORY (INHALATION) at 09:11

## 2021-12-29 RX ADMIN — DIGOXIN 125 MCG: 125 TABLET ORAL at 11:34

## 2021-12-29 RX ADMIN — TRAMADOL HYDROCHLORIDE 50 MG: 50 TABLET, COATED ORAL at 16:19

## 2021-12-29 NOTE — CASE COMMUNICATION
Admitted to [facility] on 12/28/21 for Dyspnea    Transfer [place] completed    Transfer summary sent    Orders discontinued    POC resolved

## 2021-12-30 ENCOUNTER — APPOINTMENT (OUTPATIENT)
Dept: GENERAL RADIOLOGY | Facility: HOSPITAL | Age: 86
End: 2021-12-30

## 2021-12-30 ENCOUNTER — APPOINTMENT (OUTPATIENT)
Dept: ULTRASOUND IMAGING | Facility: HOSPITAL | Age: 86
End: 2021-12-30

## 2021-12-30 LAB
ANION GAP SERPL CALCULATED.3IONS-SCNC: 14 MMOL/L (ref 5–15)
BASOPHILS # BLD AUTO: 0 10*3/MM3 (ref 0–0.2)
BASOPHILS NFR BLD AUTO: 0.4 % (ref 0–1.5)
BUN SERPL-MCNC: 40 MG/DL (ref 8–23)
BUN/CREAT SERPL: 26 (ref 7–25)
CALCIUM SPEC-SCNC: 8.4 MG/DL (ref 8.6–10.5)
CHLORIDE SERPL-SCNC: 102 MMOL/L (ref 98–107)
CK SERPL-CCNC: 60 U/L (ref 20–180)
CO2 SERPL-SCNC: 20 MMOL/L (ref 22–29)
CREAT SERPL-MCNC: 1.54 MG/DL (ref 0.57–1)
DEPRECATED RDW RBC AUTO: 52.9 FL (ref 37–54)
EOSINOPHIL # BLD AUTO: 0 10*3/MM3 (ref 0–0.4)
EOSINOPHIL NFR BLD AUTO: 0.1 % (ref 0.3–6.2)
ERYTHROCYTE [DISTWIDTH] IN BLOOD BY AUTOMATED COUNT: 16.2 % (ref 12.3–15.4)
GFR SERPL CREATININE-BSD FRML MDRD: 32 ML/MIN/1.73
GLUCOSE SERPL-MCNC: 153 MG/DL (ref 65–99)
HCT VFR BLD AUTO: 29.3 % (ref 34–46.6)
HGB BLD-MCNC: 9.7 G/DL (ref 12–15.9)
INR PPP: 1.99 (ref 2–3)
IRON 24H UR-MRATE: 24 MCG/DL (ref 37–145)
LYMPHOCYTES # BLD AUTO: 0.4 10*3/MM3 (ref 0.7–3.1)
LYMPHOCYTES NFR BLD AUTO: 3.8 % (ref 19.6–45.3)
MAGNESIUM SERPL-MCNC: 2.1 MG/DL (ref 1.6–2.4)
MAGNESIUM SERPL-MCNC: 2.1 MG/DL (ref 1.6–2.4)
MCH RBC QN AUTO: 30.4 PG (ref 26.6–33)
MCHC RBC AUTO-ENTMCNC: 33 G/DL (ref 31.5–35.7)
MCV RBC AUTO: 92 FL (ref 79–97)
MONOCYTES # BLD AUTO: 0.3 10*3/MM3 (ref 0.1–0.9)
MONOCYTES NFR BLD AUTO: 2.6 % (ref 5–12)
NEUTROPHILS NFR BLD AUTO: 10.3 10*3/MM3 (ref 1.7–7)
NEUTROPHILS NFR BLD AUTO: 93.1 % (ref 42.7–76)
NRBC BLD AUTO-RTO: 0.2 /100 WBC (ref 0–0.2)
PLATELET # BLD AUTO: 253 10*3/MM3 (ref 140–450)
PMV BLD AUTO: 6.6 FL (ref 6–12)
POTASSIUM SERPL-SCNC: 4.4 MMOL/L (ref 3.5–5.2)
PROTHROMBIN TIME: 21 SECONDS (ref 19.4–28.5)
PTH-INTACT SERPL-MCNC: 158.3 PG/ML (ref 15–65)
RBC # BLD AUTO: 3.18 10*6/MM3 (ref 3.77–5.28)
SODIUM SERPL-SCNC: 136 MMOL/L (ref 136–145)
SODIUM UR-SCNC: 27 MMOL/L
TSH SERPL DL<=0.05 MIU/L-ACNC: 0.39 UIU/ML (ref 0.27–4.2)
URATE SERPL-MCNC: 9.2 MG/DL (ref 2.4–5.7)
WBC NRBC COR # BLD: 11.1 10*3/MM3 (ref 3.4–10.8)

## 2021-12-30 PROCEDURE — 0 PHYTONADIONE 10 MG/ML SOLUTION: Performed by: INTERNAL MEDICINE

## 2021-12-30 PROCEDURE — 83735 ASSAY OF MAGNESIUM: CPT | Performed by: INTERNAL MEDICINE

## 2021-12-30 PROCEDURE — 85610 PROTHROMBIN TIME: CPT | Performed by: INTERNAL MEDICINE

## 2021-12-30 PROCEDURE — 82550 ASSAY OF CK (CPK): CPT | Performed by: INTERNAL MEDICINE

## 2021-12-30 PROCEDURE — 83970 ASSAY OF PARATHORMONE: CPT | Performed by: INTERNAL MEDICINE

## 2021-12-30 PROCEDURE — 80048 BASIC METABOLIC PNL TOTAL CA: CPT | Performed by: INTERNAL MEDICINE

## 2021-12-30 PROCEDURE — 92611 MOTION FLUOROSCOPY/SWALLOW: CPT

## 2021-12-30 PROCEDURE — 36415 COLL VENOUS BLD VENIPUNCTURE: CPT | Performed by: INTERNAL MEDICINE

## 2021-12-30 PROCEDURE — 25010000002 FUROSEMIDE PER 20 MG: Performed by: INTERNAL MEDICINE

## 2021-12-30 PROCEDURE — 99233 SBSQ HOSP IP/OBS HIGH 50: CPT | Performed by: INTERNAL MEDICINE

## 2021-12-30 PROCEDURE — 84550 ASSAY OF BLOOD/URIC ACID: CPT | Performed by: INTERNAL MEDICINE

## 2021-12-30 PROCEDURE — 74230 X-RAY XM SWLNG FUNCJ C+: CPT

## 2021-12-30 PROCEDURE — 84443 ASSAY THYROID STIM HORMONE: CPT | Performed by: INTERNAL MEDICINE

## 2021-12-30 PROCEDURE — 93005 ELECTROCARDIOGRAM TRACING: CPT | Performed by: INTERNAL MEDICINE

## 2021-12-30 PROCEDURE — 85025 COMPLETE CBC W/AUTO DIFF WBC: CPT | Performed by: INTERNAL MEDICINE

## 2021-12-30 PROCEDURE — 25010000002 NA FERRIC GLUC CPLX PER 12.5 MG: Performed by: INTERNAL MEDICINE

## 2021-12-30 PROCEDURE — 84300 ASSAY OF URINE SODIUM: CPT | Performed by: INTERNAL MEDICINE

## 2021-12-30 PROCEDURE — 94799 UNLISTED PULMONARY SVC/PX: CPT

## 2021-12-30 PROCEDURE — 25010000002 METHYLPREDNISOLONE PER 40 MG: Performed by: NURSE PRACTITIONER

## 2021-12-30 PROCEDURE — 0 AMPICILLIN-SULBACTAM PER 1.5 G: Performed by: INTERNAL MEDICINE

## 2021-12-30 PROCEDURE — 83540 ASSAY OF IRON: CPT | Performed by: INTERNAL MEDICINE

## 2021-12-30 PROCEDURE — 76775 US EXAM ABDO BACK WALL LIM: CPT

## 2021-12-30 RX ORDER — FAMOTIDINE 20 MG/1
20 TABLET, FILM COATED ORAL DAILY
Status: DISCONTINUED | OUTPATIENT
Start: 2021-12-31 | End: 2022-01-01

## 2021-12-30 RX ORDER — ACETYLCYSTEINE 200 MG/ML
600 SOLUTION ORAL; RESPIRATORY (INHALATION) EVERY 12 HOURS SCHEDULED
Status: DISCONTINUED | OUTPATIENT
Start: 2021-12-30 | End: 2022-01-01

## 2021-12-30 RX ORDER — ALLOPURINOL 100 MG/1
100 TABLET ORAL DAILY
Status: DISCONTINUED | OUTPATIENT
Start: 2021-12-30 | End: 2022-01-03 | Stop reason: HOSPADM

## 2021-12-30 RX ORDER — SODIUM CHLORIDE 9 MG/ML
50 INJECTION, SOLUTION INTRAVENOUS CONTINUOUS
Status: DISPENSED | OUTPATIENT
Start: 2021-12-30 | End: 2021-12-30

## 2021-12-30 RX ORDER — PHYTONADIONE 10 MG/ML
10 INJECTION, EMULSION INTRAMUSCULAR; INTRAVENOUS; SUBCUTANEOUS ONCE
Status: COMPLETED | OUTPATIENT
Start: 2021-12-30 | End: 2021-12-30

## 2021-12-30 RX ORDER — SODIUM BICARBONATE 650 MG/1
650 TABLET ORAL 2 TIMES DAILY
Status: DISCONTINUED | OUTPATIENT
Start: 2021-12-30 | End: 2022-01-01

## 2021-12-30 RX ADMIN — ACETYLCYSTEINE 600 MG: 200 SOLUTION ORAL; RESPIRATORY (INHALATION) at 21:36

## 2021-12-30 RX ADMIN — ATORVASTATIN CALCIUM 20 MG: 20 TABLET, FILM COATED ORAL at 09:00

## 2021-12-30 RX ADMIN — FAMOTIDINE 40 MG: 20 TABLET, FILM COATED ORAL at 09:00

## 2021-12-30 RX ADMIN — MEGESTROL ACETATE 400 MG: 40 SUSPENSION ORAL at 09:00

## 2021-12-30 RX ADMIN — GUAIFENESIN 600 MG: 600 TABLET, EXTENDED RELEASE ORAL at 21:33

## 2021-12-30 RX ADMIN — LISINOPRIL 5 MG: 5 TABLET ORAL at 09:00

## 2021-12-30 RX ADMIN — AMPICILLIN SODIUM AND SULBACTAM SODIUM 3 G: 2; 1 INJECTION, POWDER, FOR SOLUTION INTRAMUSCULAR; INTRAVENOUS at 09:08

## 2021-12-30 RX ADMIN — AMLODIPINE BESYLATE 5 MG: 5 TABLET ORAL at 09:00

## 2021-12-30 RX ADMIN — SODIUM BICARBONATE 650 MG TABLET 650 MG: at 21:33

## 2021-12-30 RX ADMIN — SERTRALINE HYDROCHLORIDE 100 MG: 100 TABLET ORAL at 09:00

## 2021-12-30 RX ADMIN — SODIUM CHLORIDE 125 MG: 9 INJECTION, SOLUTION INTRAVENOUS at 17:00

## 2021-12-30 RX ADMIN — FUROSEMIDE 20 MG: 10 INJECTION INTRAMUSCULAR; INTRAVENOUS at 09:00

## 2021-12-30 RX ADMIN — GUAIFENESIN 600 MG: 600 TABLET, EXTENDED RELEASE ORAL at 09:00

## 2021-12-30 RX ADMIN — METHYLPREDNISOLONE SODIUM SUCCINATE 20 MG: 40 INJECTION, POWDER, FOR SOLUTION INTRAMUSCULAR; INTRAVENOUS at 09:10

## 2021-12-30 RX ADMIN — DONEPEZIL HYDROCHLORIDE 5 MG: 5 TABLET, FILM COATED ORAL at 21:33

## 2021-12-30 RX ADMIN — METHYLPREDNISOLONE SODIUM SUCCINATE 20 MG: 40 INJECTION, POWDER, FOR SOLUTION INTRAMUSCULAR; INTRAVENOUS at 21:32

## 2021-12-30 RX ADMIN — IPRATROPIUM BROMIDE AND ALBUTEROL SULFATE 3 ML: 2.5; .5 SOLUTION RESPIRATORY (INHALATION) at 07:52

## 2021-12-30 RX ADMIN — SODIUM CHLORIDE, PRESERVATIVE FREE 3 ML: 5 INJECTION INTRAVENOUS at 21:34

## 2021-12-30 RX ADMIN — ALLOPURINOL 100 MG: 100 TABLET ORAL at 17:00

## 2021-12-30 RX ADMIN — SODIUM CHLORIDE 50 ML/HR: 9 INJECTION, SOLUTION INTRAVENOUS at 09:11

## 2021-12-30 RX ADMIN — AMPICILLIN SODIUM AND SULBACTAM SODIUM 1.5 G: 1; .5 INJECTION, POWDER, FOR SOLUTION INTRAMUSCULAR; INTRAVENOUS at 21:32

## 2021-12-30 RX ADMIN — DIGOXIN 125 MCG: 125 TABLET ORAL at 11:24

## 2021-12-30 RX ADMIN — POLYETHYLENE GLYCOL 3350 17 G: 17 POWDER, FOR SOLUTION ORAL at 09:10

## 2021-12-30 RX ADMIN — SODIUM CHLORIDE, PRESERVATIVE FREE 3 ML: 5 INJECTION INTRAVENOUS at 09:10

## 2021-12-30 RX ADMIN — METOPROLOL TARTRATE 25 MG: 25 TABLET, FILM COATED ORAL at 09:00

## 2021-12-30 RX ADMIN — ASPIRIN 81 MG: 81 TABLET, COATED ORAL at 09:00

## 2021-12-30 RX ADMIN — BARIUM SULFATE 50 ML: 400 SUSPENSION ORAL at 10:53

## 2021-12-30 RX ADMIN — PHYTONADIONE 10 MG: 10 INJECTION, EMULSION INTRAMUSCULAR; INTRAVENOUS; SUBCUTANEOUS at 11:24

## 2021-12-30 RX ADMIN — IPRATROPIUM BROMIDE AND ALBUTEROL SULFATE 3 ML: 2.5; .5 SOLUTION RESPIRATORY (INHALATION) at 11:47

## 2021-12-31 LAB
ALBUMIN SERPL-MCNC: 3.2 G/DL (ref 3.5–5.2)
ALBUMIN/GLOB SERPL: 1 G/DL
ALP SERPL-CCNC: 52 U/L (ref 39–117)
ALT SERPL W P-5'-P-CCNC: 13 U/L (ref 1–33)
ANION GAP SERPL CALCULATED.3IONS-SCNC: 13 MMOL/L (ref 5–15)
ANISOCYTOSIS BLD QL: ABNORMAL
AST SERPL-CCNC: 9 U/L (ref 1–32)
BILIRUB SERPL-MCNC: 0.2 MG/DL (ref 0–1.2)
BUN SERPL-MCNC: 49 MG/DL (ref 8–23)
BUN/CREAT SERPL: 26.2 (ref 7–25)
CA-I SERPL ISE-MCNC: 1.17 MMOL/L (ref 1.2–1.3)
CALCIUM SPEC-SCNC: 8.3 MG/DL (ref 8.6–10.5)
CHLORIDE SERPL-SCNC: 104 MMOL/L (ref 98–107)
CO2 SERPL-SCNC: 22 MMOL/L (ref 22–29)
CREAT SERPL-MCNC: 1.87 MG/DL (ref 0.57–1)
DEPRECATED RDW RBC AUTO: 49.4 FL (ref 37–54)
ERYTHROCYTE [DISTWIDTH] IN BLOOD BY AUTOMATED COUNT: 15.7 % (ref 12.3–15.4)
GFR SERPL CREATININE-BSD FRML MDRD: 25 ML/MIN/1.73
GLOBULIN UR ELPH-MCNC: 3.2 GM/DL
GLUCOSE SERPL-MCNC: 150 MG/DL (ref 65–99)
HCT VFR BLD AUTO: 28.3 % (ref 34–46.6)
HGB BLD-MCNC: 9.6 G/DL (ref 12–15.9)
INR PPP: 2.01 (ref 2–3)
LYMPHOCYTES # BLD MANUAL: 0.56 10*3/MM3 (ref 0.7–3.1)
MAGNESIUM SERPL-MCNC: 2.2 MG/DL (ref 1.6–2.4)
MCH RBC QN AUTO: 30.7 PG (ref 26.6–33)
MCHC RBC AUTO-ENTMCNC: 33.8 G/DL (ref 31.5–35.7)
MCV RBC AUTO: 90.9 FL (ref 79–97)
NEUTROPHILS # BLD AUTO: 13.34 10*3/MM3 (ref 1.7–7)
NEUTROPHILS NFR BLD MANUAL: 79 % (ref 42.7–76)
NEUTS BAND NFR BLD MANUAL: 17 % (ref 0–5)
PHOSPHATE SERPL-MCNC: 5.3 MG/DL (ref 2.5–4.5)
PLAT MORPH BLD: NORMAL
PLATELET # BLD AUTO: 286 10*3/MM3 (ref 140–450)
PMV BLD AUTO: 7 FL (ref 6–12)
POTASSIUM SERPL-SCNC: 4.6 MMOL/L (ref 3.5–5.2)
PROT SERPL-MCNC: 6.4 G/DL (ref 6–8.5)
PROTHROMBIN TIME: 21.2 SECONDS (ref 19.4–28.5)
QT INTERVAL: 348 MS
RBC # BLD AUTO: 3.12 10*6/MM3 (ref 3.77–5.28)
SCAN SLIDE: NORMAL
SODIUM SERPL-SCNC: 139 MMOL/L (ref 136–145)
TOXIC GRANULATION: ABNORMAL
TSH SERPL DL<=0.05 MIU/L-ACNC: 0.34 UIU/ML (ref 0.27–4.2)
VARIANT LYMPHS NFR BLD MANUAL: 4 % (ref 19.6–45.3)
WBC NRBC COR # BLD: 13.9 10*3/MM3 (ref 3.4–10.8)

## 2021-12-31 PROCEDURE — 94799 UNLISTED PULMONARY SVC/PX: CPT

## 2021-12-31 PROCEDURE — 25010000002 CALCIUM GLUCONATE-NACL 1-0.675 GM/50ML-% SOLUTION: Performed by: INTERNAL MEDICINE

## 2021-12-31 PROCEDURE — 93010 ELECTROCARDIOGRAM REPORT: CPT | Performed by: INTERNAL MEDICINE

## 2021-12-31 PROCEDURE — 84100 ASSAY OF PHOSPHORUS: CPT | Performed by: INTERNAL MEDICINE

## 2021-12-31 PROCEDURE — 25010000002 NA FERRIC GLUC CPLX PER 12.5 MG

## 2021-12-31 PROCEDURE — 85025 COMPLETE CBC W/AUTO DIFF WBC: CPT | Performed by: INTERNAL MEDICINE

## 2021-12-31 PROCEDURE — 83735 ASSAY OF MAGNESIUM: CPT | Performed by: INTERNAL MEDICINE

## 2021-12-31 PROCEDURE — 82330 ASSAY OF CALCIUM: CPT | Performed by: INTERNAL MEDICINE

## 2021-12-31 PROCEDURE — 84443 ASSAY THYROID STIM HORMONE: CPT | Performed by: INTERNAL MEDICINE

## 2021-12-31 PROCEDURE — 80053 COMPREHEN METABOLIC PANEL: CPT | Performed by: INTERNAL MEDICINE

## 2021-12-31 PROCEDURE — 99232 SBSQ HOSP IP/OBS MODERATE 35: CPT | Performed by: INTERNAL MEDICINE

## 2021-12-31 PROCEDURE — 85007 BL SMEAR W/DIFF WBC COUNT: CPT | Performed by: INTERNAL MEDICINE

## 2021-12-31 PROCEDURE — 92526 ORAL FUNCTION THERAPY: CPT

## 2021-12-31 PROCEDURE — 25010000002 METHYLPREDNISOLONE PER 40 MG: Performed by: NURSE PRACTITIONER

## 2021-12-31 PROCEDURE — 0 AMPICILLIN-SULBACTAM PER 1.5 G: Performed by: INTERNAL MEDICINE

## 2021-12-31 PROCEDURE — 85610 PROTHROMBIN TIME: CPT | Performed by: INTERNAL MEDICINE

## 2021-12-31 RX ORDER — CALCIUM GLUCONATE 20 MG/ML
1 INJECTION, SOLUTION INTRAVENOUS ONCE
Status: COMPLETED | OUTPATIENT
Start: 2021-12-31 | End: 2021-12-31

## 2021-12-31 RX ORDER — SODIUM CHLORIDE 9 MG/ML
50 INJECTION, SOLUTION INTRAVENOUS CONTINUOUS
Status: DISCONTINUED | OUTPATIENT
Start: 2021-12-31 | End: 2022-01-02

## 2021-12-31 RX ADMIN — METHYLPREDNISOLONE SODIUM SUCCINATE 20 MG: 40 INJECTION, POWDER, FOR SOLUTION INTRAMUSCULAR; INTRAVENOUS at 20:25

## 2021-12-31 RX ADMIN — ASPIRIN 81 MG: 81 TABLET, COATED ORAL at 09:21

## 2021-12-31 RX ADMIN — SODIUM CHLORIDE 125 MG: 9 INJECTION, SOLUTION INTRAVENOUS at 10:19

## 2021-12-31 RX ADMIN — DONEPEZIL HYDROCHLORIDE 5 MG: 5 TABLET, FILM COATED ORAL at 20:25

## 2021-12-31 RX ADMIN — SODIUM BICARBONATE 650 MG TABLET 650 MG: at 20:25

## 2021-12-31 RX ADMIN — METHYLPREDNISOLONE SODIUM SUCCINATE 20 MG: 40 INJECTION, POWDER, FOR SOLUTION INTRAMUSCULAR; INTRAVENOUS at 13:28

## 2021-12-31 RX ADMIN — IPRATROPIUM BROMIDE AND ALBUTEROL SULFATE 3 ML: 2.5; .5 SOLUTION RESPIRATORY (INHALATION) at 07:17

## 2021-12-31 RX ADMIN — IPRATROPIUM BROMIDE AND ALBUTEROL SULFATE 3 ML: 2.5; .5 SOLUTION RESPIRATORY (INHALATION) at 19:39

## 2021-12-31 RX ADMIN — SODIUM CHLORIDE, PRESERVATIVE FREE 3 ML: 5 INJECTION INTRAVENOUS at 09:23

## 2021-12-31 RX ADMIN — SODIUM CHLORIDE 100 ML/HR: 9 INJECTION, SOLUTION INTRAVENOUS at 23:42

## 2021-12-31 RX ADMIN — SODIUM CHLORIDE 100 ML/HR: 9 INJECTION, SOLUTION INTRAVENOUS at 06:18

## 2021-12-31 RX ADMIN — AMPICILLIN SODIUM AND SULBACTAM SODIUM 1.5 G: 1; .5 INJECTION, POWDER, FOR SOLUTION INTRAMUSCULAR; INTRAVENOUS at 09:21

## 2021-12-31 RX ADMIN — IPRATROPIUM BROMIDE AND ALBUTEROL SULFATE 3 ML: 2.5; .5 SOLUTION RESPIRATORY (INHALATION) at 11:25

## 2021-12-31 RX ADMIN — SODIUM CHLORIDE, PRESERVATIVE FREE 3 ML: 5 INJECTION INTRAVENOUS at 20:24

## 2021-12-31 RX ADMIN — AMPICILLIN SODIUM AND SULBACTAM SODIUM 1.5 G: 1; .5 INJECTION, POWDER, FOR SOLUTION INTRAMUSCULAR; INTRAVENOUS at 20:25

## 2021-12-31 RX ADMIN — CALCIUM GLUCONATE 1 G: 20 INJECTION, SOLUTION INTRAVENOUS at 13:33

## 2021-12-31 RX ADMIN — GUAIFENESIN 600 MG: 600 TABLET, EXTENDED RELEASE ORAL at 20:25

## 2021-12-31 RX ADMIN — DIGOXIN 125 MCG: 125 TABLET ORAL at 13:28

## 2022-01-01 LAB
ANION GAP SERPL CALCULATED.3IONS-SCNC: 13 MMOL/L (ref 5–15)
ANISOCYTOSIS BLD QL: ABNORMAL
BUN SERPL-MCNC: 43 MG/DL (ref 8–23)
BUN/CREAT SERPL: 34.1 (ref 7–25)
CALCIUM SPEC-SCNC: 8.6 MG/DL (ref 8.6–10.5)
CHLORIDE SERPL-SCNC: 106 MMOL/L (ref 98–107)
CO2 SERPL-SCNC: 21 MMOL/L (ref 22–29)
CREAT SERPL-MCNC: 1.26 MG/DL (ref 0.57–1)
DEPRECATED RDW RBC AUTO: 50.3 FL (ref 37–54)
DIGOXIN SERPL-MCNC: 2.6 NG/ML (ref 0.6–1.2)
ERYTHROCYTE [DISTWIDTH] IN BLOOD BY AUTOMATED COUNT: 15.9 % (ref 12.3–15.4)
GFR SERPL CREATININE-BSD FRML MDRD: 40 ML/MIN/1.73
GLUCOSE SERPL-MCNC: 145 MG/DL (ref 65–99)
HCT VFR BLD AUTO: 28.5 % (ref 34–46.6)
HGB BLD-MCNC: 9.8 G/DL (ref 12–15.9)
INR PPP: 1.07 (ref 2–3)
LYMPHOCYTES # BLD MANUAL: 0.25 10*3/MM3 (ref 0.7–3.1)
LYMPHOCYTES NFR BLD MANUAL: 4 % (ref 5–12)
MAGNESIUM SERPL-MCNC: 2.2 MG/DL (ref 1.6–2.4)
MCH RBC QN AUTO: 31 PG (ref 26.6–33)
MCHC RBC AUTO-ENTMCNC: 34.3 G/DL (ref 31.5–35.7)
MCV RBC AUTO: 90.4 FL (ref 79–97)
MONOCYTES # BLD: 0.5 10*3/MM3 (ref 0.1–0.9)
NEUTROPHILS # BLD AUTO: 11.66 10*3/MM3 (ref 1.7–7)
NEUTROPHILS NFR BLD MANUAL: 86 % (ref 42.7–76)
NEUTS BAND NFR BLD MANUAL: 8 % (ref 0–5)
PHOSPHATE SERPL-MCNC: 3.8 MG/DL (ref 2.5–4.5)
PLAT MORPH BLD: NORMAL
PLATELET # BLD AUTO: 293 10*3/MM3 (ref 140–450)
PMV BLD AUTO: 7.1 FL (ref 6–12)
POTASSIUM SERPL-SCNC: 3.9 MMOL/L (ref 3.5–5.2)
PROTHROMBIN TIME: 11.8 SECONDS (ref 19.4–28.5)
RBC # BLD AUTO: 3.15 10*6/MM3 (ref 3.77–5.28)
SCAN SLIDE: NORMAL
SODIUM SERPL-SCNC: 140 MMOL/L (ref 136–145)
VARIANT LYMPHS NFR BLD MANUAL: 1 % (ref 0–5)
VARIANT LYMPHS NFR BLD MANUAL: 1 % (ref 19.6–45.3)
WBC MORPH BLD: NORMAL
WBC NRBC COR # BLD: 12.4 10*3/MM3 (ref 3.4–10.8)

## 2022-01-01 PROCEDURE — 0 AMPICILLIN-SULBACTAM PER 1.5 G: Performed by: INTERNAL MEDICINE

## 2022-01-01 PROCEDURE — 80048 BASIC METABOLIC PNL TOTAL CA: CPT | Performed by: INTERNAL MEDICINE

## 2022-01-01 PROCEDURE — 85610 PROTHROMBIN TIME: CPT | Performed by: INTERNAL MEDICINE

## 2022-01-01 PROCEDURE — 84100 ASSAY OF PHOSPHORUS: CPT | Performed by: INTERNAL MEDICINE

## 2022-01-01 PROCEDURE — 99232 SBSQ HOSP IP/OBS MODERATE 35: CPT | Performed by: INTERNAL MEDICINE

## 2022-01-01 PROCEDURE — 94799 UNLISTED PULMONARY SVC/PX: CPT

## 2022-01-01 PROCEDURE — 25010000002 METHYLPREDNISOLONE PER 40 MG: Performed by: NURSE PRACTITIONER

## 2022-01-01 PROCEDURE — 85007 BL SMEAR W/DIFF WBC COUNT: CPT | Performed by: INTERNAL MEDICINE

## 2022-01-01 PROCEDURE — 36415 COLL VENOUS BLD VENIPUNCTURE: CPT | Performed by: INTERNAL MEDICINE

## 2022-01-01 PROCEDURE — 80162 ASSAY OF DIGOXIN TOTAL: CPT | Performed by: INTERNAL MEDICINE

## 2022-01-01 PROCEDURE — 83735 ASSAY OF MAGNESIUM: CPT | Performed by: INTERNAL MEDICINE

## 2022-01-01 PROCEDURE — 85025 COMPLETE CBC W/AUTO DIFF WBC: CPT | Performed by: INTERNAL MEDICINE

## 2022-01-01 RX ORDER — DIGOXIN 125 MCG
125 TABLET ORAL EVERY OTHER DAY
Status: DISCONTINUED | OUTPATIENT
Start: 2022-01-02 | End: 2022-01-01

## 2022-01-01 RX ORDER — SODIUM BICARBONATE 650 MG/1
650 TABLET ORAL 3 TIMES DAILY
Status: DISCONTINUED | OUTPATIENT
Start: 2022-01-01 | End: 2022-01-02

## 2022-01-01 RX ADMIN — DONEPEZIL HYDROCHLORIDE 5 MG: 5 TABLET, FILM COATED ORAL at 20:03

## 2022-01-01 RX ADMIN — SODIUM BICARBONATE 650 MG TABLET 650 MG: at 17:49

## 2022-01-01 RX ADMIN — FAMOTIDINE 20 MG: 20 TABLET, FILM COATED ORAL at 09:35

## 2022-01-01 RX ADMIN — SODIUM CHLORIDE, PRESERVATIVE FREE 3 ML: 5 INJECTION INTRAVENOUS at 20:03

## 2022-01-01 RX ADMIN — SERTRALINE HYDROCHLORIDE 100 MG: 100 TABLET ORAL at 09:34

## 2022-01-01 RX ADMIN — IPRATROPIUM BROMIDE AND ALBUTEROL SULFATE 3 ML: 2.5; .5 SOLUTION RESPIRATORY (INHALATION) at 08:17

## 2022-01-01 RX ADMIN — MEGESTROL ACETATE 400 MG: 40 SUSPENSION ORAL at 09:34

## 2022-01-01 RX ADMIN — METHYLPREDNISOLONE SODIUM SUCCINATE 20 MG: 40 INJECTION, POWDER, FOR SOLUTION INTRAMUSCULAR; INTRAVENOUS at 20:03

## 2022-01-01 RX ADMIN — AMPICILLIN SODIUM AND SULBACTAM SODIUM 1.5 G: 1; .5 INJECTION, POWDER, FOR SOLUTION INTRAMUSCULAR; INTRAVENOUS at 09:33

## 2022-01-01 RX ADMIN — ATORVASTATIN CALCIUM 20 MG: 20 TABLET, FILM COATED ORAL at 09:35

## 2022-01-01 RX ADMIN — GUAIFENESIN 600 MG: 600 TABLET, EXTENDED RELEASE ORAL at 20:03

## 2022-01-01 RX ADMIN — IPRATROPIUM BROMIDE AND ALBUTEROL SULFATE 3 ML: 2.5; .5 SOLUTION RESPIRATORY (INHALATION) at 11:47

## 2022-01-01 RX ADMIN — AMPICILLIN SODIUM AND SULBACTAM SODIUM 1.5 G: 1; .5 INJECTION, POWDER, FOR SOLUTION INTRAMUSCULAR; INTRAVENOUS at 20:03

## 2022-01-01 RX ADMIN — POLYETHYLENE GLYCOL 3350 17 G: 17 POWDER, FOR SOLUTION ORAL at 09:34

## 2022-01-01 RX ADMIN — Medication 1200 MG: at 20:03

## 2022-01-01 RX ADMIN — METHYLPREDNISOLONE SODIUM SUCCINATE 20 MG: 40 INJECTION, POWDER, FOR SOLUTION INTRAMUSCULAR; INTRAVENOUS at 09:34

## 2022-01-01 RX ADMIN — SODIUM BICARBONATE 650 MG TABLET 650 MG: at 20:03

## 2022-01-01 RX ADMIN — METOPROLOL TARTRATE 25 MG: 25 TABLET, FILM COATED ORAL at 09:35

## 2022-01-01 RX ADMIN — SODIUM BICARBONATE 650 MG TABLET 650 MG: at 10:06

## 2022-01-01 RX ADMIN — ALLOPURINOL 100 MG: 100 TABLET ORAL at 09:34

## 2022-01-01 RX ADMIN — ASPIRIN 81 MG: 81 TABLET, COATED ORAL at 09:34

## 2022-01-01 RX ADMIN — ACETYLCYSTEINE 600 MG: 200 SOLUTION ORAL; RESPIRATORY (INHALATION) at 09:34

## 2022-01-01 RX ADMIN — GUAIFENESIN 600 MG: 600 TABLET, EXTENDED RELEASE ORAL at 09:34

## 2022-01-01 RX ADMIN — SODIUM CHLORIDE, PRESERVATIVE FREE 3 ML: 5 INJECTION INTRAVENOUS at 09:34

## 2022-01-01 NOTE — PROGRESS NOTES
"NEPHROLOGY PROGRESS NOTE------[facility]    [facility]  [phone]  Quincy Ascencio MD      Patient Care Team:  Yudi Daniels MD as PCP - General (Family Medicine)  Mignon Luke MD as Consulting Physician (Cardiology)  Jl Ascencio MD as Consulting Physician (Nephrology)      Provider:  Quincy Ascencio MD  Patient Name: Alexandrea Gaxiola  :  1932    SUBJECTIVE:    F/U ARF/WALTER/CRF/CKD    Still tired. No SOB, CP, dysuria, palpitations.     Medication:  acetylcysteine, 600 mg, Oral, Q12H  allopurinol, 100 mg, Oral, Daily  ampicillin-sulbactam, 1.5 g, Intravenous, Q12H  aspirin, 81 mg, Oral, Daily  atorvastatin, 20 mg, Oral, Daily  Barium Sulfate, 1 teaspoon(s), Oral, Once in imaging  digoxin, 125 mcg, Oral, Daily  donepezil, 5 mg, Oral, Nightly  epoetin stewart-epbx, 20,000 Units, Subcutaneous, Q14 Days  famotidine, 20 mg, Oral, Daily  guaiFENesin, 600 mg, Oral, Q12H  ipratropium-albuterol, 3 mL, Nebulization, Q6H While Awake - RT  megestrol acetate, 400 mg, Oral, Daily With Breakfast  methylPREDNISolone sodium succinate, 20 mg, Intravenous, BID  metoprolol tartrate, 25 mg, Oral, Daily  polyethylene glycol, 17 g, Oral, Daily  sertraline, 100 mg, Oral, Daily  sodium bicarbonate, 650 mg, Oral, BID  sodium chloride, 3 mL, Intravenous, Q12H      sodium chloride, 100 mL/hr, Last Rate: 100 mL/hr (21 2342)        OBJECTIVE    Vital Sign Min/Max for last 24 hours  Temp  Min: 97.6 °F (36.4 °C)  Max: 98.7 °F (37.1 °C)   BP  Min: 134/56  Max: 171/62   Pulse  Min: 59  Max: 79   Resp  Min: 14  Max: 18   SpO2  Min: 95 %  Max: 100 %   No data recorded   No data recorded     Flowsheet Rows      First Filed Value   Admission Height 142.2 cm (56\") Documented at 2021 1007   Admission Weight 49.9 kg (110 lb) Documented at 2021 1007          No intake/output data recorded.  I/O last 3 completed shifts:  In: 170 " [P.O.:170]  Out: -     Physical Exam:  General Appearance: alert, appears stated age and cooperative +WEAK AND MALAISED  Head: normocephalic, without obvious abnormality and atraumatic   Eyes: conjunctivae and sclerae normal and no icterus  Neck: supple and no JVD  Lungs: +FINE BIBASILAR CRACKLES AND BIBASILAR RALES  Heart: regular rhythm & normal rate and normal S1, S2 +UNIQUE  Chest Wall: no abnormalities observed  Abdomen: normal bowel sounds and soft non-tender  Extremities: moves extremities well, no edema, no cyanosis +DJD  Skin: no bleeding, bruising or rash  Neurologic: Alert, and oriented. No focal deficits    Labs:    WBC WBC   Date Value Ref Range Status   01/01/2022 12.40 (H) 3.40 - 10.80 10*3/mm3 Final   12/31/2021 13.90 (H) 3.40 - 10.80 10*3/mm3 Final   12/30/2021 11.10 (H) 3.40 - 10.80 10*3/mm3 Final      HGB Hemoglobin   Date Value Ref Range Status   01/01/2022 9.8 (L) 12.0 - 15.9 g/dL Final   12/31/2021 9.6 (L) 12.0 - 15.9 g/dL Final   12/30/2021 9.7 (L) 12.0 - 15.9 g/dL Final      HCT Hematocrit   Date Value Ref Range Status   01/01/2022 28.5 (L) 34.0 - 46.6 % Final   12/31/2021 28.3 (L) 34.0 - 46.6 % Final   12/30/2021 29.3 (L) 34.0 - 46.6 % Final      Platlets No results found for: LABPLAT   MCV MCV   Date Value Ref Range Status   01/01/2022 90.4 79.0 - 97.0 fL Final   12/31/2021 90.9 79.0 - 97.0 fL Final   12/30/2021 92.0 79.0 - 97.0 fL Final          Sodium Sodium   Date Value Ref Range Status   01/01/2022 140 136 - 145 mmol/L Final   12/31/2021 139 136 - 145 mmol/L Final   12/30/2021 136 136 - 145 mmol/L Final      Potassium Potassium   Date Value Ref Range Status   01/01/2022 3.9 3.5 - 5.2 mmol/L Final   12/31/2021 4.6 3.5 - 5.2 mmol/L Final   12/30/2021 4.4 3.5 - 5.2 mmol/L Final      Chloride Chloride   Date Value Ref Range Status   01/01/2022 106 98 - 107 mmol/L Final   12/31/2021 104 98 - 107 mmol/L Final   12/30/2021 102 98 - 107 mmol/L Final      CO2 CO2   Date Value Ref Range Status    01/01/2022 21.0 (L) 22.0 - 29.0 mmol/L Final   12/31/2021 22.0 22.0 - 29.0 mmol/L Final   12/30/2021 20.0 (L) 22.0 - 29.0 mmol/L Final      BUN BUN   Date Value Ref Range Status   01/01/2022 43 (H) 8 - 23 mg/dL Final   12/31/2021 49 (H) 8 - 23 mg/dL Final   12/30/2021 40 (H) 8 - 23 mg/dL Final      Creatinine Creatinine   Date Value Ref Range Status   01/01/2022 1.26 (H) 0.57 - 1.00 mg/dL Final   12/31/2021 1.87 (H) 0.57 - 1.00 mg/dL Final   12/30/2021 1.54 (H) 0.57 - 1.00 mg/dL Final      Calcium Calcium   Date Value Ref Range Status   01/01/2022 8.6 8.6 - 10.5 mg/dL Final   12/31/2021 8.3 (L) 8.6 - 10.5 mg/dL Final   12/30/2021 8.4 (L) 8.6 - 10.5 mg/dL Final      PO4 No components found for: PO4   Albumin Albumin   Date Value Ref Range Status   12/31/2021 3.20 (L) 3.50 - 5.20 g/dL Final      Magnesium Magnesium   Date Value Ref Range Status   01/01/2022 2.2 1.6 - 2.4 mg/dL Final   12/31/2021 2.2 1.6 - 2.4 mg/dL Final   12/30/2021 2.1 1.6 - 2.4 mg/dL Final   12/30/2021 2.1 1.6 - 2.4 mg/dL Final      Uric Acid No components found for: URIC ACID     Imaging Results (Last 72 Hours)     Procedure Component Value Units Date/Time    US Renal Bilateral [[id number]] Collected: 12/30/21 1629     Updated: 12/30/21 1633    Narrative:      Examination: US RENAL BILATERAL-     Date of Exam: 12/30/2021 3:58 PM     Indication: Acute on chronic renal disease.     Comparison: None available.     Technique: Grayscale and color Doppler ultrasound evaluation of the  kidneys and urinary bladder was performed     Findings:  The right kidney measures 8.2 x 3.6 x 4.4 cm and the left kidney  measures 8.7 x 3.0 x 4.6 cm.The cortical thickness and echogenicity is  normal. There are incidental parapelvic cyst in the right kidney. There  is no solid renal mass, calculus, or hydronephrosis.The visualized  urinary bladder is unremarkable.The estimated urinary bladder volume is  32 cc.       Impression:         1. Right renal parapelvic  cysts.  2. The left kidney and urinary bladder are normal.     Electronically Signed By-Yayo Wang MD On:12/30/2021 4:30 PM  This report was finalized on [id number] by  Yayo Wang MD.    FL Video Swallow With Speech Single Contrast [[id number]] Collected: 12/30/21 1204     Updated: 12/30/21 1206    Narrative:      DATE OF EXAM:  12/30/2021 10:20 AM     PROCEDURE:  FL VIDEO SWALLOW W SPEECH SINGLE-CONTRAST-     INDICATIONS:  dysphagia; R06.00-Dyspnea, unspecified; R09.02-Hypoxemia;  J18.9-Pneumonia, unspecified organism; R77.8-Other specified  abnormalities of plasma proteins; R06.00-Dyspnea, unspecified     COMPARISON:  Modified swallow study with fluoroscopy 8/18/2021.     TECHNIQUE:   This examination was performed in conjunction with speech pathology.  Lateral video fluoroscopic evaluation of the swallowing mechanism was  performed while correlate administering to the patient various  consistency food items mixed with barium.     Fluoroscopic Time:   1.1 minutes     FINDINGS:  8 spot fluoroscopic series images were obtained during modified barium  swallow study. The study was performed by a dedicated speech  pathologist. I was present during the entire examination.        Impression:      Modified barium swallow study with fluoroscopy. Please refer to the  speech pathologist report for findings and dietary recommendations.     Electronically Signed By-Cynthia Lee MD On:12/30/2021 12:04 PM  This report was finalized on [id number] by  Cynthia Lee MD.    CT Head Without Contrast [[id number]] Collected: 12/29/21 1916     Updated: 12/29/21 1920    Narrative:      Examination: CT HEAD WO CONTRAST-     Date of Exam: 12/29/2021 6:10 PM     Indication: Headache, chronic, new features or increased frequency;  R06.00-Dyspnea, unspecified; R09.02-Hypoxemia; J18.9-Pneumonia,  unspecified organism.     Comparison: None available.     Technique: Axial noncontrast CT imaging of the head was performed.  Automated  exposure control and iterative reconstruction methods were  used.     Findings:  Superficial soft tissues appear within normal limits. The calvarium is  intact.  Paranasal sinuses are well aerated. The mastoids are  underpneumatized. There is no acute intracranial hemorrhage.  No mass  effect or midline shift.  No abnormal extra-axial collections.   Gray-white differentiation is within normal limits.  There are  intracranial vascular calcifications. There are scleral recent and there  is thinning of the orbital lenses bilaterally suggesting prior cataract  surgery or cataracts. There is mild patchy periventricular white matter  hypoattenuation suggesting chronic small vessel ischemic change. The  cortex appears intact. There is mild generalized parenchymal volume  loss.          Impression:         1. No acute intracranial abnormality.  2. Chronic small vessel ischemic change and generalized parenchymal  volume loss.  3. Intracranial atherosclerosis.     Electronically Signed By-Abiel Tyson MD On:12/29/2021 7:18 PM  This report was finalized on [id number] by  Abiel Tyson MD.          Results for orders placed during the hospital encounter of 12/28/21    XR Chest 1 View    Narrative  DATE OF EXAM:  12/28/2021 11:28 AM    PROCEDURE:  XR CHEST 1 VW-    INDICATIONS:  Dyspnea    COMPARISON:  AP portable chest 9/29/2021.    TECHNIQUE:  Single radiographic view of the chest was obtained.    FINDINGS:  There is increased dense opacity in the left lower lung which may  represent a combination of pleural fluid and consolidated lung. New  small right pleural effusion has developed. There is stable moderate  generalized cardiac mediastinal enlargement. New hazy airspace disease  has developed in the right mid to lower lung zone. Pacemaker device  appears unchanged. There are surgical changes of the thoracic lumbar  junction. There is S-shaped thoracolumbar scoliosis. There are severe  degenerative changes of the  shoulders. Advanced osteopenic changes are  present. No pneumothorax.    Impression  1. FINDINGS consistent with worsening bilateral pneumonia, with most  dense consolidative change in the retrocardiac left lower lobe, and new  airspace disease in the right mid to lower lung zone..  2. Suspected small bilateral pleural effusions.    Electronically Signed By-Cynthia Lee MD On:12/28/2021 11:38 AM  This report was finalized on [id number] by  Cynthia Lee MD.      Results for orders placed during the hospital encounter of 09/29/21    XR Chest 1 View    Narrative  DATE OF EXAM:  9/29/2021 2:09 PM    PROCEDURE:  XR CHEST 1 VW-    INDICATIONS:  general weakness    COMPARISON:  AP portable chest 8/23/2021    TECHNIQUE:  Single radiographic view of the chest was obtained.    FINDINGS:  Thoracolumbar shaped scoliotic curvature is redemonstrated. The  mediastinal position to left of midline is similar to the prior exam.  Bulky megaly appears unchanged. The cardiac silhouette obscures the left  lung base. Underlying left lower lobe atelectasis or pneumonia is  suspected. Right lung appears clear. Pacemaker appears unchanged. There  are surgical changes of the lumbar spine. Osteopenic changes are  present. There are degenerative changes of both shoulders.    Impression  1. Opacity at the left base may represent atelectasis or pneumonia. Left  basilar aeration is thought to be improved compared to 8/23/2021.  2. Right lung airspace disease has resolved since 8/23/2021.    Electronically Signed By-Cynthia Lee MD On:9/29/2021 2:29 PM  This report was finalized on [id number] by  Cynthia Lee MD.      Results for orders placed during the hospital encounter of 08/13/21    XR Chest 1 View    Narrative  DATE OF EXAM:  8/23/2021 5:51 AM    PROCEDURE:  XR CHEST 1 VW-    INDICATIONS:  Acute respiratory failure with hypoxia, shortness of breath, chest pain,  pneumonia.    COMPARISON:  8/16/2021.    TECHNIQUE:  Single  radiographic view of the chest was obtained.    FINDINGS:  The heart is enlarged. There is a left-sided transvenous pacemaker in  place. There is a small right and small-to-moderate left pleural  effusion. There is dense consolidation in the left lower chest felt to  represent a combination of atelectasis or pneumonia. There is bilateral  mixed interstitial/airspace disease throughout the remaining lungs which  can be seen with multifocal pneumonia versus pulmonary edema. Aeration  of the right chest may be slightly improved. There is no interval change  on the left side.  There is no pneumothorax.    Impression  1. Cardiomegaly.  2. Small right and small-to-moderate left pleural effusion which are  unchanged.  3. Unchanged dense consolidation in the left lower chest felt to  represent a combination of atelectasis or pneumonia.  4. There is superimposed bilateral mixed interstitial/airspace disease  throughout the remaining lungs with slight interval improvement which  can be seen with multifocal pneumonia or pulmonary edema.    Electronically Signed By-Yayo Wang MD On:8/23/2021 8:23 AM  This report was finalized on [id number] by  Yayo Wang MD.            ASSESSMENT / PLAN      Elevated troponin    Dyspnea      1. ARF/WALTER/CRF/CKD STG 3A------Nonoliguric. BUN/Cr down. Continue IVFs but at decreased rate. +ARF/WALTER on top of known CRF/CKD STG 3A with a baseline serum creatinine of about 1.15. CRF/CKD STG 3A is secondary to HTN NS. +ARF/WALTER is secondary to prerenal state with concomitant ACE-I and diuretic use. Off Lasix and Lisinopril. No NSAIDs or IV dye. Dose meds for CrCl less than 10 cc/min until ARF/WALTER is resolved     2. HTN WITH CKD-----BP low. D/C Lisinopril and Norvasc and holding Lasix and giving IVFs     3. OA/DJD-----No NSAIDs     4. HYPERURICEMIA------Allopurinol. No NSAIDs     5. ANEMIA OF CKD------Given IV iron for ADRIANA and started EPO     6. VITAMIN D DEFICIENCY     7. PNA----Backed down Unasyn  for WALTER. COVID 19 negative     8. SMALL BILATERAL PLEURAL EFFUSIONS------Hold Lasix and follow with hydration     9. SECONDARY HYPERPARAHTHYROIDISM-----Right below tx threshold. Follow phos and follow PTH as an outpatient     10. ACIDOSIS-----Metabolic. No elevation in AGAP. +Type 4 RTA. Increase po NaHCO3 and follow     11. ATRIAL FIBRILLATION------Rate controlled and anticoagulated. Hold Digoxin     12. GERD/PUD PROPHYLAXIS------Renal dose adjusted Pepcid     13. EARLY ALZHEIMER'S TYPE DEMENTIA----On Aricept     14. DVT PROPHYLAXIS------Anticoagulated     15. HYPERLIPIDEMIA------On Statin. CK, TSH ok    16. CAD------Cardiac cath pending      Quincy Ascencio MD  [facility]  [phone]  01/01/22  08:54 EST

## 2022-01-01 NOTE — PLAN OF CARE
Goal Outcome Evaluation:              Outcome Summary: Pt resting well with no complaints. Awaiting bed at [place]. Continue to monitor.

## 2022-01-01 NOTE — PROGRESS NOTES
Referring Provider: Margarita Schaffer MD    Reason for follow-up:  Angina  Anticoagulation management  Status post pacemaker     Patient Care Team:  Yudi Daniels MD as PCP - General (Family Medicine)  Mignon Luke MD as Consulting Physician (Cardiology)  Jl Ascencio MD as Consulting Physician (Nephrology)    Subjective .  Feeling better     ROS    Since I have last seen, the patient has been without any chest discomfort ,shortness of breath, palpitations, dizziness or syncope.  Denies having any headache ,abdominal pain ,nausea, vomiting , diarrhea constipation, loss of weight or loss of appetite.  Denies having any excessive bruising ,hematuria or blood in the stool.    Review of all systems negative except as indicated    History  Past Medical History:   Diagnosis Date   • Atrial fibrillation (HCC)    • Chronic kidney disease    • Hypertension    • Melanoma (HCC)    • MI (myocardial infarction) (HCC)        Past Surgical History:   Procedure Laterality Date   • CARDIAC CATHETERIZATION     • CARDIAC ELECTROPHYSIOLOGY PROCEDURE N/A 10/9/2020    Procedure: PPM generator change - dual;  Surgeon: Mignon Luke MD;  Location: [facility] INVASIVE LOCATION;  Service: Cardiovascular;  Laterality: N/A;   • CAROTID ENDARTERECTOMY     • CERVICAL SPINE SURGERY     • INSERT / REPLACE / REMOVE PACEMAKER     • SCALP LESION REMOVAL W/ FLAP AND SKIN GRAFT     • TOTAL HIP ARTHROPLASTY Left        History reviewed. No pertinent family history.    Social History     Tobacco Use   • Smoking status: Never Smoker   • Smokeless tobacco: Never Used   Substance Use Topics   • Alcohol use: Never   • Drug use: Never        Medications Prior to Admission   Medication Sig Dispense Refill Last Dose   • acetaminophen (TYLENOL) 325 MG tablet Take 2 tablets by mouth Every 4 (Four) Hours As Needed for Mild Pain .   Past Week at Unknown time   • albuterol sulfate  (90 Base) MCG/ACT inhaler Inhale 1 puff Every 4 (Four)  Hours As Needed for Shortness of Air.      • amLODIPine (NORVASC) 5 MG tablet TAKE ONE TABLET BY MOUTH DAILY 90 tablet 3 Past Week at Unknown time   • atorvastatin (LIPITOR) 20 MG tablet Take 20 mg by mouth Daily.   Past Week at Unknown time   • digoxin (LANOXIN) 125 MCG tablet Take 125 mcg by mouth Daily.   Past Week at Unknown time   • donepezil (ARICEPT) 5 MG tablet Take 5 mg by mouth Every Night.   Past Week at Unknown time   • furosemide (LASIX) 20 MG tablet Take 1 tablet by mouth Daily. 90 tablet 1 Past Week at Unknown time   • lisinopril (PRINIVIL,ZESTRIL) 5 MG tablet Take 10 mg by mouth Daily.   Past Week at Unknown time   • megestrol (MEGACE) 40 MG/ML suspension Take 400 mg by mouth Every Morning.      • metoprolol tartrate (LOPRESSOR) 25 MG tablet Take 25 mg by mouth Daily.      • polyethylene glycol (MIRALAX) 17 g packet Take 17 g by mouth Daily.   Past Week at Unknown time   • sertraline (ZOLOFT) 100 MG tablet Take 100 mg by mouth Daily.   Past Week at Unknown time   • traMADol (ULTRAM) 50 MG tablet Take 50 mg by mouth Every 8 (Eight) Hours As Needed for Moderate Pain .   Past Week at Unknown time   • warfarin (COUMADIN) 2 MG tablet Take 1 tablet by mouth Daily. 90 tablet 0 Past Week at Unknown time       Allergies  Codeine; Hydrocodone-acetaminophen; Meperidine; Morphine; and Antihistamines, loratadine-type    Scheduled Meds:Acetylcysteine, 1,200 mg, Oral, BID  allopurinol, 100 mg, Oral, Daily  ampicillin-sulbactam, 1.5 g, Intravenous, Q12H  aspirin, 81 mg, Oral, Daily  atorvastatin, 20 mg, Oral, Daily  Barium Sulfate, 1 teaspoon(s), Oral, Once in imaging  donepezil, 5 mg, Oral, Nightly  epoetin stewart-epbx, 20,000 Units, Subcutaneous, Q14 Days  guaiFENesin, 600 mg, Oral, Q12H  ipratropium-albuterol, 3 mL, Nebulization, Q6H While Awake - RT  megestrol acetate, 400 mg, Oral, Daily With Breakfast  methylPREDNISolone sodium succinate, 20 mg, Intravenous, BID  metoprolol tartrate, 25 mg, Oral,  "Daily  polyethylene glycol, 17 g, Oral, Daily  sertraline, 100 mg, Oral, Daily  sodium bicarbonate, 650 mg, Oral, TID  sodium chloride, 3 mL, Intravenous, Q12H      Continuous Infusions:sodium chloride, 50 mL/hr, Last Rate: 50 mL/hr (01/01/22 1006)      PRN Meds:.•  acetaminophen  •  LORazepam  •  melatonin  •  nitroglycerin  •  ondansetron  •  potassium chloride  •  potassium chloride  •  [COMPLETED] Insert peripheral IV **AND** sodium chloride  •  sodium chloride  •  traMADol    Objective     VITAL SIGNS  Vitals:    01/01/22 0820 01/01/22 1142 01/01/22 1147 01/01/22 1150   BP:  159/66     BP Location:  Right arm     Patient Position:  Lying     Pulse: 79 67 59 63   Resp: 14 16 15 15   Temp:  98.3 °F (36.8 °C)     TempSrc:  Oral     SpO2: 100% 95% 94% 95%   Weight:       Height:           Flowsheet Rows      First Filed Value   Admission Height 142.2 cm (56\") Documented at 12/28/2021 1007   Admission Weight 49.9 kg (110 lb) Documented at 12/28/2021 1007            Intake/Output Summary (Last 24 hours) at 1/1/2022 1233  Last data filed at 1/1/2022 1023  Gross per 24 hour   Intake 230 ml   Output --   Net 230 ml        TELEMETRY: Pacemaker rhythm    Physical Exam:  The patient is alert, oriented and in no distress.  Vital signs as noted above.  Head and neck revealed no carotid bruits or jugular venous distention.  No thyromegaly or lymphadenopathy is present  Lungs clear.  No wheezing.  Breath sounds are normal bilaterally.  Heart normal first and second heart sounds.  No murmur. No precordial rub is present.  No gallop is present.  Abdomen soft and nontender.  No organomegaly is present.  Extremities with good peripheral pulses without any pedal edema.  Skin warm and dry.  Pacemaker site looks normal.  Musculoskeletal system is grossly normal  CNS grossly normal      Results Review:   I reviewed the patient's new clinical results.  Lab Results (last 24 hours)     Procedure Component Value Units Date/Time    Manual " Differential [[id number]]  (Abnormal) Collected: 01/01/22 0527    Specimen: Blood Updated: 01/01/22 0823     Neutrophil % 86.0 %      Lymphocyte % 1.0 %      Monocyte % 4.0 %      Bands %  8.0 %      Atypical Lymphocyte % 1.0 %      Neutrophils Absolute 11.66 10*3/mm3      Lymphocytes Absolute 0.25 10*3/mm3      Monocytes Absolute 0.50 10*3/mm3      Anisocytosis Slight/1+     WBC Morphology Normal     Platelet Morphology Normal    CBC & Differential [[id number]]  (Abnormal) Collected: 01/01/22 0527    Specimen: Blood Updated: 01/01/22 0823    Narrative:      The following orders were created for panel order CBC & Differential.  Procedure                               Abnormality         Status                     ---------                               -----------         ------                     CBC Auto Differential[[id number]]        Abnormal            Final result               Scan Slide[[id number]]                                       Final result                 Please view results for these tests on the individual orders.    Scan Slide [[id number]] Collected: 01/01/22 0527    Specimen: Blood Updated: 01/01/22 0823     Scan Slide --     Comment: See Manual Differential Results       CBC Auto Differential [[id number]]  (Abnormal) Collected: 01/01/22 0527    Specimen: Blood Updated: 01/01/22 0823     WBC 12.40 10*3/mm3      RBC 3.15 10*6/mm3      Hemoglobin 9.8 g/dL      Hematocrit 28.5 %      MCV 90.4 fL      MCH 31.0 pg      MCHC 34.3 g/dL      RDW 15.9 %      RDW-SD 50.3 fl      MPV 7.1 fL      Platelets 293 10*3/mm3     Narrative:      The previously reported component NRBC is no longer being reported. Previous result was 0.3 /100 WBC (Reference Range: 0.0-0.2 /100 WBC) on 1/1/2022 at 0657 EST.    Digoxin Level [[id number]]  (Abnormal) Collected: 01/01/22 0527    Specimen: Blood Updated: 01/01/22 0706     Digoxin 2.60 ng/mL     Phosphorus [[id number]]  (Normal) Collected: 01/01/22 0527    Specimen: Blood  Updated: 01/01/22 0705     Phosphorus 3.8 mg/dL     Basic Metabolic Panel [[id number]]  (Abnormal) Collected: 01/01/22 0527    Specimen: Blood Updated: 01/01/22 0659     Glucose 145 mg/dL      BUN 43 mg/dL      Creatinine 1.26 mg/dL      Sodium 140 mmol/L      Potassium 3.9 mmol/L      Chloride 106 mmol/L      CO2 21.0 mmol/L      Calcium 8.6 mg/dL      eGFR Non African Amer 40 mL/min/1.73      BUN/Creatinine Ratio 34.1     Anion Gap 13.0 mmol/L     Narrative:      GFR Normal >60  Chronic Kidney Disease <60  Kidney Failure <15      Magnesium [[id number]]  (Normal) Collected: 01/01/22 0527    Specimen: Blood Updated: 01/01/22 0659     Magnesium 2.2 mg/dL     Protime-INR [[id number]]  (Abnormal) Collected: 01/01/22 0527    Specimen: Blood Updated: 01/01/22 0652     Protime 11.8 Seconds      INR 1.07    Blood Culture - Blood, Blood, Venous Line [[id number]]  (Normal) Collected: 12/28/21 1345    Specimen: Blood, Venous Line Updated: 12/31/21 1400     Blood Culture No growth at 3 days    Blood Culture - Blood, Arm, Right [[id number]]  (Normal) Collected: 12/28/21 1345    Specimen: Blood from Arm, Right Updated: 12/31/21 1400     Blood Culture No growth at 3 days          Imaging Results (Last 24 Hours)     ** No results found for the last 24 hours. **      LAB RESULTS (LAST 7 DAYS)    CBC  Results from last 7 days   Lab Units 01/01/22 0527 12/31/21  0413 12/30/21  0517 12/29/21  0416 12/28/21  1136   WBC 10*3/mm3 12.40* 13.90* 11.10* 11.60* 13.00*   RBC 10*6/mm3 3.15* 3.12* 3.18* 3.15* 3.74*   HEMOGLOBIN g/dL 9.8* 9.6* 9.7* 9.4* 11.0*   HEMATOCRIT % 28.5* 28.3* 29.3* 28.4* 34.4   MCV fL 90.4 90.9 92.0 90.2 92.0   PLATELETS 10*3/mm3 293 286 253 272 338       BMP  Results from last 7 days   Lab Units 01/01/22  0527 12/31/21  0413 12/30/21  0636 12/30/21  0517 12/29/21  0416 12/28/21  1136   SODIUM mmol/L 140 139  --  136 140 138   POTASSIUM mmol/L 3.9 4.6  --  4.4 4.5 5.2   CHLORIDE mmol/L 106 104  --  102 107 105   CO2 mmol/L  21.0* 22.0  --  20.0* 22.0 20.0*   BUN mg/dL 43* 49*  --  40* 29* 34*   CREATININE mg/dL 1.26* 1.87*  --  1.54* 1.21* 1.42*   GLUCOSE mg/dL 145* 150*  --  153* 100* 90   MAGNESIUM mg/dL 2.2 2.2 2.1 2.1 2.0  --    PHOSPHORUS mg/dL 3.8 5.3*  --   --   --   --        CMP   Results from last 7 days   Lab Units 01/01/22 0527 12/31/21 0413 12/30/21 0517 12/29/21 0416 12/28/21  1136   SODIUM mmol/L 140 139 136 140 138   POTASSIUM mmol/L 3.9 4.6 4.4 4.5 5.2   CHLORIDE mmol/L 106 104 102 107 105   CO2 mmol/L 21.0* 22.0 20.0* 22.0 20.0*   BUN mg/dL 43* 49* 40* 29* 34*   CREATININE mg/dL 1.26* 1.87* 1.54* 1.21* 1.42*   GLUCOSE mg/dL 145* 150* 153* 100* 90   ALBUMIN g/dL  --  3.20*  --   --   --    BILIRUBIN mg/dL  --  0.2  --   --   --    ALK PHOS U/L  --  52  --   --   --    AST (SGOT) U/L  --  9  --   --   --    ALT (SGPT) U/L  --  13  --   --   --          BNP        TROPONIN  Results from last 7 days   Lab Units 12/30/21  0636 12/29/21  0416   CK TOTAL U/L 60  --    TROPONIN T ng/mL  --  0.060*       CoAg  Results from last 7 days   Lab Units 01/01/22 0527 12/31/21 0413 12/30/21 0517 12/29/21 0416 12/28/21  1136   INR  1.07* 2.01 1.99* 3.40* 3.55*       Creatinine Clearance  Estimated Creatinine Clearance: 23.3 mL/min (A) (by C-G formula based on SCr of 1.26 mg/dL (H)).    ABG  Results from last 7 days   Lab Units 12/28/21  1123   PH, ARTERIAL pH units 7.422   PCO2, ARTERIAL mm Hg 32.3*   PO2 ART mm Hg 42.2*   O2 SATURATION ART % 79.4*   BASE EXCESS ART mmol/L -2.8*       Radiology  US Renal Bilateral    Result Date: 12/30/2021   1. Right renal parapelvic cysts. 2. The left kidney and urinary bladder are normal.  Electronically Signed By-Yayo Wang MD On:12/30/2021 4:30 PM This report was finalized on [id number] by  Yayo Wang MD.              EKG                            I personally viewed and interpreted the patient's EKG/Telemetry data: Atrial fibrillation    ECHOCARDIOGRAM:    Results for orders placed  during the hospital encounter of 12/28/21    Adult Transthoracic Echo Complete W/ Cont if Necessary Per Protocol    Interpretation Summary  · Estimated left ventricular EF = 60% Left ventricular systolic function is normal.    Indications  Chest pain    Technically satisfactory study.  Mitral valve is structurally normal.  Moderate mitral regurgitation.  Tricuspid valve is structurally normal.  Aortic valve is structurally normal.  Pulmonic valve could not be well visualized.  No evidence for tricuspid or aortic regurgitation is seen by Doppler study.  Biatrial enlargement  Left ventricle is normal in size and contractility with ejection fraction of 60%.  Concentric left ventricle hypertrophy.  Right ventricle is normal in size.  Atrial septum is intact.  Aorta is normal.  No pericardial effusion or intracardiac thrombus is seen.    Impression  Biatrial enlargement.  Moderate mitral regurgitation.  Left ventricular size and contractility is normal with ejection fraction of 60%.  Concentric left ventricle hypertrophy.          STRESS MYOVIEW:    Cardiolite (Tc-99m Sestamibi) stress test    CARDIAC CATHETERIZATION:            OTHER:         Assessment/Plan     Active Problems:    Elevated troponin    Dyspnea        ////////////////////////////  Impression  ===============  -Chest discomfort-suggestive of unstable angina.  Troponin level slightly elevated 0.0 53  EKG showed no acute changes.      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  Pacemaker was reprogrammed to DDDR due to long AV delays.  Status post dual-chamber pacemaker pulse generator replacement ([employer] MRI compatible)-10/9/2020      -history of intermittent atrial fibrillation.  Patient is in Sinus rhythm.     -Anticoagulation-patient is on Coumadin.     -moderate mitral regurgitation.  Echocardiogram 01/28/2019 revealed moderate mitral regurgitation left atrial enlargement normal left ventricle  function.      -  Status post  subendocardial myocardial infarction -improved.     Cardiac catheterization 12/29/2015 revealed mild anterior wall hypokinesis with ejection fraction of 50%, 40% mid LAD 70-80% ostial diagonal branch disease (small caliber vessel) and 80% circumflex distal to a large marginal branch.      -status post left carotid endarterectomy cervical spine surgery left hip replacement and recent scalp surgery for carcinoma and grafting.     -hypertension-not well controlled.      -allergy to codeine, morphine and Demerol.     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     ===============    Plan  ================  Chest discomfort suggestive of unstable angina pectoris.  Mild elevation of troponin at 0.053  Trend troponin levels.  Follow-up EKGs.  Echocardiogram-we will review     Renal dysfunction  BUN 34 creatinine 1.42  29/1.21-12/29/2021  BUN 40/creatinine 1.54-12/30/2021  49/1.87-12/31/2021  Intravenous fluids  Renal consultation appreciated  Observe closely     Anticoagulation  INR 3.54  3.4-12/29/2021  1.99-12/30/2021  2.0  Hold Coumadin in anticipation of possible need for cardiac catheterization when INR is more favorable and in the safety zone..  Likely Friday.  Lexiscan Cardiolite test-negative for myocardial ischemia 2/15/2021.     Status post pacemaker  Pacemaker site is normal.  Recent integration of the pacemaker revealed excellent pacing parameters.  Patient has intermittent atrial fibrillation      Paroxysmal atrial fibrillation  Patient has converted and staying in sinus rhythm.     Mitral regurgitation-moderate.     Echocardiogram 8/16/2021 showed biatrial enlargement moderate mitral regurgitation and normal left ventricle function.     Medications were reviewed and updated.    Patient to have cardiac catheterization and coronary arteriography.  Postpone for now due to renal dysfunction.  Timing of cardiac catheterization next week will depend on renal  function.  Risks and benefits pros and cons of the procedure were discussed with patient including infection bleeding blood clot heart attack allergic reaction to the dye renal dysfunction.     Further plan will depend on patient's progress.  ////////////////////////////        Chest pain with nonspecific elevation of troponin  Frail patient with multiple comorbidities  Renal function has improved  Plans for cardiac catheterization on Monday  INR is better  Patient is currently on aspirin beta-blocker and statin  Further recommendations based on patient course      Lalo Mendoza MD  01/01/22  12:33 EST

## 2022-01-01 NOTE — CASE MANAGEMENT/SOCIAL WORK
Continued Stay Note  DALJIT Clifford     Patient Name: Alexandrea Gaxiola  MRN: [id number]  Today's Date: 1/1/2022    Admit Date: 12/28/2021     Discharge Plan     Row Name 01/01/22 1731       Plan    Plan DC plan: [facility] accepted. no precert/pasrr required.    Plan Comments Dc barriers: needs heart cath, monitor kidney function.                Phone communication or documentation only - no physical contact with patient or family.        Brenda Krause RN

## 2022-01-01 NOTE — PROGRESS NOTES
LOS: 4 days   Patient Care Team:  Yudi Daniels MD as PCP - General (Family Medicine)  Mignon Luke MD as Consulting Physician (Cardiology)  Jl Ascencio MD as Consulting Physician (Nephrology)    Subjective     Interval History:     Patient Complaints: sleepy.  Pt with critically high digoxin level    History taken from: patient    Review of Systems   Constitutional: Positive for activity change, appetite change and fatigue.   Respiratory: Negative.    Cardiovascular: Negative.    Gastrointestinal: Negative.    Musculoskeletal: Positive for arthralgias and back pain.   Neurological: Positive for weakness.   Psychiatric/Behavioral: Positive for confusion.           Objective     Vital Signs  Temp:  [97.6 °F (36.4 °C)-98.7 °F (37.1 °C)] 98 °F (36.7 °C)  Heart Rate:  [59-79] 79  Resp:  [14-18] 14  BP: (134-171)/(55-71) 171/62    Physical Exam:     General Appearance:    sleeping but easily awakened.  in no acute distress   Head:    Normocephalic, without obvious abnormality, atraumatic   Eyes:            Lids and lashes normal, conjunctivae and sclerae normal, no   icterus, no pallor, corneas clear, PERRLA   Ears:    Ears appear intact with no abnormalities noted   Throat:   No oral lesions, no thrush, oral mucosa moist   Neck:   No adenopathy, supple, trachea midline, no thyromegaly, no   carotid bruit, no JVD   Lungs:     Clear to auscultation,respirations regular, even and                  unlabored    Heart:    Regular rhythm and normal rate, normal S1 and S2, no            murmur, no gallop, no rub, no click   Chest Wall:    No abnormalities observed   Abdomen:     Normal bowel sounds, no masses, no organomegaly, soft        non-tender, non-distended, no guarding, no rebound                tenderness   Extremities:   Moves all extremities well, no edema, no cyanosis, no             redness   Pulses:   Pulses palpable and equal bilaterally   Skin:   No bleeding, bruising or rash   Lymph  nodes:   No palpable adenopathy   Neurologic:   Cranial nerves 2 - 12 grossly intact, sensation intact, DTR       present and equal bilaterally        Results Review:    Lab Results (last 24 hours)     Procedure Component Value Units Date/Time    Manual Differential [[id number]]  (Abnormal) Collected: 01/01/22 0527    Specimen: Blood Updated: 01/01/22 0823     Neutrophil % 86.0 %      Lymphocyte % 1.0 %      Monocyte % 4.0 %      Bands %  8.0 %      Atypical Lymphocyte % 1.0 %      Neutrophils Absolute 11.66 10*3/mm3      Lymphocytes Absolute 0.25 10*3/mm3      Monocytes Absolute 0.50 10*3/mm3      Anisocytosis Slight/1+     WBC Morphology Normal     Platelet Morphology Normal    CBC & Differential [[id number]]  (Abnormal) Collected: 01/01/22 0527    Specimen: Blood Updated: 01/01/22 0823    Narrative:      The following orders were created for panel order CBC & Differential.  Procedure                               Abnormality         Status                     ---------                               -----------         ------                     CBC Auto Differential[[id number]]        Abnormal            Final result               Scan Slide[[id number]]                                       Final result                 Please view results for these tests on the individual orders.    Scan Slide [[id number]] Collected: 01/01/22 0527    Specimen: Blood Updated: 01/01/22 0823     Scan Slide --     Comment: See Manual Differential Results       CBC Auto Differential [[id number]]  (Abnormal) Collected: 01/01/22 0527    Specimen: Blood Updated: 01/01/22 0823     WBC 12.40 10*3/mm3      RBC 3.15 10*6/mm3      Hemoglobin 9.8 g/dL      Hematocrit 28.5 %      MCV 90.4 fL      MCH 31.0 pg      MCHC 34.3 g/dL      RDW 15.9 %      RDW-SD 50.3 fl      MPV 7.1 fL      Platelets 293 10*3/mm3     Narrative:      The previously reported component NRBC is no longer being reported. Previous result was 0.3 /100 WBC (Reference Range: 0.0-0.2  /100 WBC) on 1/1/2022 at 0657 EST.    Digoxin Level [[id number]]  (Abnormal) Collected: 01/01/22 0527    Specimen: Blood Updated: 01/01/22 0706     Digoxin 2.60 ng/mL     Phosphorus [[id number]]  (Normal) Collected: 01/01/22 0527    Specimen: Blood Updated: 01/01/22 0705     Phosphorus 3.8 mg/dL     Basic Metabolic Panel [[id number]]  (Abnormal) Collected: 01/01/22 0527    Specimen: Blood Updated: 01/01/22 0659     Glucose 145 mg/dL      BUN 43 mg/dL      Creatinine 1.26 mg/dL      Sodium 140 mmol/L      Potassium 3.9 mmol/L      Chloride 106 mmol/L      CO2 21.0 mmol/L      Calcium 8.6 mg/dL      eGFR Non African Amer 40 mL/min/1.73      BUN/Creatinine Ratio 34.1     Anion Gap 13.0 mmol/L     Narrative:      GFR Normal >60  Chronic Kidney Disease <60  Kidney Failure <15      Magnesium [[id number]]  (Normal) Collected: 01/01/22 0527    Specimen: Blood Updated: 01/01/22 0659     Magnesium 2.2 mg/dL     Protime-INR [[id number]]  (Abnormal) Collected: 01/01/22 0527    Specimen: Blood Updated: 01/01/22 0652     Protime 11.8 Seconds      INR 1.07    Blood Culture - Blood, Blood, Venous Line [[id number]]  (Normal) Collected: 12/28/21 1345    Specimen: Blood, Venous Line Updated: 12/31/21 1400     Blood Culture No growth at 3 days    Blood Culture - Blood, Arm, Right [[id number]]  (Normal) Collected: 12/28/21 1345    Specimen: Blood from Arm, Right Updated: 12/31/21 1400     Blood Culture No growth at 3 days           Imaging Results (Last 24 Hours)     ** No results found for the last 24 hours. **               I reviewed the patient's new clinical results.    Medication Review:   Scheduled Meds:acetylcysteine, 600 mg, Oral, Q12H  allopurinol, 100 mg, Oral, Daily  ampicillin-sulbactam, 1.5 g, Intravenous, Q12H  aspirin, 81 mg, Oral, Daily  atorvastatin, 20 mg, Oral, Daily  Barium Sulfate, 1 teaspoon(s), Oral, Once in imaging  donepezil, 5 mg, Oral, Nightly  epoetin stewart-epbx, 20,000 Units, Subcutaneous, Q14 Days  famotidine, 20  mg, Oral, Daily  guaiFENesin, 600 mg, Oral, Q12H  ipratropium-albuterol, 3 mL, Nebulization, Q6H While Awake - RT  megestrol acetate, 400 mg, Oral, Daily With Breakfast  methylPREDNISolone sodium succinate, 20 mg, Intravenous, BID  metoprolol tartrate, 25 mg, Oral, Daily  polyethylene glycol, 17 g, Oral, Daily  sertraline, 100 mg, Oral, Daily  sodium bicarbonate, 650 mg, Oral, TID  sodium chloride, 3 mL, Intravenous, Q12H      Continuous Infusions:sodium chloride, 50 mL/hr, Last Rate: 100 mL/hr (12/31/21 2592)      PRN Meds:.•  acetaminophen  •  LORazepam  •  melatonin  •  nitroglycerin  •  ondansetron  •  potassium chloride  •  potassium chloride  •  [COMPLETED] Insert peripheral IV **AND** sodium chloride  •  sodium chloride  •  traMADol     Assessment/Plan       Elevated troponin    Dyspnea    Bibasilar pneumonia  -antibiotics  -speech therapy evaluated - diet to be regular texture with thin liquids  -duo-nebs/mucinex/low dose steroids     Acute hypoxemic respiratory failure -appears to be multifactorial with both pulmonary edema, viral URI and bibasilar infiltrates noted on x-ray  -Unasyn for pneumonia  -diuresed gently with Lasix as well      Generalized weakness  -PT/OT      Elevated troponin  -cardiology following.  Cath planned for Monday     Chronic atrial fib    -rate is controlled; patient is anticoagulated  - holding warfarin for further testing  - hold digoxin secondary to critically high value.  Repeat lab in am     Chronic kidney disease stage III -monitor closely with diuresis  WALTER- worsening  -nephrology following     Acute on chronic diastolic dysfunction  -diurese gently        Pulmonary hypertension  Presence of permanent pacemaker  Secondary hypercoagulable state related to atrial fib  Non-Covid coronavirus infection -supportive care        Diet: regular  DVT prophylaxis: coumadin  GI prophylaxis: pepcid  Code status: full       Plan for disposition:inpatient rehab.  Accepted at [facility]  WILFRED Daniels MD  01/01/22  09:49 EST

## 2022-01-02 LAB
ANION GAP SERPL CALCULATED.3IONS-SCNC: 11 MMOL/L (ref 5–15)
ANISOCYTOSIS BLD QL: ABNORMAL
BACTERIA SPEC AEROBE CULT: NORMAL
BACTERIA SPEC AEROBE CULT: NORMAL
BUN SERPL-MCNC: 34 MG/DL (ref 8–23)
BUN/CREAT SERPL: 37 (ref 7–25)
CALCIUM SPEC-SCNC: 8.4 MG/DL (ref 8.6–10.5)
CHLORIDE SERPL-SCNC: 109 MMOL/L (ref 98–107)
CO2 SERPL-SCNC: 21 MMOL/L (ref 22–29)
CREAT SERPL-MCNC: 0.92 MG/DL (ref 0.57–1)
DEPRECATED RDW RBC AUTO: 52.1 FL (ref 37–54)
DIGOXIN SERPL-MCNC: 2.1 NG/ML (ref 0.6–1.2)
ERYTHROCYTE [DISTWIDTH] IN BLOOD BY AUTOMATED COUNT: 16.6 % (ref 12.3–15.4)
GFR SERPL CREATININE-BSD FRML MDRD: 57 ML/MIN/1.73
GLUCOSE SERPL-MCNC: 126 MG/DL (ref 65–99)
HCT VFR BLD AUTO: 29.9 % (ref 34–46.6)
HGB BLD-MCNC: 10.1 G/DL (ref 12–15.9)
INR PPP: 1.03 (ref 2–3)
LYMPHOCYTES # BLD MANUAL: 0.79 10*3/MM3 (ref 0.7–3.1)
LYMPHOCYTES NFR BLD MANUAL: 4 % (ref 5–12)
MAGNESIUM SERPL-MCNC: 2.2 MG/DL (ref 1.6–2.4)
MCH RBC QN AUTO: 30.8 PG (ref 26.6–33)
MCHC RBC AUTO-ENTMCNC: 33.7 G/DL (ref 31.5–35.7)
MCV RBC AUTO: 91.3 FL (ref 79–97)
METAMYELOCYTES NFR BLD MANUAL: 1 % (ref 0–0)
MONOCYTES # BLD: 0.45 10*3/MM3 (ref 0.1–0.9)
MYELOCYTES NFR BLD MANUAL: 5 % (ref 0–0)
NEUTROPHILS # BLD AUTO: 9.38 10*3/MM3 (ref 1.7–7)
NEUTROPHILS NFR BLD MANUAL: 79 % (ref 42.7–76)
NEUTS BAND NFR BLD MANUAL: 4 % (ref 0–5)
PHOSPHATE SERPL-MCNC: 2.8 MG/DL (ref 2.5–4.5)
PLAT MORPH BLD: NORMAL
PLATELET # BLD AUTO: 288 10*3/MM3 (ref 140–450)
PMV BLD AUTO: 6.7 FL (ref 6–12)
POTASSIUM SERPL-SCNC: 4.1 MMOL/L (ref 3.5–5.2)
PROTHROMBIN TIME: 11.4 SECONDS (ref 19.4–28.5)
QT INTERVAL: 365 MS
QT INTERVAL: 377 MS
RBC # BLD AUTO: 3.27 10*6/MM3 (ref 3.77–5.28)
SCAN SLIDE: NORMAL
SODIUM SERPL-SCNC: 141 MMOL/L (ref 136–145)
TOXIC GRANULATION: ABNORMAL
VARIANT LYMPHS NFR BLD MANUAL: 7 % (ref 19.6–45.3)
WBC NRBC COR # BLD: 11.3 10*3/MM3 (ref 3.4–10.8)

## 2022-01-02 PROCEDURE — 80048 BASIC METABOLIC PNL TOTAL CA: CPT | Performed by: INTERNAL MEDICINE

## 2022-01-02 PROCEDURE — 84100 ASSAY OF PHOSPHORUS: CPT | Performed by: INTERNAL MEDICINE

## 2022-01-02 PROCEDURE — 85025 COMPLETE CBC W/AUTO DIFF WBC: CPT | Performed by: INTERNAL MEDICINE

## 2022-01-02 PROCEDURE — 94799 UNLISTED PULMONARY SVC/PX: CPT

## 2022-01-02 PROCEDURE — 85007 BL SMEAR W/DIFF WBC COUNT: CPT | Performed by: INTERNAL MEDICINE

## 2022-01-02 PROCEDURE — 83735 ASSAY OF MAGNESIUM: CPT | Performed by: INTERNAL MEDICINE

## 2022-01-02 PROCEDURE — 36415 COLL VENOUS BLD VENIPUNCTURE: CPT | Performed by: INTERNAL MEDICINE

## 2022-01-02 PROCEDURE — 4A023N7 MEASUREMENT OF CARDIAC SAMPLING AND PRESSURE, LEFT HEART, PERCUTANEOUS APPROACH: ICD-10-PCS | Performed by: INTERNAL MEDICINE

## 2022-01-02 PROCEDURE — 80162 ASSAY OF DIGOXIN TOTAL: CPT | Performed by: INTERNAL MEDICINE

## 2022-01-02 PROCEDURE — B2151ZZ FLUOROSCOPY OF LEFT HEART USING LOW OSMOLAR CONTRAST: ICD-10-PCS | Performed by: INTERNAL MEDICINE

## 2022-01-02 PROCEDURE — 97112 NEUROMUSCULAR REEDUCATION: CPT

## 2022-01-02 PROCEDURE — 99232 SBSQ HOSP IP/OBS MODERATE 35: CPT | Performed by: INTERNAL MEDICINE

## 2022-01-02 PROCEDURE — 85610 PROTHROMBIN TIME: CPT | Performed by: INTERNAL MEDICINE

## 2022-01-02 PROCEDURE — 25010000002 METHYLPREDNISOLONE PER 40 MG: Performed by: NURSE PRACTITIONER

## 2022-01-02 PROCEDURE — B2111ZZ FLUOROSCOPY OF MULTIPLE CORONARY ARTERIES USING LOW OSMOLAR CONTRAST: ICD-10-PCS | Performed by: INTERNAL MEDICINE

## 2022-01-02 PROCEDURE — 25010000002 ENOXAPARIN PER 10 MG: Performed by: INTERNAL MEDICINE

## 2022-01-02 PROCEDURE — 0 AMPICILLIN-SULBACTAM PER 1.5 G: Performed by: INTERNAL MEDICINE

## 2022-01-02 RX ORDER — SODIUM BICARBONATE 650 MG/1
1300 TABLET ORAL 3 TIMES DAILY
Status: DISCONTINUED | OUTPATIENT
Start: 2022-01-02 | End: 2022-01-03 | Stop reason: HOSPADM

## 2022-01-02 RX ORDER — SODIUM CHLORIDE 9 MG/ML
50 INJECTION, SOLUTION INTRAVENOUS CONTINUOUS
Status: DISCONTINUED | OUTPATIENT
Start: 2022-01-02 | End: 2022-01-03

## 2022-01-02 RX ADMIN — AMPICILLIN SODIUM AND SULBACTAM SODIUM 1.5 G: 1; .5 INJECTION, POWDER, FOR SOLUTION INTRAMUSCULAR; INTRAVENOUS at 22:30

## 2022-01-02 RX ADMIN — ASPIRIN 81 MG: 81 TABLET, COATED ORAL at 08:44

## 2022-01-02 RX ADMIN — SODIUM BICARBONATE 650 MG TABLET 1300 MG: at 22:24

## 2022-01-02 RX ADMIN — METHYLPREDNISOLONE SODIUM SUCCINATE 20 MG: 40 INJECTION, POWDER, FOR SOLUTION INTRAMUSCULAR; INTRAVENOUS at 22:25

## 2022-01-02 RX ADMIN — MEGESTROL ACETATE 400 MG: 40 SUSPENSION ORAL at 08:44

## 2022-01-02 RX ADMIN — ENOXAPARIN SODIUM 30 MG: 30 INJECTION SUBCUTANEOUS at 17:47

## 2022-01-02 RX ADMIN — GUAIFENESIN 600 MG: 600 TABLET, EXTENDED RELEASE ORAL at 08:44

## 2022-01-02 RX ADMIN — SODIUM BICARBONATE 650 MG TABLET 650 MG: at 08:44

## 2022-01-02 RX ADMIN — SODIUM CHLORIDE, PRESERVATIVE FREE 3 ML: 5 INJECTION INTRAVENOUS at 22:34

## 2022-01-02 RX ADMIN — IPRATROPIUM BROMIDE AND ALBUTEROL SULFATE 3 ML: 2.5; .5 SOLUTION RESPIRATORY (INHALATION) at 07:55

## 2022-01-02 RX ADMIN — METOPROLOL TARTRATE 25 MG: 25 TABLET, FILM COATED ORAL at 08:44

## 2022-01-02 RX ADMIN — SODIUM CHLORIDE, PRESERVATIVE FREE 3 ML: 5 INJECTION INTRAVENOUS at 08:45

## 2022-01-02 RX ADMIN — POLYETHYLENE GLYCOL 3350 17 G: 17 POWDER, FOR SOLUTION ORAL at 08:44

## 2022-01-02 RX ADMIN — DONEPEZIL HYDROCHLORIDE 5 MG: 5 TABLET, FILM COATED ORAL at 22:33

## 2022-01-02 RX ADMIN — Medication 1200 MG: at 22:23

## 2022-01-02 RX ADMIN — IPRATROPIUM BROMIDE AND ALBUTEROL SULFATE 3 ML: 2.5; .5 SOLUTION RESPIRATORY (INHALATION) at 11:00

## 2022-01-02 RX ADMIN — SERTRALINE HYDROCHLORIDE 100 MG: 100 TABLET ORAL at 08:44

## 2022-01-02 RX ADMIN — Medication 1200 MG: at 08:44

## 2022-01-02 RX ADMIN — IPRATROPIUM BROMIDE AND ALBUTEROL SULFATE 3 ML: 2.5; .5 SOLUTION RESPIRATORY (INHALATION) at 19:59

## 2022-01-02 RX ADMIN — ATORVASTATIN CALCIUM 20 MG: 20 TABLET, FILM COATED ORAL at 08:44

## 2022-01-02 RX ADMIN — AMPICILLIN SODIUM AND SULBACTAM SODIUM 1.5 G: 1; .5 INJECTION, POWDER, FOR SOLUTION INTRAMUSCULAR; INTRAVENOUS at 08:44

## 2022-01-02 RX ADMIN — SODIUM BICARBONATE 650 MG TABLET 1300 MG: at 17:48

## 2022-01-02 RX ADMIN — METHYLPREDNISOLONE SODIUM SUCCINATE 20 MG: 40 INJECTION, POWDER, FOR SOLUTION INTRAMUSCULAR; INTRAVENOUS at 08:44

## 2022-01-02 RX ADMIN — ALLOPURINOL 100 MG: 100 TABLET ORAL at 08:44

## 2022-01-02 RX ADMIN — GUAIFENESIN 600 MG: 600 TABLET, EXTENDED RELEASE ORAL at 22:29

## 2022-01-02 RX ADMIN — SODIUM CHLORIDE 50 ML/HR: 9 INJECTION, SOLUTION INTRAVENOUS at 07:19

## 2022-01-02 NOTE — PLAN OF CARE
Problem: Adult Inpatient Plan of Care  Goal: Plan of Care Review  Outcome: Ongoing, Progressing  Goal: Patient-Specific Goal (Individualized)  Outcome: Ongoing, Progressing  Goal: Absence of Hospital-Acquired Illness or Injury  Outcome: Ongoing, Progressing  Intervention: Identify and Manage Fall Risk  Recent Flowsheet Documentation  Taken 1/2/2022 1500 by Melissa Mcmanus LPN  Safety Promotion/Fall Prevention: safety round/check completed  Taken 1/2/2022 1300 by Melissa Mcmanus LPN  Safety Promotion/Fall Prevention: safety round/check completed  Taken 1/2/2022 1100 by Melissa Mcmanus LPN  Safety Promotion/Fall Prevention: safety round/check completed  Taken 1/2/2022 0755 by Melissa Mcmanus LPN  Safety Promotion/Fall Prevention:   assistive device/personal items within reach   clutter free environment maintained   fall prevention program maintained   gait belt   lighting adjusted   room organization consistent   safety round/check completed  Intervention: Prevent Skin Injury  Recent Flowsheet Documentation  Taken 1/2/2022 0755 by Melissa Mcmanus LPN  Skin Protection: adhesive use limited  Intervention: Prevent and Manage VTE (venous thromboembolism) Risk  Recent Flowsheet Documentation  Taken 1/2/2022 0755 by Melissa Mcmanus LPN  VTE Prevention/Management:   bilateral   dorsiflexion/plantar flexion performed  Intervention: Prevent Infection  Recent Flowsheet Documentation  Taken 1/2/2022 0755 by Melissa Mcmanus LPN  Infection Prevention:   environmental surveillance performed   equipment surfaces disinfected   personal protective equipment utilized   hand hygiene promoted   rest/sleep promoted   single patient room provided  Goal: Optimal Comfort and Wellbeing  Outcome: Ongoing, Progressing  Intervention: Provide Person-Centered Care  Recent Flowsheet Documentation  Taken 1/2/2022 0755 by Melissa Mcmanus LPN  Trust Relationship/Rapport: care explained  Goal: Readiness for Transition of  Care  Outcome: Ongoing, Progressing     Problem: Skin Injury Risk Increased  Goal: Skin Health and Integrity  Outcome: Ongoing, Progressing  Intervention: Optimize Skin Protection  Recent Flowsheet Documentation  Taken 1/2/2022 0755 by Melissa Mcmanus LPN  Pressure Reduction Techniques:   frequent weight shift encouraged   weight shift assistance provided  Pressure Reduction Devices: pressure-redistributing mattress utilized  Skin Protection: adhesive use limited     Problem: Chest Pain  Goal: Resolution of Chest Pain Symptoms  Outcome: Ongoing, Progressing     Problem: Fall Injury Risk  Goal: Absence of Fall and Fall-Related Injury  Outcome: Ongoing, Progressing  Intervention: Identify and Manage Contributors to Fall Injury Risk  Recent Flowsheet Documentation  Taken 1/2/2022 1500 by Melissa Mcmanus LPN  Medication Review/Management: medications reviewed  Taken 1/2/2022 1300 by Melissa Mcmanus LPN  Medication Review/Management: medications reviewed  Taken 1/2/2022 1100 by Melissa Mcmanus LPN  Medication Review/Management: medications reviewed  Taken 1/2/2022 0755 by Melissa Mcmanus LPN  Medication Review/Management: medications reviewed  Intervention: Promote Injury-Free Environment  Recent Flowsheet Documentation  Taken 1/2/2022 1500 by Melissa Mcmanus LPN  Safety Promotion/Fall Prevention: safety round/check completed  Taken 1/2/2022 1300 by Melissa Mcmanus LPN  Safety Promotion/Fall Prevention: safety round/check completed  Taken 1/2/2022 1100 by Melissa Mcmanus LPN  Safety Promotion/Fall Prevention: safety round/check completed  Taken 1/2/2022 0755 by Melissa Mcmanus LPN  Safety Promotion/Fall Prevention:   assistive device/personal items within reach   clutter free environment maintained   fall prevention program maintained   gait belt   lighting adjusted   room organization consistent   safety round/check completed   Goal Outcome Evaluation:              Outcome Summary: Patient resting  throughout the shift. Attempted to feed herself, and do some ADLs, however patient is very weak, and tires extremely easy.Encouraged patient to take rest periods in between tasks. Alert and oriented x3. VSS.

## 2022-01-02 NOTE — PLAN OF CARE
Assessment: Alexandrea Gaxiola presents with functional mobility impairments which indicate the need for skilled intervention. Tolerating session today without incident. Pt is fearful of falling and tends to resist to come to fully sit at 90 Degrees. Worked on rocking forward at EOB to get her to stop leaning posteriorly. Req constant cues/assist to keep forward. Fatigues quickly and needs encouragement to push a little more. On 2L O2 but vital WNL. Plans to dc to acute rehab before returning to [facility] home.   Will continue to follow and progress as tolerated.

## 2022-01-02 NOTE — THERAPY TREATMENT NOTE
Subjective: Pt agreeable to therapeutic plan of care. Dtr stated pt is to have cardiac cath around 10am in morning. Pt agreed with encouragement to sit EOB.    Objective:     Bed mobility - Max-A and Assist x 2  Transfers - N/A or Not attempted.  Ambulation -  feet N/A or Not attempted.    Pain: 0 VAS  Education: Provided education on importance of mobility and skilled verbal / tactile cueing throughout intervention.     Assessment: Alexandrea Gaxiola presents with functional mobility impairments which indicate the need for skilled intervention. Tolerating session today without incident. Pt is fearful of falling and tends to resist to come to fully sit at 90 Degrees. Worked on rocking forward at EOB to get her to stop leaning posteriorly. Req constant cues/assist to keep forward. Fatigues quickly and needs encouragement to push a little more. On 2L O2 but vital WNL. Plans to dc to acute rehab before returning to dtrs home.   Will continue to follow and progress as tolerated.     Plan/Recommendations:   Pt would benefit from Inpatient Rehabilitation placement at discharge from facility and requires no DME at discharge.   Pt desires Inpatient Rehabilitation placement at discharge. Pt cooperative; agreeable to therapeutic recommendations and plan of care.     Basic Mobility 6-click:  Rollin = Total, A lot = 2, A little = 3; 4 = None  Supine>Sit:   1 = Total, A lot = 2, A little = 3; 4 = None   Sit>Stand with arms:  1 = Total, A lot = 2, A little = 3; 4 = None  Bed>Chair:   1 = Total, A lot = 2, A little = 3; 4 = None  Ambulate in room:  1 = Total, A lot = 2, A little = 3; 4 = None  3-5 Steps with railin = Total, A lot = 2, A little = 3; 4 = None  Score: 10    Modified Narcisa: 5 = Severe disability (Requires constant nursing care and attention, bedridden, incontinent)    Post-Tx Position: Supine with HOB Elevated, Alarms activated and Call light and personal items within reach  PPE: gloves, surgical mask,  eyewear protection

## 2022-01-02 NOTE — PROGRESS NOTES
Referring Provider: Margarita Schaffer MD    Reason for follow-up:  Angina  Anticoagulation management  Status post pacemaker     Patient Care Team:  Yudi Daniels MD as PCP - General (Family Medicine)  Mignon Luke MD as Consulting Physician (Cardiology)  Jl Ascencio MD as Consulting Physician (Nephrology)    Subjective .  Feeling better     ROS    Since I have last seen, the patient has been without any chest discomfort ,shortness of breath, palpitations, dizziness or syncope.  Denies having any headache ,abdominal pain ,nausea, vomiting , diarrhea constipation, loss of weight or loss of appetite.  Denies having any excessive bruising ,hematuria or blood in the stool.    Review of all systems negative except as indicated    History  Past Medical History:   Diagnosis Date   • Atrial fibrillation (HCC)    • Chronic kidney disease    • Hypertension    • Melanoma (HCC)    • MI (myocardial infarction) (HCC)        Past Surgical History:   Procedure Laterality Date   • CARDIAC CATHETERIZATION     • CARDIAC ELECTROPHYSIOLOGY PROCEDURE N/A 10/9/2020    Procedure: PPM generator change - dual;  Surgeon: Mignon Luke MD;  Location:  INVASIVE LOCATION;  Service: Cardiovascular;  Laterality: N/A;   • CAROTID ENDARTERECTOMY     • CERVICAL SPINE SURGERY     • INSERT / REPLACE / REMOVE PACEMAKER     • SCALP LESION REMOVAL W/ FLAP AND SKIN GRAFT     • TOTAL HIP ARTHROPLASTY Left        History reviewed. No pertinent family history.    Social History     Tobacco Use   • Smoking status: Never Smoker   • Smokeless tobacco: Never Used   Substance Use Topics   • Alcohol use: Never   • Drug use: Never        Medications Prior to Admission   Medication Sig Dispense Refill Last Dose   • acetaminophen (TYLENOL) 325 MG tablet Take 2 tablets by mouth Every 4 (Four) Hours As Needed for Mild Pain .   Past Week at Unknown time   • albuterol sulfate  (90 Base) MCG/ACT inhaler Inhale 1 puff Every 4 (Four)  Hours As Needed for Shortness of Air.      • amLODIPine (NORVASC) 5 MG tablet TAKE ONE TABLET BY MOUTH DAILY 90 tablet 3 Past Week at Unknown time   • atorvastatin (LIPITOR) 20 MG tablet Take 20 mg by mouth Daily.   Past Week at Unknown time   • digoxin (LANOXIN) 125 MCG tablet Take 125 mcg by mouth Daily.   Past Week at Unknown time   • donepezil (ARICEPT) 5 MG tablet Take 5 mg by mouth Every Night.   Past Week at Unknown time   • furosemide (LASIX) 20 MG tablet Take 1 tablet by mouth Daily. 90 tablet 1 Past Week at Unknown time   • lisinopril (PRINIVIL,ZESTRIL) 5 MG tablet Take 10 mg by mouth Daily.   Past Week at Unknown time   • megestrol (MEGACE) 40 MG/ML suspension Take 400 mg by mouth Every Morning.      • metoprolol tartrate (LOPRESSOR) 25 MG tablet Take 25 mg by mouth Daily.      • polyethylene glycol (MIRALAX) 17 g packet Take 17 g by mouth Daily.   Past Week at Unknown time   • sertraline (ZOLOFT) 100 MG tablet Take 100 mg by mouth Daily.   Past Week at Unknown time   • traMADol (ULTRAM) 50 MG tablet Take 50 mg by mouth Every 8 (Eight) Hours As Needed for Moderate Pain .   Past Week at Unknown time   • warfarin (COUMADIN) 2 MG tablet Take 1 tablet by mouth Daily. 90 tablet 0 Past Week at Unknown time       Allergies  Codeine; Hydrocodone-acetaminophen; Meperidine; Morphine; and Antihistamines, loratadine-type    Scheduled Meds:Acetylcysteine, 1,200 mg, Oral, BID  allopurinol, 100 mg, Oral, Daily  ampicillin-sulbactam, 1.5 g, Intravenous, Q12H  aspirin, 81 mg, Oral, Daily  atorvastatin, 20 mg, Oral, Daily  Barium Sulfate, 1 teaspoon(s), Oral, Once in imaging  donepezil, 5 mg, Oral, Nightly  epoetin stewart-epbx, 20,000 Units, Subcutaneous, Q14 Days  guaiFENesin, 600 mg, Oral, Q12H  ipratropium-albuterol, 3 mL, Nebulization, Q6H While Awake - RT  megestrol acetate, 400 mg, Oral, Daily With Breakfast  methylPREDNISolone sodium succinate, 20 mg, Intravenous, BID  metoprolol tartrate, 25 mg, Oral,  "Daily  polyethylene glycol, 17 g, Oral, Daily  sertraline, 100 mg, Oral, Daily  sodium bicarbonate, 1,300 mg, Oral, TID  sodium chloride, 3 mL, Intravenous, Q12H      Continuous Infusions:sodium chloride, 50 mL/hr      PRN Meds:.•  acetaminophen  •  LORazepam  •  melatonin  •  nitroglycerin  •  ondansetron  •  potassium chloride  •  potassium chloride  •  [COMPLETED] Insert peripheral IV **AND** sodium chloride  •  sodium chloride  •  traMADol    Objective     VITAL SIGNS  Vitals:    01/02/22 0758 01/02/22 1100 01/02/22 1104 01/02/22 1137   BP:    159/66   BP Location:       Patient Position:       Pulse: 61 60 60 61   Resp: 15 14 14 18   Temp:    98 °F (36.7 °C)   TempSrc:       SpO2: 100% 99% 100% 97%   Weight:       Height:           Flowsheet Rows      First Filed Value   Admission Height 142.2 cm (56\") Documented at 12/28/2021 1007   Admission Weight 49.9 kg (110 lb) Documented at 12/28/2021 1007            Intake/Output Summary (Last 24 hours) at 1/2/2022 1203  Last data filed at 1/2/2022 0941  Gross per 24 hour   Intake 520 ml   Output --   Net 520 ml        TELEMETRY: Pacemaker rhythm    Physical Exam:  The patient is alert, oriented and in no distress.  Vital signs as noted above.  Head and neck revealed no carotid bruits or jugular venous distention.  No thyromegaly or lymphadenopathy is present  Lungs clear.  No wheezing.  Breath sounds are normal bilaterally.  Heart normal first and second heart sounds.  No murmur. No precordial rub is present.  No gallop is present.  Abdomen soft and nontender.  No organomegaly is present.  Extremities with good peripheral pulses without any pedal edema.  Skin warm and dry.  Pacemaker site looks normal.  Musculoskeletal system is grossly normal  CNS grossly normal      Results Review:   I reviewed the patient's new clinical results.  Lab Results (last 24 hours)     Procedure Component Value Units Date/Time    Manual Differential [[id number]]  (Abnormal) Collected: " 01/02/22 0619    Specimen: Blood Updated: 01/02/22 0840     Neutrophil % 79.0 %      Lymphocyte % 7.0 %      Monocyte % 4.0 %      Bands %  4.0 %      Metamyelocyte % 1.0 %      Myelocyte % 5.0 %      Neutrophils Absolute 9.38 10*3/mm3      Lymphocytes Absolute 0.79 10*3/mm3      Monocytes Absolute 0.45 10*3/mm3      Anisocytosis Slight/1+     Toxic Granulation Slight/1+     Platelet Morphology Normal    CBC & Differential [[id number]]  (Abnormal) Collected: 01/02/22 0619    Specimen: Blood Updated: 01/02/22 0840    Narrative:      The following orders were created for panel order CBC & Differential.  Procedure                               Abnormality         Status                     ---------                               -----------         ------                     CBC Auto Differential[[id number]]        Abnormal            Final result               Scan Slide[[id number]]                                       Final result                 Please view results for these tests on the individual orders.    Scan Slide [[id number]] Collected: 01/02/22 0619    Specimen: Blood Updated: 01/02/22 0840     Scan Slide --     Comment: See Manual Differential Results       CBC Auto Differential [[id number]]  (Abnormal) Collected: 01/02/22 0619    Specimen: Blood Updated: 01/02/22 0840     WBC 11.30 10*3/mm3      RBC 3.27 10*6/mm3      Hemoglobin 10.1 g/dL      Hematocrit 29.9 %      MCV 91.3 fL      MCH 30.8 pg      MCHC 33.7 g/dL      RDW 16.6 %      RDW-SD 52.1 fl      MPV 6.7 fL      Platelets 288 10*3/mm3     Narrative:      The previously reported component NRBC is no longer being reported. Previous result was 0.0 /100 WBC (Reference Range: 0.0-0.2 /100 WBC) on 1/2/2022 at 0645 EST.    Phosphorus [[id number]]  (Normal) Collected: 01/02/22 0619    Specimen: Blood Updated: 01/02/22 0712     Phosphorus 2.8 mg/dL     Digoxin Level [[id number]]  (Abnormal) Collected: 01/02/22 0619    Specimen: Blood Updated: 01/02/22 0705      Digoxin 2.10 ng/mL     Protime-INR [[id number]]  (Abnormal) Collected: 01/02/22 0619    Specimen: Blood Updated: 01/02/22 0702     Protime 11.4 Seconds      INR 1.03    Basic Metabolic Panel [[id number]]  (Abnormal) Collected: 01/02/22 0619    Specimen: Blood Updated: 01/02/22 0701     Glucose 126 mg/dL      BUN 34 mg/dL      Creatinine 0.92 mg/dL      Sodium 141 mmol/L      Potassium 4.1 mmol/L      Chloride 109 mmol/L      CO2 21.0 mmol/L      Calcium 8.4 mg/dL      eGFR Non African Amer 57 mL/min/1.73      BUN/Creatinine Ratio 37.0     Anion Gap 11.0 mmol/L     Narrative:      GFR Normal >60  Chronic Kidney Disease <60  Kidney Failure <15      Magnesium [[id number]]  (Normal) Collected: 01/02/22 0619    Specimen: Blood Updated: 01/02/22 0701     Magnesium 2.2 mg/dL     Blood Culture - Blood, Blood, Venous Line [[id number]]  (Normal) Collected: 12/28/21 1345    Specimen: Blood, Venous Line Updated: 01/01/22 1400     Blood Culture No growth at 4 days    Blood Culture - Blood, Arm, Right [[id number]]  (Normal) Collected: 12/28/21 1345    Specimen: Blood from Arm, Right Updated: 01/01/22 1400     Blood Culture No growth at 4 days          Imaging Results (Last 24 Hours)     ** No results found for the last 24 hours. **      LAB RESULTS (LAST 7 DAYS)    CBC  Results from last 7 days   Lab Units 01/02/22  0619 01/01/22  0527 12/31/21  0413 12/30/21  0517 12/29/21  0416 12/28/21  1136   WBC 10*3/mm3 11.30* 12.40* 13.90* 11.10* 11.60* 13.00*   RBC 10*6/mm3 3.27* 3.15* 3.12* 3.18* 3.15* 3.74*   HEMOGLOBIN g/dL 10.1* 9.8* 9.6* 9.7* 9.4* 11.0*   HEMATOCRIT % 29.9* 28.5* 28.3* 29.3* 28.4* 34.4   MCV fL 91.3 90.4 90.9 92.0 90.2 92.0   PLATELETS 10*3/mm3 288 293 286 253 272 338       BMP  Results from last 7 days   Lab Units 01/02/22  0619 01/01/22  0527 12/31/21  0413 12/30/21  0636 12/30/21  0517 12/29/21  0416 12/28/21  1136   SODIUM mmol/L 141 140 139  --  136 140 138   POTASSIUM mmol/L 4.1 3.9 4.6  --  4.4 4.5 5.2   CHLORIDE  mmol/L 109* 106 104  --  102 107 105   CO2 mmol/L 21.0* 21.0* 22.0  --  20.0* 22.0 20.0*   BUN mg/dL 34* 43* 49*  --  40* 29* 34*   CREATININE mg/dL 0.92 1.26* 1.87*  --  1.54* 1.21* 1.42*   GLUCOSE mg/dL 126* 145* 150*  --  153* 100* 90   MAGNESIUM mg/dL 2.2 2.2 2.2 2.1 2.1 2.0  --    PHOSPHORUS mg/dL 2.8 3.8 5.3*  --   --   --   --        CMP   Results from last 7 days   Lab Units 01/02/22 0619 01/01/22 0527 12/31/21 0413 12/30/21 0517 12/29/21 0416 12/28/21  1136   SODIUM mmol/L 141 140 139 136 140 138   POTASSIUM mmol/L 4.1 3.9 4.6 4.4 4.5 5.2   CHLORIDE mmol/L 109* 106 104 102 107 105   CO2 mmol/L 21.0* 21.0* 22.0 20.0* 22.0 20.0*   BUN mg/dL 34* 43* 49* 40* 29* 34*   CREATININE mg/dL 0.92 1.26* 1.87* 1.54* 1.21* 1.42*   GLUCOSE mg/dL 126* 145* 150* 153* 100* 90   ALBUMIN g/dL  --   --  3.20*  --   --   --    BILIRUBIN mg/dL  --   --  0.2  --   --   --    ALK PHOS U/L  --   --  52  --   --   --    AST (SGOT) U/L  --   --  9  --   --   --    ALT (SGPT) U/L  --   --  13  --   --   --          BNP        TROPONIN  Results from last 7 days   Lab Units 12/30/21 0636 12/29/21 0416   CK TOTAL U/L 60  --    TROPONIN T ng/mL  --  0.060*       CoAg  Results from last 7 days   Lab Units 01/02/22 0619 01/01/22 0527 12/31/21 0413 12/30/21 0517 12/29/21 0416 12/28/21  1136   INR  1.03* 1.07* 2.01 1.99* 3.40* 3.55*       Creatinine Clearance  Estimated Creatinine Clearance: 31.9 mL/min (by C-G formula based on SCr of 0.92 mg/dL).    ABG  Results from last 7 days   Lab Units 12/28/21  1123   PH, ARTERIAL pH units 7.422   PCO2, ARTERIAL mm Hg 32.3*   PO2 ART mm Hg 42.2*   O2 SATURATION ART % 79.4*   BASE EXCESS ART mmol/L -2.8*       Radiology  No radiology results for the last day            EKG                            I personally viewed and interpreted the patient's EKG/Telemetry data: Atrial fibrillation    ECHOCARDIOGRAM:    Results for orders placed during the hospital encounter of 12/28/21    Adult  Transthoracic Echo Complete W/ Cont if Necessary Per Protocol    Interpretation Summary  · Estimated left ventricular EF = 60% Left ventricular systolic function is normal.    Indications  Chest pain    Technically satisfactory study.  Mitral valve is structurally normal.  Moderate mitral regurgitation.  Tricuspid valve is structurally normal.  Aortic valve is structurally normal.  Pulmonic valve could not be well visualized.  No evidence for tricuspid or aortic regurgitation is seen by Doppler study.  Biatrial enlargement  Left ventricle is normal in size and contractility with ejection fraction of 60%.  Concentric left ventricle hypertrophy.  Right ventricle is normal in size.  Atrial septum is intact.  Aorta is normal.  No pericardial effusion or intracardiac thrombus is seen.    Impression  Biatrial enlargement.  Moderate mitral regurgitation.  Left ventricular size and contractility is normal with ejection fraction of 60%.  Concentric left ventricle hypertrophy.          STRESS MYOVIEW:    Cardiolite (Tc-99m Sestamibi) stress test    CARDIAC CATHETERIZATION:            OTHER:         Assessment/Plan     Active Problems:    Elevated troponin    Dyspnea        ////////////////////////////  Impression  ===============  -Chest discomfort-suggestive of unstable angina.  Troponin level slightly elevated 0.0 53  EKG showed no acute changes.      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  Pacemaker was reprogrammed to DDDR due to long AV delays.  Status post dual-chamber pacemaker pulse generator replacement (prollie MRI compatible)-10/9/2020      -history of intermittent atrial fibrillation.  Patient is in Sinus rhythm.     -Anticoagulation-patient is on Coumadin.     -moderate mitral regurgitation.  Echocardiogram 01/28/2019 revealed moderate mitral regurgitation left atrial enlargement normal left ventricle function.      -  Status post  subendocardial  myocardial infarction -improved.     Cardiac catheterization 12/29/2015 revealed mild anterior wall hypokinesis with ejection fraction of 50%, 40% mid LAD 70-80% ostial diagonal branch disease (small caliber vessel) and 80% circumflex distal to a large marginal branch.      -status post left carotid endarterectomy cervical spine surgery left hip replacement and recent scalp surgery for carcinoma and grafting.     -hypertension-not well controlled.      -allergy to codeine, morphine and Demerol.     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     ===============    Plan  ================  Chest discomfort suggestive of unstable angina pectoris.  Mild elevation of troponin at 0.053  Trend troponin levels.  Follow-up EKGs.  Echocardiogram-we will review     Renal dysfunction  BUN 34 creatinine 1.42  29/1.21-12/29/2021  BUN 40/creatinine 1.54-12/30/2021  49/1.87-12/31/2021  Intravenous fluids  Renal consultation appreciated  Observe closely     Anticoagulation  INR 3.54  3.4-12/29/2021  1.99-12/30/2021  2.0  Hold Coumadin in anticipation of possible need for cardiac catheterization when INR is more favorable and in the safety zone..  Likely Friday.  Lexiscan Cardiolite test-negative for myocardial ischemia 2/15/2021.     Status post pacemaker  Pacemaker site is normal.  Recent integration of the pacemaker revealed excellent pacing parameters.  Patient has intermittent atrial fibrillation      Paroxysmal atrial fibrillation  Patient has converted and staying in sinus rhythm.     Mitral regurgitation-moderate.     Echocardiogram 8/16/2021 showed biatrial enlargement moderate mitral regurgitation and normal left ventricle function.     Medications were reviewed and updated.    Patient to have cardiac catheterization and coronary arteriography.  Postpone for now due to renal dysfunction.  Timing of cardiac catheterization next week will depend on renal function.  Risks and benefits pros and cons of  the procedure were discussed with patient including infection bleeding blood clot heart attack allergic reaction to the dye renal dysfunction.     Further plan will depend on patient's progress.  ////////////////////////////        Chest pain with nonspecific elevation of troponin  Frail patient with multiple comorbidities  Renal function has improved  Plans for cardiac catheterization on Monday  INR is better  Patient is currently on aspirin beta-blocker and statin  Plans for cardiac catheterization tomorrow  Renal function is back to baseline    Further recommendations based on patient course      Lalo Mendoza MD  01/02/22  12:03 EST

## 2022-01-02 NOTE — PLAN OF CARE
Problem: Adult Inpatient Plan of Care  Goal: Plan of Care Review  Outcome: Ongoing, Progressing  Flowsheets (Taken 1/1/2022 1901)  Outcome Summary: Patient resting throughout the shift. Attempted to feed herself, and do some ADLs, however patient is very weak, and tires extremely easy.Encouraged patient to take rest periods in between tasks. Alert and oriented x3. VSS.  Goal: Patient-Specific Goal (Individualized)  Outcome: Ongoing, Progressing  Goal: Absence of Hospital-Acquired Illness or Injury  Outcome: Ongoing, Progressing  Intervention: Identify and Manage Fall Risk  Recent Flowsheet Documentation  Taken 1/1/2022 1700 by Melissa Mcmanus LPN  Safety Promotion/Fall Prevention: safety round/check completed  Taken 1/1/2022 1500 by Melissa Mcmanus LPN  Safety Promotion/Fall Prevention: safety round/check completed  Taken 1/1/2022 1300 by Melissa Mcmanus LPN  Safety Promotion/Fall Prevention: safety round/check completed  Taken 1/1/2022 1100 by Melissa Mcmanus LPN  Safety Promotion/Fall Prevention: safety round/check completed  Taken 1/1/2022 0900 by Melissa Mcmanus LPN  Safety Promotion/Fall Prevention: safety round/check completed  Taken 1/1/2022 0745 by Melissa Mcmanus LPN  Safety Promotion/Fall Prevention:   safety round/check completed   room organization consistent   lighting adjusted   gait belt   fall prevention program maintained   clutter free environment maintained   assistive device/personal items within reach  Intervention: Prevent Skin Injury  Recent Flowsheet Documentation  Taken 1/1/2022 0745 by Melissa Mmcanus LPN  Skin Protection:   adhesive use limited   incontinence pads utilized  Intervention: Prevent and Manage VTE (venous thromboembolism) Risk  Recent Flowsheet Documentation  Taken 1/1/2022 0745 by Melissa Mcmanus LPN  VTE Prevention/Management:   bilateral   dorsiflexion/plantar flexion performed  Intervention: Prevent Infection  Recent Flowsheet Documentation  Taken  1/1/2022 0745 by Melissa Mcmanus LPN  Infection Prevention:   environmental surveillance performed   equipment surfaces disinfected   hand hygiene promoted   personal protective equipment utilized   rest/sleep promoted   single patient room provided  Goal: Optimal Comfort and Wellbeing  Outcome: Ongoing, Progressing  Intervention: Provide Person-Centered Care  Recent Flowsheet Documentation  Taken 1/1/2022 0745 by Melissa Mcmanus LPN  Trust Relationship/Rapport: care explained  Goal: Readiness for Transition of Care  Outcome: Ongoing, Progressing     Problem: Skin Injury Risk Increased  Goal: Skin Health and Integrity  Outcome: Ongoing, Progressing  Intervention: Optimize Skin Protection  Recent Flowsheet Documentation  Taken 1/1/2022 0745 by Melissa Mcmanus LPN  Pressure Reduction Techniques:   frequent weight shift encouraged   weight shift assistance provided  Pressure Reduction Devices: pressure-redistributing mattress utilized  Skin Protection:   adhesive use limited   incontinence pads utilized     Problem: Chest Pain  Goal: Resolution of Chest Pain Symptoms  Outcome: Ongoing, Progressing     Problem: Fall Injury Risk  Goal: Absence of Fall and Fall-Related Injury  Outcome: Ongoing, Progressing  Intervention: Identify and Manage Contributors to Fall Injury Risk  Recent Flowsheet Documentation  Taken 1/1/2022 1700 by Melissa Mcmanus LPN  Medication Review/Management: medications reviewed  Taken 1/1/2022 1500 by Melissa Mcmanus LPN  Medication Review/Management: medications reviewed  Taken 1/1/2022 1300 by Melissa Mcmanus LPN  Medication Review/Management: medications reviewed  Taken 1/1/2022 1100 by Melissa Mcmanus LPN  Medication Review/Management: medications reviewed  Taken 1/1/2022 0900 by Melissa Mcmanus LPN  Medication Review/Management: medications reviewed  Taken 1/1/2022 0745 by Melissa Mcmanus LPN  Medication Review/Management: medications reviewed  Intervention: Promote Injury-Free  Environment  Recent Flowsheet Documentation  Taken 1/1/2022 1700 by Melissa Mcmanus LPN  Safety Promotion/Fall Prevention: safety round/check completed  Taken 1/1/2022 1500 by Melissa Mcmanus LPN  Safety Promotion/Fall Prevention: safety round/check completed  Taken 1/1/2022 1300 by Melissa Mcmanus LPN  Safety Promotion/Fall Prevention: safety round/check completed  Taken 1/1/2022 1100 by Melissa Mcmanus LPN  Safety Promotion/Fall Prevention: safety round/check completed  Taken 1/1/2022 0900 by Melissa Mcmanus LPN  Safety Promotion/Fall Prevention: safety round/check completed  Taken 1/1/2022 0745 by Melissa Mcmanus LPN  Safety Promotion/Fall Prevention:   safety round/check completed   room organization consistent   lighting adjusted   gait belt   fall prevention program maintained   clutter free environment maintained   assistive device/personal items within reach   Goal Outcome Evaluation:              Outcome Summary: Patient resting throughout the shift. Attempted to feed herself, and do some ADLs, however patient is very weak, and tires extremely easy.Encouraged patient to take rest periods in between tasks. Alert and oriented x3. VSS.

## 2022-01-02 NOTE — PROGRESS NOTES
LOS: 5 days   Patient Care Team:  Yudi Daniels MD as PCP - General (Family Medicine)  Mignon Luke MD as Consulting Physician (Cardiology)  Jl Ascencio MD as Consulting Physician (Nephrology)    Subjective     Interval History:     Patient Complaints: pt is more alert today    History taken from: patient    Review of Systems   Constitutional: Positive for activity change and fatigue. Negative for fever.   Respiratory: Negative for shortness of breath.    Cardiovascular: Negative.    Gastrointestinal: Negative.    Musculoskeletal: Positive for arthralgias and back pain.   Neurological: Positive for weakness and headaches.   Psychiatric/Behavioral: Positive for confusion. The patient is nervous/anxious.            Objective     Vital Signs  Temp:  [98 °F (36.7 °C)-98.3 °F (36.8 °C)] 98 °F (36.7 °C)  Heart Rate:  [59-67] 61  Resp:  [15-18] 15  BP: (152-168)/(62-73) 168/62    Physical Exam:     General Appearance:    Alert, cooperative, in no acute distress   Head:    Normocephalic, without obvious abnormality, atraumatic   Eyes:            Lids and lashes normal, conjunctivae and sclerae normal, no   icterus, no pallor, corneas clear, PERRLA   Ears:    Ears appear intact with no abnormalities noted   Throat:   No oral lesions, no thrush, oral mucosa moist   Neck:   No adenopathy, supple, trachea midline, no thyromegaly, no   carotid bruit, no JVD   Lungs:     Clear to auscultation,respirations regular, even and                  unlabored    Heart:    Regular rhythm and normal rate, normal S1 and S2, no            murmur, no gallop, no rub, no click   Chest Wall:    No abnormalities observed   Abdomen:     Normal bowel sounds, no masses, no organomegaly, soft        non-tender, non-distended, no guarding, no rebound                tenderness   Extremities:   Moves all extremities well, no edema, no cyanosis, no             redness   Pulses:   Pulses palpable and equal bilaterally   Skin:   No  bleeding, bruising or rash   Lymph nodes:   No palpable adenopathy   Neurologic:   Cranial nerves 2 - 12 grossly intact, sensation intact, DTR       present and equal bilaterally        Results Review:    Lab Results (last 24 hours)     Procedure Component Value Units Date/Time    Manual Differential [[id number]]  (Abnormal) Collected: 01/02/22 0619    Specimen: Blood Updated: 01/02/22 0840     Neutrophil % 79.0 %      Lymphocyte % 7.0 %      Monocyte % 4.0 %      Bands %  4.0 %      Metamyelocyte % 1.0 %      Myelocyte % 5.0 %      Neutrophils Absolute 9.38 10*3/mm3      Lymphocytes Absolute 0.79 10*3/mm3      Monocytes Absolute 0.45 10*3/mm3      Anisocytosis Slight/1+     Toxic Granulation Slight/1+     Platelet Morphology Normal    CBC & Differential [[id number]]  (Abnormal) Collected: 01/02/22 0619    Specimen: Blood Updated: 01/02/22 0840    Narrative:      The following orders were created for panel order CBC & Differential.  Procedure                               Abnormality         Status                     ---------                               -----------         ------                     CBC Auto Differential[[id number]]        Abnormal            Final result               Scan Slide[[id number]]                                       Final result                 Please view results for these tests on the individual orders.    Scan Slide [[id number]] Collected: 01/02/22 0619    Specimen: Blood Updated: 01/02/22 0840     Scan Slide --     Comment: See Manual Differential Results       CBC Auto Differential [[id number]]  (Abnormal) Collected: 01/02/22 0619    Specimen: Blood Updated: 01/02/22 0840     WBC 11.30 10*3/mm3      RBC 3.27 10*6/mm3      Hemoglobin 10.1 g/dL      Hematocrit 29.9 %      MCV 91.3 fL      MCH 30.8 pg      MCHC 33.7 g/dL      RDW 16.6 %      RDW-SD 52.1 fl      MPV 6.7 fL      Platelets 288 10*3/mm3     Narrative:      The previously reported component NRBC is no longer being reported.  Previous result was 0.0 /100 WBC (Reference Range: 0.0-0.2 /100 WBC) on 1/2/2022 at 0645 EST.    Phosphorus [[id number]]  (Normal) Collected: 01/02/22 0619    Specimen: Blood Updated: 01/02/22 0712     Phosphorus 2.8 mg/dL     Digoxin Level [[id number]]  (Abnormal) Collected: 01/02/22 0619    Specimen: Blood Updated: 01/02/22 0705     Digoxin 2.10 ng/mL     Protime-INR [[id number]]  (Abnormal) Collected: 01/02/22 0619    Specimen: Blood Updated: 01/02/22 0702     Protime 11.4 Seconds      INR 1.03    Basic Metabolic Panel [[id number]]  (Abnormal) Collected: 01/02/22 0619    Specimen: Blood Updated: 01/02/22 0701     Glucose 126 mg/dL      BUN 34 mg/dL      Creatinine 0.92 mg/dL      Sodium 141 mmol/L      Potassium 4.1 mmol/L      Chloride 109 mmol/L      CO2 21.0 mmol/L      Calcium 8.4 mg/dL      eGFR Non African Amer 57 mL/min/1.73      BUN/Creatinine Ratio 37.0     Anion Gap 11.0 mmol/L     Narrative:      GFR Normal >60  Chronic Kidney Disease <60  Kidney Failure <15      Magnesium [[id number]]  (Normal) Collected: 01/02/22 0619    Specimen: Blood Updated: 01/02/22 0701     Magnesium 2.2 mg/dL     Blood Culture - Blood, Blood, Venous Line [[id number]]  (Normal) Collected: 12/28/21 1345    Specimen: Blood, Venous Line Updated: 01/01/22 1400     Blood Culture No growth at 4 days    Blood Culture - Blood, Arm, Right [[id number]]  (Normal) Collected: 12/28/21 1345    Specimen: Blood from Arm, Right Updated: 01/01/22 1400     Blood Culture No growth at 4 days           Imaging Results (Last 24 Hours)     ** No results found for the last 24 hours. **               I reviewed the patient's new clinical results.    Medication Review:   Scheduled Meds:Acetylcysteine, 1,200 mg, Oral, BID  allopurinol, 100 mg, Oral, Daily  ampicillin-sulbactam, 1.5 g, Intravenous, Q12H  aspirin, 81 mg, Oral, Daily  atorvastatin, 20 mg, Oral, Daily  Barium Sulfate, 1 teaspoon(s), Oral, Once in imaging  donepezil, 5 mg, Oral, Nightly  epoetin  stewart-epbx, 20,000 Units, Subcutaneous, Q14 Days  guaiFENesin, 600 mg, Oral, Q12H  ipratropium-albuterol, 3 mL, Nebulization, Q6H While Awake - RT  megestrol acetate, 400 mg, Oral, Daily With Breakfast  methylPREDNISolone sodium succinate, 20 mg, Intravenous, BID  metoprolol tartrate, 25 mg, Oral, Daily  polyethylene glycol, 17 g, Oral, Daily  sertraline, 100 mg, Oral, Daily  sodium bicarbonate, 1,300 mg, Oral, TID  sodium chloride, 3 mL, Intravenous, Q12H      Continuous Infusions:sodium chloride, 50 mL/hr      PRN Meds:.•  acetaminophen  •  LORazepam  •  melatonin  •  nitroglycerin  •  ondansetron  •  potassium chloride  •  potassium chloride  •  [COMPLETED] Insert peripheral IV **AND** sodium chloride  •  sodium chloride  •  traMADol     Assessment/Plan       Elevated troponin    Dyspnea    Bibasilar pneumonia  -antibiotics  -speech therapy evaluated - diet to be regular texture with thin liquids  -duo-nebs/mucinex/low dose steroids     Acute hypoxemic respiratory failure -appears to be multifactorial with both pulmonary edema, viral URI and bibasilar infiltrates noted on x-ray  -Unasyn for pneumonia  -diuresed gently with Lasix as well      Generalized weakness  -PT/OT      Elevated troponin  -cardiology following.  Cath planned for Monday     Chronic atrial fib    -rate is controlled; patient is anticoagulated  - holding warfarin for further testing  - hold digoxin secondary to critically high value.  Repeat improved but high.  Repeat in am     Chronic kidney disease stage III   -nephrology following     Acute on chronic diastolic dysfunction  -diurese gently        Pulmonary hypertension  Presence of permanent pacemaker  Secondary hypercoagulable state related to atrial fib  Non-Covid coronavirus infection -supportive care        Diet: regular  DVT prophylaxis: coumadin (on hold)  GI prophylaxis: pepcid  Code status: full    Plan for disposition: accepted at [facility]/SYEDA Daniels MD  01/02/22  10:03  EST

## 2022-01-02 NOTE — PROGRESS NOTES
"NEPHROLOGY PROGRESS NOTE------[facility]    [facility]  [phone]  Quincy Ascencio MD      Patient Care Team:  Yudi Daniels MD as PCP - General (Family Medicine)  Mignon Luke MD as Consulting Physician (Cardiology)  Jl Ascencio MD as Consulting Physician (Nephrology)      Provider:  Quincy Ascencio MD  Patient Name: Alexandrea Gaxiola  :  1932    SUBJECTIVE:    F/U ARF/WALTER/CRF/CKD    Comfortable. No angina. No palpitations. No dysuria.    Medication:  Acetylcysteine, 1,200 mg, Oral, BID  allopurinol, 100 mg, Oral, Daily  ampicillin-sulbactam, 1.5 g, Intravenous, Q12H  aspirin, 81 mg, Oral, Daily  atorvastatin, 20 mg, Oral, Daily  Barium Sulfate, 1 teaspoon(s), Oral, Once in imaging  donepezil, 5 mg, Oral, Nightly  epoetin stewart-epbx, 20,000 Units, Subcutaneous, Q14 Days  guaiFENesin, 600 mg, Oral, Q12H  ipratropium-albuterol, 3 mL, Nebulization, Q6H While Awake - RT  megestrol acetate, 400 mg, Oral, Daily With Breakfast  methylPREDNISolone sodium succinate, 20 mg, Intravenous, BID  metoprolol tartrate, 25 mg, Oral, Daily  polyethylene glycol, 17 g, Oral, Daily  sertraline, 100 mg, Oral, Daily  sodium bicarbonate, 650 mg, Oral, TID  sodium chloride, 3 mL, Intravenous, Q12H      sodium chloride, 50 mL/hr, Last Rate: 50 mL/hr (22 0719)        OBJECTIVE    Vital Sign Min/Max for last 24 hours  Temp  Min: 98 °F (36.7 °C)  Max: 98.3 °F (36.8 °C)   BP  Min: 152/73  Max: 168/62   Pulse  Min: 59  Max: 67   Resp  Min: 15  Max: 18   SpO2  Min: 94 %  Max: 100 %   No data recorded   No data recorded     Flowsheet Rows      First Filed Value   Admission Height 142.2 cm (56\") Documented at 2021 1007   Admission Weight 49.9 kg (110 lb) Documented at 2021 1007          No intake/output data recorded.  I/O last 3 completed shifts:  In: 480 [P.O.:480]  Out: -     Physical Exam:  General Appearance: alert, appears " stated age and cooperative +WEAK AND MALAISED  Head: normocephalic, without obvious abnormality and atraumatic   Eyes: conjunctivae and sclerae normal and no icterus  Neck: supple and no JVD  Lungs: +FINE BIBASILAR CRACKLES AND BIBASILAR RALES  Heart: regular rhythm & normal rate and normal S1, S2 +UNIQUE  Chest Wall: no abnormalities observed  Abdomen: normal bowel sounds and soft non-tender  Extremities: moves extremities well, no edema, no cyanosis +DJD  Skin: no bleeding, bruising or rash  Neurologic: Alert, and oriented. No focal deficits    Labs:    WBC WBC   Date Value Ref Range Status   01/02/2022 11.30 (H) 3.40 - 10.80 10*3/mm3 Final   01/01/2022 12.40 (H) 3.40 - 10.80 10*3/mm3 Final   12/31/2021 13.90 (H) 3.40 - 10.80 10*3/mm3 Final      HGB Hemoglobin   Date Value Ref Range Status   01/02/2022 10.1 (L) 12.0 - 15.9 g/dL Final   01/01/2022 9.8 (L) 12.0 - 15.9 g/dL Final   12/31/2021 9.6 (L) 12.0 - 15.9 g/dL Final      HCT Hematocrit   Date Value Ref Range Status   01/02/2022 29.9 (L) 34.0 - 46.6 % Final   01/01/2022 28.5 (L) 34.0 - 46.6 % Final   12/31/2021 28.3 (L) 34.0 - 46.6 % Final      Platlets No results found for: LABPLAT   MCV MCV   Date Value Ref Range Status   01/02/2022 91.3 79.0 - 97.0 fL Final   01/01/2022 90.4 79.0 - 97.0 fL Final   12/31/2021 90.9 79.0 - 97.0 fL Final          Sodium Sodium   Date Value Ref Range Status   01/02/2022 141 136 - 145 mmol/L Final   01/01/2022 140 136 - 145 mmol/L Final   12/31/2021 139 136 - 145 mmol/L Final      Potassium Potassium   Date Value Ref Range Status   01/02/2022 4.1 3.5 - 5.2 mmol/L Final   01/01/2022 3.9 3.5 - 5.2 mmol/L Final   12/31/2021 4.6 3.5 - 5.2 mmol/L Final      Chloride Chloride   Date Value Ref Range Status   01/02/2022 109 (H) 98 - 107 mmol/L Final   01/01/2022 106 98 - 107 mmol/L Final   12/31/2021 104 98 - 107 mmol/L Final      CO2 CO2   Date Value Ref Range Status   01/02/2022 21.0 (L) 22.0 - 29.0 mmol/L Final   01/01/2022 21.0 (L) 22.0  - 29.0 mmol/L Final   12/31/2021 22.0 22.0 - 29.0 mmol/L Final      BUN BUN   Date Value Ref Range Status   01/02/2022 34 (H) 8 - 23 mg/dL Final   01/01/2022 43 (H) 8 - 23 mg/dL Final   12/31/2021 49 (H) 8 - 23 mg/dL Final      Creatinine Creatinine   Date Value Ref Range Status   01/02/2022 0.92 0.57 - 1.00 mg/dL Final   01/01/2022 1.26 (H) 0.57 - 1.00 mg/dL Final   12/31/2021 1.87 (H) 0.57 - 1.00 mg/dL Final      Calcium Calcium   Date Value Ref Range Status   01/02/2022 8.4 (L) 8.6 - 10.5 mg/dL Final   01/01/2022 8.6 8.6 - 10.5 mg/dL Final   12/31/2021 8.3 (L) 8.6 - 10.5 mg/dL Final      PO4 No components found for: PO4   Albumin Albumin   Date Value Ref Range Status   12/31/2021 3.20 (L) 3.50 - 5.20 g/dL Final      Magnesium Magnesium   Date Value Ref Range Status   01/02/2022 2.2 1.6 - 2.4 mg/dL Final   01/01/2022 2.2 1.6 - 2.4 mg/dL Final   12/31/2021 2.2 1.6 - 2.4 mg/dL Final      Uric Acid No components found for: URIC ACID     Imaging Results (Last 72 Hours)     Procedure Component Value Units Date/Time    US Renal Bilateral [[id number]] Collected: 12/30/21 1629     Updated: 12/30/21 1633    Narrative:      Examination: US RENAL BILATERAL-     Date of Exam: 12/30/2021 3:58 PM     Indication: Acute on chronic renal disease.     Comparison: None available.     Technique: Grayscale and color Doppler ultrasound evaluation of the  kidneys and urinary bladder was performed     Findings:  The right kidney measures 8.2 x 3.6 x 4.4 cm and the left kidney  measures 8.7 x 3.0 x 4.6 cm.The cortical thickness and echogenicity is  normal. There are incidental parapelvic cyst in the right kidney. There  is no solid renal mass, calculus, or hydronephrosis.The visualized  urinary bladder is unremarkable.The estimated urinary bladder volume is  32 cc.       Impression:         1. Right renal parapelvic cysts.  2. The left kidney and urinary bladder are normal.     Electronically Signed By-Yayo Wang MD On:12/30/2021 4:30  PM  This report was finalized on [id number] by  Yayo Wang MD.    FL Video Swallow With Speech Single Contrast [[id number]] Collected: 12/30/21 1204     Updated: 12/30/21 1206    Narrative:      DATE OF EXAM:  12/30/2021 10:20 AM     PROCEDURE:  FL VIDEO SWALLOW W SPEECH SINGLE-CONTRAST-     INDICATIONS:  dysphagia; R06.00-Dyspnea, unspecified; R09.02-Hypoxemia;  J18.9-Pneumonia, unspecified organism; R77.8-Other specified  abnormalities of plasma proteins; R06.00-Dyspnea, unspecified     COMPARISON:  Modified swallow study with fluoroscopy 8/18/2021.     TECHNIQUE:   This examination was performed in conjunction with speech pathology.  Lateral video fluoroscopic evaluation of the swallowing mechanism was  performed while correlate administering to the patient various  consistency food items mixed with barium.     Fluoroscopic Time:   1.1 minutes     FINDINGS:  8 spot fluoroscopic series images were obtained during modified barium  swallow study. The study was performed by a dedicated speech  pathologist. I was present during the entire examination.        Impression:      Modified barium swallow study with fluoroscopy. Please refer to the  speech pathologist report for findings and dietary recommendations.     Electronically Signed By-Cynthia Lee MD On:12/30/2021 12:04 PM  This report was finalized on [id number] by  Cynthia Lee MD.          Results for orders placed during the hospital encounter of 12/28/21    XR Chest 1 View    Narrative  DATE OF EXAM:  12/28/2021 11:28 AM    PROCEDURE:  XR CHEST 1 VW-    INDICATIONS:  Dyspnea    COMPARISON:  AP portable chest 9/29/2021.    TECHNIQUE:  Single radiographic view of the chest was obtained.    FINDINGS:  There is increased dense opacity in the left lower lung which may  represent a combination of pleural fluid and consolidated lung. New  small right pleural effusion has developed. There is stable moderate  generalized cardiac mediastinal enlargement. New  hazy airspace disease  has developed in the right mid to lower lung zone. Pacemaker device  appears unchanged. There are surgical changes of the thoracic lumbar  junction. There is S-shaped thoracolumbar scoliosis. There are severe  degenerative changes of the shoulders. Advanced osteopenic changes are  present. No pneumothorax.    Impression  1. FINDINGS consistent with worsening bilateral pneumonia, with most  dense consolidative change in the retrocardiac left lower lobe, and new  airspace disease in the right mid to lower lung zone..  2. Suspected small bilateral pleural effusions.    Electronically Signed By-Cynthia Lee MD On:12/28/2021 11:38 AM  This report was finalized on [id number] by  Cynthia Lee MD.      Results for orders placed during the hospital encounter of 09/29/21    XR Chest 1 View    Narrative  DATE OF EXAM:  9/29/2021 2:09 PM    PROCEDURE:  XR CHEST 1 VW-    INDICATIONS:  general weakness    COMPARISON:  AP portable chest 8/23/2021    TECHNIQUE:  Single radiographic view of the chest was obtained.    FINDINGS:  Thoracolumbar shaped scoliotic curvature is redemonstrated. The  mediastinal position to left of midline is similar to the prior exam.  Bulky megaly appears unchanged. The cardiac silhouette obscures the left  lung base. Underlying left lower lobe atelectasis or pneumonia is  suspected. Right lung appears clear. Pacemaker appears unchanged. There  are surgical changes of the lumbar spine. Osteopenic changes are  present. There are degenerative changes of both shoulders.    Impression  1. Opacity at the left base may represent atelectasis or pneumonia. Left  basilar aeration is thought to be improved compared to 8/23/2021.  2. Right lung airspace disease has resolved since 8/23/2021.    Electronically Signed By-Cynthia Lee MD On:9/29/2021 2:29 PM  This report was finalized on [id number] by  Cynthia Lee MD.      Results for orders placed during the hospital encounter of  08/13/21    XR Chest 1 View    Narrative  DATE OF EXAM:  8/23/2021 5:51 AM    PROCEDURE:  XR CHEST 1 VW-    INDICATIONS:  Acute respiratory failure with hypoxia, shortness of breath, chest pain,  pneumonia.    COMPARISON:  8/16/2021.    TECHNIQUE:  Single radiographic view of the chest was obtained.    FINDINGS:  The heart is enlarged. There is a left-sided transvenous pacemaker in  place. There is a small right and small-to-moderate left pleural  effusion. There is dense consolidation in the left lower chest felt to  represent a combination of atelectasis or pneumonia. There is bilateral  mixed interstitial/airspace disease throughout the remaining lungs which  can be seen with multifocal pneumonia versus pulmonary edema. Aeration  of the right chest may be slightly improved. There is no interval change  on the left side.  There is no pneumothorax.    Impression  1. Cardiomegaly.  2. Small right and small-to-moderate left pleural effusion which are  unchanged.  3. Unchanged dense consolidation in the left lower chest felt to  represent a combination of atelectasis or pneumonia.  4. There is superimposed bilateral mixed interstitial/airspace disease  throughout the remaining lungs with slight interval improvement which  can be seen with multifocal pneumonia or pulmonary edema.    Electronically Signed By-Yayo Wang MD On:8/23/2021 8:23 AM  This report was finalized on [id number] by  Yayo Wang MD.            ASSESSMENT / PLAN      Elevated troponin    Dyspnea      1. ARF/WALTER/CRF/CKD STG 3A------Nonoliguric. BUN/Cr down to baseline. Hold IVFs during day time today and then restart at midnight tonight for cardiac cath that's planned for tomorrow.  Patient has been explained and understands risks of IV dye exposure. +ARF/WALTER on top of known CRF/CKD STG 3A with a baseline serum creatinine of about 1.15. CRF/CKD STG 3A is secondary to HTN NS. +ARF/WALTER is secondary to prerenal state with concomitant ACE-I and  diuretic use. Off Lasix and Lisinopril. No NSAIDs or IV dye. Dose meds for CrCl less than 10 cc/min until ARF/WALTER is resolved     2. HTN WITH CKD-----BP ok and not significantly hypotensive today. D/C Lisinopril and Norvasc and holding Lasix and giving IVFs     3. OA/DJD-----No NSAIDs     4. HYPERURICEMIA------Allopurinol. No NSAIDs     5. ANEMIA OF CKD------Given IV iron for ADRIANA and started EPO     6. VITAMIN D DEFICIENCY     7. PNA----Backed down Unasyn for WALTER. COVID 19 negative     8. SMALL BILATERAL PLEURAL EFFUSIONS------Hold Lasix and follow with hydration     9. SECONDARY HYPERPARAHTHYROIDISM-----Right below tx threshold. Follow phos and follow PTH as an outpatient     10. ACIDOSIS-----Metabolic. No elevation in AGAP. +Type 4 RTA. Increase po NaHCO3 and follow     11. ATRIAL FIBRILLATION------Rate controlled and anticoagulated. Hold Digoxin     12. GERD/PUD PROPHYLAXIS------Renal dose adjusted Pepcid     13. EARLY ALZHEIMER'S TYPE DEMENTIA----On Aricept     14. DVT PROPHYLAXIS------Anticoagulated     15. HYPERLIPIDEMIA------On Statin. CK, TSH ok    16. CAD------Cardiac cath tomorrow      Quincy Ascencio MD  [facility]  [phone]  01/02/22  08:56 EST

## 2022-01-03 VITALS
SYSTOLIC BLOOD PRESSURE: 172 MMHG | OXYGEN SATURATION: 97 % | DIASTOLIC BLOOD PRESSURE: 70 MMHG | HEART RATE: 61 BPM | TEMPERATURE: 97.9 F | WEIGHT: 131.84 LBS | BODY MASS INDEX: 29.66 KG/M2 | RESPIRATION RATE: 18 BRPM | HEIGHT: 56 IN

## 2022-01-03 LAB
ANION GAP SERPL CALCULATED.3IONS-SCNC: 12 MMOL/L (ref 5–15)
ANISOCYTOSIS BLD QL: ABNORMAL
BUN SERPL-MCNC: 29 MG/DL (ref 8–23)
BUN/CREAT SERPL: 34.1 (ref 7–25)
CALCIUM SPEC-SCNC: 8.4 MG/DL (ref 8.6–10.5)
CHLORIDE SERPL-SCNC: 107 MMOL/L (ref 98–107)
CO2 SERPL-SCNC: 21 MMOL/L (ref 22–29)
CREAT SERPL-MCNC: 0.85 MG/DL (ref 0.57–1)
DEPRECATED RDW RBC AUTO: 49.9 FL (ref 37–54)
DIGOXIN SERPL-MCNC: 1.8 NG/ML (ref 0.6–1.2)
ERYTHROCYTE [DISTWIDTH] IN BLOOD BY AUTOMATED COUNT: 15.8 % (ref 12.3–15.4)
GFR SERPL CREATININE-BSD FRML MDRD: 63 ML/MIN/1.73
GLUCOSE SERPL-MCNC: 122 MG/DL (ref 65–99)
HCT VFR BLD AUTO: 30 % (ref 34–46.6)
HGB BLD-MCNC: 10.1 G/DL (ref 12–15.9)
INR PPP: 1.05 (ref 2–3)
INR PPP: 1.05 (ref 2–3)
LYMPHOCYTES # BLD MANUAL: 1.04 10*3/MM3 (ref 0.7–3.1)
MAGNESIUM SERPL-MCNC: 2.3 MG/DL (ref 1.6–2.4)
MCH RBC QN AUTO: 31.1 PG (ref 26.6–33)
MCHC RBC AUTO-ENTMCNC: 33.9 G/DL (ref 31.5–35.7)
MCV RBC AUTO: 92 FL (ref 79–97)
METAMYELOCYTES NFR BLD MANUAL: 2 % (ref 0–0)
MYELOCYTES NFR BLD MANUAL: 3 % (ref 0–0)
NEUTROPHILS # BLD AUTO: 11.31 10*3/MM3 (ref 1.7–7)
NEUTROPHILS NFR BLD MANUAL: 78 % (ref 42.7–76)
NEUTS BAND NFR BLD MANUAL: 9 % (ref 0–5)
PHOSPHATE SERPL-MCNC: 2.8 MG/DL (ref 2.5–4.5)
PLAT MORPH BLD: NORMAL
PLATELET # BLD AUTO: 300 10*3/MM3 (ref 140–450)
PMV BLD AUTO: 7.1 FL (ref 6–12)
POTASSIUM SERPL-SCNC: 4 MMOL/L (ref 3.5–5.2)
PROTHROMBIN TIME: 11.6 SECONDS (ref 19.4–28.5)
PROTHROMBIN TIME: 11.6 SECONDS (ref 19.4–28.5)
RBC # BLD AUTO: 3.26 10*6/MM3 (ref 3.77–5.28)
SCAN SLIDE: NORMAL
SODIUM SERPL-SCNC: 140 MMOL/L (ref 136–145)
VARIANT LYMPHS NFR BLD MANUAL: 8 % (ref 19.6–45.3)
WBC MORPH BLD: NORMAL
WBC NRBC COR # BLD: 13 10*3/MM3 (ref 3.4–10.8)

## 2022-01-03 PROCEDURE — 85025 COMPLETE CBC W/AUTO DIFF WBC: CPT | Performed by: INTERNAL MEDICINE

## 2022-01-03 PROCEDURE — 93010 ELECTROCARDIOGRAM REPORT: CPT | Performed by: INTERNAL MEDICINE

## 2022-01-03 PROCEDURE — C1769 GUIDE WIRE: HCPCS | Performed by: INTERNAL MEDICINE

## 2022-01-03 PROCEDURE — 92526 ORAL FUNCTION THERAPY: CPT

## 2022-01-03 PROCEDURE — 94799 UNLISTED PULMONARY SVC/PX: CPT

## 2022-01-03 PROCEDURE — 84100 ASSAY OF PHOSPHORUS: CPT | Performed by: INTERNAL MEDICINE

## 2022-01-03 PROCEDURE — 93458 L HRT ARTERY/VENTRICLE ANGIO: CPT | Performed by: INTERNAL MEDICINE

## 2022-01-03 PROCEDURE — 85007 BL SMEAR W/DIFF WBC COUNT: CPT | Performed by: INTERNAL MEDICINE

## 2022-01-03 PROCEDURE — 99152 MOD SED SAME PHYS/QHP 5/>YRS: CPT | Performed by: INTERNAL MEDICINE

## 2022-01-03 PROCEDURE — C1894 INTRO/SHEATH, NON-LASER: HCPCS | Performed by: INTERNAL MEDICINE

## 2022-01-03 PROCEDURE — 0 IOPAMIDOL PER 1 ML: Performed by: INTERNAL MEDICINE

## 2022-01-03 PROCEDURE — 0 AMPICILLIN-SULBACTAM PER 1.5 G: Performed by: INTERNAL MEDICINE

## 2022-01-03 PROCEDURE — 80162 ASSAY OF DIGOXIN TOTAL: CPT | Performed by: FAMILY MEDICINE

## 2022-01-03 PROCEDURE — 25010000002 MIDAZOLAM PER 1 MG: Performed by: INTERNAL MEDICINE

## 2022-01-03 PROCEDURE — 85610 PROTHROMBIN TIME: CPT | Performed by: INTERNAL MEDICINE

## 2022-01-03 PROCEDURE — 99233 SBSQ HOSP IP/OBS HIGH 50: CPT | Performed by: INTERNAL MEDICINE

## 2022-01-03 PROCEDURE — 25010000002 METHYLPREDNISOLONE PER 40 MG: Performed by: NURSE PRACTITIONER

## 2022-01-03 PROCEDURE — 36415 COLL VENOUS BLD VENIPUNCTURE: CPT | Performed by: INTERNAL MEDICINE

## 2022-01-03 PROCEDURE — 93005 ELECTROCARDIOGRAM TRACING: CPT | Performed by: INTERNAL MEDICINE

## 2022-01-03 PROCEDURE — 83735 ASSAY OF MAGNESIUM: CPT | Performed by: INTERNAL MEDICINE

## 2022-01-03 PROCEDURE — 80048 BASIC METABOLIC PNL TOTAL CA: CPT | Performed by: INTERNAL MEDICINE

## 2022-01-03 RX ORDER — ONDANSETRON 2 MG/ML
4 INJECTION INTRAMUSCULAR; INTRAVENOUS EVERY 6 HOURS PRN
Status: DISCONTINUED | OUTPATIENT
Start: 2022-01-03 | End: 2022-01-03 | Stop reason: HOSPADM

## 2022-01-03 RX ORDER — CEFDINIR 300 MG/1
300 CAPSULE ORAL EVERY 12 HOURS SCHEDULED
Status: DISCONTINUED | OUTPATIENT
Start: 2022-01-03 | End: 2022-01-03 | Stop reason: HOSPADM

## 2022-01-03 RX ORDER — SODIUM CHLORIDE 0.9 % (FLUSH) 0.9 %
3 SYRINGE (ML) INJECTION EVERY 12 HOURS SCHEDULED
Status: DISCONTINUED | OUTPATIENT
Start: 2022-01-03 | End: 2022-01-03 | Stop reason: HOSPADM

## 2022-01-03 RX ORDER — LIDOCAINE HYDROCHLORIDE 20 MG/ML
INJECTION, SOLUTION INFILTRATION; PERINEURAL AS NEEDED
Status: DISCONTINUED | OUTPATIENT
Start: 2022-01-03 | End: 2022-01-03 | Stop reason: HOSPADM

## 2022-01-03 RX ORDER — CEFDINIR 300 MG/1
300 CAPSULE ORAL EVERY 12 HOURS SCHEDULED
Qty: 10 CAPSULE | Refills: 0
Start: 2022-01-03 | End: 2022-01-08

## 2022-01-03 RX ORDER — PREDNISONE 10 MG/1
10 TABLET ORAL
Status: DISCONTINUED | OUTPATIENT
Start: 2022-01-04 | End: 2022-01-03 | Stop reason: HOSPADM

## 2022-01-03 RX ORDER — SODIUM CHLORIDE 0.9 % (FLUSH) 0.9 %
3-10 SYRINGE (ML) INJECTION AS NEEDED
Status: DISCONTINUED | OUTPATIENT
Start: 2022-01-03 | End: 2022-01-03 | Stop reason: HOSPADM

## 2022-01-03 RX ORDER — MIDAZOLAM HYDROCHLORIDE 1 MG/ML
INJECTION INTRAMUSCULAR; INTRAVENOUS AS NEEDED
Status: DISCONTINUED | OUTPATIENT
Start: 2022-01-03 | End: 2022-01-03 | Stop reason: HOSPADM

## 2022-01-03 RX ORDER — DIPHENHYDRAMINE HCL 25 MG
25 CAPSULE ORAL EVERY 6 HOURS PRN
Status: DISCONTINUED | OUTPATIENT
Start: 2022-01-03 | End: 2022-01-03 | Stop reason: HOSPADM

## 2022-01-03 RX ORDER — WARFARIN SODIUM 2 MG/1
2 TABLET ORAL
Status: DISCONTINUED | OUTPATIENT
Start: 2022-01-04 | End: 2022-01-03 | Stop reason: HOSPADM

## 2022-01-03 RX ORDER — ONDANSETRON 4 MG/1
4 TABLET, FILM COATED ORAL EVERY 6 HOURS PRN
Status: DISCONTINUED | OUTPATIENT
Start: 2022-01-03 | End: 2022-01-03 | Stop reason: HOSPADM

## 2022-01-03 RX ORDER — PREDNISONE 10 MG/1
10 TABLET ORAL
Qty: 2 TABLET | Refills: 0
Start: 2022-01-04 | End: 2022-01-06

## 2022-01-03 RX ORDER — ASPIRIN 81 MG/1
81 TABLET ORAL DAILY
Start: 2022-01-04

## 2022-01-03 RX ORDER — SODIUM CHLORIDE 9 MG/ML
250 INJECTION, SOLUTION INTRAVENOUS ONCE AS NEEDED
Status: DISCONTINUED | OUTPATIENT
Start: 2022-01-03 | End: 2022-01-03 | Stop reason: HOSPADM

## 2022-01-03 RX ORDER — SODIUM BICARBONATE 650 MG/1
1300 TABLET ORAL 3 TIMES DAILY
Start: 2022-01-03

## 2022-01-03 RX ORDER — ALLOPURINOL 100 MG/1
100 TABLET ORAL DAILY
Start: 2022-01-04

## 2022-01-03 RX ORDER — GUAIFENESIN 600 MG/1
600 TABLET, EXTENDED RELEASE ORAL EVERY 12 HOURS SCHEDULED
Start: 2022-01-03

## 2022-01-03 RX ORDER — MEGESTROL ACETATE 40 MG/ML
400 SUSPENSION ORAL
Start: 2022-01-04

## 2022-01-03 RX ADMIN — MEGESTROL ACETATE 400 MG: 40 SUSPENSION ORAL at 13:13

## 2022-01-03 RX ADMIN — METOPROLOL TARTRATE 25 MG: 25 TABLET, FILM COATED ORAL at 13:13

## 2022-01-03 RX ADMIN — SODIUM BICARBONATE 650 MG TABLET 1300 MG: at 13:14

## 2022-01-03 RX ADMIN — SODIUM CHLORIDE 50 ML/HR: 9 INJECTION, SOLUTION INTRAVENOUS at 04:18

## 2022-01-03 RX ADMIN — ACETAMINOPHEN 650 MG: 325 TABLET ORAL at 04:18

## 2022-01-03 RX ADMIN — ASPIRIN 81 MG: 81 TABLET, COATED ORAL at 08:50

## 2022-01-03 RX ADMIN — Medication 1200 MG: at 13:13

## 2022-01-03 RX ADMIN — POLYETHYLENE GLYCOL 3350 17 G: 17 POWDER, FOR SOLUTION ORAL at 13:13

## 2022-01-03 RX ADMIN — ATORVASTATIN CALCIUM 20 MG: 20 TABLET, FILM COATED ORAL at 13:14

## 2022-01-03 RX ADMIN — SODIUM CHLORIDE, PRESERVATIVE FREE 3 ML: 5 INJECTION INTRAVENOUS at 13:15

## 2022-01-03 RX ADMIN — IPRATROPIUM BROMIDE AND ALBUTEROL SULFATE 3 ML: 2.5; .5 SOLUTION RESPIRATORY (INHALATION) at 06:40

## 2022-01-03 RX ADMIN — METHYLPREDNISOLONE SODIUM SUCCINATE 20 MG: 40 INJECTION, POWDER, FOR SOLUTION INTRAMUSCULAR; INTRAVENOUS at 08:50

## 2022-01-03 RX ADMIN — AMPICILLIN SODIUM AND SULBACTAM SODIUM 1.5 G: 1; .5 INJECTION, POWDER, FOR SOLUTION INTRAMUSCULAR; INTRAVENOUS at 08:50

## 2022-01-03 RX ADMIN — SODIUM CHLORIDE, PRESERVATIVE FREE 3 ML: 5 INJECTION INTRAVENOUS at 08:50

## 2022-01-03 RX ADMIN — ALLOPURINOL 100 MG: 100 TABLET ORAL at 13:14

## 2022-01-03 RX ADMIN — GUAIFENESIN 600 MG: 600 TABLET, EXTENDED RELEASE ORAL at 13:14

## 2022-01-03 RX ADMIN — SERTRALINE HYDROCHLORIDE 100 MG: 100 TABLET ORAL at 13:14

## 2022-01-03 NOTE — PLAN OF CARE
Goal Outcome Evaluation:  Plan of Care Reviewed With: patient, daughter           Outcome Summary: pt has been in bed resting throughout day. pt had heart cath this morning and has had no complaints since back on floor. pt going to d/c to rehab today. will continue to monitor

## 2022-01-03 NOTE — PLAN OF CARE
Pt to DC this date and DC summary in. Will f/u if still here tomorrow.    Siomara Newby, OTD, OTR

## 2022-01-03 NOTE — PROGRESS NOTES
"NEPHROLOGY PROGRESS NOTE------[facility]    [facility]  [phone]  Quincy Ascencio MD      Patient Care Team:  Yudi Daniels MD as PCP - General (Family Medicine)  Mignon uLke MD as Consulting Physician (Cardiology)  Jl Ascencio MD as Consulting Physician (Nephrology)      Provider:  Quincy Ascencio MD  Patient Name: Alexandrea Gaxiola  :  1932    SUBJECTIVE:    F/U ARF/WALTER/CRF/CKD    No SOB, CP, palpitations, cramping.     Medication:  Acetylcysteine, 1,200 mg, Oral, BID  allopurinol, 100 mg, Oral, Daily  ampicillin-sulbactam, 1.5 g, Intravenous, Q12H  aspirin, 81 mg, Oral, Daily  atorvastatin, 20 mg, Oral, Daily  Barium Sulfate, 1 teaspoon(s), Oral, Once in imaging  donepezil, 5 mg, Oral, Nightly  enoxaparin, 30 mg, Subcutaneous, Q24H  epoetin stewart-epbx, 20,000 Units, Subcutaneous, Q14 Days  guaiFENesin, 600 mg, Oral, Q12H  ipratropium-albuterol, 3 mL, Nebulization, Q6H While Awake - RT  megestrol acetate, 400 mg, Oral, Daily With Breakfast  methylPREDNISolone sodium succinate, 20 mg, Intravenous, BID  metoprolol tartrate, 25 mg, Oral, Daily  polyethylene glycol, 17 g, Oral, Daily  sertraline, 100 mg, Oral, Daily  sodium bicarbonate, 1,300 mg, Oral, TID  sodium chloride, 3 mL, Intravenous, Q12H      sodium chloride, 50 mL/hr, Last Rate: 50 mL/hr (22 0418)        OBJECTIVE    Vital Sign Min/Max for last 24 hours  Temp  Min: 97.7 °F (36.5 °C)  Max: 98.2 °F (36.8 °C)   BP  Min: 159/66  Max: 175/70   Pulse  Min: 59  Max: 63   Resp  Min: 14  Max: 19   SpO2  Min: 96 %  Max: 100 %   No data recorded   Weight  Min: 59.8 kg (131 lb 13.4 oz)  Max: 59.8 kg (131 lb 13.4 oz)     Flowsheet Rows      First Filed Value   Admission Height 142.2 cm (56\") Documented at 2021 1007   Admission Weight 49.9 kg (110 lb) Documented at 2021 1007          No intake/output data recorded.  I/O last 3 completed shifts:  In: " 460 [P.O.:460]  Out: 300 [Urine:300]    Physical Exam:  General Appearance: alert, appears stated age and cooperative +WEAK AND MALAISED  Head: normocephalic, without obvious abnormality and atraumatic   Eyes: conjunctivae and sclerae normal and no icterus  Neck: supple and no JVD  Lungs: +FINE BIBASILAR CRACKLES AND BIBASILAR RALES  Heart: regular rhythm & normal rate and normal S1, S2 +UNIQUE  Chest Wall: no abnormalities observed  Abdomen: normal bowel sounds and soft non-tender  Extremities: moves extremities well, no edema, no cyanosis +DJD  Skin: no bleeding, bruising or rash  Neurologic: Alert, and oriented. No focal deficits    Labs:    WBC WBC   Date Value Ref Range Status   01/03/2022 13.00 (H) 3.40 - 10.80 10*3/mm3 Final   01/02/2022 11.30 (H) 3.40 - 10.80 10*3/mm3 Final   01/01/2022 12.40 (H) 3.40 - 10.80 10*3/mm3 Final      HGB Hemoglobin   Date Value Ref Range Status   01/03/2022 10.1 (L) 12.0 - 15.9 g/dL Final   01/02/2022 10.1 (L) 12.0 - 15.9 g/dL Final   01/01/2022 9.8 (L) 12.0 - 15.9 g/dL Final      HCT Hematocrit   Date Value Ref Range Status   01/03/2022 30.0 (L) 34.0 - 46.6 % Final   01/02/2022 29.9 (L) 34.0 - 46.6 % Final   01/01/2022 28.5 (L) 34.0 - 46.6 % Final      Platlets No results found for: LABPLAT   MCV MCV   Date Value Ref Range Status   01/03/2022 92.0 79.0 - 97.0 fL Final   01/02/2022 91.3 79.0 - 97.0 fL Final   01/01/2022 90.4 79.0 - 97.0 fL Final          Sodium Sodium   Date Value Ref Range Status   01/03/2022 140 136 - 145 mmol/L Final   01/02/2022 141 136 - 145 mmol/L Final   01/01/2022 140 136 - 145 mmol/L Final      Potassium Potassium   Date Value Ref Range Status   01/03/2022 4.0 3.5 - 5.2 mmol/L Final   01/02/2022 4.1 3.5 - 5.2 mmol/L Final   01/01/2022 3.9 3.5 - 5.2 mmol/L Final      Chloride Chloride   Date Value Ref Range Status   01/03/2022 107 98 - 107 mmol/L Final   01/02/2022 109 (H) 98 - 107 mmol/L Final   01/01/2022 106 98 - 107 mmol/L Final      CO2 CO2   Date Value  Ref Range Status   01/03/2022 21.0 (L) 22.0 - 29.0 mmol/L Final   01/02/2022 21.0 (L) 22.0 - 29.0 mmol/L Final   01/01/2022 21.0 (L) 22.0 - 29.0 mmol/L Final      BUN BUN   Date Value Ref Range Status   01/03/2022 29 (H) 8 - 23 mg/dL Final   01/02/2022 34 (H) 8 - 23 mg/dL Final   01/01/2022 43 (H) 8 - 23 mg/dL Final      Creatinine Creatinine   Date Value Ref Range Status   01/03/2022 0.85 0.57 - 1.00 mg/dL Final   01/02/2022 0.92 0.57 - 1.00 mg/dL Final   01/01/2022 1.26 (H) 0.57 - 1.00 mg/dL Final      Calcium Calcium   Date Value Ref Range Status   01/03/2022 8.4 (L) 8.6 - 10.5 mg/dL Final   01/02/2022 8.4 (L) 8.6 - 10.5 mg/dL Final   01/01/2022 8.6 8.6 - 10.5 mg/dL Final      PO4 No components found for: PO4   Albumin No results found for: ALBUMIN   Magnesium Magnesium   Date Value Ref Range Status   01/03/2022 2.3 1.6 - 2.4 mg/dL Final   01/02/2022 2.2 1.6 - 2.4 mg/dL Final   01/01/2022 2.2 1.6 - 2.4 mg/dL Final      Uric Acid No components found for: URIC ACID     Imaging Results (Last 72 Hours)     ** No results found for the last 72 hours. **          Results for orders placed during the hospital encounter of 12/28/21    XR Chest 1 View    Narrative  DATE OF EXAM:  12/28/2021 11:28 AM    PROCEDURE:  XR CHEST 1 VW-    INDICATIONS:  Dyspnea    COMPARISON:  AP portable chest 9/29/2021.    TECHNIQUE:  Single radiographic view of the chest was obtained.    FINDINGS:  There is increased dense opacity in the left lower lung which may  represent a combination of pleural fluid and consolidated lung. New  small right pleural effusion has developed. There is stable moderate  generalized cardiac mediastinal enlargement. New hazy airspace disease  has developed in the right mid to lower lung zone. Pacemaker device  appears unchanged. There are surgical changes of the thoracic lumbar  junction. There is S-shaped thoracolumbar scoliosis. There are severe  degenerative changes of the shoulders. Advanced osteopenic changes  are  present. No pneumothorax.    Impression  1. FINDINGS consistent with worsening bilateral pneumonia, with most  dense consolidative change in the retrocardiac left lower lobe, and new  airspace disease in the right mid to lower lung zone..  2. Suspected small bilateral pleural effusions.    Electronically Signed By-Cynthia Lee MD On:12/28/2021 11:38 AM  This report was finalized on [id number] by  Cynthia Lee MD.      Results for orders placed during the hospital encounter of 09/29/21    XR Chest 1 View    Narrative  DATE OF EXAM:  9/29/2021 2:09 PM    PROCEDURE:  XR CHEST 1 VW-    INDICATIONS:  general weakness    COMPARISON:  AP portable chest 8/23/2021    TECHNIQUE:  Single radiographic view of the chest was obtained.    FINDINGS:  Thoracolumbar shaped scoliotic curvature is redemonstrated. The  mediastinal position to left of midline is similar to the prior exam.  Bulky megaly appears unchanged. The cardiac silhouette obscures the left  lung base. Underlying left lower lobe atelectasis or pneumonia is  suspected. Right lung appears clear. Pacemaker appears unchanged. There  are surgical changes of the lumbar spine. Osteopenic changes are  present. There are degenerative changes of both shoulders.    Impression  1. Opacity at the left base may represent atelectasis or pneumonia. Left  basilar aeration is thought to be improved compared to 8/23/2021.  2. Right lung airspace disease has resolved since 8/23/2021.    Electronically Signed By-Cynthia Lee MD On:9/29/2021 2:29 PM  This report was finalized on [id number] by  Cynthia Lee MD.      Results for orders placed during the hospital encounter of 08/13/21    XR Chest 1 View    Narrative  DATE OF EXAM:  8/23/2021 5:51 AM    PROCEDURE:  XR CHEST 1 VW-    INDICATIONS:  Acute respiratory failure with hypoxia, shortness of breath, chest pain,  pneumonia.    COMPARISON:  8/16/2021.    TECHNIQUE:  Single radiographic view of the chest was  obtained.    FINDINGS:  The heart is enlarged. There is a left-sided transvenous pacemaker in  place. There is a small right and small-to-moderate left pleural  effusion. There is dense consolidation in the left lower chest felt to  represent a combination of atelectasis or pneumonia. There is bilateral  mixed interstitial/airspace disease throughout the remaining lungs which  can be seen with multifocal pneumonia versus pulmonary edema. Aeration  of the right chest may be slightly improved. There is no interval change  on the left side.  There is no pneumothorax.    Impression  1. Cardiomegaly.  2. Small right and small-to-moderate left pleural effusion which are  unchanged.  3. Unchanged dense consolidation in the left lower chest felt to  represent a combination of atelectasis or pneumonia.  4. There is superimposed bilateral mixed interstitial/airspace disease  throughout the remaining lungs with slight interval improvement which  can be seen with multifocal pneumonia or pulmonary edema.    Electronically Signed By-Yayo Wang MD On:8/23/2021 8:23 AM  This report was finalized on [id number] by  Yayo Wang MD.            ASSESSMENT / PLAN      Elevated troponin    Dyspnea      1. ARF/WALTER/CRF/CKD STG 3A------Nonoliguric. BUN/Cr down to baseline. Follow post cath.  Patient has been explained and understands risks of IV dye exposure. +ARF/WALTER on top of known CRF/CKD STG 3A with a baseline serum creatinine of about 1.15. CRF/CKD STG 3A is secondary to HTN NS. +ARF/WALTER is secondary to prerenal state with concomitant ACE-I and diuretic use. Off Lasix and Lisinopril. No NSAIDs or IV dye.       2. HTN WITH CKD-----BP ok and not significantly hypotensive today. D/C Lisinopril and Norvasc and holding Lasix and giving IVFs     3. OA/DJD-----No NSAIDs     4. HYPERURICEMIA------Allopurinol. No NSAIDs     5. ANEMIA OF CKD------Given IV iron for ADRIANA and started EPO     6. VITAMIN D DEFICIENCY     7. PNA----Backed down  Unasyn for WALTER. COVID 19 negative     8. SMALL BILATERAL PLEURAL EFFUSIONS------Hold Lasix and follow with hydration     9. SECONDARY HYPERPARAHTHYROIDISM-----Right below tx threshold. Follow phos and follow PTH as an outpatient     10. ACIDOSIS-----Metabolic. No elevation in AGAP. +Type 4 RTA. Increased po NaHCO3 and follow     11. ATRIAL FIBRILLATION------Rate controlled and anticoagulated. Hold Digoxin     12. GERD/PUD PROPHYLAXIS------Renal dose adjusted Pepcid     13. EARLY ALZHEIMER'S TYPE DEMENTIA----On Aricept     14. DVT PROPHYLAXIS------Anticoagulated     15. HYPERLIPIDEMIA------On Statin. CK, TSH ok    16. CAD------Cardiac cath today and follow      Quincy Ascencio MD  [facility]  [phone]  01/03/22  08:23 EST

## 2022-01-03 NOTE — DISCHARGE SUMMARY
Date of Discharge:  1/3/2022    Discharge Diagnosis:   Chest pain chest pain  Elevated troponin  General weakness  Acute diastolic congestive heart failure  Atrial fib  Long-term anticoagulant  CAD  Tachybradycardia syndrome  Pacemaker  Pneumonia  Hypoxia  Coronavirus OC43 not covid    Presenting Problem/History of Present Illness  Active Hospital Problems    Diagnosis  POA   • Dyspnea [R06.00]  Yes   • Elevated troponin [R77.8]  Yes   • Urinary tract infection without hematuria [N39.0]  Yes   • General weakness [R53.1]  Yes   • Abnormal weight loss [R63.4]  Yes   • Pulmonary hypertension (HCC) [I27.20]  Yes   • Hypertension [I10]  Yes   • Acute diastolic congestive heart failure (HCC) [I50.31]  Yes   • Pacemaker [Z95.0]  Yes   • Tachy-ramiro syndrome (HCC) [I49.5]  Yes   • Atrial fibrillation (CMS/HCC) [I48.91] [I48.91]  Yes   • Long term (current) use of anticoagulants [Z79.01] [Z79.01]  Not Applicable   • Coronary artery disease [I25.10]  Yes      Resolved Hospital Problems   No resolved problems to display.          Hospital Course    Patient is a 89 y.o. female presented with past medical history atrial for with anticoagulations, pulmonary hypertension, CAD, CHF, tachybradycardia syndrome, chronic renal disease stage III , and pacemaker.  Patient presented to the emergency department on 12/28 complaints of chest tightness and shortness of breath.  Family states that she had noticed increased lower extremity swelling over the last few weeks. In the emergency department she was severely hypoxemic and was noted that she has pneumonia bilaterally.  Cardiac cath was postponed due to kidney function. Today patient had cardiac cath and requires no intervention however medical adjustments will be made.  Kidney function was monitored by nephrology. Physical therapy followed due to weakness and she will be discharge to rehab. Today she drowsy but alert, hemodynamically stable. She will follow up with primary care  provider when out of rehab    Procedures Performed    Procedure(s):  Left Heart Cath and coronary angiogram  -------------------    Procedure(s):  LEFT HEART CATH  Coronary angiography  -------------------       Consults:   Consults     Date and Time Order Name Status Description    12/30/2021  6:00 PM Inpatient Nephrology Consult      12/30/2021  9:11 AM Inpatient Nephrology Consult Completed     12/28/2021  4:13 PM Inpatient Cardiology Consult Completed           Pertinent Test Results:    Lab Results (most recent)     Procedure Component Value Units Date/Time    Protime-INR [[id number]]  (Abnormal) Collected: 01/03/22 1026    Specimen: Blood Updated: 01/03/22 1049     Protime 11.6 Seconds      INR 1.05    Digoxin Level [[id number]]  (Abnormal) Collected: 01/03/22 0632    Specimen: Blood Updated: 01/03/22 1037     Digoxin 1.80 ng/mL     Manual Differential [[id number]]  (Abnormal) Collected: 01/03/22 0632    Specimen: Blood Updated: 01/03/22 0808     Neutrophil % 78.0 %      Lymphocyte % 8.0 %      Bands %  9.0 %      Metamyelocyte % 2.0 %      Myelocyte % 3.0 %      Neutrophils Absolute 11.31 10*3/mm3      Lymphocytes Absolute 1.04 10*3/mm3      Anisocytosis Slight/1+     WBC Morphology Normal     Platelet Morphology Normal    CBC & Differential [[id number]]  (Abnormal) Collected: 01/03/22 0632    Specimen: Blood Updated: 01/03/22 0808    Narrative:      The following orders were created for panel order CBC & Differential.  Procedure                               Abnormality         Status                     ---------                               -----------         ------                     CBC Auto Differential[[id number]]        Abnormal            Final result               Scan Slide[[id number]]                                       Final result                 Please view results for these tests on the individual orders.    Scan Slide [[id number]] Collected: 01/03/22 0632    Specimen: Blood Updated: 01/03/22  0808     Scan Slide --     Comment: See Manual Differential Results       CBC Auto Differential [[id number]]  (Abnormal) Collected: 01/03/22 0632    Specimen: Blood Updated: 01/03/22 0808     WBC 13.00 10*3/mm3      RBC 3.26 10*6/mm3      Hemoglobin 10.1 g/dL      Hematocrit 30.0 %      MCV 92.0 fL      MCH 31.1 pg      MCHC 33.9 g/dL      RDW 15.8 %      RDW-SD 49.9 fl      MPV 7.1 fL      Platelets 300 10*3/mm3     Narrative:      The previously reported component NRBC is no longer being reported. Previous result was 0.1 /100 WBC (Reference Range: 0.0-0.2 /100 WBC) on 1/3/2022 at 0733 EST.    Basic Metabolic Panel [[id number]]  (Abnormal) Collected: 01/03/22 0632    Specimen: Blood Updated: 01/03/22 0734     Glucose 122 mg/dL      BUN 29 mg/dL      Creatinine 0.85 mg/dL      Sodium 140 mmol/L      Potassium 4.0 mmol/L      Chloride 107 mmol/L      CO2 21.0 mmol/L      Calcium 8.4 mg/dL      eGFR Non African Amer 63 mL/min/1.73      BUN/Creatinine Ratio 34.1     Anion Gap 12.0 mmol/L     Narrative:      GFR Normal >60  Chronic Kidney Disease <60  Kidney Failure <15      Phosphorus [[id number]]  (Normal) Collected: 01/03/22 0632    Specimen: Blood Updated: 01/03/22 0734     Phosphorus 2.8 mg/dL     Magnesium [[id number]]  (Normal) Collected: 01/03/22 0632    Specimen: Blood Updated: 01/03/22 0734     Magnesium 2.3 mg/dL     Protime-INR [[id number]]  (Abnormal) Collected: 01/03/22 0632    Specimen: Blood Updated: 01/03/22 0729     Protime 11.6 Seconds      INR 1.05    Blood Culture - Blood, Blood, Venous Line [[id number]]  (Normal) Collected: 12/28/21 1345    Specimen: Blood, Venous Line Updated: 01/02/22 1400     Blood Culture No growth at 5 days    Blood Culture - Blood, Arm, Right [[id number]]  (Normal) Collected: 12/28/21 1345    Specimen: Blood from Arm, Right Updated: 01/02/22 1400     Blood Culture No growth at 5 days    Manual Differential [[id number]]  (Abnormal) Collected: 01/02/22 0619    Specimen: Blood Updated:  01/02/22 0840     Neutrophil % 79.0 %      Lymphocyte % 7.0 %      Monocyte % 4.0 %      Bands %  4.0 %      Metamyelocyte % 1.0 %      Myelocyte % 5.0 %      Neutrophils Absolute 9.38 10*3/mm3      Lymphocytes Absolute 0.79 10*3/mm3      Monocytes Absolute 0.45 10*3/mm3      Anisocytosis Slight/1+     Toxic Granulation Slight/1+     Platelet Morphology Normal    CBC & Differential [[id number]]  (Abnormal) Collected: 01/02/22 0619    Specimen: Blood Updated: 01/02/22 0840    Narrative:      The following orders were created for panel order CBC & Differential.  Procedure                               Abnormality         Status                     ---------                               -----------         ------                     CBC Auto Differential[[id number]]        Abnormal            Final result               Scan Slide[[id number]]                                       Final result                 Please view results for these tests on the individual orders.    Scan Slide [[id number]] Collected: 01/02/22 0619    Specimen: Blood Updated: 01/02/22 0840     Scan Slide --     Comment: See Manual Differential Results       CBC Auto Differential [[id number]]  (Abnormal) Collected: 01/02/22 0619    Specimen: Blood Updated: 01/02/22 0840     WBC 11.30 10*3/mm3      RBC 3.27 10*6/mm3      Hemoglobin 10.1 g/dL      Hematocrit 29.9 %      MCV 91.3 fL      MCH 30.8 pg      MCHC 33.7 g/dL      RDW 16.6 %      RDW-SD 52.1 fl      MPV 6.7 fL      Platelets 288 10*3/mm3     Narrative:      The previously reported component NRBC is no longer being reported. Previous result was 0.0 /100 WBC (Reference Range: 0.0-0.2 /100 WBC) on 1/2/2022 at 0645 EST.    Phosphorus [[id number]]  (Normal) Collected: 01/02/22 0619    Specimen: Blood Updated: 01/02/22 0712     Phosphorus 2.8 mg/dL     Digoxin Level [[id number]]  (Abnormal) Collected: 01/02/22 0619    Specimen: Blood Updated: 01/02/22 0705     Digoxin 2.10 ng/mL     Basic Metabolic Panel  [[id number]]  (Abnormal) Collected: 01/02/22 0619    Specimen: Blood Updated: 01/02/22 0701     Glucose 126 mg/dL      BUN 34 mg/dL      Creatinine 0.92 mg/dL      Sodium 141 mmol/L      Potassium 4.1 mmol/L      Chloride 109 mmol/L      CO2 21.0 mmol/L      Calcium 8.4 mg/dL      eGFR Non African Amer 57 mL/min/1.73      BUN/Creatinine Ratio 37.0     Anion Gap 11.0 mmol/L     Narrative:      GFR Normal >60  Chronic Kidney Disease <60  Kidney Failure <15      Magnesium [[id number]]  (Normal) Collected: 01/02/22 0619    Specimen: Blood Updated: 01/02/22 0701     Magnesium 2.2 mg/dL     TSH [[id number]]  (Normal) Collected: 12/31/21 0413    Specimen: Blood Updated: 12/31/21 0556     TSH 0.341 uIU/mL     Comprehensive Metabolic Panel [[id number]]  (Abnormal) Collected: 12/31/21 0413    Specimen: Blood Updated: 12/31/21 0551     Glucose 150 mg/dL      BUN 49 mg/dL      Creatinine 1.87 mg/dL      Sodium 139 mmol/L      Potassium 4.6 mmol/L      Chloride 104 mmol/L      CO2 22.0 mmol/L      Calcium 8.3 mg/dL      Total Protein 6.4 g/dL      Albumin 3.20 g/dL      ALT (SGPT) 13 U/L      AST (SGOT) 9 U/L      Alkaline Phosphatase 52 U/L      Total Bilirubin 0.2 mg/dL      eGFR Non African Amer 25 mL/min/1.73      Globulin 3.2 gm/dL      A/G Ratio 1.0 g/dL      BUN/Creatinine Ratio 26.2     Anion Gap 13.0 mmol/L     Narrative:      GFR Normal >60  Chronic Kidney Disease <60  Kidney Failure <15      Calcium, Ionized [[id number]]  (Abnormal) Collected: 12/31/21 0413    Specimen: Blood Updated: 12/31/21 0532     Ionized Calcium 1.17 mmol/L     Sodium, Urine, Random - Urine, Clean Catch [[id number]] Collected: 12/30/21 2007    Specimen: Urine, Clean Catch Updated: 12/30/21 2248     Sodium, Urine 27 mmol/L     Narrative:      Reference intervals for random urine have not been established.  Clinical usage is dependent upon physician's interpretation in combination with other laboratory tests.       Iron [[id number]]  (Abnormal)  Collected: 12/30/21 0636    Specimen: Blood Updated: 12/30/21 1034     Iron 24 mcg/dL     Uric Acid [[id number]]  (Abnormal) Collected: 12/30/21 0636    Specimen: Blood Updated: 12/30/21 1034     Uric Acid 9.2 mg/dL     CK [[id number]]  (Normal) Collected: 12/30/21 0636    Specimen: Blood Updated: 12/30/21 1034     Creatine Kinase 60 U/L     PTH, Intact [[id number]]  (Abnormal) Collected: 12/30/21 0636    Specimen: Blood Updated: 12/30/21 0953     PTH, Intact 158.3 pg/mL     Narrative:      Results may be falsely decreased if patient taking Biotin.      TSH [[id number]]  (Normal) Collected: 12/30/21 0636    Specimen: Blood Updated: 12/30/21 0725     TSH 0.393 uIU/mL     Extra Tubes [[id number]] Collected: 12/30/21 0636    Specimen: Blood Updated: 12/30/21 0710    Narrative:      The following orders were created for panel order Extra Tubes.  Procedure                               Abnormality         Status                     ---------                               -----------         ------                     Gold Top - SST[[id number]]                                   Final result                 Please view results for these tests on the individual orders.    Gold Top - SST [[id number]] Collected: 12/30/21 0636    Specimen: Blood Updated: 12/30/21 0710    Troponin [[id number]]  (Abnormal) Collected: 12/29/21 0416    Specimen: Blood Updated: 12/29/21 0554     Troponin T 0.060 ng/mL     Narrative:      Troponin T Reference Range:  <= 0.03 ng/mL-   Negative for AMI  >0.03 ng/mL-     Abnormal for myocardial necrosis.  Clinicians would have to utilize clinical acumen, EKG, Troponin and serial changes to determine if it is an Acute Myocardial Infarction or myocardial injury due to an underlying chronic condition.       Results may be falsely decreased if patient taking Biotin.      Respiratory Panel PCR w/COVID-19(SARS-CoV-2) ANDREI/DARLENE/TAO/PAD/COR/MAD/ANAMIKA In-House, NP Swab in [facility], 3-4 HR TAT - Swab, Nasopharynx [[id number]]   (Abnormal) Collected: 12/28/21 1141    Specimen: Swab from Nasopharynx Updated: 12/28/21 1257     ADENOVIRUS, PCR Not Detected     Coronavirus 229E Not Detected     Coronavirus HKU1 Not Detected     Coronavirus NL63 Not Detected     Coronavirus OC43 Detected     COVID19 Not Detected     Human Metapneumovirus Not Detected     Human Rhinovirus/Enterovirus Not Detected     Influenza A PCR Not Detected     Influenza B PCR Not Detected     Parainfluenza Virus 1 Not Detected     Parainfluenza Virus 2 Not Detected     Parainfluenza Virus 3 Not Detected     Parainfluenza Virus 4 Not Detected     RSV, PCR Not Detected     Bordetella pertussis pcr Not Detected     Bordetella parapertussis PCR Not Detected     Chlamydophila pneumoniae PCR Not Detected     Mycoplasma pneumo by PCR Not Detected    Narrative:      In the setting of a positive respiratory panel with a viral infection PLUS a negative procalcitonin without other underlying concern for bacterial infection, consider observing off antibiotics or discontinuation of antibiotics and continue supportive care. If the respiratory panel is positive for atypical bacterial infection (Bordetella pertussis, Chlamydophila pneumoniae, or Mycoplasma pneumoniae), consider antibiotic de-escalation to target atypical bacterial infection.    Extra Tubes [[id number]] Collected: 12/28/21 1136    Specimen: Blood, Venous Line Updated: 12/28/21 1246    Narrative:      The following orders were created for panel order Extra Tubes.  Procedure                               Abnormality         Status                     ---------                               -----------         ------                     Gold Top - SST[[id number]]                                   Final result               Green Top (Gel)[[id number]]                                  Final result                 Please view results for these tests on the individual orders.    Green Top (Gel) [[id number]] Collected: 12/28/21 1136     Specimen: Blood Updated: 12/28/21 1246     Extra Tube Hold for add-ons.     Comment: Auto resulted.       Gold Top - SST [[id number]] Collected: 12/28/21 1136    Specimen: Blood Updated: 12/28/21 1246     Extra Tube Hold for add-ons.     Comment: Auto resulted.       Troponin [[id number]]  (Abnormal) Collected: 12/28/21 1136    Specimen: Blood Updated: 12/28/21 1216     Troponin T 0.053 ng/mL      Comment: Specimen hemolyzed.  Results may be affected.       Narrative:      Troponin T Reference Range:  <= 0.03 ng/mL-   Negative for AMI  >0.03 ng/mL-     Abnormal for myocardial necrosis.  Clinicians would have to utilize clinical acumen, EKG, Troponin and serial changes to determine if it is an Acute Myocardial Infarction or myocardial injury due to an underlying chronic condition.       Results may be falsely decreased if patient taking Biotin.      BNP [[id number]]  (Abnormal) Collected: 12/28/21 1136    Specimen: Blood Updated: 12/28/21 1209     proBNP 9,066.0 pg/mL     Narrative:      Among patients with dyspnea, NT-proBNP is highly sensitive for the detection of acute congestive heart failure. In addition NT-proBNP of <300 pg/ml effectively rules out acute congestive heart failure with 99% negative predictive value.    Results may be falsely decreased if patient taking Biotin.      Blood Gas, Arterial - [[id number]]  (Abnormal) Collected: 12/28/21 1123    Specimen: Arterial Blood Updated: 12/28/21 1126     Site Right Radial     Mert's Test Positive     pH, Arterial 7.422 pH units      pCO2, Arterial 32.3 mm Hg      pO2, Arterial 42.2 mm Hg      HCO3, Arterial 21.1 mmol/L      Base Excess, Arterial -2.8 mmol/L      Comment: Serial Number: [id number]:  [id number]        O2 Saturation, Arterial 79.4 %      CO2 Content 22.1 mmol/L      Barometric Pressure for Blood Gas --     Comment: N/A        Modality Room Air     FIO2 21 %      Hemodilution No           Results for orders placed during the hospital encounter of  12/28/21    Adult Transthoracic Echo Complete W/ Cont if Necessary Per Protocol    Interpretation Summary  · Estimated left ventricular EF = 60% Left ventricular systolic function is normal.    Indications  Chest pain    Technically satisfactory study.  Mitral valve is structurally normal.  Moderate mitral regurgitation.  Tricuspid valve is structurally normal.  Aortic valve is structurally normal.  Pulmonic valve could not be well visualized.  No evidence for tricuspid or aortic regurgitation is seen by Doppler study.  Biatrial enlargement  Left ventricle is normal in size and contractility with ejection fraction of 60%.  Concentric left ventricle hypertrophy.  Right ventricle is normal in size.  Atrial septum is intact.  Aorta is normal.  No pericardial effusion or intracardiac thrombus is seen.    Impression  Biatrial enlargement.  Moderate mitral regurgitation.  Left ventricular size and contractility is normal with ejection fraction of 60%.  Concentric left ventricle hypertrophy.       Condition on Discharge:  Stable    Vital Signs  Temp:  [97.7 °F (36.5 °C)-98.8 °F (37.1 °C)] 97.9 °F (36.6 °C)  Heart Rate:  [58-73] 61  Resp:  [18-20] 18  BP: (157-178)/(51-71) 172/70      Physical Exam  Vitals reviewed.   Constitutional:       Appearance: Normal appearance.   HENT:      Head: Normocephalic and atraumatic.      Right Ear: External ear normal.      Left Ear: External ear normal.      Nose: Nose normal.      Mouth/Throat:      Mouth: Mucous membranes are moist.      Pharynx: Oropharynx is clear.   Eyes:      Conjunctiva/sclera: Conjunctivae normal.   Cardiovascular:      Rate and Rhythm: Normal rate and regular rhythm.      Pulses: Normal pulses.      Heart sounds: Normal heart sounds.   Pulmonary:      Effort: Pulmonary effort is normal.      Breath sounds: Normal breath sounds.   Abdominal:      General: Bowel sounds are normal.      Palpations: Abdomen is soft.   Musculoskeletal:         General: Normal range  of motion.      Cervical back: Normal range of motion.      Right lower leg: No edema.      Left lower leg: No edema.   Skin:     General: Skin is warm and dry.   Neurological:      Mental Status: She is alert and oriented to person, place, and time.   Psychiatric:         Behavior: Behavior normal.              Discharge Disposition  Rehab Facility or Unit (DC - External)    Discharge Medications     Discharge Medications      New Medications      Instructions Start Date   Acetylcysteine capsule capsule   1,200 mg, Oral, 2 Times Daily      allopurinol 100 MG tablet  Commonly known as: ZYLOPRIM   100 mg, Oral, Daily   Start Date: January 4, 2022     cefdinir 300 MG capsule  Commonly known as: OMNICEF   300 mg, Oral, Every 12 Hours Scheduled      epoetin stewart-epbx 15056 UNIT/ML injection  Commonly known as: RETACRIT   20,000 Units, Subcutaneous, Every 14 Days   Start Date: January 14, 2022     guaiFENesin 600 MG 12 hr tablet  Commonly known as: MUCINEX   600 mg, Oral, Every 12 Hours Scheduled      predniSONE 10 MG tablet  Commonly known as: DELTASONE   10 mg, Oral, Daily With Breakfast   Start Date: January 4, 2022     sodium bicarbonate 650 MG tablet   1,300 mg, Oral, 3 Times Daily         Changes to Medications      Instructions Start Date   megestrol acetate 400 MG/10ML suspension oral suspension  Commonly known as: MEGACE  What changed:   · when to take this  · Another medication with the same name was removed. Continue taking this medication, and follow the directions you see here.   400 mg, Oral, Daily With Breakfast   Start Date: January 4, 2022        Continue These Medications      Instructions Start Date   acetaminophen 325 MG tablet  Commonly known as: TYLENOL   650 mg, Oral, Every 4 Hours PRN      albuterol sulfate  (90 Base) MCG/ACT inhaler  Commonly known as: PROVENTIL HFA;VENTOLIN HFA;PROAIR HFA   1 puff, Inhalation, Every 4 Hours PRN      aspirin 81 MG EC tablet   81 mg, Oral, Daily   Start  Date: January 4, 2022     atorvastatin 20 MG tablet  Commonly known as: LIPITOR   20 mg, Oral, Daily      donepezil 5 MG tablet  Commonly known as: ARICEPT   5 mg, Oral, Nightly      furosemide 20 MG tablet  Commonly known as: LASIX   20 mg, Oral, Daily      metoprolol tartrate 25 MG tablet  Commonly known as: LOPRESSOR   25 mg, Oral, Daily      polyethylene glycol 17 g packet  Commonly known as: MIRALAX   17 g, Oral, Daily      sertraline 100 MG tablet  Commonly known as: ZOLOFT   100 mg, Oral, Daily      traMADol 50 MG tablet  Commonly known as: ULTRAM   50 mg, Oral, Every 8 Hours PRN      warfarin 2 MG tablet  Commonly known as: COUMADIN   2 mg, Oral, Daily         Stop These Medications    amLODIPine 5 MG tablet  Commonly known as: NORVASC     digoxin 125 MCG tablet  Commonly known as: LANOXIN     lisinopril 5 MG tablet  Commonly known as: PRINIVIL,ZESTRIL            Discharge Diet: Renal    Activity at Discharge: as tolerated    Follow-up Appointments  No future appointments.  Additional Instructions for the Follow-ups that You Need to Schedule     Discharge Follow-up with PCP   As directed       Currently Documented PCP:    Yudi Daniels MD    PCP Phone Number:    [phone]     Follow Up Details: 1 month or as needed               Test Results Pending at Discharge       Autumn Carroll, FELIZ  01/03/22  14:14 EST    Time: Discharge 25 min

## 2022-01-03 NOTE — PROGRESS NOTES
Referring Provider: Margarita Schaffer MD    Reason for follow-up:  Angina  Anticoagulation management  Status post pacemaker     Patient Care Team:  Yudi Daniels MD as PCP - General (Family Medicine)  Mignon Luke MD as Consulting Physician (Cardiology)  Jl Ascencio MD as Consulting Physician (Nephrology)    Subjective .  Feeling better     ROS    Since I have last seen, the patient has been without any chest discomfort ,shortness of breath, palpitations, dizziness or syncope.  Denies having any headache ,abdominal pain ,nausea, vomiting , diarrhea constipation, loss of weight or loss of appetite.  Denies having any excessive bruising ,hematuria or blood in the stool.    Review of all systems negative except as indicated    History  Past Medical History:   Diagnosis Date   • Atrial fibrillation (HCC)    • Chronic kidney disease    • Hypertension    • Melanoma (HCC)    • MI (myocardial infarction) (HCC)        Past Surgical History:   Procedure Laterality Date   • CARDIAC CATHETERIZATION     • CARDIAC ELECTROPHYSIOLOGY PROCEDURE N/A 10/9/2020    Procedure: PPM generator change - dual;  Surgeon: Mignon Luke MD;  Location: [facility] INVASIVE LOCATION;  Service: Cardiovascular;  Laterality: N/A;   • CAROTID ENDARTERECTOMY     • CERVICAL SPINE SURGERY     • INSERT / REPLACE / REMOVE PACEMAKER     • SCALP LESION REMOVAL W/ FLAP AND SKIN GRAFT     • TOTAL HIP ARTHROPLASTY Left        History reviewed. No pertinent family history.    Social History     Tobacco Use   • Smoking status: Never Smoker   • Smokeless tobacco: Never Used   Substance Use Topics   • Alcohol use: Never   • Drug use: Never        Medications Prior to Admission   Medication Sig Dispense Refill Last Dose   • acetaminophen (TYLENOL) 325 MG tablet Take 2 tablets by mouth Every 4 (Four) Hours As Needed for Mild Pain .   Past Week at Unknown time   • albuterol sulfate  (90 Base) MCG/ACT inhaler Inhale 1 puff Every 4 (Four)  Hours As Needed for Shortness of Air.      • amLODIPine (NORVASC) 5 MG tablet TAKE ONE TABLET BY MOUTH DAILY 90 tablet 3 Past Week at Unknown time   • atorvastatin (LIPITOR) 20 MG tablet Take 20 mg by mouth Daily.   Past Week at Unknown time   • digoxin (LANOXIN) 125 MCG tablet Take 125 mcg by mouth Daily.   Past Week at Unknown time   • donepezil (ARICEPT) 5 MG tablet Take 5 mg by mouth Every Night.   Past Week at Unknown time   • furosemide (LASIX) 20 MG tablet Take 1 tablet by mouth Daily. 90 tablet 1 Past Week at Unknown time   • lisinopril (PRINIVIL,ZESTRIL) 5 MG tablet Take 10 mg by mouth Daily.   Past Week at Unknown time   • megestrol (MEGACE) 40 MG/ML suspension Take 400 mg by mouth Every Morning.      • metoprolol tartrate (LOPRESSOR) 25 MG tablet Take 25 mg by mouth Daily.      • polyethylene glycol (MIRALAX) 17 g packet Take 17 g by mouth Daily.   Past Week at Unknown time   • sertraline (ZOLOFT) 100 MG tablet Take 100 mg by mouth Daily.   Past Week at Unknown time   • traMADol (ULTRAM) 50 MG tablet Take 50 mg by mouth Every 8 (Eight) Hours As Needed for Moderate Pain .   Past Week at Unknown time   • warfarin (COUMADIN) 2 MG tablet Take 1 tablet by mouth Daily. 90 tablet 0 Past Week at Unknown time       Allergies  Codeine; Hydrocodone-acetaminophen; Meperidine; Morphine; and Antihistamines, loratadine-type    Scheduled Meds:Acetylcysteine, 1,200 mg, Oral, BID  allopurinol, 100 mg, Oral, Daily  ampicillin-sulbactam, 1.5 g, Intravenous, Q12H  aspirin, 81 mg, Oral, Daily  atorvastatin, 20 mg, Oral, Daily  Barium Sulfate, 1 teaspoon(s), Oral, Once in imaging  donepezil, 5 mg, Oral, Nightly  enoxaparin, 30 mg, Subcutaneous, Q24H  epoetin stewart-epbx, 20,000 Units, Subcutaneous, Q14 Days  guaiFENesin, 600 mg, Oral, Q12H  ipratropium-albuterol, 3 mL, Nebulization, Q6H While Awake - RT  megestrol acetate, 400 mg, Oral, Daily With Breakfast  methylPREDNISolone sodium succinate, 20 mg, Intravenous, BID  metoprolol  "tartrate, 25 mg, Oral, Daily  polyethylene glycol, 17 g, Oral, Daily  sertraline, 100 mg, Oral, Daily  sodium bicarbonate, 1,300 mg, Oral, TID  sodium chloride, 3 mL, Intravenous, Q12H      Continuous Infusions:sodium chloride, 50 mL/hr, Last Rate: 50 mL/hr (01/03/22 0418)      PRN Meds:.•  acetaminophen  •  LORazepam  •  melatonin  •  nitroglycerin  •  ondansetron  •  potassium chloride  •  potassium chloride  •  [COMPLETED] Insert peripheral IV **AND** sodium chloride  •  sodium chloride  •  traMADol    Objective     VITAL SIGNS  Vitals:    01/02/22 2004 01/02/22 2110 01/03/22 0029 01/03/22 0331   BP:  159/66 166/66 175/70   BP Location:  Right arm Right arm Right arm   Patient Position:  Lying Lying Lying   Pulse: 62 59 62 63   Resp: 18 18 19 19   Temp:  98.2 °F (36.8 °C) 97.7 °F (36.5 °C) 97.7 °F (36.5 °C)   TempSrc:  Oral Oral Oral   SpO2: 96% 98% 97% 99%   Weight:    59.8 kg (131 lb 13.4 oz)   Height:           Flowsheet Rows      First Filed Value   Admission Height 142.2 cm (56\") Documented at 12/28/2021 1007   Admission Weight 49.9 kg (110 lb) Documented at 12/28/2021 1007            Intake/Output Summary (Last 24 hours) at 1/3/2022 0633  Last data filed at 1/3/2022 0030  Gross per 24 hour   Intake 460 ml   Output 300 ml   Net 160 ml        TELEMETRY: Pacemaker rhythm    Physical Exam:  The patient is alert, oriented and in no distress.  Vital signs as noted above.  Head and neck revealed no carotid bruits or jugular venous distention.  No thyromegaly or lymphadenopathy is present  Lungs clear.  No wheezing.  Breath sounds are normal bilaterally.  Heart normal first and second heart sounds.  No murmur. No precordial rub is present.  No gallop is present.  Abdomen soft and nontender.  No organomegaly is present.  Extremities with good peripheral pulses without any pedal edema.  Skin warm and dry.  Pacemaker site looks normal.  Musculoskeletal system is grossly normal  CNS grossly normal      Results " Review:   I reviewed the patient's new clinical results.  Lab Results (last 24 hours)     Procedure Component Value Units Date/Time    Blood Culture - Blood, Blood, Venous Line [[id number]]  (Normal) Collected: 12/28/21 1345    Specimen: Blood, Venous Line Updated: 01/02/22 1400     Blood Culture No growth at 5 days    Blood Culture - Blood, Arm, Right [[id number]]  (Normal) Collected: 12/28/21 1345    Specimen: Blood from Arm, Right Updated: 01/02/22 1400     Blood Culture No growth at 5 days    Manual Differential [[id number]]  (Abnormal) Collected: 01/02/22 0619    Specimen: Blood Updated: 01/02/22 0840     Neutrophil % 79.0 %      Lymphocyte % 7.0 %      Monocyte % 4.0 %      Bands %  4.0 %      Metamyelocyte % 1.0 %      Myelocyte % 5.0 %      Neutrophils Absolute 9.38 10*3/mm3      Lymphocytes Absolute 0.79 10*3/mm3      Monocytes Absolute 0.45 10*3/mm3      Anisocytosis Slight/1+     Toxic Granulation Slight/1+     Platelet Morphology Normal    CBC & Differential [[id number]]  (Abnormal) Collected: 01/02/22 0619    Specimen: Blood Updated: 01/02/22 0840    Narrative:      The following orders were created for panel order CBC & Differential.  Procedure                               Abnormality         Status                     ---------                               -----------         ------                     CBC Auto Differential[[id number]]        Abnormal            Final result               Scan Slide[[id number]]                                       Final result                 Please view results for these tests on the individual orders.    Scan Slide [[id number]] Collected: 01/02/22 0619    Specimen: Blood Updated: 01/02/22 0840     Scan Slide --     Comment: See Manual Differential Results       CBC Auto Differential [[id number]]  (Abnormal) Collected: 01/02/22 0619    Specimen: Blood Updated: 01/02/22 0840     WBC 11.30 10*3/mm3      RBC 3.27 10*6/mm3      Hemoglobin 10.1 g/dL      Hematocrit 29.9 %       MCV 91.3 fL      MCH 30.8 pg      MCHC 33.7 g/dL      RDW 16.6 %      RDW-SD 52.1 fl      MPV 6.7 fL      Platelets 288 10*3/mm3     Narrative:      The previously reported component NRBC is no longer being reported. Previous result was 0.0 /100 WBC (Reference Range: 0.0-0.2 /100 WBC) on 1/2/2022 at 0645 EST.    Phosphorus [[id number]]  (Normal) Collected: 01/02/22 0619    Specimen: Blood Updated: 01/02/22 0712     Phosphorus 2.8 mg/dL     Digoxin Level [[id number]]  (Abnormal) Collected: 01/02/22 0619    Specimen: Blood Updated: 01/02/22 0705     Digoxin 2.10 ng/mL     Protime-INR [[id number]]  (Abnormal) Collected: 01/02/22 0619    Specimen: Blood Updated: 01/02/22 0702     Protime 11.4 Seconds      INR 1.03    Basic Metabolic Panel [[id number]]  (Abnormal) Collected: 01/02/22 0619    Specimen: Blood Updated: 01/02/22 0701     Glucose 126 mg/dL      BUN 34 mg/dL      Creatinine 0.92 mg/dL      Sodium 141 mmol/L      Potassium 4.1 mmol/L      Chloride 109 mmol/L      CO2 21.0 mmol/L      Calcium 8.4 mg/dL      eGFR Non African Amer 57 mL/min/1.73      BUN/Creatinine Ratio 37.0     Anion Gap 11.0 mmol/L     Narrative:      GFR Normal >60  Chronic Kidney Disease <60  Kidney Failure <15      Magnesium [[id number]]  (Normal) Collected: 01/02/22 0619    Specimen: Blood Updated: 01/02/22 0701     Magnesium 2.2 mg/dL           Imaging Results (Last 24 Hours)     ** No results found for the last 24 hours. **      LAB RESULTS (LAST 7 DAYS)    CBC  Results from last 7 days   Lab Units 01/02/22 0619 01/01/22 0527 12/31/21  0413 12/30/21  0517 12/29/21  0416 12/28/21  1136   WBC 10*3/mm3 11.30* 12.40* 13.90* 11.10* 11.60* 13.00*   RBC 10*6/mm3 3.27* 3.15* 3.12* 3.18* 3.15* 3.74*   HEMOGLOBIN g/dL 10.1* 9.8* 9.6* 9.7* 9.4* 11.0*   HEMATOCRIT % 29.9* 28.5* 28.3* 29.3* 28.4* 34.4   MCV fL 91.3 90.4 90.9 92.0 90.2 92.0   PLATELETS 10*3/mm3 288 293 286 253 272 338       BMP  Results from last 7 days   Lab Units 01/02/22  0619  01/01/22  0527 12/31/21 0413 12/30/21  0636 12/30/21  0517 12/29/21 0416 12/28/21  1136   SODIUM mmol/L 141 140 139  --  136 140 138   POTASSIUM mmol/L 4.1 3.9 4.6  --  4.4 4.5 5.2   CHLORIDE mmol/L 109* 106 104  --  102 107 105   CO2 mmol/L 21.0* 21.0* 22.0  --  20.0* 22.0 20.0*   BUN mg/dL 34* 43* 49*  --  40* 29* 34*   CREATININE mg/dL 0.92 1.26* 1.87*  --  1.54* 1.21* 1.42*   GLUCOSE mg/dL 126* 145* 150*  --  153* 100* 90   MAGNESIUM mg/dL 2.2 2.2 2.2 2.1 2.1 2.0  --    PHOSPHORUS mg/dL 2.8 3.8 5.3*  --   --   --   --        CMP   Results from last 7 days   Lab Units 01/02/22  0619 01/01/22 0527 12/31/21 0413 12/30/21  0517 12/29/21  0416 12/28/21  1136   SODIUM mmol/L 141 140 139 136 140 138   POTASSIUM mmol/L 4.1 3.9 4.6 4.4 4.5 5.2   CHLORIDE mmol/L 109* 106 104 102 107 105   CO2 mmol/L 21.0* 21.0* 22.0 20.0* 22.0 20.0*   BUN mg/dL 34* 43* 49* 40* 29* 34*   CREATININE mg/dL 0.92 1.26* 1.87* 1.54* 1.21* 1.42*   GLUCOSE mg/dL 126* 145* 150* 153* 100* 90   ALBUMIN g/dL  --   --  3.20*  --   --   --    BILIRUBIN mg/dL  --   --  0.2  --   --   --    ALK PHOS U/L  --   --  52  --   --   --    AST (SGOT) U/L  --   --  9  --   --   --    ALT (SGPT) U/L  --   --  13  --   --   --          BNP        TROPONIN  Results from last 7 days   Lab Units 12/30/21  0636 12/29/21  0416   CK TOTAL U/L 60  --    TROPONIN T ng/mL  --  0.060*       CoAg  Results from last 7 days   Lab Units 01/02/22  0619 01/01/22  0527 12/31/21  0413 12/30/21  0517 12/29/21  0416 12/28/21  1136   INR  1.03* 1.07* 2.01 1.99* 3.40* 3.55*       Creatinine Clearance  Estimated Creatinine Clearance: 32.9 mL/min (by C-G formula based on SCr of 0.92 mg/dL).    ABG  Results from last 7 days   Lab Units 12/28/21  1123   PH, ARTERIAL pH units 7.422   PCO2, ARTERIAL mm Hg 32.3*   PO2 ART mm Hg 42.2*   O2 SATURATION ART % 79.4*   BASE EXCESS ART mmol/L -2.8*       Radiology  No radiology results for the last  day            EKG                                  I personally viewed and interpreted the patient's EKG/Telemetry data: Atrial fibrillation    ECHOCARDIOGRAM:    Results for orders placed during the hospital encounter of 12/28/21    Adult Transthoracic Echo Complete W/ Cont if Necessary Per Protocol    Interpretation Summary  · Estimated left ventricular EF = 60% Left ventricular systolic function is normal.    Indications  Chest pain    Technically satisfactory study.  Mitral valve is structurally normal.  Moderate mitral regurgitation.  Tricuspid valve is structurally normal.  Aortic valve is structurally normal.  Pulmonic valve could not be well visualized.  No evidence for tricuspid or aortic regurgitation is seen by Doppler study.  Biatrial enlargement  Left ventricle is normal in size and contractility with ejection fraction of 60%.  Concentric left ventricle hypertrophy.  Right ventricle is normal in size.  Atrial septum is intact.  Aorta is normal.  No pericardial effusion or intracardiac thrombus is seen.    Impression  Biatrial enlargement.  Moderate mitral regurgitation.  Left ventricular size and contractility is normal with ejection fraction of 60%.  Concentric left ventricle hypertrophy.          STRESS MYOVIEW:    Cardiolite (Tc-99m Sestamibi) stress test    CARDIAC CATHETERIZATION:            OTHER:         Assessment/Plan     Active Problems:    Elevated troponin    Dyspnea        ////////////////////////////  Impression  ===============  -Chest discomfort-suggestive of unstable angina.  Troponin level slightly elevated 0.0 53  EKG showed no acute changes.      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  Pacemaker was reprogrammed to DDDR due to long AV delays.  Status post dual-chamber pacemaker pulse generator replacement ([employer] MRI compatible)-10/9/2020      -history of intermittent atrial fibrillation.  Patient is in Sinus  rhythm.     -Anticoagulation-patient is on Coumadin.     -moderate mitral regurgitation.  Echocardiogram 01/28/2019 revealed moderate mitral regurgitation left atrial enlargement normal left ventricle function.  Echocardiogram 8/16/2021 showed biatrial enlargement moderate mitral regurgitation and normal left ventricle function.   Echocardiogram--12/28/2021 revealed moderate mitral regurgitation biatrial enlargement normal left ventricular function.     -  Status post  subendocardial myocardial infarction -improved.     Cardiac catheterization 12/29/2015 revealed mild anterior wall hypokinesis with ejection fraction of 50%, 40% mid LAD 70-80% ostial diagonal branch disease (small caliber vessel) and 80% circumflex distal to a large marginal branch.      -status post left carotid endarterectomy cervical spine surgery left hip replacement and recent scalp surgery for carcinoma and grafting.     -hypertension-not well controlled.      -allergy to codeine, morphine and Demerol.     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     ===============    Plan  ================  Chest discomfort suggestive of unstable angina pectoris.  Mild elevation of troponin at 0.053  Trend troponin levels.  Follow-up EKGs.  Echocardiogram--12/28/2021 revealed moderate mitral regurgitation biatrial enlargement normal left ventricular function.  Lexiscan Cardiolite test-negative for myocardial ischemia 2/15/2021.    Renal dysfunction  BUN 34 creatinine 1.42  29/1.21-12/29/2021  BUN 40/creatinine 1.54-12/30/2021  49/1.87-12/31/2021  34/0.92-1/3/2022  Intravenous fluids  Renal consultation and follow-up appreciated  Observe closely     Anticoagulation  INR 3.54  3.4-12/29/2021  1.99-12/30/2021  2.0-12/31/2021  1.03-1/3/2022  Coumadin is on hold.    Status post pacemaker  Pacemaker site is normal.  Recent integration of the pacemaker revealed excellent pacing parameters.  Patient has intermittent atrial  fibrillation      Paroxysmal atrial fibrillation  Patient has converted and staying in sinus rhythm.     Mitral regurgitation-moderate.     Medications were reviewed and updated.    Patient to have cardiac catheterization and coronary arteriography.  Renal function is better.  INR is favorable.  Risks and benefits pros and cons of the procedure were discussed with patient including infection bleeding blood clot heart attack allergic reaction to the dye renal dysfunction.     Further plan will depend on patient's progress.  ////////////////////////////     Cardiac catheterization 1/3/2022 revealed  Left ventricle size and contractility is normal with ejection fraction of 60%.  3-4+ mitral regurgitation is present.  Significant left atrial enlargement is present.    50% LAD and 50% ostial RCA disease.    RECOMMENDATIONS:  Medical treatment.  Restart Coumadin.  Close observation of PT/INR.        Mignon Luke MD  01/03/22  06:33 EST

## 2022-01-03 NOTE — H&P (VIEW-ONLY)
Referring Provider: Margarita Schaffer MD    Reason for follow-up:  Angina  Anticoagulation management  Status post pacemaker     Patient Care Team:  Yudi Daniels MD as PCP - General (Family Medicine)  Mignon Luke MD as Consulting Physician (Cardiology)  Jl Ascencio MD as Consulting Physician (Nephrology)    Subjective .  Feeling better     ROS    Since I have last seen, the patient has been without any chest discomfort ,shortness of breath, palpitations, dizziness or syncope.  Denies having any headache ,abdominal pain ,nausea, vomiting , diarrhea constipation, loss of weight or loss of appetite.  Denies having any excessive bruising ,hematuria or blood in the stool.    Review of all systems negative except as indicated    History  Past Medical History:   Diagnosis Date   • Atrial fibrillation (HCC)    • Chronic kidney disease    • Hypertension    • Melanoma (HCC)    • MI (myocardial infarction) (HCC)        Past Surgical History:   Procedure Laterality Date   • CARDIAC CATHETERIZATION     • CARDIAC ELECTROPHYSIOLOGY PROCEDURE N/A 10/9/2020    Procedure: PPM generator change - dual;  Surgeon: Mignon Luke MD;  Location: [facility] INVASIVE LOCATION;  Service: Cardiovascular;  Laterality: N/A;   • CAROTID ENDARTERECTOMY     • CERVICAL SPINE SURGERY     • INSERT / REPLACE / REMOVE PACEMAKER     • SCALP LESION REMOVAL W/ FLAP AND SKIN GRAFT     • TOTAL HIP ARTHROPLASTY Left        History reviewed. No pertinent family history.    Social History     Tobacco Use   • Smoking status: Never Smoker   • Smokeless tobacco: Never Used   Substance Use Topics   • Alcohol use: Never   • Drug use: Never        Medications Prior to Admission   Medication Sig Dispense Refill Last Dose   • acetaminophen (TYLENOL) 325 MG tablet Take 2 tablets by mouth Every 4 (Four) Hours As Needed for Mild Pain .   Past Week at Unknown time   • albuterol sulfate  (90 Base) MCG/ACT inhaler Inhale 1 puff Every 4 (Four)  Hours As Needed for Shortness of Air.      • amLODIPine (NORVASC) 5 MG tablet TAKE ONE TABLET BY MOUTH DAILY 90 tablet 3 Past Week at Unknown time   • atorvastatin (LIPITOR) 20 MG tablet Take 20 mg by mouth Daily.   Past Week at Unknown time   • digoxin (LANOXIN) 125 MCG tablet Take 125 mcg by mouth Daily.   Past Week at Unknown time   • donepezil (ARICEPT) 5 MG tablet Take 5 mg by mouth Every Night.   Past Week at Unknown time   • furosemide (LASIX) 20 MG tablet Take 1 tablet by mouth Daily. 90 tablet 1 Past Week at Unknown time   • lisinopril (PRINIVIL,ZESTRIL) 5 MG tablet Take 10 mg by mouth Daily.   Past Week at Unknown time   • megestrol (MEGACE) 40 MG/ML suspension Take 400 mg by mouth Every Morning.      • metoprolol tartrate (LOPRESSOR) 25 MG tablet Take 25 mg by mouth Daily.      • polyethylene glycol (MIRALAX) 17 g packet Take 17 g by mouth Daily.   Past Week at Unknown time   • sertraline (ZOLOFT) 100 MG tablet Take 100 mg by mouth Daily.   Past Week at Unknown time   • traMADol (ULTRAM) 50 MG tablet Take 50 mg by mouth Every 8 (Eight) Hours As Needed for Moderate Pain .   Past Week at Unknown time   • warfarin (COUMADIN) 2 MG tablet Take 1 tablet by mouth Daily. 90 tablet 0 Past Week at Unknown time       Allergies  Codeine; Hydrocodone-acetaminophen; Meperidine; Morphine; and Antihistamines, loratadine-type    Scheduled Meds:Acetylcysteine, 1,200 mg, Oral, BID  allopurinol, 100 mg, Oral, Daily  ampicillin-sulbactam, 1.5 g, Intravenous, Q12H  aspirin, 81 mg, Oral, Daily  atorvastatin, 20 mg, Oral, Daily  Barium Sulfate, 1 teaspoon(s), Oral, Once in imaging  donepezil, 5 mg, Oral, Nightly  enoxaparin, 30 mg, Subcutaneous, Q24H  epoetin stewart-epbx, 20,000 Units, Subcutaneous, Q14 Days  guaiFENesin, 600 mg, Oral, Q12H  ipratropium-albuterol, 3 mL, Nebulization, Q6H While Awake - RT  megestrol acetate, 400 mg, Oral, Daily With Breakfast  methylPREDNISolone sodium succinate, 20 mg, Intravenous, BID  metoprolol  "tartrate, 25 mg, Oral, Daily  polyethylene glycol, 17 g, Oral, Daily  sertraline, 100 mg, Oral, Daily  sodium bicarbonate, 1,300 mg, Oral, TID  sodium chloride, 3 mL, Intravenous, Q12H      Continuous Infusions:sodium chloride, 50 mL/hr, Last Rate: 50 mL/hr (01/03/22 0418)      PRN Meds:.•  acetaminophen  •  LORazepam  •  melatonin  •  nitroglycerin  •  ondansetron  •  potassium chloride  •  potassium chloride  •  [COMPLETED] Insert peripheral IV **AND** sodium chloride  •  sodium chloride  •  traMADol    Objective     VITAL SIGNS  Vitals:    01/02/22 2004 01/02/22 2110 01/03/22 0029 01/03/22 0331   BP:  159/66 166/66 175/70   BP Location:  Right arm Right arm Right arm   Patient Position:  Lying Lying Lying   Pulse: 62 59 62 63   Resp: 18 18 19 19   Temp:  98.2 °F (36.8 °C) 97.7 °F (36.5 °C) 97.7 °F (36.5 °C)   TempSrc:  Oral Oral Oral   SpO2: 96% 98% 97% 99%   Weight:    59.8 kg (131 lb 13.4 oz)   Height:           Flowsheet Rows      First Filed Value   Admission Height 142.2 cm (56\") Documented at 12/28/2021 1007   Admission Weight 49.9 kg (110 lb) Documented at 12/28/2021 1007            Intake/Output Summary (Last 24 hours) at 1/3/2022 0633  Last data filed at 1/3/2022 0030  Gross per 24 hour   Intake 460 ml   Output 300 ml   Net 160 ml        TELEMETRY: Pacemaker rhythm    Physical Exam:  The patient is alert, oriented and in no distress.  Vital signs as noted above.  Head and neck revealed no carotid bruits or jugular venous distention.  No thyromegaly or lymphadenopathy is present  Lungs clear.  No wheezing.  Breath sounds are normal bilaterally.  Heart normal first and second heart sounds.  No murmur. No precordial rub is present.  No gallop is present.  Abdomen soft and nontender.  No organomegaly is present.  Extremities with good peripheral pulses without any pedal edema.  Skin warm and dry.  Pacemaker site looks normal.  Musculoskeletal system is grossly normal  CNS grossly normal      Results " Review:   I reviewed the patient's new clinical results.  Lab Results (last 24 hours)     Procedure Component Value Units Date/Time    Blood Culture - Blood, Blood, Venous Line [[id number]]  (Normal) Collected: 12/28/21 1345    Specimen: Blood, Venous Line Updated: 01/02/22 1400     Blood Culture No growth at 5 days    Blood Culture - Blood, Arm, Right [[id number]]  (Normal) Collected: 12/28/21 1345    Specimen: Blood from Arm, Right Updated: 01/02/22 1400     Blood Culture No growth at 5 days    Manual Differential [[id number]]  (Abnormal) Collected: 01/02/22 0619    Specimen: Blood Updated: 01/02/22 0840     Neutrophil % 79.0 %      Lymphocyte % 7.0 %      Monocyte % 4.0 %      Bands %  4.0 %      Metamyelocyte % 1.0 %      Myelocyte % 5.0 %      Neutrophils Absolute 9.38 10*3/mm3      Lymphocytes Absolute 0.79 10*3/mm3      Monocytes Absolute 0.45 10*3/mm3      Anisocytosis Slight/1+     Toxic Granulation Slight/1+     Platelet Morphology Normal    CBC & Differential [[id number]]  (Abnormal) Collected: 01/02/22 0619    Specimen: Blood Updated: 01/02/22 0840    Narrative:      The following orders were created for panel order CBC & Differential.  Procedure                               Abnormality         Status                     ---------                               -----------         ------                     CBC Auto Differential[[id number]]        Abnormal            Final result               Scan Slide[[id number]]                                       Final result                 Please view results for these tests on the individual orders.    Scan Slide [[id number]] Collected: 01/02/22 0619    Specimen: Blood Updated: 01/02/22 0840     Scan Slide --     Comment: See Manual Differential Results       CBC Auto Differential [[id number]]  (Abnormal) Collected: 01/02/22 0619    Specimen: Blood Updated: 01/02/22 0840     WBC 11.30 10*3/mm3      RBC 3.27 10*6/mm3      Hemoglobin 10.1 g/dL      Hematocrit 29.9 %       MCV 91.3 fL      MCH 30.8 pg      MCHC 33.7 g/dL      RDW 16.6 %      RDW-SD 52.1 fl      MPV 6.7 fL      Platelets 288 10*3/mm3     Narrative:      The previously reported component NRBC is no longer being reported. Previous result was 0.0 /100 WBC (Reference Range: 0.0-0.2 /100 WBC) on 1/2/2022 at 0645 EST.    Phosphorus [[id number]]  (Normal) Collected: 01/02/22 0619    Specimen: Blood Updated: 01/02/22 0712     Phosphorus 2.8 mg/dL     Digoxin Level [[id number]]  (Abnormal) Collected: 01/02/22 0619    Specimen: Blood Updated: 01/02/22 0705     Digoxin 2.10 ng/mL     Protime-INR [[id number]]  (Abnormal) Collected: 01/02/22 0619    Specimen: Blood Updated: 01/02/22 0702     Protime 11.4 Seconds      INR 1.03    Basic Metabolic Panel [[id number]]  (Abnormal) Collected: 01/02/22 0619    Specimen: Blood Updated: 01/02/22 0701     Glucose 126 mg/dL      BUN 34 mg/dL      Creatinine 0.92 mg/dL      Sodium 141 mmol/L      Potassium 4.1 mmol/L      Chloride 109 mmol/L      CO2 21.0 mmol/L      Calcium 8.4 mg/dL      eGFR Non African Amer 57 mL/min/1.73      BUN/Creatinine Ratio 37.0     Anion Gap 11.0 mmol/L     Narrative:      GFR Normal >60  Chronic Kidney Disease <60  Kidney Failure <15      Magnesium [[id number]]  (Normal) Collected: 01/02/22 0619    Specimen: Blood Updated: 01/02/22 0701     Magnesium 2.2 mg/dL           Imaging Results (Last 24 Hours)     ** No results found for the last 24 hours. **      LAB RESULTS (LAST 7 DAYS)    CBC  Results from last 7 days   Lab Units 01/02/22 0619 01/01/22 0527 12/31/21  0413 12/30/21  0517 12/29/21  0416 12/28/21  1136   WBC 10*3/mm3 11.30* 12.40* 13.90* 11.10* 11.60* 13.00*   RBC 10*6/mm3 3.27* 3.15* 3.12* 3.18* 3.15* 3.74*   HEMOGLOBIN g/dL 10.1* 9.8* 9.6* 9.7* 9.4* 11.0*   HEMATOCRIT % 29.9* 28.5* 28.3* 29.3* 28.4* 34.4   MCV fL 91.3 90.4 90.9 92.0 90.2 92.0   PLATELETS 10*3/mm3 288 293 286 253 272 338       BMP  Results from last 7 days   Lab Units 01/02/22  0619  01/01/22  0527 12/31/21 0413 12/30/21  0636 12/30/21  0517 12/29/21 0416 12/28/21  1136   SODIUM mmol/L 141 140 139  --  136 140 138   POTASSIUM mmol/L 4.1 3.9 4.6  --  4.4 4.5 5.2   CHLORIDE mmol/L 109* 106 104  --  102 107 105   CO2 mmol/L 21.0* 21.0* 22.0  --  20.0* 22.0 20.0*   BUN mg/dL 34* 43* 49*  --  40* 29* 34*   CREATININE mg/dL 0.92 1.26* 1.87*  --  1.54* 1.21* 1.42*   GLUCOSE mg/dL 126* 145* 150*  --  153* 100* 90   MAGNESIUM mg/dL 2.2 2.2 2.2 2.1 2.1 2.0  --    PHOSPHORUS mg/dL 2.8 3.8 5.3*  --   --   --   --        CMP   Results from last 7 days   Lab Units 01/02/22  0619 01/01/22 0527 12/31/21 0413 12/30/21  0517 12/29/21  0416 12/28/21  1136   SODIUM mmol/L 141 140 139 136 140 138   POTASSIUM mmol/L 4.1 3.9 4.6 4.4 4.5 5.2   CHLORIDE mmol/L 109* 106 104 102 107 105   CO2 mmol/L 21.0* 21.0* 22.0 20.0* 22.0 20.0*   BUN mg/dL 34* 43* 49* 40* 29* 34*   CREATININE mg/dL 0.92 1.26* 1.87* 1.54* 1.21* 1.42*   GLUCOSE mg/dL 126* 145* 150* 153* 100* 90   ALBUMIN g/dL  --   --  3.20*  --   --   --    BILIRUBIN mg/dL  --   --  0.2  --   --   --    ALK PHOS U/L  --   --  52  --   --   --    AST (SGOT) U/L  --   --  9  --   --   --    ALT (SGPT) U/L  --   --  13  --   --   --          BNP        TROPONIN  Results from last 7 days   Lab Units 12/30/21  0636 12/29/21  0416   CK TOTAL U/L 60  --    TROPONIN T ng/mL  --  0.060*       CoAg  Results from last 7 days   Lab Units 01/02/22  0619 01/01/22  0527 12/31/21  0413 12/30/21  0517 12/29/21  0416 12/28/21  1136   INR  1.03* 1.07* 2.01 1.99* 3.40* 3.55*       Creatinine Clearance  Estimated Creatinine Clearance: 32.9 mL/min (by C-G formula based on SCr of 0.92 mg/dL).    ABG  Results from last 7 days   Lab Units 12/28/21  1123   PH, ARTERIAL pH units 7.422   PCO2, ARTERIAL mm Hg 32.3*   PO2 ART mm Hg 42.2*   O2 SATURATION ART % 79.4*   BASE EXCESS ART mmol/L -2.8*       Radiology  No radiology results for the last  day            EKG                                  I personally viewed and interpreted the patient's EKG/Telemetry data: Atrial fibrillation    ECHOCARDIOGRAM:    Results for orders placed during the hospital encounter of 12/28/21    Adult Transthoracic Echo Complete W/ Cont if Necessary Per Protocol    Interpretation Summary  · Estimated left ventricular EF = 60% Left ventricular systolic function is normal.    Indications  Chest pain    Technically satisfactory study.  Mitral valve is structurally normal.  Moderate mitral regurgitation.  Tricuspid valve is structurally normal.  Aortic valve is structurally normal.  Pulmonic valve could not be well visualized.  No evidence for tricuspid or aortic regurgitation is seen by Doppler study.  Biatrial enlargement  Left ventricle is normal in size and contractility with ejection fraction of 60%.  Concentric left ventricle hypertrophy.  Right ventricle is normal in size.  Atrial septum is intact.  Aorta is normal.  No pericardial effusion or intracardiac thrombus is seen.    Impression  Biatrial enlargement.  Moderate mitral regurgitation.  Left ventricular size and contractility is normal with ejection fraction of 60%.  Concentric left ventricle hypertrophy.          STRESS MYOVIEW:    Cardiolite (Tc-99m Sestamibi) stress test    CARDIAC CATHETERIZATION:            OTHER:         Assessment/Plan     Active Problems:    Elevated troponin    Dyspnea        ////////////////////////////  Impression  ===============  -Chest discomfort-suggestive of unstable angina.  Troponin level slightly elevated 0.0 53  EKG showed no acute changes.      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  Pacemaker was reprogrammed to DDDR due to long AV delays.  Status post dual-chamber pacemaker pulse generator replacement (Medtronic MRI compatible)-10/9/2020      -history of intermittent atrial fibrillation.  Patient is in Sinus  rhythm.     -Anticoagulation-patient is on Coumadin.     -moderate mitral regurgitation.  Echocardiogram 01/28/2019 revealed moderate mitral regurgitation left atrial enlargement normal left ventricle function.  Echocardiogram 8/16/2021 showed biatrial enlargement moderate mitral regurgitation and normal left ventricle function.   Echocardiogram--12/28/2021 revealed moderate mitral regurgitation biatrial enlargement normal left ventricular function.     -  Status post  subendocardial myocardial infarction -improved.     Cardiac catheterization 12/29/2015 revealed mild anterior wall hypokinesis with ejection fraction of 50%, 40% mid LAD 70-80% ostial diagonal branch disease (small caliber vessel) and 80% circumflex distal to a large marginal branch.      -status post left carotid endarterectomy cervical spine surgery left hip replacement and recent scalp surgery for carcinoma and grafting.     -hypertension-not well controlled.      -allergy to codeine, morphine and Demerol.     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     ===============    Plan  ================  Chest discomfort suggestive of unstable angina pectoris.  Mild elevation of troponin at 0.053  Trend troponin levels.  Follow-up EKGs.  Echocardiogram--12/28/2021 revealed moderate mitral regurgitation biatrial enlargement normal left ventricular function.  Lexiscan Cardiolite test-negative for myocardial ischemia 2/15/2021.    Renal dysfunction  BUN 34 creatinine 1.42  29/1.21-12/29/2021  BUN 40/creatinine 1.54-12/30/2021  49/1.87-12/31/2021  34/0.92-1/3/2022  Intravenous fluids  Renal consultation and follow-up appreciated  Observe closely     Anticoagulation  INR 3.54  3.4-12/29/2021  1.99-12/30/2021  2.0-12/31/2021  1.03-1/3/2022  Coumadin is on hold.    Status post pacemaker  Pacemaker site is normal.  Recent integration of the pacemaker revealed excellent pacing parameters.  Patient has intermittent atrial  fibrillation      Paroxysmal atrial fibrillation  Patient has converted and staying in sinus rhythm.     Mitral regurgitation-moderate.     Medications were reviewed and updated.    Patient to have cardiac catheterization and coronary arteriography.  Renal function is better.  INR is favorable.  Risks and benefits pros and cons of the procedure were discussed with patient including infection bleeding blood clot heart attack allergic reaction to the dye renal dysfunction.     Further plan will depend on patient's progress.  ////////////////////////////          Mignon Luke MD  01/03/22  06:33 EST

## 2022-01-03 NOTE — PLAN OF CARE
Goal Outcome Evaluation:  Plan of Care Reviewed With: patient   Progress: no change  Will have Cadiac Cath on 1/3/22 @ 1135 AM

## 2022-01-03 NOTE — THERAPY TREATMENT NOTE
Acute Care - Speech Language Pathology   Swallow Treatment Note  [place]     Patient Name: Alexandrea Gaxiola  : 1932  MRN: [id number]  Today's Date: 1/3/2022               Admit Date: 2021  Patient was not wearing a face mask during this therapy encounter. Therapist used appropriate personal protective equipment including mask, eye protection and gloves.  Mask used was standard procedure mask. Appropriate PPE was worn during the entire therapy session. Hand hygiene was completed before and after therapy session. Patient is not in enhanced droplet precautions.   Visit Dx:     ICD-10-CM ICD-9-CM   1. Dyspnea, unspecified type  R06.00 786.09   2. Hypoxia  R09.02 799.02   3. Pneumonia of both lower lobes due to infectious organism  J18.9 486   4. Elevated troponin  R77.8 790.6   5. 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   • Fibromyositis   • Hypertension   • Persons encountering health services in other specified circumstances   • Postlaminectomy syndrome, lumbar region   • Long term current use of anticoagulant therapy   • Acute hypoxemic respiratory failure (HCC)   • Pulmonary hypertension (HCC)   • Unintentional weight loss   • Abnormal weight loss   • Moderate malnutrition (HCC)   • General weakness   • Urinary tract infection without hematuria   • Elevated troponin   • Dyspnea     Past Medical History:   Diagnosis Date   • Atrial fibrillation (HCC)    • Chronic kidney disease    • Hypertension    • Melanoma (HCC)    •  MI (myocardial infarction) (HCC)      Past Surgical History:   Procedure Laterality Date   • CARDIAC CATHETERIZATION     • CARDIAC CATHETERIZATION  01/03/2022   • CARDIAC ELECTROPHYSIOLOGY PROCEDURE N/A 10/9/2020    Procedure: PPM generator change - dual;  Surgeon: Mignon Luke MD;  Location: [facility] INVASIVE LOCATION;  Service: Cardiovascular;  Laterality: N/A;   • CAROTID ENDARTERECTOMY     • CERVICAL SPINE SURGERY     • INSERT / REPLACE / REMOVE PACEMAKER     • SCALP LESION REMOVAL W/ FLAP AND SKIN GRAFT     • TOTAL HIP ARTHROPLASTY Left        SLP Recommendation and Plan            EDUCATION  The patient has been educated in the following areas:   Dysphagia (Swallowing Impairment) Oral Care/Hydration.        SLP GOALS     Row Name 01/03/22 1300          Oral Nutrition/Hydration Goal 1, SLP Pt to have follow up assessment, including possible instrumental, to ensure continued diet tolerance w/further recommendations as indicated.  -CB    Time Frame (Oral Nutrition/Hydration Goal 1, SLP) 2 days  -CB              Oral Nutrition/Hydration Goal 2, SLP Pt to tolerate safest & least restrictive diet recommendations w/no complications from aspiration.  -CB    Time Frame (Oral Nutrition/Hydration Goal 2, SLP) by discharge  -CB    Barriers (Oral Nutrition/Hydration Goal 2, SLP) Patient was seen for DT/meal at lunch. Patient had returned from heart cath that was scheduled at 11:35. Patient has upper denture and some lower natural teeth. Patient appeared to tolerate some soup and crackers that she was eating. Patient masticated effectively and cleared between bites. No wet vocal quality was observed. She has a tendency to prefer eating in a recline position due to back discomfort. Oral intake was minimal. Patient tolerated thin liquids via straw without any s/s of aspiration or complications despite reclined position. ST will continue to follow to assure tolerance and safety with swallowing current diet. Further  education on encouraging patient to eat more upright when eating.  -CB    Progress/Outcomes (Oral Nutrition/Hydration Goal 2, SLP) goal ongoing  -CB              Oral Nutrition/Hydration Goal, SLP Pt to be seen for a meal follow up to ensure use of safe swallow strategies and provide follow up edu re: VFSS.  -CB    Time Frame (Oral Nutrition/Hydration Goal, SLP) 2 days  -CB    Barriers (Oral Nutrition/Hydration Goal, SLP) --    Progress/Outcomes (Oral Nutrition/Hydration Goal, SLP) goal ongoing  -CB          User Key  (r) = Recorded By, (t) = Taken By, (c) = Cosigned By    Initials Name Provider Type    CP Mirta Benitez, SLP Speech and Language Pathologist    CB Elsa Munoz, SLP Speech and Language Pathologist                   Time Calculation:                MOR Obregon  1/3/2022

## 2022-01-04 LAB — QT INTERVAL: 446 MS

## 2022-01-04 NOTE — CASE MANAGEMENT/SOCIAL WORK
Case Management Discharge Note         Selected Continued Care - Discharged on 1/3/2022 Admission date: 12/28/2021 - Discharge disposition: Rehab Facility or Unit (DC - External)    Destination Coordination complete.    Service Provider Selected Services Address Phone Fax Patient Preferred    [facility]  Inpatient Rehabilitation [address] [place] [address] [phone] --                Final Discharge Disposition Code: 62 - inpatient rehab facility

## 2022-01-30 PROCEDURE — 93294 REM INTERROG EVL PM/LDLS PM: CPT | Performed by: INTERNAL MEDICINE

## 2022-01-30 PROCEDURE — 93296 REM INTERROG EVL PM/IDS: CPT | Performed by: INTERNAL MEDICINE

## 2022-03-16 ENCOUNTER — OFFICE VISIT (OUTPATIENT)
Dept: CARDIOLOGY | Facility: CLINIC | Age: 87
End: 2022-03-16

## 2022-03-16 ENCOUNTER — CLINICAL SUPPORT NO REQUIREMENTS (OUTPATIENT)
Dept: CARDIOLOGY | Facility: CLINIC | Age: 87
End: 2022-03-16

## 2022-03-16 VITALS
OXYGEN SATURATION: 91 % | HEART RATE: 60 BPM | DIASTOLIC BLOOD PRESSURE: 80 MMHG | SYSTOLIC BLOOD PRESSURE: 153 MMHG | BODY MASS INDEX: 27.55 KG/M2 | HEIGHT: 58 IN

## 2022-03-16 DIAGNOSIS — Z95.0 PACEMAKER: Primary | ICD-10-CM

## 2022-03-16 DIAGNOSIS — I49.5 TACHY-BRADY SYNDROME: ICD-10-CM

## 2022-03-16 DIAGNOSIS — I48.20 CHRONIC ATRIAL FIBRILLATION: ICD-10-CM

## 2022-03-16 DIAGNOSIS — I49.5 SICK SINUS SYNDROME: ICD-10-CM

## 2022-03-16 DIAGNOSIS — Z95.0 PRESENCE OF CARDIAC PACEMAKER: ICD-10-CM

## 2022-03-16 DIAGNOSIS — I48.91 ATRIAL FIBRILLATION, UNSPECIFIED TYPE: ICD-10-CM

## 2022-03-16 DIAGNOSIS — I48.11 LONGSTANDING PERSISTENT ATRIAL FIBRILLATION: ICD-10-CM

## 2022-03-16 DIAGNOSIS — Z79.01 LONG TERM (CURRENT) USE OF ANTICOAGULANTS: ICD-10-CM

## 2022-03-16 PROCEDURE — 93000 ELECTROCARDIOGRAM COMPLETE: CPT | Performed by: INTERNAL MEDICINE

## 2022-03-16 PROCEDURE — 93280 PM DEVICE PROGR EVAL DUAL: CPT | Performed by: INTERNAL MEDICINE

## 2022-03-16 PROCEDURE — 99214 OFFICE O/P EST MOD 30 MIN: CPT | Performed by: INTERNAL MEDICINE

## 2022-03-16 RX ORDER — DIGOXIN 125 MCG
125 TABLET ORAL
COMMUNITY

## 2022-03-16 NOTE — PROGRESS NOTES
Encounter Date:03/16/2022  Last seen 1/3/2022      Patient ID: Alexandrea Gaxiola is a 89 y.o. female.    Chief Complaint:  Status post pacemaker  Anticoagulation management  Moderate mitral regurgitation    History of Present Illness  Patient recently was admitted to [facility] with chest discomfort and had cardiac catheterization and coronary arteriography and was released home.    Since I have last seen, the patient has been without any chest discomfort ,shortness of breath, palpitations, dizziness or syncope.  Denies having any headache ,abdominal pain ,nausea, vomiting , diarrhea constipation, loss of weight or loss of appetite.  Denies having any excessive bruising ,hematuria or blood in the stool.    Review of all systems negative except as indicated.    Reviewed ROS.      Assessment and Plan       ////////////////////////////  Impression  ===============  -Chest discomfort-improved     Cardiac catheterization 1/3/2022 revealed  Left ventricle size and contractility is normal with ejection fraction of 60%.  3-4+ mitral regurgitation is present.  Significant left atrial enlargement is present.  50% LAD and 50% ostial RCA disease.     echocardiogram--12/28/2021 revealed moderate mitral regurgitation biatrial enlargement normal left ventricular function.  Lexiscan Cardiolite test-negative for myocardial ischemia 2/15/2021.     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  Pacemaker was reprogrammed to DDDR due to long AV delays.  Status post dual-chamber pacemaker pulse generator replacement ([employer] MRI compatible)-10/9/2020      -history of intermittent atrial fibrillation.  Patient is in Sinus rhythm.     -Anticoagulation-patient is on Coumadin.     -moderate mitral regurgitation.  Echocardiogram 01/28/2019 revealed moderate mitral regurgitation left atrial enlargement normal left ventricle function.  Echocardiogram 8/16/2021 showed biatrial  enlargement moderate mitral regurgitation and normal left ventricle function.   Echocardiogram--12/28/2021 revealed moderate mitral regurgitation biatrial enlargement normal left ventricular function.      -  Status post  subendocardial myocardial infarction -improved.     Cardiac catheterization 12/29/2015 revealed mild anterior wall hypokinesis with ejection fraction of 50%, 40% mid LAD 70-80% ostial diagonal branch disease (small caliber vessel) and 80% circumflex distal to a large marginal branch.      -status post left carotid endarterectomy cervical spine surgery left hip replacement and recent scalp surgery for carcinoma and grafting.     -hypertension-not well controlled.      -allergy to codeine, morphine and Demerol.     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     ===============    Plan  ================  Chest discomfort-improved  Cardiac catheterization 1/3/2022-as above  EKG showed atrial fibrillation intermittent ventricular pacemaker rhythm.     Renal dysfunction  34/0.92-1/3/2022  I  Anticoagulation  Continue Coumadin.  PT/INR on a monthly basis.  Observe for toxic effects.  Coumadin is on hold.     Status post pacemaker  Pacemaker site is normal.  Recent integration of the pacemaker revealed excellent pacing parameters.  Patient has intermittent but mostly persistent atrial fibrillation       Mitral regurgitation-moderate.     Medications were reviewed and updated.     Further plan will depend on patient's progress.  ////////////////////////////           Diagnosis Plan   1. Pacemaker  ECG 12 Lead   2. Long term (current) use of anticoagulants  ECG 12 Lead   3. Tachy-ramiro syndrome (HCC)  ECG 12 Lead   4. Longstanding persistent atrial fibrillation (HCC)  ECG 12 Lead   5. Presence of cardiac pacemaker  ECG 12 Lead   6. Chronic atrial fibrillation (HCC)  ECG 12 Lead   7. Sick sinus syndrome (HCC)  ECG 12 Lead   8. Atrial fibrillation, unspecified type (HCC)  ECG 12 Lead    LAB RESULTS (LAST 7 DAYS)    CBC        BMP        CMP         BNP        TROPONIN        CoAg        Creatinine Clearance  CrCl cannot be calculated (Patient's most recent lab result is older than the maximum 30 days allowed.).    ABG        Radiology  No radiology results for the last day                The following portions of the patient's history were reviewed and updated as appropriate: allergies, current medications, past family history, past medical history, past social history, past surgical history and problem list.    Review of Systems   Constitutional: Negative for malaise/fatigue.   Cardiovascular: Negative for chest pain, leg swelling, palpitations and syncope.   Respiratory: Negative for shortness of breath.    Skin: Negative for rash.   Gastrointestinal: Negative for nausea and vomiting.   Neurological: Negative for dizziness, light-headedness and numbness.   All other systems reviewed and are negative.        Current Outpatient Medications:   •  acetaminophen (TYLENOL) 325 MG tablet, Take 2 tablets by mouth Every 4 (Four) Hours As Needed for Mild Pain ., Disp:  , Rfl:   •  albuterol sulfate  (90 Base) MCG/ACT inhaler, Inhale 1 puff Every 4 (Four) Hours As Needed for Shortness of Air., Disp: , Rfl:   •  allopurinol (ZYLOPRIM) 100 MG tablet, Take 1 tablet by mouth Daily., Disp: , Rfl:   •  aspirin 81 MG EC tablet, Take 1 tablet by mouth Daily., Disp: , Rfl:   •  atorvastatin (LIPITOR) 20 MG tablet, Take 20 mg by mouth Daily., Disp: , Rfl:   •  digoxin (LANOXIN) 125 MCG tablet, Take 125 mcg by mouth Daily., Disp: , Rfl:   •  donepezil (ARICEPT) 5 MG tablet, Take 5 mg by mouth Every Night., Disp: , Rfl:   •  epoetin stewart-epbx (RETACRIT) 67196 UNIT/ML injection, Inject 2 mL under the skin into the appropriate area as directed Every 14 (Fourteen) Days. Indications: Anemia, Anemia associated with Chronic Kidney Failure, Disp: 6.6 mL, Rfl:   •  furosemide (LASIX) 20 MG tablet, Take 1 tablet by  mouth Daily., Disp: 90 tablet, Rfl: 1  •  guaiFENesin (MUCINEX) 600 MG 12 hr tablet, Take 1 tablet by mouth Every 12 (Twelve) Hours., Disp: , Rfl:   •  megestrol acetate (MEGACE) 400 MG/10ML suspension oral suspension, Take 10 mL by mouth Daily With Breakfast., Disp: , Rfl:   •  metoprolol tartrate (LOPRESSOR) 25 MG tablet, Take 25 mg by mouth Daily., Disp: , Rfl:   •  polyethylene glycol (MIRALAX) 17 g packet, Take 17 g by mouth Daily., Disp: , Rfl:   •  sertraline (ZOLOFT) 100 MG tablet, Take 100 mg by mouth Daily., Disp: , Rfl:   •  sodium bicarbonate 650 MG tablet, Take 2 tablets by mouth 3 (Three) Times a Day., Disp: , Rfl:   •  traMADol (ULTRAM) 50 MG tablet, Take 50 mg by mouth Every 8 (Eight) Hours As Needed for Moderate Pain ., Disp: , Rfl:   •  warfarin (COUMADIN) 2 MG tablet, Take 1 tablet by mouth Daily., Disp: 90 tablet, Rfl: 0    Allergies   Allergen Reactions   • Codeine Other (See Comments)     Hives, hallucinations   • Hydrocodone-Acetaminophen Other (See Comments)   • Meperidine Other (See Comments)     Hallucinations, hives   • Morphine Other (See Comments)     Hives, hallucinations   • Antihistamines, Loratadine-Type Itching       History reviewed. No pertinent family history.    Past Surgical History:   Procedure Laterality Date   • CARDIAC CATHETERIZATION     • CARDIAC CATHETERIZATION  01/03/2022   • CARDIAC CATHETERIZATION N/A 1/3/2022    Procedure: LEFT HEART CATH;  Surgeon: Mignon Luke MD;  Location: [facility] CATH INVASIVE LOCATION;  Service: Cardiovascular;  Laterality: N/A;   • CARDIAC CATHETERIZATION N/A 1/3/2022    Procedure: Coronary angiography;  Surgeon: Mignon Luke MD;  Location: [facility] CATH INVASIVE LOCATION;  Service: Cardiovascular;  Laterality: N/A;   • CARDIAC ELECTROPHYSIOLOGY PROCEDURE N/A 10/9/2020    Procedure: PPM generator change - dual;  Surgeon: Mignon Luke MD;  Location: [facility] CATH INVASIVE LOCATION;  Service: Cardiovascular;  Laterality: N/A;   •  "CAROTID ENDARTERECTOMY     • CERVICAL SPINE SURGERY     • INSERT / REPLACE / REMOVE PACEMAKER     • SCALP LESION REMOVAL W/ FLAP AND SKIN GRAFT     • TOTAL HIP ARTHROPLASTY Left        Past Medical History:   Diagnosis Date   • Atrial fibrillation (HCC)    • Chronic kidney disease    • Hypertension    • Melanoma (HCC)    • MI (myocardial infarction) (HCC)        History reviewed. No pertinent family history.    Social History     Socioeconomic History   • Marital status:    Tobacco Use   • Smoking status: Never Smoker   • Smokeless tobacco: Never Used   Vaping Use   • Vaping Use: Never used   Substance and Sexual Activity   • Alcohol use: Never   • Drug use: Never   • Sexual activity: Defer           ECG 12 Lead    Date/Time: 3/16/2022 3:22 PM  Performed by: Mignon Luke MD  Authorized by: Mignon Luke MD   Comparison: compared with previous ECG   Similar to previous ECG  Comparison to previous ECG: Atrial fibrillation with controlled ventricular response intermittent ventricular pacemaker rhythm 68/min nonspecific ST-T wave changes no ectopy no change from previous EKG.                Objective:       Physical Exam    /80   Pulse 60   Ht 147.3 cm (58\")   SpO2 91% Comment: ROOM AIR  BMI 27.55 kg/m²   The patient is alert, oriented and in no distress.    Vital signs as noted above.    Head and neck revealed no carotid bruits or jugular venous distension.  No thyromegaly or lymphadenopathy is present.    Lungs clear.  No wheezing.  Breath sounds are normal bilaterally.    Heart normal first and second heart sounds.  No murmur..  No pericardial rub is present.  No gallop is present.    Abdomen soft and nontender.  No organomegaly is present.    Extremities revealed good peripheral pulses without any pedal edema.    Skin warm and dry.  Pacemaker site looks normal.    Musculoskeletal system is grossly normal.    CNS grossly normal.    Reviewed and updated.        "

## 2022-04-26 ENCOUNTER — ANTICOAGULATION VISIT (OUTPATIENT)
Dept: CARDIOLOGY | Facility: CLINIC | Age: 87
End: 2022-04-26

## 2022-04-26 DIAGNOSIS — Z79.01 LONG TERM (CURRENT) USE OF ANTICOAGULANTS: ICD-10-CM

## 2022-04-26 DIAGNOSIS — I48.91 ATRIAL FIBRILLATION, UNSPECIFIED TYPE: Primary | ICD-10-CM

## (undated) DEVICE — SUT SILK 2/0 FS BLK 18IN 685G

## (undated) DEVICE — SKIN AFFIX SURG ADHESIVE 72/CS 0.55ML: Brand: MEDLINE

## (undated) DEVICE — VIOLET BRAIDED (POLYGLACTIN 910), SYNTHETIC ABSORBABLE SUTURE: Brand: COATED VICRYL

## (undated) DEVICE — PINNACLE INTRODUCER SHEATH: Brand: PINNACLE

## (undated) DEVICE — PK TRY HEART CATH 50

## (undated) DEVICE — GW DIAG EMERALD HEPCOAT MOVE JTIP STD .035 3MM 150CM

## (undated) DEVICE — CABL BIPOL W/ALLGTR CLIP/SM 12FT

## (undated) DEVICE — RADIFOCUS OBTURATOR: Brand: RADIFOCUS

## (undated) DEVICE — 3M™ PATIENT PLATE, CORDED, SPLIT, LARGE, 40 PER CASE, 1179: Brand: 3M™

## (undated) DEVICE — SKIN PREP TRAY W/CHG: Brand: MEDLINE INDUSTRIES, INC.

## (undated) DEVICE — CATH DIAG IMPULSE PIG 5F 100CM

## (undated) DEVICE — 3M™ STERI-STRIP™ REINFORCED ADHESIVE SKIN CLOSURES, R1547, 1/2 IN X 4 IN (12 MM X 100 MM), 6 STRIPS/ENVELOPE: Brand: 3M™ STERI-STRIP™

## (undated) DEVICE — UNDYED BRAIDED (POLYGLACTIN 910), SYNTHETIC ABSORBABLE SUTURE: Brand: COATED VICRYL

## (undated) DEVICE — PLASMABLADE PS200-040 4.0: Brand: PLASMABLADE™

## (undated) DEVICE — CATH DIAG IMPULSE FR4 5F 100CM

## (undated) DEVICE — PACEMAKER CDS: Brand: MEDLINE INDUSTRIES, INC.

## (undated) DEVICE — CATH DIAG IMPULSE FL4 5F 100CM

## (undated) DEVICE — ELECTRD DEFIB M/FUNC PROPADZ RADIOL 2PK